# Patient Record
Sex: FEMALE | Race: WHITE | NOT HISPANIC OR LATINO | Employment: OTHER | URBAN - METROPOLITAN AREA
[De-identification: names, ages, dates, MRNs, and addresses within clinical notes are randomized per-mention and may not be internally consistent; named-entity substitution may affect disease eponyms.]

---

## 2017-03-15 ENCOUNTER — HOSPITAL ENCOUNTER (OUTPATIENT)
Dept: FAMILY MEDICINE CLINIC | Facility: CLINIC | Age: 82
Setting detail: SPECIMEN
Discharge: HOME | End: 2017-03-15
Attending: FAMILY MEDICINE | Admitting: FAMILY MEDICINE

## 2017-03-15 LAB
ANION GAP SERPL CALC-SCNC: 15.1 MMOL/L (ref 10–20)
BUN SERPL-MCNC: 22 MG/DL (ref 8–20)
BUN/CREAT SERPL: 18.3 (ref 5.4–26.2)
CALCIUM SERPL-MCNC: 9.6 MG/DL (ref 8.9–10.3)
CHLORIDE SERPL-SCNC: 102 MMOL/L (ref 101–111)
CONV CO2: 22 MMOL/L (ref 22–32)
CREAT UR-MCNC: 1.2 MG/DL (ref 0.4–1)
GLUCOSE SERPL-MCNC: 108 MG/DL (ref 65–99)
POTASSIUM SERPL-SCNC: 4.1 MMOL/L (ref 3.6–5.1)
SODIUM SERPL-SCNC: 135 MMOL/L (ref 136–144)

## 2017-04-26 ENCOUNTER — HOSPITAL ENCOUNTER (OUTPATIENT)
Dept: FAMILY MEDICINE CLINIC | Facility: CLINIC | Age: 82
Setting detail: SPECIMEN
Discharge: HOME | End: 2017-04-26
Attending: FAMILY MEDICINE | Admitting: FAMILY MEDICINE

## 2017-04-26 LAB
ANION GAP SERPL CALC-SCNC: 17.4 MMOL/L (ref 10–20)
BUN SERPL-MCNC: 18 MG/DL (ref 8–20)
BUN/CREAT SERPL: 15 (ref 5.4–26.2)
CALCIUM SERPL-MCNC: 9.9 MG/DL (ref 8.9–10.3)
CHLORIDE SERPL-SCNC: 102 MMOL/L (ref 101–111)
CONV CO2: 22 MMOL/L (ref 22–32)
CREAT UR-MCNC: 1.2 MG/DL (ref 0.4–1)
GLUCOSE SERPL-MCNC: 115 MG/DL (ref 65–99)
POTASSIUM SERPL-SCNC: 4.4 MMOL/L (ref 3.6–5.1)
SODIUM SERPL-SCNC: 137 MMOL/L (ref 136–144)

## 2017-07-06 ENCOUNTER — HOSPITAL ENCOUNTER (OUTPATIENT)
Dept: LAB | Facility: HOSPITAL | Age: 82
Setting detail: SPECIMEN
Discharge: HOME | End: 2017-07-06
Attending: INTERNAL MEDICINE | Admitting: INTERNAL MEDICINE

## 2017-07-06 LAB
ANION GAP SERPL CALC-SCNC: 16.2 MMOL/L (ref 10–20)
BACTERIA SPEC AEROBE CULT: NORMAL
BILIRUB UR QL STRIP: NEGATIVE MG/DL
BUN SERPL-MCNC: 17 MG/DL (ref 8–20)
BUN/CREAT SERPL: 13.1 (ref 5.4–26.2)
CALCIUM SERPL-MCNC: 9.3 MG/DL (ref 8.9–10.3)
CASTS URNS QL MICRO: ABNORMAL /[LPF]
CHLORIDE SERPL-SCNC: 104 MMOL/L (ref 101–111)
COLOR UR: YELLOW
CONV BACTERIA IN URINE MICRO: ABNORMAL
CONV CLARITY OF URINE: ABNORMAL
CONV CO2: 21 MMOL/L (ref 22–32)
CONV HYALINE CASTS IN URINE MICRO: 1 /[LPF] (ref 0–5)
CONV PROTEIN IN URINE BY AUTOMATED TEST STRIP: NEGATIVE MG/DL
CONV SMALL ROUND CELLS: ABNORMAL /[HPF]
CONV UROBILINOGEN IN URINE BY AUTOMATED TEST STRIP: 0.2 MG/DL
CREAT UR-MCNC: 1.3 MG/DL (ref 0.4–1)
CULTURE INDICATED?: ABNORMAL
GLUCOSE SERPL-MCNC: 106 MG/DL (ref 65–99)
GLUCOSE UR QL: NEGATIVE MG/DL
HGB UR QL STRIP: ABNORMAL
KETONES UR QL STRIP: NEGATIVE MG/DL
LEUKOCYTE ESTERASE UR QL STRIP: ABNORMAL
Lab: NORMAL
MICRO REPORT STATUS: NORMAL
NITRITE UR QL STRIP: NEGATIVE
PH UR STRIP.AUTO: 7 [PH] (ref 4.5–8)
POTASSIUM SERPL-SCNC: 4.2 MMOL/L (ref 3.6–5.1)
RBC #/AREA URNS HPF: 1 /[HPF] (ref 0–3)
SODIUM SERPL-SCNC: 137 MMOL/L (ref 136–144)
SP GR UR: 1.01 (ref 1–1.03)
SPECIMEN SOURCE: NORMAL
SPERM URNS QL MICRO: ABNORMAL /[HPF]
SQUAMOUS SPT QL MICRO: 2 /[HPF] (ref 0–5)
UNIDENT CRYS URNS QL MICRO: ABNORMAL /[HPF]
WBC #/AREA URNS HPF: ABNORMAL /[HPF] (ref 0–5)
YEAST SPEC QL WET PREP: ABNORMAL /[HPF]

## 2017-09-15 ENCOUNTER — HOSPITAL ENCOUNTER (OUTPATIENT)
Dept: FAMILY MEDICINE CLINIC | Facility: CLINIC | Age: 82
Setting detail: SPECIMEN
Discharge: HOME | End: 2017-09-15
Attending: FAMILY MEDICINE | Admitting: FAMILY MEDICINE

## 2017-11-16 ENCOUNTER — HOSPITAL ENCOUNTER (OUTPATIENT)
Dept: ORTHOPEDIC SURGERY | Facility: CLINIC | Age: 82
Discharge: HOME | End: 2017-11-16
Attending: PODIATRIST | Admitting: PODIATRIST

## 2018-01-05 ENCOUNTER — HOSPITAL ENCOUNTER (OUTPATIENT)
Dept: GENERAL RADIOLOGY | Facility: HOSPITAL | Age: 83
Discharge: HOME | End: 2018-01-05
Attending: FAMILY MEDICINE | Admitting: FAMILY MEDICINE

## 2018-05-02 ENCOUNTER — HOSPITAL ENCOUNTER (OUTPATIENT)
Dept: GENERAL RADIOLOGY | Facility: HOSPITAL | Age: 83
Discharge: HOME | End: 2018-05-02

## 2018-05-14 ENCOUNTER — HOSPITAL ENCOUNTER (OUTPATIENT)
Dept: FAMILY MEDICINE CLINIC | Facility: CLINIC | Age: 83
Setting detail: SPECIMEN
Discharge: HOME | End: 2018-05-14

## 2018-05-14 LAB
ANION GAP SERPL CALC-SCNC: 11.7 MMOL/L (ref 10–20)
BASOPHILS # BLD AUTO: 0 10*3/UL (ref 0–0.2)
BASOPHILS NFR BLD AUTO: 0 % (ref 0–2)
BUN SERPL-MCNC: 23 MG/DL (ref 8–20)
BUN/CREAT SERPL: 20.9 (ref 5.4–26.2)
CALCIUM SERPL-MCNC: 10.4 MG/DL (ref 8.9–10.3)
CHLORIDE SERPL-SCNC: 105 MMOL/L (ref 101–111)
CONV CO2: 24 MMOL/L (ref 22–32)
CREAT UR-MCNC: 1.1 MG/DL (ref 0.4–1)
DIFFERENTIAL METHOD BLD: (no result)
EOSINOPHIL # BLD AUTO: 0.1 10*3/UL (ref 0–0.3)
EOSINOPHIL # BLD AUTO: 1 % (ref 0–3)
ERYTHROCYTE [DISTWIDTH] IN BLOOD BY AUTOMATED COUNT: 15.1 % (ref 11.5–14.5)
GLUCOSE SERPL-MCNC: 103 MG/DL (ref 65–99)
HCT VFR BLD AUTO: 39.8 % (ref 35–49)
HGB BLD-MCNC: 13.2 G/DL (ref 12–15)
LYMPHOCYTES # BLD AUTO: 2.7 10*3/UL (ref 0.8–4.8)
LYMPHOCYTES NFR BLD AUTO: 26 % (ref 18–42)
MCH RBC QN AUTO: 28.8 PG (ref 26–32)
MCHC RBC AUTO-ENTMCNC: 33.2 G/DL (ref 32–36)
MCV RBC AUTO: 86.7 FL (ref 80–94)
MONOCYTES # BLD AUTO: 1 10*3/UL (ref 0.1–1.3)
MONOCYTES NFR BLD AUTO: 10 % (ref 2–11)
NEUTROPHILS # BLD AUTO: 6.3 10*3/UL (ref 2.3–8.6)
NEUTROPHILS NFR BLD AUTO: 63 % (ref 50–75)
NRBC BLD AUTO-RTO: 0 /100{WBCS}
NRBC/RBC NFR BLD MANUAL: 0 10*3/UL
PLATELET # BLD AUTO: 319 10*3/UL (ref 150–450)
PMV BLD AUTO: 8.9 FL (ref 7.4–10.4)
POTASSIUM SERPL-SCNC: 4.7 MMOL/L (ref 3.6–5.1)
RBC # BLD AUTO: 4.59 10*6/UL (ref 4–5.4)
SODIUM SERPL-SCNC: 136 MMOL/L (ref 136–144)
WBC # BLD AUTO: 10.1 10*3/UL (ref 4.5–11.5)

## 2018-08-02 ENCOUNTER — HOSPITAL ENCOUNTER (OUTPATIENT)
Dept: FAMILY MEDICINE CLINIC | Facility: CLINIC | Age: 83
Setting detail: SPECIMEN
Discharge: HOME | End: 2018-08-02
Attending: PHYSICIAN ASSISTANT | Admitting: PHYSICIAN ASSISTANT

## 2018-08-02 LAB
ANION GAP SERPL CALC-SCNC: 16.3 MMOL/L (ref 10–20)
BUN SERPL-MCNC: 22 MG/DL (ref 8–20)
BUN/CREAT SERPL: 18.3 (ref 5.4–26.2)
CALCIUM SERPL-MCNC: 9.5 MG/DL (ref 8.9–10.3)
CHLORIDE SERPL-SCNC: 102 MMOL/L (ref 101–111)
CONV CO2: 20 MMOL/L (ref 22–32)
CREAT UR-MCNC: 1.2 MG/DL (ref 0.4–1)
GLUCOSE SERPL-MCNC: 102 MG/DL (ref 65–99)
POTASSIUM SERPL-SCNC: 4.3 MMOL/L (ref 3.6–5.1)
SODIUM SERPL-SCNC: 134 MMOL/L (ref 136–144)

## 2018-08-09 ENCOUNTER — HOSPITAL ENCOUNTER (OUTPATIENT)
Dept: FAMILY MEDICINE CLINIC | Facility: CLINIC | Age: 83
Setting detail: SPECIMEN
Discharge: HOME | End: 2018-08-09
Attending: FAMILY MEDICINE | Admitting: FAMILY MEDICINE

## 2018-12-14 ENCOUNTER — HOSPITAL ENCOUNTER (OUTPATIENT)
Dept: FAMILY MEDICINE CLINIC | Facility: CLINIC | Age: 83
Setting detail: SPECIMEN
Discharge: HOME | End: 2018-12-14
Attending: FAMILY MEDICINE | Admitting: FAMILY MEDICINE

## 2018-12-14 LAB
ANION GAP SERPL CALC-SCNC: 13 MMOL/L (ref 10–20)
BUN SERPL-MCNC: 20 MG/DL (ref 8–20)
BUN/CREAT SERPL: 18.2 (ref 5.4–26.2)
CALCIUM SERPL-MCNC: 9.4 MG/DL (ref 8.9–10.3)
CHLORIDE SERPL-SCNC: 105 MMOL/L (ref 101–111)
CONV CO2: 22 MMOL/L (ref 22–32)
CREAT UR-MCNC: 1.1 MG/DL (ref 0.4–1)
GLUCOSE SERPL-MCNC: 87 MG/DL (ref 65–99)
POTASSIUM SERPL-SCNC: 4 MMOL/L (ref 3.6–5.1)
SODIUM SERPL-SCNC: 136 MMOL/L (ref 136–144)

## 2019-02-28 ENCOUNTER — HOSPITAL ENCOUNTER (OUTPATIENT)
Dept: FAMILY MEDICINE CLINIC | Facility: CLINIC | Age: 84
Setting detail: SPECIMEN
Discharge: HOME | End: 2019-02-28
Attending: FAMILY MEDICINE | Admitting: FAMILY MEDICINE

## 2019-02-28 LAB
ALBUMIN SERPL-MCNC: 4 G/DL (ref 3.5–4.8)
ALBUMIN/GLOB SERPL: 1.3 {RATIO} (ref 1–1.7)
ALP SERPL-CCNC: 72 IU/L (ref 32–91)
ALT SERPL-CCNC: 22 IU/L (ref 14–54)
ANION GAP SERPL CALC-SCNC: 16.1 MMOL/L (ref 10–20)
AST SERPL-CCNC: 39 IU/L (ref 15–41)
BILIRUB SERPL-MCNC: 0.6 MG/DL (ref 0.3–1.2)
BUN SERPL-MCNC: 18 MG/DL (ref 8–20)
BUN/CREAT SERPL: 16.4 (ref 5.4–26.2)
CALCIUM SERPL-MCNC: 9.5 MG/DL (ref 8.9–10.3)
CHLORIDE SERPL-SCNC: 104 MMOL/L (ref 101–111)
CHOLEST SERPL-MCNC: 223 MG/DL
CHOLEST/HDLC SERPL: 5 {RATIO}
CONV CO2: 18 MMOL/L (ref 22–32)
CONV LDL CHOLESTEROL DIRECT: 125 MG/DL (ref 0–100)
CONV TOTAL PROTEIN: 7.1 G/DL (ref 6.1–7.9)
CREAT UR-MCNC: 1.1 MG/DL (ref 0.4–1)
GLOBULIN UR ELPH-MCNC: 3.1 G/DL (ref 2.5–3.8)
GLUCOSE SERPL-MCNC: 117 MG/DL (ref 65–99)
HDLC SERPL-MCNC: 45 MG/DL
LDLC/HDLC SERPL: 2.8 {RATIO}
LIPID INTERPRETATION: ABNORMAL
POTASSIUM SERPL-SCNC: 4.1 MMOL/L (ref 3.6–5.1)
SODIUM SERPL-SCNC: 134 MMOL/L (ref 136–144)
TRIGL SERPL-MCNC: 391 MG/DL
VLDLC SERPL CALC-MCNC: 53.1 MG/DL

## 2019-09-16 ENCOUNTER — LAB (OUTPATIENT)
Dept: FAMILY MEDICINE CLINIC | Facility: CLINIC | Age: 84
End: 2019-09-16

## 2019-09-16 ENCOUNTER — OFFICE VISIT (OUTPATIENT)
Dept: FAMILY MEDICINE CLINIC | Facility: CLINIC | Age: 84
End: 2019-09-16

## 2019-09-16 VITALS
DIASTOLIC BLOOD PRESSURE: 92 MMHG | WEIGHT: 231.2 LBS | HEART RATE: 59 BPM | BODY MASS INDEX: 40.96 KG/M2 | SYSTOLIC BLOOD PRESSURE: 133 MMHG | TEMPERATURE: 97 F | HEIGHT: 63 IN | OXYGEN SATURATION: 96 %

## 2019-09-16 DIAGNOSIS — M79.602 LEFT ARM PAIN: ICD-10-CM

## 2019-09-16 DIAGNOSIS — Z63.8 STRESS DUE TO FAMILY TENSION: ICD-10-CM

## 2019-09-16 DIAGNOSIS — E78.2 MIXED HYPERLIPIDEMIA: Primary | ICD-10-CM

## 2019-09-16 DIAGNOSIS — I10 ESSENTIAL HYPERTENSION: ICD-10-CM

## 2019-09-16 DIAGNOSIS — Z23 NEED FOR VACCINATION: ICD-10-CM

## 2019-09-16 DIAGNOSIS — E78.2 MIXED HYPERLIPIDEMIA: ICD-10-CM

## 2019-09-16 DIAGNOSIS — R73.9 HYPERGLYCEMIA: ICD-10-CM

## 2019-09-16 PROBLEM — N28.9 RENAL INSUFFICIENCY: Status: ACTIVE | Noted: 2017-03-15

## 2019-09-16 PROBLEM — C64.9 RENAL CANCER: Status: ACTIVE | Noted: 2017-03-15

## 2019-09-16 PROBLEM — E11.9 TYPE 2 DIABETES MELLITUS WITHOUT COMPLICATIONS (HCC): Status: ACTIVE | Noted: 2017-09-17

## 2019-09-16 PROBLEM — M79.673 PAIN OF FOOT: Status: ACTIVE | Noted: 2017-11-16

## 2019-09-16 PROBLEM — E78.5 HYPERLIPIDEMIA: Status: ACTIVE | Noted: 2019-09-16

## 2019-09-16 PROBLEM — J98.01 BRONCHOSPASM: Status: ACTIVE | Noted: 2019-01-09

## 2019-09-16 PROBLEM — J45.909 ASTHMA: Status: ACTIVE | Noted: 2018-01-10

## 2019-09-16 PROBLEM — M79.10 MUSCLE ACHE: Status: ACTIVE | Noted: 2019-01-09

## 2019-09-16 PROBLEM — H57.12 EYE PAIN, LEFT: Status: ACTIVE | Noted: 2019-02-28

## 2019-09-16 PROBLEM — G47.33 OSA (OBSTRUCTIVE SLEEP APNEA): Status: ACTIVE | Noted: 2019-09-16

## 2019-09-16 PROBLEM — R05.9 COUGH: Status: ACTIVE | Noted: 2018-05-02

## 2019-09-16 PROBLEM — Z63.9 FAMILY TENSION: Status: ACTIVE | Noted: 2018-12-14

## 2019-09-16 PROBLEM — F41.9 ANXIETY: Status: ACTIVE | Noted: 2018-05-14

## 2019-09-16 PROBLEM — M76.829 TIBIALIS POSTERIOR TENDINITIS: Status: ACTIVE | Noted: 2017-11-16

## 2019-09-16 PROBLEM — N18.30 CHRONIC RENAL INSUFFICIENCY, STAGE III (MODERATE) (HCC): Status: ACTIVE | Noted: 2018-08-09

## 2019-09-16 PROBLEM — M19.079 ARTHRITIS OF FOOT: Status: ACTIVE | Noted: 2017-11-16

## 2019-09-16 PROBLEM — R60.0 EDEMA OF LOWER EXTREMITY: Status: ACTIVE | Noted: 2018-07-27

## 2019-09-16 PROBLEM — R25.2 MUSCLE CRAMPS: Status: ACTIVE | Noted: 2017-03-15

## 2019-09-16 LAB
ALBUMIN SERPL-MCNC: 4 G/DL (ref 3.5–4.8)
ALBUMIN/GLOB SERPL: 1.4 G/DL (ref 1–1.7)
ALP SERPL-CCNC: 75 U/L (ref 32–91)
ALT SERPL W P-5'-P-CCNC: 26 U/L (ref 14–54)
ANION GAP SERPL CALCULATED.3IONS-SCNC: 16.4 MMOL/L (ref 5–15)
ARTICHOKE IGE QN: 213 MG/DL (ref 0–100)
AST SERPL-CCNC: 38 U/L (ref 15–41)
BILIRUB SERPL-MCNC: 0.6 MG/DL (ref 0.3–1.2)
BUN BLD-MCNC: 20 MG/DL (ref 8–20)
BUN/CREAT SERPL: 18.2 (ref 5.4–26.2)
CALCIUM SPEC-SCNC: 9.6 MG/DL (ref 8.9–10.3)
CHLORIDE SERPL-SCNC: 104 MMOL/L (ref 101–111)
CHOLEST SERPL-MCNC: 383 MG/DL
CO2 SERPL-SCNC: 22 MMOL/L (ref 22–32)
CREAT BLD-MCNC: 1.1 MG/DL (ref 0.4–1)
GFR SERPL CREATININE-BSD FRML MDRD: 47 ML/MIN/1.73
GLOBULIN UR ELPH-MCNC: 2.8 GM/DL (ref 2.5–3.8)
GLUCOSE BLD-MCNC: 114 MG/DL (ref 65–99)
HBA1C MFR BLD: 5.9 % (ref 3.5–5.6)
HDLC SERPL QL: 9.58
HDLC SERPL-MCNC: 40 MG/DL
LDLC/HDLC SERPL: 4.74 {RATIO}
POTASSIUM BLD-SCNC: 4.4 MMOL/L (ref 3.6–5.1)
PROT SERPL-MCNC: 6.8 G/DL (ref 6.1–7.9)
SODIUM BLD-SCNC: 138 MMOL/L (ref 136–144)
TRIGL SERPL-MCNC: 768 MG/DL
VLDLC SERPL-MCNC: 153.6 MG/DL

## 2019-09-16 PROCEDURE — G0008 ADMIN INFLUENZA VIRUS VAC: HCPCS | Performed by: FAMILY MEDICINE

## 2019-09-16 PROCEDURE — 83036 HEMOGLOBIN GLYCOSYLATED A1C: CPT | Performed by: FAMILY MEDICINE

## 2019-09-16 PROCEDURE — G0009 ADMIN PNEUMOCOCCAL VACCINE: HCPCS | Performed by: FAMILY MEDICINE

## 2019-09-16 PROCEDURE — 99214 OFFICE O/P EST MOD 30 MIN: CPT | Performed by: FAMILY MEDICINE

## 2019-09-16 PROCEDURE — 36415 COLL VENOUS BLD VENIPUNCTURE: CPT | Performed by: FAMILY MEDICINE

## 2019-09-16 PROCEDURE — 90732 PPSV23 VACC 2 YRS+ SUBQ/IM: CPT | Performed by: FAMILY MEDICINE

## 2019-09-16 PROCEDURE — 80053 COMPREHEN METABOLIC PANEL: CPT | Performed by: FAMILY MEDICINE

## 2019-09-16 PROCEDURE — 80061 LIPID PANEL: CPT | Performed by: FAMILY MEDICINE

## 2019-09-16 PROCEDURE — 90674 CCIIV4 VAC NO PRSV 0.5 ML IM: CPT | Performed by: FAMILY MEDICINE

## 2019-09-16 RX ORDER — HYDRALAZINE HYDROCHLORIDE 100 MG/1
100 TABLET, FILM COATED ORAL EVERY 12 HOURS PRN
COMMUNITY
Start: 2018-09-21 | End: 2022-05-10

## 2019-09-16 RX ORDER — ALPRAZOLAM 0.5 MG/1
0.5 TABLET ORAL 2 TIMES DAILY PRN
Qty: 30 TABLET | Refills: 0 | Status: SHIPPED | OUTPATIENT
Start: 2019-09-16 | End: 2020-06-29 | Stop reason: SDUPTHER

## 2019-09-16 RX ORDER — AMLODIPINE BESYLATE 10 MG/1
10 TABLET ORAL EVERY 24 HOURS
Qty: 90 TABLET | Refills: 3 | Status: SHIPPED | OUTPATIENT
Start: 2019-09-16 | End: 2020-08-12

## 2019-09-16 RX ORDER — ALBUTEROL SULFATE 90 UG/1
2 AEROSOL, METERED RESPIRATORY (INHALATION) EVERY 4 HOURS PRN
COMMUNITY
Start: 2018-01-10 | End: 2021-02-05

## 2019-09-16 RX ORDER — ALPRAZOLAM 0.5 MG/1
TABLET ORAL
COMMUNITY
Start: 2018-05-14 | End: 2019-09-16 | Stop reason: SDUPTHER

## 2019-09-16 RX ORDER — AMLODIPINE BESYLATE 10 MG/1
TABLET ORAL EVERY 24 HOURS
COMMUNITY
Start: 2013-02-05 | End: 2019-09-16 | Stop reason: SDUPTHER

## 2019-09-16 SDOH — SOCIAL STABILITY - SOCIAL INSECURITY: OTHER SPECIFIED PROBLEMS RELATED TO PRIMARY SUPPORT GROUP: Z63.8
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
## 2019-09-18 ENCOUNTER — TELEPHONE (OUTPATIENT)
Dept: FAMILY MEDICINE CLINIC | Facility: CLINIC | Age: 84
End: 2019-09-18

## 2019-09-19 RX ORDER — ATORVASTATIN CALCIUM 10 MG/1
10 TABLET, FILM COATED ORAL DAILY
Qty: 90 TABLET | Refills: 3 | Status: SHIPPED | OUTPATIENT
Start: 2019-09-19 | End: 2019-11-25 | Stop reason: SINTOL

## 2019-11-08 ENCOUNTER — OFFICE VISIT (OUTPATIENT)
Dept: CARDIOLOGY | Facility: CLINIC | Age: 84
End: 2019-11-08

## 2019-11-08 VITALS — BODY MASS INDEX: 41.27 KG/M2 | SYSTOLIC BLOOD PRESSURE: 178 MMHG | DIASTOLIC BLOOD PRESSURE: 98 MMHG | WEIGHT: 233 LBS

## 2019-11-08 DIAGNOSIS — R53.82 CHRONIC FATIGUE: ICD-10-CM

## 2019-11-08 DIAGNOSIS — I50.32 CHRONIC DIASTOLIC (CONGESTIVE) HEART FAILURE (HCC): ICD-10-CM

## 2019-11-08 DIAGNOSIS — N28.89 HYPERTENSION SECONDARY TO OTHER RENAL DISORDERS: Primary | ICD-10-CM

## 2019-11-08 DIAGNOSIS — G47.33 SLEEP APNEA, OBSTRUCTIVE: ICD-10-CM

## 2019-11-08 DIAGNOSIS — E78.2 MIXED HYPERLIPIDEMIA: ICD-10-CM

## 2019-11-08 DIAGNOSIS — I15.1 HYPERTENSION SECONDARY TO OTHER RENAL DISORDERS: Primary | ICD-10-CM

## 2019-11-08 DIAGNOSIS — R06.09 DYSPNEA ON EXERTION: ICD-10-CM

## 2019-11-08 PROCEDURE — 93000 ELECTROCARDIOGRAM COMPLETE: CPT | Performed by: INTERNAL MEDICINE

## 2019-11-08 PROCEDURE — 99204 OFFICE O/P NEW MOD 45 MIN: CPT | Performed by: INTERNAL MEDICINE

## 2019-11-08 NOTE — PROGRESS NOTES
CC--exertional dyspnea, hypertension, chronic kidney disease, hyperlipidemia    Sub--  84-year-old female patient came as a self-referral--she complains of exertional shortness of breath with this class III shortness of breath--chronic medical problems include solitary kidney with prior nephrectomy, chronic kidney disease stage III, hypertension, hyperlipidemia and sleep apnea  Patient had issues with treatment of sleep apnea in the past and she is currently using a different mask and she is under the care of an ENT surgeon  She is under tremendous stress from social situation with her   She complains of exertional shortness of breath and significant fatigue and daytime sleepiness  She had a prior cardiac evaluation several years ago according to her without significant coronary artery disease and heart catheterization done more than 10 years ago according to her  She denies any TIA or stroke and no prior history of any peripheral vascular disease  She claims that her blood pressure is well controlled on multiple home recordings      Past Medical History:   Diagnosis Date   • Breast nodule 2013    Left breast nodule (fibrocystic disease , no malignancy)    • Hyperlipidemia    • Hypertension      Past Surgical History:   Procedure Laterality Date   • BREAST BIOPSY Left 05/21/2013    Benign fibrocystic disease ,Abstracted from [facility].   • CARDIAC CATHETERIZATION     • D&C AND LAPAROSCOPY     • FULGURATION ENDOMETRIOSIS      surgery   • NEPHRECTOMY Right      Family History   Problem Relation Age of Onset   • Heart disease Other    • Hyperlipidemia Other    • Diabetes Other    • Hypertension Other      Social History     Tobacco Use   • Smoking status: Never Smoker   • Smokeless tobacco: Never Used   Substance Use Topics   • Alcohol use: No     Frequency: Never   • Drug use: No       (Not in a hospital admission)  Allergies:  Patient has no known allergies.    Review of Systems   General:  positive for  fatigue and tiredness  Eyes: No redness  Cardiovascular: No chest pain, no palpitations  Respiratory:   positive for class 3 shortness of breath  Gastrointestinal: No nausea or vomiting, bleeding  Genitourinary: no hematuria or dysuria  Musculoskeletal: No arthralgia or myalgia  Skin: No rash  Neurologic: No numbness, tingling, syncope  Hematologic/Lymphatic: No abnormal bleeding      Physical Exam  VITALS REVIEWED--blood pressure 178/98, pulse rate is 65 patient is afebrile respiration 12 times a minute    General:      well developed, well nourished, in no acute distress.    Head:      normocephalic and atraumatic.    Eyes:      PERRL/EOM intact, conjunctiva and sclera clear with out nystagmus.    Neck:      no masses, thyromegaly,  trachea central with normal respiratory effort and PMI non displaced   Lungs:      clear bilaterally to auscultation.    Heart:      Sinus rhythm without any murmurs gallops or rubs  Msk:      no deformity or scoliosis noted of thoracic or lumbar spine.    Pulses:      pulses normal in all 4 extremities.    Extremities:       no cyanosis or clubbing--trace left pedal edema and trace right pedal edema.    Neurologic:      no focal deficits.   alert oriented x3  Skin:      intact without lesions or rashes.    Psych:      alert and cooperative; normal mood and affect; normal attention span and concentration.        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

## 2019-11-19 ENCOUNTER — TELEPHONE (OUTPATIENT)
Dept: CARDIOLOGY | Facility: CLINIC | Age: 84
End: 2019-11-19

## 2019-11-19 NOTE — TELEPHONE ENCOUNTER
Called pt regarding stress test, and echo.  Pt states she does not want to have them done right now.  She had testing done at [facility] on 9/25/13 and 9/26/2013.  The results are in care everywhere to be viewed only.  Pt did not want to do the echo she was concerned she would have to stop the echo as the last time she had one she did do to the feeling like her lungs were being punctured. Pt will call if she changes her mind.  I will discuss with Dr Ty to see if he would like to encourage her.

## 2019-11-20 ENCOUNTER — APPOINTMENT (OUTPATIENT)
Dept: CARDIOLOGY | Facility: HOSPITAL | Age: 84
End: 2019-11-20

## 2019-11-25 ENCOUNTER — OFFICE VISIT (OUTPATIENT)
Dept: FAMILY MEDICINE CLINIC | Facility: CLINIC | Age: 84
End: 2019-11-25

## 2019-11-25 VITALS
TEMPERATURE: 98.3 F | HEART RATE: 63 BPM | DIASTOLIC BLOOD PRESSURE: 85 MMHG | OXYGEN SATURATION: 91 % | SYSTOLIC BLOOD PRESSURE: 157 MMHG | BODY MASS INDEX: 41.45 KG/M2 | WEIGHT: 234 LBS

## 2019-11-25 DIAGNOSIS — E78.5 HYPERLIPIDEMIA, UNSPECIFIED HYPERLIPIDEMIA TYPE: Primary | ICD-10-CM

## 2019-11-25 DIAGNOSIS — M79.601 BILATERAL ARM PAIN: ICD-10-CM

## 2019-11-25 DIAGNOSIS — M79.602 BILATERAL ARM PAIN: ICD-10-CM

## 2019-11-25 PROCEDURE — 99213 OFFICE O/P EST LOW 20 MIN: CPT | Performed by: NURSE PRACTITIONER

## 2019-11-25 RX ORDER — EZETIMIBE 10 MG/1
10 TABLET ORAL DAILY
Qty: 30 TABLET | Refills: 0 | Status: SHIPPED | OUTPATIENT
Start: 2019-11-25 | End: 2019-12-14

## 2019-11-25 NOTE — PROGRESS NOTES
Subjective   Leandra Nuñez is a 84 y.o. female.       HPI   Pt. Is here today with c/o intermittent bilateral arm pain.  She feels it is related to her statin medication.  She says the symptoms started when she started this med.  She has previously been on other statins and had the same issues. Once she stopped the med her symptoms resolved.  She would like to discuss an alternative medication.   The pains are from her shoulders to her wrists; come and go but occur daily.  No numbness or tingling.  She hasn't lost  strength.  Denies any redness, bruising or swelling.  No known injury.    Denies any CP; palpations; SOA: dizziness headache; trouble with vision.        The following portions of the patient's history were reviewed and updated as appropriate: allergies, current medications, past family history, past medical history, past social history, past surgical history and problem list.    Review of Systems   Constitutional: Negative for activity change, appetite change, chills, diaphoresis, fatigue, fever, unexpected weight gain and unexpected weight loss.   Respiratory: Negative for cough, shortness of breath and wheezing.    Cardiovascular: Negative for chest pain, palpitations and leg swelling.   Musculoskeletal: Positive for myalgias (bilateral arm pains; intermittent). Negative for arthralgias, neck pain and neck stiffness.   Skin: Negative for dry skin, rash, skin lesions and bruise.   Neurological: Negative for tremors, weakness and numbness.   Hematological: Negative for adenopathy.   Psychiatric/Behavioral: Negative for depressed mood. The patient is not nervous/anxious.        Objective   Physical Exam   Constitutional: She is oriented to person, place, and time. She appears well-developed and well-nourished. No distress.   Neck: Normal range of motion. Neck supple. No thyromegaly present.   Cardiovascular: Normal rate, regular rhythm, normal heart sounds and intact distal pulses.   No murmur  heard.  Pulmonary/Chest: Effort normal and breath sounds normal. No respiratory distress.   Musculoskeletal: Normal range of motion. She exhibits no edema, tenderness or deformity.   Lymphadenopathy:     She has no cervical adenopathy.   Neurological: She is alert and oriented to person, place, and time. She displays normal reflexes. No sensory deficit.   Skin: Skin is warm and dry. Capillary refill takes less than 2 seconds. No rash noted. No erythema.   Psychiatric: She has a normal mood and affect.   Vitals reviewed.        Assessment/Plan   Leandra was seen today for arm pain.    Diagnoses and all orders for this visit:    Hyperlipidemia, unspecified hyperlipidemia type  Comments:  Atorvastatin d/c'd; will start Zetia.  Pt. to work on healthy diet; exercise; weight loss.   Orders:  -     ezetimibe (ZETIA) 10 MG tablet; Take 1 tablet by mouth Daily.    Bilateral arm pain  Comments:  Will d/c atorvastatin.  Will start Zetia.  Pt. to do phone follow up in a couple of weeks to re-eval symptoms.  Call for worsening or new symptoms.

## 2019-12-06 ENCOUNTER — OFFICE VISIT (OUTPATIENT)
Dept: CARDIOLOGY | Facility: CLINIC | Age: 84
End: 2019-12-06

## 2019-12-06 VITALS
WEIGHT: 234 LBS | SYSTOLIC BLOOD PRESSURE: 166 MMHG | BODY MASS INDEX: 41.45 KG/M2 | HEART RATE: 53 BPM | DIASTOLIC BLOOD PRESSURE: 72 MMHG

## 2019-12-06 DIAGNOSIS — I15.1 HYPERTENSION SECONDARY TO OTHER RENAL DISORDERS: ICD-10-CM

## 2019-12-06 DIAGNOSIS — N28.89 HYPERTENSION SECONDARY TO OTHER RENAL DISORDERS: ICD-10-CM

## 2019-12-06 DIAGNOSIS — I50.32 CHRONIC DIASTOLIC (CONGESTIVE) HEART FAILURE (HCC): Primary | ICD-10-CM

## 2019-12-06 DIAGNOSIS — R06.09 DYSPNEA ON EXERTION: ICD-10-CM

## 2019-12-06 DIAGNOSIS — R53.82 CHRONIC FATIGUE: ICD-10-CM

## 2019-12-06 DIAGNOSIS — G47.33 SLEEP APNEA, OBSTRUCTIVE: ICD-10-CM

## 2019-12-06 PROCEDURE — 99213 OFFICE O/P EST LOW 20 MIN: CPT | Performed by: INTERNAL MEDICINE

## 2019-12-06 RX ORDER — OXYBUTYNIN CHLORIDE 10 MG/1
10 TABLET, EXTENDED RELEASE ORAL DAILY
COMMUNITY
End: 2022-04-28

## 2019-12-06 NOTE — PROGRESS NOTES
CC--exertional dyspnea, hypertension, chronic kidney disease, hyperlipidemia    Sub--  84-year-old female patient came as a self-referral--she complains of exertional shortness of breath with this class III shortness of breath--chronic medical problems include solitary kidney with prior nephrectomy, chronic kidney disease stage III, hypertension, hyperlipidemia and sleep apnea  Patient had issues with treatment of sleep apnea in the past and she is currently using a different mask and she is under the care of an ENT surgeon  She is under tremendous stress from social situation with her   She complains of exertional shortness of breath and significant fatigue and daytime sleepiness  She had a prior cardiac evaluation several years ago according to her without significant coronary artery disease and heart catheterization done more than 10 years ago according to her  She denies any TIA or stroke and no prior history of any peripheral vascular disease  She claims that her blood pressure is well controlled on multiple home recordings  Since last visit patient has refused noninvasive work-up and feels better with optimization of her hypertension which is been monitoring at home      Past Medical History:   Diagnosis Date   • Breast nodule 2013    Left breast nodule (fibrocystic disease , no malignancy)    • Hyperlipidemia    • Hypertension      Past Surgical History:   Procedure Laterality Date   • BREAST BIOPSY Left 05/21/2013    Benign fibrocystic disease ,Abstracted from [facility].   • CARDIAC CATHETERIZATION     • D&C AND LAPAROSCOPY     • FULGURATION ENDOMETRIOSIS      surgery   • NEPHRECTOMY Right      Family History   Problem Relation Age of Onset   • Heart disease Other    • Hyperlipidemia Other    • Diabetes Other    • Hypertension Other      Social History     Tobacco Use   • Smoking status: Never Smoker   • Smokeless tobacco: Never Used   Substance Use Topics   • Alcohol use: No     Frequency: Never   •  Drug use: No       (Not in a hospital admission)  Allergies:  Patient has no known allergies.    Review of Systems   General:  positive for fatigue and tiredness  Eyes: No redness  Cardiovascular: No chest pain, no palpitations  Respiratory:   positive for class 3 shortness of breath  Gastrointestinal: No nausea or vomiting, bleeding  Genitourinary: no hematuria or dysuria  Musculoskeletal: No arthralgia or myalgia  Skin: No rash  Neurologic: No numbness, tingling, syncope  Hematologic/Lymphatic: No abnormal bleeding      Physical Exam  VITALS REVIEWED--blood pressure 166/72, pulse rate is 53 patient is afebrile respiration 12 times a minute    General:      well developed, well nourished, in no acute distress.    Head:      normocephalic and atraumatic.    Eyes:      PERRL/EOM intact, conjunctiva and sclera clear with out nystagmus.    Neck:      no masses, thyromegaly,  trachea central with normal respiratory effort and PMI non displaced   Lungs:      clear bilaterally to auscultation.    Heart:      Sinus rhythm without any murmurs gallops or rubs  Msk:      no deformity or scoliosis noted of thoracic or lumbar spine.    Pulses:      pulses normal in all 4 extremities.    Extremities:       no cyanosis or clubbing--trace left pedal edema and trace right pedal edema.    Neurologic:      no focal deficits.   alert oriented x3  Skin:      intact without lesions or rashes.    Psych:      alert and cooperative; normal mood and affect; normal attention span and concentration.            Assessment     hypertensive heart disease with diastolic dysfunction  Chronic kidney disease stage III  Hypertension  Hyperlipidemia  Sleep apnea  Exertional shortness of breath and significant fatigue  Chronic class III diastolic heart failure symptoms  Improved clinically since last clinical visit with optimization of hypertension at home recordings  Continue  current medication and follow-up after few months    Electronically signed  by Damien Ty MD, 12/06/19, 8:04 PM.

## 2019-12-14 ENCOUNTER — TELEPHONE (OUTPATIENT)
Dept: FAMILY MEDICINE CLINIC | Facility: CLINIC | Age: 84
End: 2019-12-14

## 2019-12-14 RX ORDER — COLESEVELAM 180 1/1
1875 TABLET ORAL 2 TIMES DAILY WITH MEALS
Qty: 180 TABLET | Refills: 0 | Status: SHIPPED | OUTPATIENT
Start: 2019-12-14 | End: 2020-03-11

## 2019-12-14 NOTE — TELEPHONE ENCOUNTER
Pt called answering service with c/o leg pain that started last night.  Pt states that he woke up late in the middle of the night with deep muscle leg pain, and some arm aching.  Pt states that it has continued today.  She started taking zetia a couple of weeks ago and is wondering if it could be from that.  She has been doing deep massage.  I advised pt to stop the zetia and that I would call in some welchol for her cholesterol.  I told her to increase her fluid intake to make sure the cramping is not from dehydration.  She is to go to the ER or [facility] for worsening symptoms.  I advised her if her symptoms continue to come in next week bc labs would need to be checked to evaluate electrolytes. Pt agreeable.

## 2019-12-17 ENCOUNTER — OFFICE VISIT (OUTPATIENT)
Dept: FAMILY MEDICINE CLINIC | Facility: CLINIC | Age: 84
End: 2019-12-17

## 2019-12-17 VITALS
HEIGHT: 63 IN | TEMPERATURE: 97.6 F | SYSTOLIC BLOOD PRESSURE: 176 MMHG | OXYGEN SATURATION: 95 % | BODY MASS INDEX: 40.93 KG/M2 | HEART RATE: 56 BPM | WEIGHT: 231 LBS | DIASTOLIC BLOOD PRESSURE: 91 MMHG

## 2019-12-17 DIAGNOSIS — M25.50 ARTHRALGIA, UNSPECIFIED JOINT: Primary | ICD-10-CM

## 2019-12-17 DIAGNOSIS — M79.10 MYALGIA: ICD-10-CM

## 2019-12-17 DIAGNOSIS — J40 BRONCHITIS: ICD-10-CM

## 2019-12-17 PROCEDURE — 99213 OFFICE O/P EST LOW 20 MIN: CPT | Performed by: FAMILY MEDICINE

## 2019-12-17 RX ORDER — BENZONATATE 100 MG/1
100 CAPSULE ORAL 3 TIMES DAILY PRN
Qty: 30 CAPSULE | Refills: 0 | Status: SHIPPED | OUTPATIENT
Start: 2019-12-17 | End: 2020-01-10

## 2019-12-17 RX ORDER — AZITHROMYCIN 250 MG/1
TABLET, FILM COATED ORAL
Qty: 6 TABLET | Refills: 0 | Status: SHIPPED | OUTPATIENT
Start: 2019-12-17 | End: 2019-12-22 | Stop reason: HOSPADM

## 2019-12-17 NOTE — PROGRESS NOTES
Subjective   Leandra Nuñez is a 84 y.o. female.     Comes in with complaints of not feeling well and having leg pain  C/o bilateral arm pain  Saw Elaine Rahman on the 25th   Pt related it to her statin and that was stopped but pain has not changed  Legs and back hurt  Wakes her  In calves and shins  Coughing - taking robitussin with DM and guaifenesin  Chest hurts from coughing  Started Friday  No fever  Hoarse  bp at home varying       The following portions of the patient's history were reviewed and updated as appropriate: allergies, current medications, past family history, past medical history, past social history, past surgical history and problem list.  Past Medical History:   Diagnosis Date   • Breast nodule 2013    Left breast nodule (fibrocystic disease , no malignancy)    • Hyperlipidemia    • Hypertension      Past Surgical History:   Procedure Laterality Date   • BREAST BIOPSY Left 05/21/2013    Benign [facility]rocystic [facility] ,[facility]   • CARDIAC CATHETERIZATION     • D&C AND LAPAROSCOPY     • FULGURATION ENDOMETRIOSIS      surgery   • NEPHRECTOMY Right      Family History   Problem Relation Age of Onset   • Heart disease Other    • Hyperlipidemia Other    • Diabetes Other    • Hypertension Other      Social History     Socioeconomic History   • Marital status:      Spouse name: Not on file   • Number of children: Not on file   • Years of education: Not on file   • Highest education level: Not on file   Tobacco Use   • Smoking status: Never Smoker   • Smokeless tobacco: Never Used   Substance and Sexual Activity   • Alcohol use: No     Frequency: Never   • Drug use: No   • Sexual activity: Yes     Partners: Male         Current Outpatient Medications:   •  albuterol sulfate HFA (VENTOLIN HFA) 108 (90 Base) MCG/ACT inhaler, VENTOLIN  (90 Base) MCG/ACT AERS, Disp: , Rfl:   •  ALPRAZolam (XANAX) 0.5 MG tablet, Take 1 tablet by mouth 2 (Two) Times a Day As Needed for  "Anxiety., Disp: 30 tablet, Rfl: 0  •  amLODIPine (NORVASC) 10 MG tablet, Take 1 tablet by mouth Daily., Disp: 90 tablet, Rfl: 3  •  azithromycin (ZITHROMAX) 250 MG tablet, Take 2 tablets the first day, then 1 tablet daily for 4 days., Disp: 6 tablet, Rfl: 0  •  benzonatate (TESSALON PERLES) 100 MG capsule, Take 1 capsule by mouth 3 (Three) Times a Day As Needed for Cough., Disp: 30 capsule, Rfl: 0  •  colesevelam (WELCHOL) 625 MG tablet, Take 3 tablets by mouth 2 (Two) Times a Day With Meals., Disp: 180 tablet, Rfl: 0  •  hydrALAZINE (APRESOLINE) 100 MG tablet, Every 12 (Twelve) Hours., Disp: , Rfl:   •  oxybutynin XL (DITROPAN-XL) 10 MG 24 hr tablet, Take 10 mg by mouth Daily., Disp: , Rfl:   •  pitavastatin calcium (LIVALO) 2 MG tablet tablet, Take 1 tablet by mouth Daily., Disp: , Rfl:     Review of Systems   Constitutional: Positive for fatigue. Negative for diaphoresis, fever, unexpected weight gain and unexpected weight loss.   Respiratory: Positive for cough. Negative for chest tightness and shortness of breath.    Cardiovascular: Positive for chest pain (secondary to coughing). Negative for palpitations and leg swelling.   Gastrointestinal: Negative for nausea and vomiting.   Musculoskeletal: Positive for arthralgias, back pain and myalgias.   Skin: Negative for rash.   Neurological: Negative for dizziness, syncope, numbness and headache.     /91 (BP Location: Left arm, Patient Position: Sitting, Cuff Size: Adult)   Pulse 56   Temp 97.6 °F (36.4 °C) (Oral)   Ht 160 cm (63\")   Wt 105 kg (231 lb)   SpO2 95%   BMI 40.92 kg/m²       Objective   Physical Exam   Constitutional: She appears well-developed and well-nourished. No distress.   HENT:   Head: Normocephalic and atraumatic.   Right Ear: Tympanic membrane and external ear normal.   Left Ear: Tympanic membrane and external ear normal.   Nose: Nose normal. Right sinus exhibits no maxillary sinus tenderness and no frontal sinus tenderness. Left " sinus exhibits no maxillary sinus tenderness and no frontal sinus tenderness.   Mouth/Throat: Oropharynx is clear and moist and mucous membranes are normal.   Eyes: Conjunctivae are normal. Right eye exhibits no discharge. Left eye exhibits no discharge.   Neck: Neck supple. No JVD present. No thyromegaly present.   Cardiovascular: Normal rate, regular rhythm, normal heart sounds and intact distal pulses. Exam reveals no gallop and no friction rub.   No murmur heard.  Pulmonary/Chest: Effort normal and breath sounds normal. No respiratory distress. She has no wheezes. She has no rales.   Abdominal: Bowel sounds are normal. There is no tenderness. There is no CVA tenderness.   Musculoskeletal: She exhibits no edema.   No vertebral tenderness  Both arms are tender to touch along the full length  Both calves and shins are slightly tender but no swelling, erythema, or warmth   Lymphadenopathy:     She has no cervical adenopathy.   Neurological: She is alert.   Skin: Skin is warm and dry. No rash noted.   Nursing note and vitals reviewed.        Assessment/Plan   Problems Addressed this Visit     None      Visit Diagnoses     Arthralgia, unspecified joint    -  Primary    Relevant Orders    Sedimentation rate, automated    C-reactive protein    Comprehensive Metabolic Panel    Myalgia        Relevant Orders    Sedimentation rate, automated    C-reactive protein    Comprehensive Metabolic Panel    Bronchitis        Relevant Medications    benzonatate (TESSALON PERLES) 100 MG capsule        zpak and benzonate to help with bronchitis  Rest, fluids, hot showers to help with the aches  Will check ESR, CRP and cmp

## 2019-12-20 ENCOUNTER — TELEPHONE (OUTPATIENT)
Dept: FAMILY MEDICINE CLINIC | Facility: CLINIC | Age: 84
End: 2019-12-20

## 2019-12-20 ENCOUNTER — HOSPITAL ENCOUNTER (OUTPATIENT)
Facility: HOSPITAL | Age: 84
Setting detail: OBSERVATION
Discharge: HOME | End: 2019-12-22
Attending: EMERGENCY MEDICINE | Admitting: HOSPITALIST

## 2019-12-20 ENCOUNTER — APPOINTMENT (OUTPATIENT)
Dept: GENERAL RADIOLOGY | Facility: HOSPITAL | Age: 84
End: 2019-12-20

## 2019-12-20 DIAGNOSIS — F41.9 ACUTE ANXIETY: ICD-10-CM

## 2019-12-20 DIAGNOSIS — J20.9 BRONCHITIS WITH BRONCHOSPASM: Primary | ICD-10-CM

## 2019-12-20 DIAGNOSIS — I50.9 CONGESTIVE HEART FAILURE, UNSPECIFIED HF CHRONICITY, UNSPECIFIED HEART FAILURE TYPE (HCC): ICD-10-CM

## 2019-12-20 LAB
ANION GAP SERPL CALCULATED.3IONS-SCNC: 15 MMOL/L (ref 5–15)
B PERT DNA SPEC QL NAA+PROBE: NOT DETECTED
BASOPHILS # BLD AUTO: 0 10*3/MM3 (ref 0–0.2)
BASOPHILS NFR BLD AUTO: 0.2 % (ref 0–1.5)
BUN BLD-MCNC: 15 MG/DL (ref 8–23)
BUN/CREAT SERPL: 13.9 (ref 7–25)
C PNEUM DNA NPH QL NAA+NON-PROBE: NOT DETECTED
CALCIUM SPEC-SCNC: 9.4 MG/DL (ref 8.6–10.5)
CHLORIDE SERPL-SCNC: 101 MMOL/L (ref 98–107)
CO2 SERPL-SCNC: 20 MMOL/L (ref 22–29)
CREAT BLD-MCNC: 1.08 MG/DL (ref 0.57–1)
DEPRECATED RDW RBC AUTO: 46.8 FL (ref 37–54)
EOSINOPHIL # BLD AUTO: 0.1 10*3/MM3 (ref 0–0.4)
EOSINOPHIL NFR BLD AUTO: 1.3 % (ref 0.3–6.2)
ERYTHROCYTE [DISTWIDTH] IN BLOOD BY AUTOMATED COUNT: 14.9 % (ref 12.3–15.4)
FLUAV H1 2009 PAND RNA NPH QL NAA+PROBE: NOT DETECTED
FLUAV H1 HA GENE NPH QL NAA+PROBE: NOT DETECTED
FLUAV H3 RNA NPH QL NAA+PROBE: NOT DETECTED
FLUAV SUBTYP SPEC NAA+PROBE: NOT DETECTED
FLUBV RNA ISLT QL NAA+PROBE: NOT DETECTED
GFR SERPL CREATININE-BSD FRML MDRD: 48 ML/MIN/1.73
GLUCOSE BLD-MCNC: 104 MG/DL (ref 65–99)
HADV DNA SPEC NAA+PROBE: NOT DETECTED
HCOV 229E RNA SPEC QL NAA+PROBE: NOT DETECTED
HCOV HKU1 RNA SPEC QL NAA+PROBE: NOT DETECTED
HCOV NL63 RNA SPEC QL NAA+PROBE: NOT DETECTED
HCOV OC43 RNA SPEC QL NAA+PROBE: NOT DETECTED
HCT VFR BLD AUTO: 39.1 % (ref 34–46.6)
HGB BLD-MCNC: 13 G/DL (ref 12–15.9)
HMPV RNA NPH QL NAA+NON-PROBE: NOT DETECTED
HPIV1 RNA SPEC QL NAA+PROBE: DETECTED
HPIV2 RNA SPEC QL NAA+PROBE: NOT DETECTED
HPIV3 RNA NPH QL NAA+PROBE: NOT DETECTED
HPIV4 P GENE NPH QL NAA+PROBE: NOT DETECTED
LYMPHOCYTES # BLD AUTO: 3.2 10*3/MM3 (ref 0.7–3.1)
LYMPHOCYTES NFR BLD AUTO: 29.1 % (ref 19.6–45.3)
M PNEUMO IGG SER IA-ACNC: NOT DETECTED
MCH RBC QN AUTO: 29.4 PG (ref 26.6–33)
MCHC RBC AUTO-ENTMCNC: 33.3 G/DL (ref 31.5–35.7)
MCV RBC AUTO: 88.3 FL (ref 79–97)
MONOCYTES # BLD AUTO: 0.9 10*3/MM3 (ref 0.1–0.9)
MONOCYTES NFR BLD AUTO: 8.3 % (ref 5–12)
NEUTROPHILS # BLD AUTO: 6.7 10*3/MM3 (ref 1.7–7)
NEUTROPHILS NFR BLD AUTO: 61.1 % (ref 42.7–76)
NRBC BLD AUTO-RTO: 0 /100 WBC (ref 0–0.2)
NT-PROBNP SERPL-MCNC: 293.5 PG/ML (ref 5–1800)
PLATELET # BLD AUTO: 263 10*3/MM3 (ref 140–450)
PMV BLD AUTO: 8.8 FL (ref 6–12)
POTASSIUM BLD-SCNC: 3.8 MMOL/L (ref 3.5–5.2)
RBC # BLD AUTO: 4.43 10*6/MM3 (ref 3.77–5.28)
RHINOVIRUS RNA SPEC NAA+PROBE: NOT DETECTED
RSV RNA NPH QL NAA+NON-PROBE: NOT DETECTED
SODIUM BLD-SCNC: 136 MMOL/L (ref 136–145)
WBC NRBC COR # BLD: 10.9 10*3/MM3 (ref 3.4–10.8)

## 2019-12-20 PROCEDURE — 0099U HC BIOFIRE FILMARRAY RESP PANEL 1: CPT | Performed by: PHYSICIAN ASSISTANT

## 2019-12-20 PROCEDURE — 80048 BASIC METABOLIC PNL TOTAL CA: CPT | Performed by: PHYSICIAN ASSISTANT

## 2019-12-20 PROCEDURE — G0378 HOSPITAL OBSERVATION PER HR: HCPCS

## 2019-12-20 PROCEDURE — 85025 COMPLETE CBC W/AUTO DIFF WBC: CPT | Performed by: PHYSICIAN ASSISTANT

## 2019-12-20 PROCEDURE — 25010000002 FUROSEMIDE PER 20 MG: Performed by: EMERGENCY MEDICINE

## 2019-12-20 PROCEDURE — 25010000002 LORAZEPAM PER 2 MG: Performed by: EMERGENCY MEDICINE

## 2019-12-20 PROCEDURE — 25010000002 CEFTRIAXONE PER 250 MG: Performed by: EMERGENCY MEDICINE

## 2019-12-20 PROCEDURE — 94799 UNLISTED PULMONARY SVC/PX: CPT

## 2019-12-20 PROCEDURE — 96375 TX/PRO/DX INJ NEW DRUG ADDON: CPT

## 2019-12-20 PROCEDURE — 94760 N-INVAS EAR/PLS OXIMETRY 1: CPT

## 2019-12-20 PROCEDURE — 25010000002 HYDRALAZINE PER 20 MG: Performed by: EMERGENCY MEDICINE

## 2019-12-20 PROCEDURE — 99219 PR INITIAL OBSERVATION CARE/DAY 50 MINUTES: CPT | Performed by: NURSE PRACTITIONER

## 2019-12-20 PROCEDURE — 36415 COLL VENOUS BLD VENIPUNCTURE: CPT

## 2019-12-20 PROCEDURE — 93005 ELECTROCARDIOGRAM TRACING: CPT | Performed by: EMERGENCY MEDICINE

## 2019-12-20 PROCEDURE — 25010000002 ONDANSETRON PER 1 MG: Performed by: NURSE PRACTITIONER

## 2019-12-20 PROCEDURE — 94640 AIRWAY INHALATION TREATMENT: CPT

## 2019-12-20 PROCEDURE — 83880 ASSAY OF NATRIURETIC PEPTIDE: CPT | Performed by: EMERGENCY MEDICINE

## 2019-12-20 PROCEDURE — 99284 EMERGENCY DEPT VISIT MOD MDM: CPT

## 2019-12-20 PROCEDURE — 25010000002 METHYLPREDNISOLONE PER 125 MG: Performed by: NURSE PRACTITIONER

## 2019-12-20 PROCEDURE — 71046 X-RAY EXAM CHEST 2 VIEWS: CPT

## 2019-12-20 PROCEDURE — 96361 HYDRATE IV INFUSION ADD-ON: CPT

## 2019-12-20 RX ORDER — SODIUM CHLORIDE 9 MG/ML
100 INJECTION, SOLUTION INTRAVENOUS CONTINUOUS
Status: DISCONTINUED | OUTPATIENT
Start: 2019-12-20 | End: 2019-12-21

## 2019-12-20 RX ORDER — HYDRALAZINE HYDROCHLORIDE 25 MG/1
75 TABLET, FILM COATED ORAL ONCE
Status: DISCONTINUED | OUTPATIENT
Start: 2019-12-20 | End: 2019-12-22 | Stop reason: HOSPADM

## 2019-12-20 RX ORDER — FUROSEMIDE 10 MG/ML
40 INJECTION INTRAMUSCULAR; INTRAVENOUS ONCE
Status: COMPLETED | OUTPATIENT
Start: 2019-12-20 | End: 2019-12-20

## 2019-12-20 RX ORDER — SODIUM CHLORIDE 0.9 % (FLUSH) 0.9 %
10 SYRINGE (ML) INJECTION EVERY 12 HOURS SCHEDULED
Status: DISCONTINUED | OUTPATIENT
Start: 2019-12-20 | End: 2019-12-22 | Stop reason: HOSPADM

## 2019-12-20 RX ORDER — PREDNISONE 20 MG/1
40 TABLET ORAL
Status: DISCONTINUED | OUTPATIENT
Start: 2019-12-21 | End: 2019-12-22 | Stop reason: HOSPADM

## 2019-12-20 RX ORDER — ONDANSETRON 4 MG/1
4 TABLET, FILM COATED ORAL EVERY 6 HOURS PRN
Status: DISCONTINUED | OUTPATIENT
Start: 2019-12-20 | End: 2019-12-22 | Stop reason: HOSPADM

## 2019-12-20 RX ORDER — ACETAMINOPHEN 650 MG/1
650 SUPPOSITORY RECTAL EVERY 4 HOURS PRN
Status: DISCONTINUED | OUTPATIENT
Start: 2019-12-20 | End: 2019-12-22 | Stop reason: HOSPADM

## 2019-12-20 RX ORDER — SODIUM CHLORIDE 0.9 % (FLUSH) 0.9 %
10 SYRINGE (ML) INJECTION AS NEEDED
Status: DISCONTINUED | OUTPATIENT
Start: 2019-12-20 | End: 2019-12-22 | Stop reason: HOSPADM

## 2019-12-20 RX ORDER — ONDANSETRON 2 MG/ML
4 INJECTION INTRAMUSCULAR; INTRAVENOUS EVERY 6 HOURS PRN
Status: DISCONTINUED | OUTPATIENT
Start: 2019-12-20 | End: 2019-12-22 | Stop reason: HOSPADM

## 2019-12-20 RX ORDER — METHYLPREDNISOLONE SODIUM SUCCINATE 125 MG/2ML
125 INJECTION, POWDER, LYOPHILIZED, FOR SOLUTION INTRAMUSCULAR; INTRAVENOUS ONCE
Status: COMPLETED | OUTPATIENT
Start: 2019-12-20 | End: 2019-12-20

## 2019-12-20 RX ORDER — ACETAMINOPHEN 160 MG/5ML
650 SOLUTION ORAL EVERY 4 HOURS PRN
Status: DISCONTINUED | OUTPATIENT
Start: 2019-12-20 | End: 2019-12-22 | Stop reason: HOSPADM

## 2019-12-20 RX ORDER — ECHINACEA PURPUREA EXTRACT 125 MG
2 TABLET ORAL AS NEEDED
Status: ACTIVE | OUTPATIENT
Start: 2019-12-20 | End: 2019-12-21

## 2019-12-20 RX ORDER — LORAZEPAM 2 MG/ML
0.5 INJECTION INTRAMUSCULAR ONCE
Status: COMPLETED | OUTPATIENT
Start: 2019-12-20 | End: 2019-12-20

## 2019-12-20 RX ORDER — HYDRALAZINE HYDROCHLORIDE 20 MG/ML
20 INJECTION INTRAMUSCULAR; INTRAVENOUS ONCE
Status: COMPLETED | OUTPATIENT
Start: 2019-12-20 | End: 2019-12-20

## 2019-12-20 RX ORDER — IPRATROPIUM BROMIDE AND ALBUTEROL SULFATE 2.5; .5 MG/3ML; MG/3ML
3 SOLUTION RESPIRATORY (INHALATION) ONCE
Status: COMPLETED | OUTPATIENT
Start: 2019-12-20 | End: 2019-12-20

## 2019-12-20 RX ORDER — ALBUTEROL SULFATE 2.5 MG/3ML
2.5 SOLUTION RESPIRATORY (INHALATION) ONCE
Status: COMPLETED | OUTPATIENT
Start: 2019-12-20 | End: 2019-12-20

## 2019-12-20 RX ORDER — ACETAMINOPHEN 325 MG/1
650 TABLET ORAL EVERY 4 HOURS PRN
Status: DISCONTINUED | OUTPATIENT
Start: 2019-12-20 | End: 2019-12-22 | Stop reason: HOSPADM

## 2019-12-20 RX ADMIN — METHYLPREDNISOLONE SODIUM SUCCINATE 125 MG: 125 INJECTION, POWDER, FOR SOLUTION INTRAMUSCULAR; INTRAVENOUS at 22:39

## 2019-12-20 RX ADMIN — SODIUM CHLORIDE 100 ML/HR: 900 INJECTION, SOLUTION INTRAVENOUS at 22:40

## 2019-12-20 RX ADMIN — CEFTRIAXONE SODIUM 1 G: 10 INJECTION, POWDER, FOR SOLUTION INTRAVENOUS at 20:04

## 2019-12-20 RX ADMIN — Medication 10 ML: at 23:00

## 2019-12-20 RX ADMIN — FUROSEMIDE 40 MG: 10 INJECTION, SOLUTION INTRAMUSCULAR; INTRAVENOUS at 20:04

## 2019-12-20 RX ADMIN — IPRATROPIUM BROMIDE AND ALBUTEROL SULFATE 3 ML: .5; 3 SOLUTION RESPIRATORY (INHALATION) at 17:11

## 2019-12-20 RX ADMIN — HYDRALAZINE HYDROCHLORIDE 20 MG: 20 INJECTION INTRAMUSCULAR; INTRAVENOUS at 18:09

## 2019-12-20 RX ADMIN — ACETAMINOPHEN 650 MG: 325 TABLET, FILM COATED ORAL at 23:00

## 2019-12-20 RX ADMIN — LORAZEPAM 0.5 MG: 2 INJECTION INTRAMUSCULAR; INTRAVENOUS at 20:05

## 2019-12-20 RX ADMIN — ONDANSETRON 4 MG: 2 INJECTION INTRAMUSCULAR; INTRAVENOUS at 23:00

## 2019-12-20 RX ADMIN — ALBUTEROL SULFATE 2.5 MG: 2.5 SOLUTION RESPIRATORY (INHALATION) at 19:54

## 2019-12-20 NOTE — ED PROVIDER NOTES
Subjective   History of Present Illness  1 week history of cough and congestion with little in the way of sputum production.  Patient denies any fever or chills nausea vomiting.  There is been no chest pain.  Patient denies any swelling in the extremities.  The patient states that she never did smoke.  She has been on antibiotics for the past 3 days without improvement.  She states that she has a nebulizer at home but has not used it.  She does not know the underlying diagnosis for her lung disease but she does have a history of lung disease and has been treated by a pulmonologist in the past.  No history of asthma.  No exposure to any toxic substances.  The patient's old chart was reviewed and she had renal cell cancer in 2013 with a right nephrectomy and no evidence of any recurrence or metastasis.  She also has a past history of ITP obstructive sleep apnea hypertension arthritis and congestive heart failure  Review of Systems   Constitutional: Positive for fatigue and fever.   HENT: Positive for congestion.    Eyes: Negative.    Respiratory: Positive for cough and shortness of breath.    Cardiovascular: Negative.    Gastrointestinal: Negative.    Endocrine: Negative.    Genitourinary: Negative.    Musculoskeletal: Positive for arthralgias.   Skin: Negative.    Allergic/Immunologic: Negative.    Neurological: Positive for weakness.   Hematological: Negative.    Psychiatric/Behavioral: Positive for dysphoric mood.       Past Medical History:   Diagnosis Date   • Breast nodule 2013    Left breast nodule (fibrocystic disease , no malignancy)    • Hyperlipidemia    • Hypertension        No Known Allergies    Past Surgical History:   Procedure Laterality Date   • BREAST BIOPSY Left 05/21/2013    Benign fibrocystic disease ,Abstracted from [facility].   • CARDIAC CATHETERIZATION     • D&C AND LAPAROSCOPY     • FULGURATION ENDOMETRIOSIS      surgery   • NEPHRECTOMY Right        Family History   Problem Relation Age of  Onset   • Heart disease Other    • Hyperlipidemia Other    • Diabetes Other    • Hypertension Other        Social History     Socioeconomic History   • Marital status:      Spouse name: Not on file   • Number of children: Not on file   • Years of education: Not on file   • Highest education level: Not on file   Tobacco Use   • Smoking status: Never Smoker   • Smokeless tobacco: Never Used   Substance and Sexual Activity   • Alcohol use: No     Frequency: Never   • Drug use: No   • Sexual activity: Yes     Partners: Male           Objective   Physical Exam  Patient is awake and alert her pressure was 202/100 her heart rate was 63 and her sats are 98% the HEENT exam shows no exudate neck is supple that JVD her chest reveals some wheezing bilaterally there is some rales in the bases cardiovascular exam reveals a regular rhythm without a gallop or murmurs abdomen was soft nontender she has trace pedal edema she has good distal pulses neurologic exam shows no focal deficit and she has no rash.  Procedures           ED Course         EKG shows a sinus tachycardia at 112 with left atrial enlargement left anterior hemiblock  .thisvisit  Medications   sodium chloride 0.9 % flush 10 mL (has no administration in time range)   hydrALAZINE (APRESOLINE) tablet 75 mg (has no administration in time range)   furosemide (LASIX) injection 40 mg (has no administration in time range)   ipratropium-albuterol (DUO-NEB) nebulizer solution 3 mL (3 mL Nebulization Given 12/20/19 1711)   hydrALAZINE (APRESOLINE) injection 20 mg (20 mg Intravenous Given 12/20/19 1809)     Xr Chest 2 View    Result Date: 12/20/2019  Stable cardiomegaly and benign calcified granulomatous changes. No acute chest findings.  Electronically Signed By-Dr. Saranya Zee MD On:12/20/2019 4:58 PM This report was finalized on [id number] by Dr. Saranya Zee MD.               No data recorded                        MDM  Patient was given hydralazine both  intravenously and p.o. and her blood pressure decreased to 180/90.  Chest x-ray shows chronic changes with old calcifications as well as cardiomegaly but no evidence of acute disease.  White count was 10,900 hemoglobin is 13.  The patient was also given an albuterol treatment for her wheezing.  She was given a dose of Lasix and she does show some patient of her BNP at 235.  Final diagnoses:   Bronchitis with bronchospasm   Congestive heart failure, unspecified HF chronicity, unspecified heart failure type (CMS/[facility])   Acute anxiety              Chris Keyes MD  12/20/19 1934

## 2019-12-20 NOTE — ED NOTES
Seen by her PCP for cough 3 days ago. Was given antibiotics and cough medication. Reports cough is getting worse.     Melina Avelar, RN  12/20/19 3282

## 2019-12-21 PROBLEM — R06.02 SHORTNESS OF BREATH: Status: ACTIVE | Noted: 2019-12-21

## 2019-12-21 PROBLEM — D72.829 LEUKOCYTOSIS: Status: ACTIVE | Noted: 2019-12-21

## 2019-12-21 PROBLEM — N17.9 ACUTE RENAL INJURY: Status: ACTIVE | Noted: 2019-12-21

## 2019-12-21 LAB
ANION GAP SERPL CALCULATED.3IONS-SCNC: 15 MMOL/L (ref 5–15)
BASOPHILS # BLD AUTO: 0 10*3/MM3 (ref 0–0.2)
BASOPHILS NFR BLD AUTO: 0.3 % (ref 0–1.5)
BUN BLD-MCNC: 15 MG/DL (ref 8–23)
BUN/CREAT SERPL: 12.7 (ref 7–25)
CALCIUM SPEC-SCNC: 9.2 MG/DL (ref 8.6–10.5)
CHLORIDE SERPL-SCNC: 102 MMOL/L (ref 98–107)
CHOLEST SERPL-MCNC: 207 MG/DL (ref 0–200)
CO2 SERPL-SCNC: 19 MMOL/L (ref 22–29)
CREAT BLD-MCNC: 1.18 MG/DL (ref 0.57–1)
DEPRECATED RDW RBC AUTO: 45.9 FL (ref 37–54)
EOSINOPHIL # BLD AUTO: 0 10*3/MM3 (ref 0–0.4)
EOSINOPHIL NFR BLD AUTO: 0.1 % (ref 0.3–6.2)
ERYTHROCYTE [DISTWIDTH] IN BLOOD BY AUTOMATED COUNT: 14.9 % (ref 12.3–15.4)
GFR SERPL CREATININE-BSD FRML MDRD: 44 ML/MIN/1.73
GLUCOSE BLD-MCNC: 194 MG/DL (ref 65–99)
HCT VFR BLD AUTO: 38.1 % (ref 34–46.6)
HDLC SERPL-MCNC: 41 MG/DL (ref 40–60)
HGB BLD-MCNC: 13.1 G/DL (ref 12–15.9)
LDLC SERPL CALC-MCNC: 110 MG/DL (ref 0–100)
LDLC/HDLC SERPL: 2.68 {RATIO}
LYMPHOCYTES # BLD AUTO: 1 10*3/MM3 (ref 0.7–3.1)
LYMPHOCYTES NFR BLD AUTO: 9.9 % (ref 19.6–45.3)
MCH RBC QN AUTO: 29.8 PG (ref 26.6–33)
MCHC RBC AUTO-ENTMCNC: 34.3 G/DL (ref 31.5–35.7)
MCV RBC AUTO: 86.8 FL (ref 79–97)
MONOCYTES # BLD AUTO: 0.1 10*3/MM3 (ref 0.1–0.9)
MONOCYTES NFR BLD AUTO: 1.2 % (ref 5–12)
NEUTROPHILS # BLD AUTO: 9.1 10*3/MM3 (ref 1.7–7)
NEUTROPHILS NFR BLD AUTO: 88.5 % (ref 42.7–76)
PLATELET # BLD AUTO: 248 10*3/MM3 (ref 140–450)
PMV BLD AUTO: 8.9 FL (ref 6–12)
POTASSIUM BLD-SCNC: 4.1 MMOL/L (ref 3.5–5.2)
RBC # BLD AUTO: 4.38 10*6/MM3 (ref 3.77–5.28)
SODIUM BLD-SCNC: 136 MMOL/L (ref 136–145)
TRIGL SERPL-MCNC: 281 MG/DL (ref 0–150)
TROPONIN T SERPL-MCNC: <0.01 NG/ML (ref 0–0.03)
TSH SERPL DL<=0.05 MIU/L-ACNC: 0.83 UIU/ML (ref 0.27–4.2)
VLDLC SERPL-MCNC: 56.2 MG/DL
WBC NRBC COR # BLD: 10.3 10*3/MM3 (ref 3.4–10.8)

## 2019-12-21 PROCEDURE — 96372 THER/PROPH/DIAG INJ SC/IM: CPT

## 2019-12-21 PROCEDURE — 25010000002 ENOXAPARIN PER 10 MG: Performed by: NURSE PRACTITIONER

## 2019-12-21 PROCEDURE — 96361 HYDRATE IV INFUSION ADD-ON: CPT

## 2019-12-21 PROCEDURE — 85027 COMPLETE CBC AUTOMATED: CPT | Performed by: NURSE PRACTITIONER

## 2019-12-21 PROCEDURE — 84443 ASSAY THYROID STIM HORMONE: CPT | Performed by: NURSE PRACTITIONER

## 2019-12-21 PROCEDURE — 63710000001 PREDNISONE PER 1 MG: Performed by: NURSE PRACTITIONER

## 2019-12-21 PROCEDURE — 84484 ASSAY OF TROPONIN QUANT: CPT | Performed by: NURSE PRACTITIONER

## 2019-12-21 PROCEDURE — 99225 PR SBSQ OBSERVATION CARE/DAY 25 MINUTES: CPT | Performed by: HOSPITALIST

## 2019-12-21 PROCEDURE — 80048 BASIC METABOLIC PNL TOTAL CA: CPT | Performed by: NURSE PRACTITIONER

## 2019-12-21 PROCEDURE — 80061 LIPID PANEL: CPT | Performed by: NURSE PRACTITIONER

## 2019-12-21 PROCEDURE — G0378 HOSPITAL OBSERVATION PER HR: HCPCS

## 2019-12-21 RX ORDER — COLESEVELAM 180 1/1
1875 TABLET ORAL 2 TIMES DAILY WITH MEALS
Status: DISCONTINUED | OUTPATIENT
Start: 2019-12-21 | End: 2019-12-22 | Stop reason: HOSPADM

## 2019-12-21 RX ORDER — DOXYCYCLINE 100 MG/1
100 TABLET ORAL EVERY 12 HOURS SCHEDULED
Status: DISCONTINUED | OUTPATIENT
Start: 2019-12-22 | End: 2019-12-22 | Stop reason: HOSPADM

## 2019-12-21 RX ORDER — AMLODIPINE BESYLATE 5 MG/1
10 TABLET ORAL DAILY
Status: DISCONTINUED | OUTPATIENT
Start: 2019-12-21 | End: 2019-12-22 | Stop reason: HOSPADM

## 2019-12-21 RX ORDER — HYDRALAZINE HYDROCHLORIDE 25 MG/1
100 TABLET, FILM COATED ORAL EVERY 12 HOURS PRN
Status: DISCONTINUED | OUTPATIENT
Start: 2019-12-21 | End: 2019-12-22 | Stop reason: HOSPADM

## 2019-12-21 RX ORDER — OXYBUTYNIN CHLORIDE 10 MG/1
10 TABLET, EXTENDED RELEASE ORAL DAILY
Status: DISCONTINUED | OUTPATIENT
Start: 2019-12-21 | End: 2019-12-22

## 2019-12-21 RX ORDER — BENZONATATE 100 MG/1
100 CAPSULE ORAL 3 TIMES DAILY PRN
Status: DISCONTINUED | OUTPATIENT
Start: 2019-12-21 | End: 2019-12-22 | Stop reason: HOSPADM

## 2019-12-21 RX ORDER — ALPRAZOLAM 0.5 MG/1
0.5 TABLET ORAL 2 TIMES DAILY PRN
Status: DISCONTINUED | OUTPATIENT
Start: 2019-12-21 | End: 2019-12-22 | Stop reason: HOSPADM

## 2019-12-21 RX ORDER — ATORVASTATIN CALCIUM 20 MG/1
20 TABLET, FILM COATED ORAL DAILY
Status: DISCONTINUED | OUTPATIENT
Start: 2019-12-21 | End: 2019-12-22 | Stop reason: HOSPADM

## 2019-12-21 RX ADMIN — DOXYCYCLINE 100 MG: 100 INJECTION, POWDER, LYOPHILIZED, FOR SOLUTION INTRAVENOUS at 17:29

## 2019-12-21 RX ADMIN — AMLODIPINE BESYLATE 10 MG: 5 TABLET ORAL at 09:41

## 2019-12-21 RX ADMIN — OXYBUTYNIN CHLORIDE 10 MG: 10 TABLET, EXTENDED RELEASE ORAL at 09:42

## 2019-12-21 RX ADMIN — SODIUM CHLORIDE 100 ML/HR: 900 INJECTION, SOLUTION INTRAVENOUS at 09:20

## 2019-12-21 RX ADMIN — ACETAMINOPHEN 650 MG: 325 TABLET, FILM COATED ORAL at 17:36

## 2019-12-21 RX ADMIN — COLESEVELAM HYDROCHLORIDE 1875 MG: 625 TABLET, FILM COATED ORAL at 17:29

## 2019-12-21 RX ADMIN — ENOXAPARIN SODIUM 40 MG: 40 INJECTION SUBCUTANEOUS at 21:18

## 2019-12-21 RX ADMIN — PREDNISONE 40 MG: 20 TABLET ORAL at 09:41

## 2019-12-21 RX ADMIN — COLESEVELAM HYDROCHLORIDE 1875 MG: 625 TABLET, FILM COATED ORAL at 09:41

## 2019-12-21 RX ADMIN — Medication 10 ML: at 09:48

## 2019-12-21 NOTE — ED NOTES
"Called to bedside by pt. Pt sts \"I can't breath and my toes are numb. I'm going to die if I can't catch my breath.\" Pt hyperventilating, spo2 98% on room air, good vocal quality, no stridor noted, wheeze noted at end if expiratory phase, good chest expansion bilaterally, spouse reports pt has hx of anxiety. Pt sts , \" My nose is clogged and I can't breath through my nose.\" Dr Keyes notified, new orders received.      Simin Turner, RN  12/20/19 1937    "

## 2019-12-21 NOTE — H&P
[facility] Medicine Services      Patient Name: Leandra Nuñez  : 1935  MRN: [id number]  Primary Care Physician: Anabelle Sellers MD  Date of admission: 2019    Patient Care Team:  Anabelle Sellers MD as PCP - General  Anabelle Sellers MD as PCP - Claims Attributed          Subjective   History Present Illness     Chief Complaint:   Chief Complaint   Patient presents with   • Cough       Ms. Nuñez is a 84 y.o.  presents to [facility] complaining of shortness of breath          84-year-old female presents to the ER with a chief complaint of shortness of breath which is been worsening over the last 4 days.  The patient reports increased cough without sputum production which was so severe 2 days ago that she vomited.  She has had subjective chills without known fever, denies associated nausea or diarrhea.  She has had no known ill contacts.  The patient went to see her PCP 3 days ago and was prescribed an antibiotic.  There was a delay of 1 day getting the prescription filled because the prescription was originally faxed to the online instead of a local pharmacy.  The patient has been on antibiotics for 2 days.    Review of records: The patient self-referred to Dr. 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  At that time he recommended that the patient have a stress test and echocardiogram to evaluate for possible hypertension induced diastolic heart failure.  The patient has not had that testing done today.  The patient had cardiac evaluation with stress test 7 years ago prior to having nephrectomy.      Review of Systems   Constitution: Positive for chills and malaise/fatigue.   HENT: Positive for congestion.    Cardiovascular: Negative for chest pain.   Respiratory: Positive for cough and shortness of breath. Negative for sputum production.    Gastrointestinal: Positive  for vomiting. Negative for abdominal pain, diarrhea and nausea.        Upper abdominal wall tenderness to palpation; patient has been coughing and vomited x1   Genitourinary: Negative for dysuria.   All other systems reviewed and are negative.          Personal History     Past Medical History:   Past Medical History:   Diagnosis Date   • Breast nodule 2013    Left breast nodule (fibrocystic disease , no malignancy)    • Hyperlipidemia    • Hypertension        Surgical History:      Past Surgical History:   Procedure Laterality Date   • BREAST BIOPSY Left 05/21/2013    Benign fibrocystic disease ,Abstracted from [facility].   • CARDIAC CATHETERIZATION     • D&C AND LAPAROSCOPY     • FULGURATION ENDOMETRIOSIS      surgery   • NEPHRECTOMY Right            Family History: family history includes Diabetes in an other family member; Heart disease in an other family member; Hyperlipidemia in an other family member; Hypertension in an other family member. Otherwise pertinent FHx was reviewed and unremarkable.     Social History:  reports that she has never smoked. She has never used smokeless tobacco. She reports that she does not drink alcohol or use drugs.      Medications:  Prior to Admission medications    Medication Sig Start Date End Date Taking? Authorizing Provider   albuterol sulfate HFA (VENTOLIN HFA) 108 (90 Base) MCG/ACT inhaler VENTOLIN  (90 Base) MCG/ACT AERS 1/10/18   Provider, MD Jory   ALPRAZolam (XANAX) 0.5 MG tablet Take 1 tablet by mouth 2 (Two) Times a Day As Needed for Anxiety. 9/16/19   Anabelle Sellers MD   amLODIPine (NORVASC) 10 MG tablet Take 1 tablet by mouth Daily. 9/16/19   Anabelle Sellers MD   azithromycin (ZITHROMAX) 250 MG tablet Take 2 tablets the first day, then 1 tablet daily for 4 days. 12/17/19   Anabelle Sellers MD   benzonatate (TESSALON PERLES) 100 MG capsule Take 1 capsule by mouth 3 (Three) Times a Day As Needed for Cough. 12/17/19    Anabelle Sellers MD   colesevelam (WELCHOL) 625 MG tablet Take 3 tablets by mouth 2 (Two) Times a Day With Meals. 12/14/19   Yancy Craft APRN   hydrALAZINE (APRESOLINE) 100 MG tablet Every 12 (Twelve) Hours. 9/21/18   ProviderJory MD   oxybutynin XL (DITROPAN-XL) 10 MG 24 hr tablet Take 10 mg by mouth Daily.    ProviderJory MD   pitavastatin calcium (LIVALO) 2 MG tablet tablet Take 1 tablet by mouth Daily.    Provider, MD Jory       Allergies:  No Known Allergies    Objective   Objective     Vital Signs  Temp:  [97.5 °F (36.4 °C)] 97.5 °F (36.4 °C)  Heart Rate:  [55-63] 63  Resp:  [16-30] 16  BP: (161-226)/() 181/95  SpO2:  [96 %-98 %] 98 %  on   ;   Device (Oxygen Therapy): room air  Body mass index is 41.12 kg/m².    Physical Exam   Constitutional: She is oriented to person, place, and time. She appears well-developed and well-nourished. No distress.   HENT:   Head: Normocephalic and atraumatic.   Nasal congestion noted with clear drainage   Eyes: Pupils are equal, round, and reactive to light. EOM are normal. No scleral icterus.   Neck: Normal range of motion. No JVD present. No tracheal deviation present. No thyromegaly present.   Cardiovascular: Normal rate, regular rhythm, normal heart sounds and intact distal pulses.   No murmur heard.  Pulmonary/Chest: Effort normal. She has wheezes.   Abdominal: Soft. Bowel sounds are normal. She exhibits no distension. There is tenderness.   Abdominal wall tenderness to the upper abdomen just below the rib area consistent with muscle strain from coughing   Musculoskeletal: Normal range of motion. She exhibits no edema.   Lymphadenopathy:     She has no cervical adenopathy.   Neurological: She is alert and oriented to person, place, and time.   Skin: Skin is warm and dry. Capillary refill takes less than 2 seconds. She is not diaphoretic. No erythema.   Psychiatric: Her behavior is normal. Judgment and thought content normal.    Patient is anxious   Vitals reviewed.      Results Review:  I have personally reviewed most recent cardiac tracings, lab results and radiology images and interpretations and agree with findings, most notably: Parainfluenza positive, mild leukocytosis.    Results from last 7 days   Lab Units 12/20/19  1721   WBC 10*3/mm3 10.90*   HEMOGLOBIN g/dL 13.0   HEMATOCRIT % 39.1   PLATELETS 10*3/mm3 263     Results from last 7 days   Lab Units 12/20/19  1804   SODIUM mmol/L 136   POTASSIUM mmol/L 3.8   CHLORIDE mmol/L 101   CO2 mmol/L 20.0*   BUN mg/dL 15   CREATININE mg/dL 1.08*   GLUCOSE mg/dL 104*   CALCIUM mg/dL 9.4   PROBNP pg/mL 293.5     Estimated Creatinine Clearance: 45.7 mL/min (A) (by C-G formula based on SCr of 1.08 mg/dL (H)).  Brief Urine Lab Results     None          Microbiology Results (last 10 days)     Procedure Component Value - Date/Time    Respiratory Panel, PCR - Swab, Nasopharynx [[id number]]  (Abnormal) Collected:  12/20/19 1808    Lab Status:  Final result Specimen:  Swab from Nasopharynx Updated:  12/20/19 1923     ADENOVIRUS, PCR Not Detected     Coronavirus 229E Not Detected     Coronavirus HKU1 Not Detected     Coronavirus NL63 Not Detected     Coronavirus OC43 Not Detected     Human Metapneumovirus Not Detected     Human Rhinovirus/Enterovirus Not Detected     Influenza B PCR Not Detected     Parainfluenza Virus 1 Detected     Parainfluenza Virus 2 Not Detected     Parainfluenza Virus 3 Not Detected     Parainfluenza Virus 4 Not Detected     Bordetella pertussis pcr Not Detected     Influenza A H1 2009 PCR Not Detected     Chlamydophila pneumoniae PCR Not Detected     Mycoplasma pneumo by PCR Not Detected     Influenza A PCR Not Detected     Influenza A H3 Not Detected     Influenza A H1 Not Detected     RSV, PCR Not Detected          ECG/EMG Results (most recent)     Procedure Component Value Units Date/Time    ECG 12 Lead [[id number]] Collected:  12/20/19 1922     Updated:  12/20/19 1924     Narrative:       HEART RATE= 79  bpm  RR Interval= 756  ms  MI Interval= 174  ms  P Horizontal Axis= 16  deg  P Front Axis= 50  deg  QRSD Interval= 112  ms  QT Interval= 404  ms  QRS Axis= -14  deg  T Wave Axis= 13  deg  - BORDERLINE ECG -  Sinus rhythm  Low voltage, precordial leads  Probable left ventricular hypertrophy  When compared with ECG of 23-Oct-2017 11:41:15,  Significant rate increase  Significant axis, voltage or hypertrophy change  Electronically Signed By:   Date and Time of Study: 2019-12-20 19:22:40                    Xr Chest 2 View    Result Date: 12/20/2019  Stable cardiomegaly and benign calcified granulomatous changes. No acute chest findings.  Electronically Signed By-Dr. Saranya Zee MD On:12/20/2019 4:58 PM This report was finalized on [id number] by Dr. Saranya Zee MD.        Estimated Creatinine Clearance: 45.7 mL/min (A) (by C-G formula based on SCr of 1.08 mg/dL (H)).    Assessment/Plan   Assessment/Plan       Active Hospital Problems    Diagnosis  POA   • Bronchitis with bronchospasm [J20.9]  Yes      Resolved Hospital Problems   No resolved problems to display.       Active Hospital Problems:  * Shortness of breath  --Likely secondary to bronchospasms; consideration for CHF, proBNP 293; cardiac ischemia considered  --Lasix x1 given in ER    Plan: Oxygen support as needed; duo nebs as needed; repeat troponin     Bronchitis with bronchospasm- (present on admission)  --Likely secondary to parainfluenza virus  --History of unspecified pulmonary disease with rescue inhaler    Plan: IV Medrol x1; Prednisone 40 mg daily x4 days; droplet precautions; continue Tessalon Perles; hold azithromycin    Hypertension- (present on admission)  Chronic, uncontrolled with elevated blood pressure    Continue amlodipine; continue PRN hydralazine    Leukocytosis  Mild, WBC 10.9--likely reactive    Plan: Repeat CBC     Acute renal injury (CMS/HCC)  --Mild, creatinine 1.08  --History of CKD stage III,  solitary kidney secondary to nephrectomy with renal carcinoma    Plan: Gentle IV fluids; repeat BMP    Anxiety- (present on admission)  Chronic, uncontrolled with situational anxiety about health    Plan: Continue Xanax     Hyperlipidemia- (present on admission)  Chronic    Plan: Continue WelChol, pitavastatin     Overactive bladder, chronic: continue oxybutynin    VTE Prophylaxis - Lovenox 30 mg SC daily.    CODE STATUS:    Code Status and Medical Interventions:   Ordered at: 12/20/19 1944     Code Status:    CPR     Medical Interventions (Level of Support Prior to Arrest):    Full       Admission Status:  I believe this patient meets observation criteria.      I discussed the patients findings and my recommendations with patient and family.        Electronically signed by DENNIS Romo, 12/20/19, 7:45 PM.  [facility] Hospitalist Team

## 2019-12-21 NOTE — ASSESSMENT & PLAN NOTE
--Likely secondary to bronchospasms; consideration for CHF, proBNP 293; cardiac ischemia considered  --Lasix x1 given in ER    Plan: Oxygen support as needed; duo nebs as needed; repeat troponin

## 2019-12-21 NOTE — PLAN OF CARE
Problem: Patient Care Overview  Goal: Plan of Care Review  Outcome: Ongoing (interventions implemented as appropriate)  Flowsheets (Taken 12/21/2019 0317)  Progress: no change  Plan of Care Reviewed With: patient  Outcome Summary: Pt continues to have harsh non productive cought that induces nausea.  Goal: Individualization and Mutuality  Outcome: Ongoing (interventions implemented as appropriate)  Goal: Discharge Needs Assessment  Outcome: Ongoing (interventions implemented as appropriate)  Goal: Interprofessional Rounds/Family Conf  Outcome: Ongoing (interventions implemented as appropriate)     Problem: Fall Risk (Adult)  Goal: Identify Related Risk Factors and Signs and Symptoms  Outcome: Ongoing (interventions implemented as appropriate)  Goal: Absence of Fall  Outcome: Ongoing (interventions implemented as appropriate)     Problem: Skin Injury Risk (Adult)  Goal: Identify Related Risk Factors and Signs and Symptoms  Outcome: Ongoing (interventions implemented as appropriate)  Goal: Skin Health and Integrity  Outcome: Ongoing (interventions implemented as appropriate)

## 2019-12-21 NOTE — ASSESSMENT & PLAN NOTE
--Mild, creatinine 1.08  --History of CKD stage III, solitary kidney secondary to nephrectomy with renal carcinoma    Plan: Gentle IV fluids; repeat BMP

## 2019-12-21 NOTE — PROGRESS NOTES
"      [facility] Medicine Services Daily Progress Note      Hospitalist Team  LOS 0 days      Patient Care Team:  Anabelle Sellers MD as PCP - General  Anabelle Sellers MD as PCP - Claims Attributed    Patient Location: [facility]/1      Subjective   Subjective     Chief Complaint / Subjective  Chief Complaint   Patient presents with   • Cough     Examined the patient in the morning 12/21/2019.  Complains of productive yellow cough and generalized weakness.  Never smoked.          Present on Admission:  • Bronchitis with bronchospasm  • Anxiety  • Hyperlipidemia  • Hypertension      Brief Synopsis of Hospital Course/HPI      The patient is an 84-year-old female with history of solitary kidney, hypertension, and hyperlipidemia that presented to the ER on 12/20/2019 complaining of 4 days of shortness of air and productive yellow cough.  She endorsed subjective chills without known fever and denies associated nausea or diarrhea.  She has had no known ill contacts.  The patient went to see her PCP 3 days ago and was prescribed an antibiotic.  There was a delay of 1 day getting the prescription filled because the prescription was originally faxed to the online instead of a local pharmacy.  The patient has been on antibiotics for 2 days.     Review of Systems   All other systems reviewed and are negative.        Objective   Objective      Vital Signs  Temp:  [97.5 °F (36.4 °C)-98.2 °F (36.8 °C)] 97.6 °F (36.4 °C)  Heart Rate:  [55-87] 65  Resp:  [16-30] 18  BP: (140-226)/() 183/78  Oxygen Therapy  SpO2: 94 %  Pulse Oximetry Type: Continuous  Device (Oxygen Therapy): room air  Flowsheet Rows      First Filed Value   Admission Height  160 cm (63\") Documented at 12/20/2019 1559   Admission Weight  105 kg (232 lb 2.3 oz) Documented at 12/20/2019 1559        Intake & Output (last 3 days)       12/18 0701 - 12/19 0700 12/19 0701 - 12/20 0700 12/20 0701 - 12/21 0700 12/21 0701 - 12/22 0700 "    P.O.   675 720    I.V. (mL/kg)    1066.7 (10.1)    Total Intake(mL/kg)   675 (6.4) 1786.7 (16.9)    Urine (mL/kg/hr)   650     Total Output   650     Net   +25 +1786.7            Urine Unmeasured Occurrence    1 x        Lines, Drains & Airways    Active LDAs     Name:   Placement date:   Placement time:   Site:   Days:    Peripheral IV 12/20/19 2003 Left Hand   12/20/19 2003    Hand   less than 1                  Physical Exam:    Physical Exam   Constitutional: She is oriented to person, place, and time. She appears well-developed and well-nourished.   HENT:   Head: Normocephalic and atraumatic.   Eyes: Pupils are equal, round, and reactive to light. EOM are normal.   Neck: Normal range of motion. Neck supple.   Cardiovascular: Normal rate and intact distal pulses.   Pulmonary/Chest: Effort normal. She has decreased breath sounds in the right lower field and the left lower field.   Abdominal: Soft. Bowel sounds are normal. There is no rebound and no guarding.   Musculoskeletal:   Moves all extremities.   Lymphadenopathy:     She has no cervical adenopathy.   Neurological: She is alert and oriented to person, place, and time.   Skin: Skin is warm and dry.   Psychiatric: She has a normal mood and affect. Her speech is normal and behavior is normal.       Procedures: None    Results Review:     I reviewed the patient's new clinical results.      Lab Results (last 24 hours)     Procedure Component Value Units Date/Time    Basic Metabolic Panel [[id number]]  (Abnormal) Collected:  12/21/19 0343    Specimen:  Blood Updated:  12/21/19 0507     Glucose 194 mg/dL      BUN 15 mg/dL      Creatinine 1.18 mg/dL      Sodium 136 mmol/L      Potassium 4.1 mmol/L      Chloride 102 mmol/L      CO2 19.0 mmol/L      Calcium 9.2 mg/dL      eGFR Non African Amer 44 mL/min/1.73      BUN/Creatinine Ratio 12.7     Anion Gap 15.0 mmol/L     Narrative:       GFR Normal >60  Chronic Kidney Disease <60  Kidney Failure <15      TSH  [[id number]]  (Normal) Collected:  12/21/19 0343    Specimen:  Blood Updated:  12/21/19 0501     TSH 0.832 uIU/mL     Troponin [[id number]]  (Normal) Collected:  12/21/19 0343    Specimen:  Blood Updated:  12/21/19 0501     Troponin T <0.010 ng/mL     Narrative:       Troponin T Reference Range:  <= 0.03 ng/mL-   Negative for AMI  >0.03 ng/mL-     Abnormal for myocardial necrosis.  Clinicians would have to utilize clinical acumen, EKG, Troponin and serial changes to determine if it is an Acute Myocardial Infarction or myocardial injury due to an underlying chronic condition.     Lipid Panel [[id number]]  (Abnormal) Collected:  12/21/19 0343    Specimen:  Blood Updated:  12/21/19 0457     Total Cholesterol 207 mg/dL      Triglycerides 281 mg/dL      HDL Cholesterol 41 mg/dL      LDL Cholesterol  110 mg/dL      VLDL Cholesterol 56.2 mg/dL      LDL/HDL Ratio 2.68    Narrative:       Cholesterol Reference Ranges  (U.S. Department of Health and Human Services ATP III Classifications)    Desirable          <200 mg/dL  Borderline High    200-239 mg/dL  High Risk          >240 mg/dL      Triglyceride Reference Ranges  (U.S. Department of Health and Human Services ATP III Classifications)    Normal           <150 mg/dL  Borderline High  150-199 mg/dL  High             200-499 mg/dL  Very High        >500 mg/dL    HDL Reference Ranges  (U.S. Department of Health and Human Services ATP III Classifcations)    Low     <40 mg/dl (major risk factor for CHD)  High    >60 mg/dl ('negative' risk factor for CHD)        LDL Reference Ranges  (U.S. Department of Health and Human Services ATP III Classifcations)    Optimal          <100 mg/dL  Near Optimal     100-129 mg/dL  Borderline High  130-159 mg/dL  High             160-189 mg/dL  Very High        >189 mg/dL    CBC & Differential [[id number]] Collected:  12/21/19 0343    Specimen:  Blood Updated:  12/21/19 0444    Narrative:       The following orders were created for panel order CBC  & Differential.  Procedure                               Abnormality         Status                     ---------                               -----------         ------                     Manual Differential[[id number]]                                                         CBC Auto Differential[[id number]]        Abnormal            Final result                 Please view results for these tests on the individual orders.    CBC Auto Differential [[id number]]  (Abnormal) Collected:  12/21/19 0343    Specimen:  Blood Updated:  12/21/19 0444     WBC 10.30 10*3/mm3      RBC 4.38 10*6/mm3      Hemoglobin 13.1 g/dL      Hematocrit 38.1 %      MCV 86.8 fL      MCH 29.8 pg      MCHC 34.3 g/dL      RDW 14.9 %      RDW-SD 45.9 fl      MPV 8.9 fL      Platelets 248 10*3/mm3      Neutrophil % 88.5 %      Lymphocyte % 9.9 %      Monocyte % 1.2 %      Eosinophil % 0.1 %      Basophil % 0.3 %      Neutrophils, Absolute 9.10 10*3/mm3      Lymphocytes, Absolute 1.00 10*3/mm3      Monocytes, Absolute 0.10 10*3/mm3      Eosinophils, Absolute 0.00 10*3/mm3      Basophils, Absolute 0.00 10*3/mm3     Respiratory Panel, PCR - Swab, Nasopharynx [[id number]]  (Abnormal) Collected:  12/20/19 1808    Specimen:  Swab from Nasopharynx Updated:  12/20/19 1923     ADENOVIRUS, PCR Not Detected     Coronavirus 229E Not Detected     Coronavirus HKU1 Not Detected     Coronavirus NL63 Not Detected     Coronavirus OC43 Not Detected     Human Metapneumovirus Not Detected     Human Rhinovirus/Enterovirus Not Detected     Influenza B PCR Not Detected     Parainfluenza Virus 1 Detected     Parainfluenza Virus 2 Not Detected     Parainfluenza Virus 3 Not Detected     Parainfluenza Virus 4 Not Detected     Bordetella pertussis pcr Not Detected     Influenza A H1 2009 PCR Not Detected     Chlamydophila pneumoniae PCR Not Detected     Mycoplasma pneumo by PCR Not Detected     Influenza A PCR Not Detected     Influenza A H3 Not Detected     Influenza A H1 Not  Detected     RSV, PCR Not Detected    BNP [[id number]]  (Normal) Collected:  12/20/19 1804    Specimen:  Blood Updated:  12/20/19 1843     proBNP 293.5 pg/mL     Narrative:       Among patients with dyspnea, NT-proBNP is highly sensitive for the detection of acute congestive heart failure. In addition NT-proBNP of <300 pg/ml effectively rules out acute congestive heart failure with 99% negative predictive value.      Basic Metabolic Panel [[id number]]  (Abnormal) Collected:  12/20/19 1804    Specimen:  Blood Updated:  12/20/19 1839     Glucose 104 mg/dL      BUN 15 mg/dL      Creatinine 1.08 mg/dL      Sodium 136 mmol/L      Potassium 3.8 mmol/L      Chloride 101 mmol/L      CO2 20.0 mmol/L      Calcium 9.4 mg/dL      eGFR Non African Amer 48 mL/min/1.73      BUN/Creatinine Ratio 13.9     Anion Gap 15.0 mmol/L     Narrative:       GFR Normal >60  Chronic Kidney Disease <60  Kidney Failure <15      CBC & Differential [[id number]] Collected:  12/20/19 1721    Specimen:  Blood Updated:  12/20/19 1727    Narrative:       The following orders were created for panel order CBC & Differential.  Procedure                               Abnormality         Status                     ---------                               -----------         ------                     CBC Auto Differential[[id number]]        Abnormal            Final result                 Please view results for these tests on the individual orders.    CBC Auto Differential [[id number]]  (Abnormal) Collected:  12/20/19 1721    Specimen:  Blood Updated:  12/20/19 1727     WBC 10.90 10*3/mm3      RBC 4.43 10*6/mm3      Hemoglobin 13.0 g/dL      Hematocrit 39.1 %      MCV 88.3 fL      MCH 29.4 pg      MCHC 33.3 g/dL      RDW 14.9 %      RDW-SD 46.8 fl      MPV 8.8 fL      Platelets 263 10*3/mm3      Neutrophil % 61.1 %      Lymphocyte % 29.1 %      Monocyte % 8.3 %      Eosinophil % 1.3 %      Basophil % 0.2 %      Neutrophils, Absolute 6.70 10*3/mm3       Lymphocytes, Absolute 3.20 10*3/mm3      Monocytes, Absolute 0.90 10*3/mm3      Eosinophils, Absolute 0.10 10*3/mm3      Basophils, Absolute 0.00 10*3/mm3      nRBC 0.0 /100 WBC         No results found for: HGBA1C                Microbiology Results (last 10 days)     Procedure Component Value - Date/Time    Respiratory Panel, PCR - Swab, Nasopharynx [[id number]]  (Abnormal) Collected:  12/20/19 1808    Lab Status:  Final result Specimen:  Swab from Nasopharynx Updated:  12/20/19 1923     ADENOVIRUS, PCR Not Detected     Coronavirus 229E Not Detected     Coronavirus HKU1 Not Detected     Coronavirus NL63 Not Detected     Coronavirus OC43 Not Detected     Human Metapneumovirus Not Detected     Human Rhinovirus/Enterovirus Not Detected     Influenza B PCR Not Detected     Parainfluenza Virus 1 Detected     Parainfluenza Virus 2 Not Detected     Parainfluenza Virus 3 Not Detected     Parainfluenza Virus 4 Not Detected     Bordetella pertussis pcr Not Detected     Influenza A H1 2009 PCR Not Detected     Chlamydophila pneumoniae PCR Not Detected     Mycoplasma pneumo by PCR Not Detected     Influenza A PCR Not Detected     Influenza A H3 Not Detected     Influenza A H1 Not Detected     RSV, PCR Not Detected          ECG/EMG Results (most recent)     Procedure Component Value Units Date/Time    ECG 12 Lead [[id number]] Collected:  12/20/19 1922     Updated:  12/21/19 0703    Narrative:       HEART RATE= 79  bpm  RR Interval= 756  ms  MA Interval= 174  ms  P Horizontal Axis= 16  deg  P Front Axis= 50  deg  QRSD Interval= 112  ms  QT Interval= 404  ms  QRS Axis= -14  deg  T Wave Axis= 13  deg  - BORDERLINE ECG -  Sinus rhythm  Low voltage, precordial leads  Probable left ventricular hypertrophy  When compared with ECG of 23-Oct-2017 11:41:15,  Significant rate increase  Significant axis, voltage or hypertrophy change  Electronically Signed By: Chris Keyes ([email or web address]) 21-Dec-2019 07:03:23  Date and Time of Study: 2019-12-20  19:22:40                    Xr Chest 2 View    Result Date: 12/20/2019  Stable cardiomegaly and benign calcified granulomatous changes. No acute chest findings.  Electronically Signed By-Dr. Saranya Zee MD On:12/20/2019 4:58 PM This report was finalized on [id number] by Dr. Saranya Zee MD.      Xrays, labs reviewed personally by physician.    Medication Review:   I have reviewed the patient's current medication list      Scheduled Meds    amLODIPine 10 mg Oral Daily   atorvastatin 20 mg Oral Daily   colesevelam 1,875 mg Oral BID With Meals   enoxaparin 40 mg Subcutaneous BID   hydrALAZINE 75 mg Oral Once   oxybutynin XL 10 mg Oral Daily   predniSONE 40 mg Oral Daily With Breakfast   sodium chloride 10 mL Intravenous Q12H       Meds Infusions       Meds PRN  •  acetaminophen **OR** acetaminophen **OR** acetaminophen  •  ALPRAZolam  •  benzonatate  •  hydrALAZINE  •  ondansetron **OR** ondansetron  •  [COMPLETED] Insert peripheral IV **AND** sodium chloride  •  sodium chloride  •  sodium chloride      Assessment/Plan   Assessment/Plan     Active Hospital Problems    Diagnosis  POA   • **Shortness of breath [R06.02]  Unknown   • Acute renal injury (CMS/HCC) [N17.9]  Unknown   • Leukocytosis [D72.829]  Unknown   • Bronchitis with bronchospasm [J20.9]  Yes   • Hyperlipidemia [E78.5]  Yes   • Hypertension [I10]  Yes   • Anxiety [F41.9]  Yes      Resolved Hospital Problems   No resolved problems to display.         Active Hospital Problems:    1.  Mucopurulent bronchitis secondary to parainfluenza virus  --Continue bronchodilators, corticosteroids and antibiotics    2. Hypertension:  --Continue amlodipine    3.  Hyperlipidemia:  --Continue statin    4.  Anxiety:  --Continue Xanax    5. VTE Prophylaxis - Lovenox 40 mg SC daily.      Code Status -   Code Status and Medical Interventions:   Ordered at: 12/20/19 1944     Code Status:    CPR     Medical Interventions (Level of Support Prior to Arrest):    Full        Discharge Planning    Destination      Coordination has not been started for this encounter.      Durable Medical Equipment      Coordination has not been started for this encounter.      Dialysis/Infusion      Coordination has not been started for this encounter.      Home Medical Care      Coordination has not been started for this encounter.      Therapy      Coordination has not been started for this encounter.      Community Resources      Coordination has not been started for this encounter.            Electronically signed by Dave Hernandez DO, 12/21/19, 1:35 PM.  [facility] Hospitalist Team

## 2019-12-21 NOTE — ASSESSMENT & PLAN NOTE
--Likely secondary to parainfluenza virus  --History of unspecified pulmonary disease with rescue inhaler    Plan: IV Medrol x1; Prednisone 40 mg daily x4 days; droplet precautions; continue Tessalon Perles; hold azithromycin

## 2019-12-21 NOTE — ASSESSMENT & PLAN NOTE
Chronic, uncontrolled with elevated blood pressure    Continue amlodipine; continue PRN hydralazine

## 2019-12-22 VITALS
TEMPERATURE: 97.8 F | HEIGHT: 63 IN | HEART RATE: 55 BPM | SYSTOLIC BLOOD PRESSURE: 142 MMHG | WEIGHT: 234.13 LBS | DIASTOLIC BLOOD PRESSURE: 80 MMHG | BODY MASS INDEX: 41.48 KG/M2 | OXYGEN SATURATION: 93 % | RESPIRATION RATE: 18 BRPM

## 2019-12-22 PROBLEM — J20.4 ACUTE BRONCHITIS DUE TO PARAINFLUENZA VIRUS: Status: ACTIVE | Noted: 2019-12-22

## 2019-12-22 PROBLEM — N17.9 ACUTE KIDNEY INJURY: Status: ACTIVE | Noted: 2019-12-22

## 2019-12-22 PROBLEM — J41.1 MUCOPURULENT CHRONIC BRONCHITIS (HCC): Status: ACTIVE | Noted: 2019-12-22

## 2019-12-22 LAB
ANION GAP SERPL CALCULATED.3IONS-SCNC: 12 MMOL/L (ref 5–15)
BASOPHILS # BLD AUTO: 0 10*3/MM3 (ref 0–0.2)
BASOPHILS NFR BLD AUTO: 0.2 % (ref 0–1.5)
BUN BLD-MCNC: 21 MG/DL (ref 8–23)
BUN/CREAT SERPL: 16.9 (ref 7–25)
CALCIUM SPEC-SCNC: 9.1 MG/DL (ref 8.6–10.5)
CHLORIDE SERPL-SCNC: 103 MMOL/L (ref 98–107)
CO2 SERPL-SCNC: 19 MMOL/L (ref 22–29)
CREAT BLD-MCNC: 1.24 MG/DL (ref 0.57–1)
CRP SERPL-MCNC: 0.33 MG/DL (ref 0–0.5)
DEPRECATED RDW RBC AUTO: 47.3 FL (ref 37–54)
EOSINOPHIL # BLD AUTO: 0 10*3/MM3 (ref 0–0.4)
EOSINOPHIL NFR BLD AUTO: 0 % (ref 0.3–6.2)
ERYTHROCYTE [DISTWIDTH] IN BLOOD BY AUTOMATED COUNT: 15.2 % (ref 12.3–15.4)
ERYTHROCYTE [SEDIMENTATION RATE] IN BLOOD: 24 MM/HR (ref 0–30)
GFR SERPL CREATININE-BSD FRML MDRD: 41 ML/MIN/1.73
GLUCOSE BLD-MCNC: 151 MG/DL (ref 65–99)
HCT VFR BLD AUTO: 33.6 % (ref 34–46.6)
HGB BLD-MCNC: 11.5 G/DL (ref 12–15.9)
LYMPHOCYTES # BLD AUTO: 2.1 10*3/MM3 (ref 0.7–3.1)
LYMPHOCYTES NFR BLD AUTO: 13 % (ref 19.6–45.3)
MCH RBC QN AUTO: 30.1 PG (ref 26.6–33)
MCHC RBC AUTO-ENTMCNC: 34.1 G/DL (ref 31.5–35.7)
MCV RBC AUTO: 88.2 FL (ref 79–97)
MONOCYTES # BLD AUTO: 1.2 10*3/MM3 (ref 0.1–0.9)
MONOCYTES NFR BLD AUTO: 7.1 % (ref 5–12)
NEUTROPHILS # BLD AUTO: 13 10*3/MM3 (ref 1.7–7)
NEUTROPHILS NFR BLD AUTO: 79.7 % (ref 42.7–76)
NRBC BLD AUTO-RTO: 0.1 /100 WBC (ref 0–0.2)
PLATELET # BLD AUTO: 251 10*3/MM3 (ref 140–450)
PMV BLD AUTO: 8.7 FL (ref 6–12)
POTASSIUM BLD-SCNC: 3.9 MMOL/L (ref 3.5–5.2)
PROCALCITONIN SERPL-MCNC: <0.02 NG/ML (ref 0.1–0.25)
RBC # BLD AUTO: 3.81 10*6/MM3 (ref 3.77–5.28)
SODIUM BLD-SCNC: 134 MMOL/L (ref 136–145)
WBC NRBC COR # BLD: 16.3 10*3/MM3 (ref 3.4–10.8)

## 2019-12-22 PROCEDURE — 99217 PR OBSERVATION CARE DISCHARGE MANAGEMENT: CPT | Performed by: HOSPITALIST

## 2019-12-22 PROCEDURE — 85652 RBC SED RATE AUTOMATED: CPT | Performed by: HOSPITALIST

## 2019-12-22 PROCEDURE — 86140 C-REACTIVE PROTEIN: CPT | Performed by: HOSPITALIST

## 2019-12-22 PROCEDURE — 84145 PROCALCITONIN (PCT): CPT | Performed by: HOSPITALIST

## 2019-12-22 PROCEDURE — 80048 BASIC METABOLIC PNL TOTAL CA: CPT | Performed by: HOSPITALIST

## 2019-12-22 PROCEDURE — 85025 COMPLETE CBC W/AUTO DIFF WBC: CPT | Performed by: HOSPITALIST

## 2019-12-22 PROCEDURE — 63710000001 PREDNISONE PER 1 MG: Performed by: NURSE PRACTITIONER

## 2019-12-22 PROCEDURE — 96365 THER/PROPH/DIAG IV INF INIT: CPT

## 2019-12-22 PROCEDURE — 97162 PT EVAL MOD COMPLEX 30 MIN: CPT

## 2019-12-22 PROCEDURE — G0378 HOSPITAL OBSERVATION PER HR: HCPCS

## 2019-12-22 RX ORDER — SODIUM CHLORIDE 9 MG/ML
75 INJECTION, SOLUTION INTRAVENOUS CONTINUOUS
Status: DISCONTINUED | OUTPATIENT
Start: 2019-12-22 | End: 2019-12-22 | Stop reason: HOSPADM

## 2019-12-22 RX ORDER — CLONIDINE HYDROCHLORIDE 0.1 MG/1
0.1 TABLET ORAL EVERY 8 HOURS PRN
Status: DISCONTINUED | OUTPATIENT
Start: 2019-12-22 | End: 2019-12-22 | Stop reason: HOSPADM

## 2019-12-22 RX ORDER — DOXYCYCLINE 100 MG/1
100 TABLET ORAL EVERY 12 HOURS SCHEDULED
Qty: 8 TABLET | Refills: 0 | Status: SHIPPED | OUTPATIENT
Start: 2019-12-22 | End: 2019-12-26

## 2019-12-22 RX ORDER — DOXYCYCLINE 100 MG/1
100 TABLET ORAL EVERY 12 HOURS SCHEDULED
Qty: 8 TABLET | Refills: 0 | Status: SHIPPED | OUTPATIENT
Start: 2019-12-22 | End: 2019-12-22

## 2019-12-22 RX ADMIN — Medication 10 ML: at 08:34

## 2019-12-22 RX ADMIN — DOXYCYCLINE 100 MG: 100 INJECTION, POWDER, LYOPHILIZED, FOR SOLUTION INTRAVENOUS at 03:56

## 2019-12-22 RX ADMIN — COLESEVELAM HYDROCHLORIDE 1875 MG: 625 TABLET, FILM COATED ORAL at 08:34

## 2019-12-22 RX ADMIN — PREDNISONE 40 MG: 20 TABLET ORAL at 08:34

## 2019-12-22 RX ADMIN — ATORVASTATIN CALCIUM 20 MG: 20 TABLET, FILM COATED ORAL at 08:34

## 2019-12-22 RX ADMIN — AMLODIPINE BESYLATE 10 MG: 5 TABLET ORAL at 08:34

## 2019-12-22 NOTE — NURSING NOTE
Pt complains of pain at IV site and swelling. IV is not able to be used and patient is refusing to have another IV placed.

## 2019-12-22 NOTE — PLAN OF CARE
Problem: Patient Care Overview  Goal: Plan of Care Review  Outcome: Ongoing (interventions implemented as appropriate)  Flowsheets  Taken 12/22/2019 1006  Progress: improving  Taken 12/22/2019 1004  Plan of Care Reviewed With: patient  Outcome Summary: Pt admitted 12/20 with SOA and failed OP treatment.  d/o bronchitis/flu.  Pt feels much better today, has been getting self up to bathroom.  Pt is safe to be up ad kwadwo, no concerns from PT standpoint.  Plans home at d/c.

## 2019-12-22 NOTE — PLAN OF CARE
Problem: Patient Care Overview  Goal: Plan of Care Review  Outcome: Ongoing (interventions implemented as appropriate)   Patient states she feels much better today and slept well most of the night. Possible discharge today.

## 2019-12-22 NOTE — DISCHARGE SUMMARY
[facility] Medicine Services  DISCHARGE SUMMARY        Prepared For PCP:  Anabelle Sellers MD    Patient Name: Leandra Nuñez  : 1935  MRN: [id number]      Date of Admission:   2019    Date of Discharge:  2019    Length of stay:  LOS: 0 days     Hospital Course     Presenting Problem:   Bronchitis with bronchospasm [J20.9]  Acute anxiety [F41.9]  Congestive heart failure, unspecified HF chronicity, unspecified heart failure type (CMS/HCC) [I50.9]      Active Hospital Problems    Diagnosis  POA   • **Mucopurulent chronic bronchitis (CMS/HCC) [J41.1]  Yes     Priority: High   • Acute renal injury (CMS/HCC) [N17.9]  Unknown     Priority: High   • Hypertension [I10]  Yes     Priority: High   • Acute kidney injury (CMS/HCC) [N17.9]  Yes   • Acute bronchitis due to parainfluenza virus [J20.4]  Yes   • Shortness of breath [R06.02]  Unknown   • Leukocytosis [D72.829]  Unknown   • Bronchitis with bronchospasm [J20.9]  Yes   • Hyperlipidemia [E78.5]  Yes   • Anxiety [F41.9]  Yes      Resolved Hospital Problems   No resolved problems to display.       Hospital Course:    The patient was placed on observation for treatment of mucopurulent bronchitis from parainfluenza infection.  IV fluids were provided for acute kidney injury from dehydration.  The patient's blood pressure was elevated on admission which was controlled after amlodipine and hydralazine were continued.  The patient will be discharged home on 2019 and needs to follow-up with her PCP in 2 weeks.  She also needs to follow-up with EP cardiologist regarding work-up of chronic dyspnea on exertion.    Recommendation for Outpatient Providers:     The patient should have monitoring of her renal function as an outpatient.  BMP within 2 weeks after discharge home        Reasons For Change In Medications and Indications for New Medications:        Day of Discharge     HPI:        The patient is an 84-year-old  female with history of solitary kidney, hypertension, and hyperlipidemia that presented to the ER on 12/20/2019 complaining of 4 days of shortness of air and productive yellow cough.  She endorsed subjective chills without known fever and denies associated nausea or diarrhea.  She has had no known ill contacts.  The patient went to see her PCP 3 days ago and was prescribed an antibiotic.  There was a delay of 1 day getting the prescription filled because the prescription was originally faxed to the online instead of a local pharmacy.  The patient has been on antibiotics for 2 days.    Vital Signs:   Temp:  [97.8 °F (36.6 °C)-98.5 °F (36.9 °C)] 97.8 °F (36.6 °C)  Heart Rate:  [55-70] 55  Resp:  [18] 18  BP: (137-142)/(79-80) 142/80     Physical Exam:  Physical Exam   Constitutional: She is oriented to person, place, and time. She appears well-developed and well-nourished.   HENT:   Head: Normocephalic and atraumatic.   Eyes: Pupils are equal, round, and reactive to light. EOM are normal.   Neck: Normal range of motion. Neck supple.   Cardiovascular: Normal rate and normal heart sounds.   Pulmonary/Chest: Effort normal and breath sounds normal.   Abdominal: Soft. Bowel sounds are normal.   Musculoskeletal: Normal range of motion.   Neurological: She is alert and oriented to person, place, and time.   Skin: Skin is warm and dry.   Psychiatric: She has a normal mood and affect.       Pertinent  and/or Most Recent Results     Results from last 7 days   Lab Units 12/22/19  0355 12/21/19  0343 12/20/19  1804 12/20/19  1721   WBC 10*3/mm3 16.30* 10.30  --  10.90*   HEMOGLOBIN g/dL 11.5* 13.1  --  13.0   HEMATOCRIT % 33.6* 38.1  --  39.1   PLATELETS 10*3/mm3 251 248  --  263   SODIUM mmol/L 134* 136 136  --    POTASSIUM mmol/L 3.9 4.1 3.8  --    CHLORIDE mmol/L 103 102 101  --    CO2 mmol/L 19.0* 19.0* 20.0*  --    BUN mg/dL 21 15 15  --    CREATININE mg/dL 1.24* 1.18* 1.08*  --    GLUCOSE mg/dL 151* 194* 104*  --    CALCIUM  mg/dL 9.1 9.2 9.4  --            Invalid input(s): PROT, LABALBU  Results from last 7 days   Lab Units 12/21/19  0343   CHOLESTEROL mg/dL 207*   TRIGLYCERIDES mg/dL 281*   HDL CHOL mg/dL 41     Results from last 7 days   Lab Units 12/22/19  0355 12/21/19  0343 12/20/19  1804   TSH uIU/mL  --  0.832  --    PROBNP pg/mL  --   --  293.5   TROPONIN T ng/mL  --  <0.010  --    PROCALCITONIN ng/mL <0.02*  --   --        Brief Urine Lab Results     None          Microbiology Results Abnormal     Procedure Component Value - Date/Time    Respiratory Panel, PCR - Swab, Nasopharynx [[id number]]  (Abnormal) Collected:  12/20/19 1808    Lab Status:  Final result Specimen:  Swab from Nasopharynx Updated:  12/20/19 1923     ADENOVIRUS, PCR Not Detected     Coronavirus 229E Not Detected     Coronavirus HKU1 Not Detected     Coronavirus NL63 Not Detected     Coronavirus OC43 Not Detected     Human Metapneumovirus Not Detected     Human Rhinovirus/Enterovirus Not Detected     Influenza B PCR Not Detected     Parainfluenza Virus 1 Detected     Parainfluenza Virus 2 Not Detected     Parainfluenza Virus 3 Not Detected     Parainfluenza Virus 4 Not Detected     Bordetella pertussis pcr Not Detected     Influenza A H1 2009 PCR Not Detected     Chlamydophila pneumoniae PCR Not Detected     Mycoplasma pneumo by PCR Not Detected     Influenza A PCR Not Detected     Influenza A H3 Not Detected     Influenza A H1 Not Detected     RSV, PCR Not Detected          Xr Chest 2 View    Result Date: 12/20/2019  Impression: Stable cardiomegaly and benign calcified granulomatous changes. No acute chest findings.  Electronically Signed By-Dr. Saranya Zee MD On:12/20/2019 4:58 PM This report was finalized on [id number] by Dr. Saranya Zee MD.                           Test Results Pending at Discharge: None        Procedures Performed: none           Consults:   Consults     Date and Time Order Name Status Description    12/20/2019 1928 Hospitalist  (on-call MD unless specified) Completed             Discharge Details        Discharge Medications      New Medications      Instructions Start Date   doxycycline 100 MG tablet  Commonly known as:  ADOXA   100 mg, Oral, Every 12 Hours Scheduled         Continue These Medications      Instructions Start Date   ALPRAZolam 0.5 MG tablet  Commonly known as:  XANAX   0.5 mg, Oral, 2 Times Daily PRN      amLODIPine 10 MG tablet  Commonly known as:  NORVASC   10 mg, Oral, Every 24 Hours      benzonatate 100 MG capsule  Commonly known as:  TESSALON PERLES   100 mg, Oral, 3 Times Daily PRN      colesevelam 625 MG tablet  Commonly known as:  WELCHOL   1,875 mg, Oral, 2 Times Daily With Meals      hydrALAZINE 100 MG tablet  Commonly known as:  APRESOLINE   100 mg, Oral, Every 12 Hours PRN      oxybutynin XL 10 MG 24 hr tablet  Commonly known as:  DITROPAN-XL   10 mg, Oral, Daily      pitavastatin calcium 2 MG tablet tablet  Commonly known as:  LIVALO   1 tablet, Oral, Daily      VENTOLIN  (90 Base) MCG/ACT inhaler  Generic drug:  albuterol sulfate HFA   2 puffs, Inhalation, Every 4 Hours PRN         Stop These Medications    azithromycin 250 MG tablet  Commonly known as:  ZITHROMAX            No Known Allergies      Discharge Disposition:  Home or Self Care    Diet:  Hospital:  Diet Order   Procedures   • Diet Cardiac, Diabetic/Consistent Carbs; Healthy Heart; Diabetic - Consistent Carb       Discharge Activity: As tolerated    CODE STATUS:    Code Status and Medical Interventions:   Ordered at: 12/20/19 1944     Code Status:    CPR     Medical Interventions (Level of Support Prior to Arrest):    Full         Follow-up Appointments  Future Appointments   Date Time Provider Department Center   2/3/2020  9:30 AM Damien Ty MD [facility] None   3/9/2020 11:15 AM Damien Ty MD [facility] None   3/18/2020 11:00 AM Anabelle Sellers MD [facility] None       Additional Instructions for the  Follow-ups that You Need to Schedule     Discharge Follow-up with PCP   As directed       Currently Documented PCP:    Anabelle Sellers MD    PCP Phone Number:    [phone]     Follow Up Details:  2 weeks                 Condition on Discharge:    Stable      Electronically signed by Dave Hernandez DO, 12/22/19, 1:15 PM.    Time: I spent  15 minutes on this discharge activity which included face-to-face encounter with the patient/reviewing the data in the system/coordination of the care with the nursing staff as well as consultants/documentation/entering orders.

## 2019-12-22 NOTE — THERAPY EVALUATION
Patient Name: Leandra Nuñez  : 1935    MRN: [id number]                              Today's Date: 2019       Admit Date: 2019    Visit Dx:     ICD-10-CM ICD-9-CM   1. Bronchitis with bronchospasm J20.9 490   2. Congestive heart failure, unspecified HF chronicity, unspecified heart failure type (CMS/HCC) I50.9 428.0   3. Acute anxiety F41.9 300.00     Patient Active Problem List   Diagnosis   • Abnormal mammogram   • Anxiety   • Arthritis of foot   • Asthma   • Bronchospasm   • Chronic renal insufficiency, stage III (moderate) (CMS/HCC)   • Cough   • Edema of lower extremity   • Encounter for general adult medical examination without abnormal findings   • Eye pain, left   • Family tension   • Gastroesophageal reflux disease   • Hyperlipidemia   • Hypertension   • Muscle ache   • Muscle cramps   • Obesity   • Pain of foot   • Renal cancer (CMS/HCC)   • Renal insufficiency   • Tibialis posterior tendinitis   • Type 2 diabetes mellitus without complications (CMS/[facility])   • Visit for screening mammogram   • Adjustment disorder with anxiety   • Idiopathic thrombocytopenic purpura (CMS/HCC)   • NSVT (nonsustained ventricular tachycardia) (CMS/HCC)   • HOMA (obstructive sleep apnea)   • Osteoarthritis   • Renal cell cancer (CMS/HCC)   • Thrombocytopenia (CMS/HCC)   • GERD (gastroesophageal reflux disease)   • Hyperlipidemia   • Hypertension   • Hyperglycemia   • Bronchitis with bronchospasm   • Shortness of breath   • Acute renal injury (CMS/HCC)   • Leukocytosis   • Mucopurulent chronic bronchitis (CMS/HCC)   • Acute kidney injury (CMS/[facility])   • Acute bronchitis due to parainfluenza virus     Past Medical History:   Diagnosis Date   • Breast nodule     Left breast nodule (fibrocystic disease , no malignancy)    • Hyperlipidemia    • Hypertension      Past Surgical History:   Procedure Laterality Date   • BREAST BIOPSY Left 2013    Benign fibrocystic disease ,Abstracted from [facility].   •  CARDIAC CATHETERIZATION     • D&C AND LAPAROSCOPY     • FULGURATION ENDOMETRIOSIS      surgery   • NEPHRECTOMY Right      General Information     Row Name 12/22/19 1003          PT Evaluation Time/Intention    Document Type  evaluation  -     Mode of Treatment  physical therapy  -[facility] Name 12/22/19 1003          General Information    Patient Profile Reviewed?  yes  -     Prior Level of Function  independent:  -     Barriers to Rehab  none identified  -[facility] Name 12/22/19 1003          Relationship/Environment    Lives With  alone spouse is in assisted living facility  -[facility] Name 12/22/19 1003          Resource/Environmental Concerns    Current Living Arrangements  home/apartment/condo  -[facility] Name 12/22/19 1003          Home Main Entrance    Number of Stairs, Main Entrance  none  -     Row Name 12/22/19 1003          Cognitive Assessment/Intervention- PT/OT    Orientation Status (Cognition)  oriented x 4  -       User Key  (r) = Recorded By, (t) = Taken By, (c) = Cosigned By    Initials Name Provider Type     Mitra Alatorre, PT Physical Therapist        Mobility     Row Name 12/22/19 1003          Bed Mobility Assessment/Treatment    Bed Mobility Assessment/Treatment  supine-sit-supine  -     Supine-Sit-Supine [place] (Bed Mobility)  independent  -     Row Name 12/22/19 1003          Bed-Chair Transfer    Bed-Chair [place] (Transfers)  independent  -[facility] Name 12/22/19 1003          Sit-Stand Transfer    Sit-Stand [place] (Transfers)  independent  -[facility] Name 12/22/19 1003          Gait/Stairs Assessment/Training    Gait/Stairs Assessment/Training  gait/ambulation independence  -     [place] Level (Gait)  independent  -     Distance in Feet (Gait)  pt is up ad kwadwo in room, able to make laps in room during eval.  did not walk in [place] due to droplet isolation  -     Comment (Gait/Stairs)  normal gait pattern  -       User Key  (r) = Recorded  By, (t) = Taken By, (c) = Cosigned By    Initials Name Provider Type     Mitra Alatorre, IAIN Physical Therapist        Obj/Interventions     [facility] Name 12/22/19 1004          General ROM    GENERAL ROM COMMENTS  AROM WNLs BUE/LE  -[facility] Name 12/22/19 1004          MMT (Manual Muscle Testing)    General MMT Comments  gross strength 5/5  -[facility] 12/22/19 1004          Static Sitting Balance    Level of [place] (Unsupported Sitting, Static Balance)  independent  -[facility] 12/22/19 1004          Dynamic Sitting Balance    Level of [place], Reaches Outside Midline (Sitting, Dynamic Balance)  independent  -[facility] 12/22/19 1004          Static Standing Balance    Level of [place] (Supported Standing, Static Balance)  independent  -[facility] 12/22/19 1004          Dynamic Standing Balance    Level of [place], Reaches Outside Midline (Standing, Dynamic Balance)  independent  -[facility] 12/22/19 1004          Sensory Assessment/Intervention    Sensory General Assessment  no sensation deficits identified  -       User Key  (r) = Recorded By, (t) = Taken By, (c) = Cosigned By    Initials Name Provider Type     Mitra Alatorre, IAIN Physical Therapist        Goals/Plan    No documentation.       Clinical Impression     Row Name 12/22/19 1004          Pain Assessment    Additional Documentation  Pain Scale: Numbers Pre/Post-Treatment (Group)  -[facility]     Row Name 12/22/19 1004          Pain Scale: Numbers Pre/Post-Treatment    Pain Scale: Numbers, Pretreatment  0/10 - no pain  -     Pain Scale: Numbers, Post-Treatment  0/10 - no pain  -[facility]     Row Name 12/22/19 1004          Plan of Care Review    Plan of Care Reviewed With  patient  -     Progress  improving  -     Outcome Summary  Pt admitted 12/20 with SOA and failed OP treatment.  d/o bronchitis/flu.  Pt feels much better today, has been getting self up to bathroom.  Pt is safe to be up ad kwadwo, no concerns from PT  standpoint.  Plans home at d/c.  -     Row Name 12/22/19 1004          Physical Therapy Clinical Impression    Criteria for Skilled Interventions Met (PT Clinical Impression)  no;no problems identified which require skilled intervention  -     Row Name 12/22/19 1004          Positioning and Restraints    Pre-Treatment Position  in bed  -     Post Treatment Position  chair  -     In Chair  notified nsg;call light within reach  -       User Key  (r) = Recorded By, (t) = Taken By, (c) = Cosigned By    Initials Name Provider Type     Mitra Alatorre, IAIN Physical Therapist        Outcome Measures    No documentation.       Physical Therapy Education                 Title: PT OT SLP Therapies (In Progress)     Topic: Physical Therapy (In Progress)     Point: Mobility training (Done)     Description:   Instruct learner(s) on safety and technique for assisting patient out of bed, chair or wheelchair.  Instruct in the proper use of assistive devices, such as walker, crutches, cane or brace.              Patient Friendly Description:   It's important to get you on your feet again, but we need to do so in a way that is safe for you. Falling has serious consequences, and your personal safety is the most important thing of all.        When it's time to get out of bed, one of us or a family member will sit next to you on the bed to give you support.     If your doctor or nurse tells you to use a walker, crutches, a cane, or a brace, be sure you use it every time you get out of bed, even if you think you don't need it.    Learning Progress Summary           Patient Acceptance, E,TB, VU by  at 12/22/2019 1005                               User Key     Initials Effective Dates Name Provider Type Discipline     03/01/19 -  Mitra Alatorre PT Physical Therapist PT              PT Recommendation and Plan     Outcome Summary/Treatment Plan (PT)  Anticipated Discharge Disposition (PT): home  Plan of Care Reviewed With:  patient  Progress: improving  Outcome Summary: Pt admitted 12/20 with SOA and failed OP treatment.  d/o bronchitis/flu.  Pt feels much better today, has been getting self up to bathroom.  Pt is safe to be up ad kwadwo, no concerns from PT standpoint.  Plans home at d/c.     Time Calculation:   PT Charges     Row Name 12/22/19 1006             Time Calculation    Start Time  0715  -      Stop Time  0735  -      Time Calculation (min)  20 min  -      PT Received On  12/22/19  -         Time Calculation- PT    Total Timed Code Minutes- PT  0 minute(s)  -        User Key  (r) = Recorded By, (t) = Taken By, (c) = Cosigned By    Initials Name Provider Type     Mitra Alatorre, PT Physical Therapist        Therapy Charges for Today     Code Description Service Date Service Provider Modifiers Qty    [id number]  PT EVAL MOD COMPLEXITY 2 12/22/2019 Mitra Alatorre, PT GP 1               Mitra Alatorre PT  12/22/2019

## 2019-12-23 ENCOUNTER — READMISSION MANAGEMENT (OUTPATIENT)
Dept: CALL CENTER | Facility: HOSPITAL | Age: 84
End: 2019-12-23

## 2019-12-23 NOTE — OUTREACH NOTE
Prep Survey      Responses   Facility patient discharged from?  [facility]   Is patient eligible?  Yes   Discharge diagnosis  bronchitis w/ bronchospasm r/t parainfluenza virusacute anxiety, CHF   Does the patient have one of the following disease processes/diagnoses(primary or secondary)?  Other   Does the patient have Home health ordered?  No   Is there a DME ordered?  No   Prep survey completed?  Yes          Melva Rojas RN

## 2019-12-26 ENCOUNTER — READMISSION MANAGEMENT (OUTPATIENT)
Dept: CALL CENTER | Facility: HOSPITAL | Age: 84
End: 2019-12-26

## 2019-12-27 NOTE — OUTREACH NOTE
Medical Week 1 Survey      Responses   Facility patient discharged from?  [facility]   Does the patient have one of the following disease processes/diagnoses(primary or secondary)?  Other   Is there a successful TCM telephone encounter documented?  No   Week 1 attempt successful?  No   Revoke  Decline to participate [No answer/No voicemail]          Sofya Artis LPN

## 2020-01-10 ENCOUNTER — LAB (OUTPATIENT)
Dept: FAMILY MEDICINE CLINIC | Facility: CLINIC | Age: 85
End: 2020-01-10

## 2020-01-10 ENCOUNTER — OFFICE VISIT (OUTPATIENT)
Dept: FAMILY MEDICINE CLINIC | Facility: CLINIC | Age: 85
End: 2020-01-10

## 2020-01-10 VITALS
WEIGHT: 248 LBS | DIASTOLIC BLOOD PRESSURE: 82 MMHG | HEIGHT: 63 IN | OXYGEN SATURATION: 96 % | HEART RATE: 55 BPM | BODY MASS INDEX: 43.94 KG/M2 | SYSTOLIC BLOOD PRESSURE: 142 MMHG

## 2020-01-10 DIAGNOSIS — N28.9 RENAL INSUFFICIENCY: ICD-10-CM

## 2020-01-10 DIAGNOSIS — R53.83 FATIGUE, UNSPECIFIED TYPE: ICD-10-CM

## 2020-01-10 DIAGNOSIS — J20.9 BRONCHITIS WITH BRONCHOSPASM: ICD-10-CM

## 2020-01-10 DIAGNOSIS — N28.9 RENAL INSUFFICIENCY: Primary | ICD-10-CM

## 2020-01-10 LAB
ANION GAP SERPL CALCULATED.3IONS-SCNC: 17 MMOL/L (ref 5–15)
BASOPHILS # BLD AUTO: 0.06 10*3/MM3 (ref 0–0.2)
BASOPHILS NFR BLD AUTO: 0.6 % (ref 0–1.5)
BUN BLD-MCNC: 23 MG/DL (ref 8–23)
BUN/CREAT SERPL: 18.3 (ref 7–25)
CALCIUM SPEC-SCNC: 9.7 MG/DL (ref 8.6–10.5)
CHLORIDE SERPL-SCNC: 100 MMOL/L (ref 98–107)
CO2 SERPL-SCNC: 21 MMOL/L (ref 22–29)
CREAT BLD-MCNC: 1.26 MG/DL (ref 0.57–1)
DEPRECATED RDW RBC AUTO: 44.2 FL (ref 37–54)
EOSINOPHIL # BLD AUTO: 0.13 10*3/MM3 (ref 0–0.4)
EOSINOPHIL NFR BLD AUTO: 1.2 % (ref 0.3–6.2)
ERYTHROCYTE [DISTWIDTH] IN BLOOD BY AUTOMATED COUNT: 14 % (ref 12.3–15.4)
GFR SERPL CREATININE-BSD FRML MDRD: 41 ML/MIN/1.73
GLUCOSE BLD-MCNC: 113 MG/DL (ref 65–99)
HCT VFR BLD AUTO: 38 % (ref 34–46.6)
HGB BLD-MCNC: 12.8 G/DL (ref 12–15.9)
IMM GRANULOCYTES # BLD AUTO: 0.05 10*3/MM3 (ref 0–0.05)
IMM GRANULOCYTES NFR BLD AUTO: 0.5 % (ref 0–0.5)
LYMPHOCYTES # BLD AUTO: 2.48 10*3/MM3 (ref 0.7–3.1)
LYMPHOCYTES NFR BLD AUTO: 23.6 % (ref 19.6–45.3)
MCH RBC QN AUTO: 29.3 PG (ref 26.6–33)
MCHC RBC AUTO-ENTMCNC: 33.7 G/DL (ref 31.5–35.7)
MCV RBC AUTO: 87 FL (ref 79–97)
MONOCYTES # BLD AUTO: 0.85 10*3/MM3 (ref 0.1–0.9)
MONOCYTES NFR BLD AUTO: 8.1 % (ref 5–12)
NEUTROPHILS # BLD AUTO: 6.92 10*3/MM3 (ref 1.7–7)
NEUTROPHILS NFR BLD AUTO: 66 % (ref 42.7–76)
NRBC BLD AUTO-RTO: 0 /100 WBC (ref 0–0.2)
PLATELET # BLD AUTO: 327 10*3/MM3 (ref 140–450)
PMV BLD AUTO: 11.2 FL (ref 6–12)
POTASSIUM BLD-SCNC: 4.3 MMOL/L (ref 3.5–5.2)
RBC # BLD AUTO: 4.37 10*6/MM3 (ref 3.77–5.28)
SODIUM BLD-SCNC: 138 MMOL/L (ref 136–145)
WBC NRBC COR # BLD: 10.49 10*3/MM3 (ref 3.4–10.8)

## 2020-01-10 PROCEDURE — 36415 COLL VENOUS BLD VENIPUNCTURE: CPT

## 2020-01-10 PROCEDURE — 85025 COMPLETE CBC W/AUTO DIFF WBC: CPT | Performed by: FAMILY MEDICINE

## 2020-01-10 PROCEDURE — 99213 OFFICE O/P EST LOW 20 MIN: CPT | Performed by: FAMILY MEDICINE

## 2020-01-10 PROCEDURE — 80048 BASIC METABOLIC PNL TOTAL CA: CPT | Performed by: FAMILY MEDICINE

## 2020-01-10 RX ORDER — EZETIMIBE 10 MG/1
10 TABLET ORAL DAILY
Qty: 90 TABLET | Refills: 3 | Status: SHIPPED | OUTPATIENT
Start: 2020-01-10 | End: 2021-01-23

## 2020-01-10 NOTE — PROGRESS NOTES
Subjective   Leandra Nuñez is a 84 y.o. female.     She comes in today for follow-up after she was admitted to the hospital just prior to [place] with bronchitis with acute bronchospasm  She was also noted to be in congestive heart failure and have some renal insufficiency  She needs a follow-up BMP  Extremely tired       The following portions of the patient's history were reviewed and updated as appropriate: allergies, current medications, past family history, past medical history, past social history, past surgical history and problem list.  Past Medical History:   Diagnosis Date   • Breast nodule 2013    Left breast nodule (fibrocystic disease , no malignancy)    • Hyperlipidemia    • Hypertension      Past Surgical History:   Procedure Laterality Date   • BREAST BIOPSY Left 05/21/2013    Benign fibrocystic disease ,[facility].   • CARDIAC CATHETERIZATION     • D&C AND LAPAROSCOPY     • FULGURATION ENDOMETRIOSIS      surgery   • NEPHRECTOMY Right      Family History   Problem Relation Age of Onset   • Heart disease Other    • Hyperlipidemia Other    • Diabetes Other    • Hypertension Other      Social History     Socioeconomic History   • Marital status:      Spouse name: Not on file   • Number of children: Not on file   • Years of education: Not on file   • Highest education level: Not on file   Tobacco Use   • Smoking status: Never Smoker   • Smokeless tobacco: Never Used   Substance and Sexual Activity   • Alcohol use: No     Frequency: Never   • Drug use: No   • Sexual activity: Yes     Partners: Male         Current Outpatient Medications:   •  albuterol sulfate HFA (VENTOLIN HFA) 108 (90 Base) MCG/ACT inhaler, Inhale 2 puffs Every 4 (Four) Hours As Needed for Wheezing or Shortness of Air., Disp: , Rfl:   •  ALPRAZolam (XANAX) 0.5 MG tablet, Take 1 tablet by mouth 2 (Two) Times a Day As Needed for Anxiety., Disp: 30 tablet, Rfl: 0  •  amLODIPine (NORVASC) 10 MG tablet, Take 1  "tablet by mouth Daily., Disp: 90 tablet, Rfl: 3  •  colesevelam (WELCHOL) 625 MG tablet, Take 3 tablets by mouth 2 (Two) Times a Day With Meals., Disp: 180 tablet, Rfl: 0  •  hydrALAZINE (APRESOLINE) 100 MG tablet, Take 100 mg by mouth Every 12 (Twelve) Hours As Needed., Disp: , Rfl:   •  oxybutynin XL (DITROPAN-XL) 10 MG 24 hr tablet, Take 10 mg by mouth Daily., Disp: , Rfl:   •  pitavastatin calcium (LIVALO) 2 MG tablet tablet, Take 1 tablet by mouth Daily., Disp: 90 tablet, Rfl: 3  •  ezetimibe (ZETIA) 10 MG tablet, Take 1 tablet by mouth Daily., Disp: 90 tablet, Rfl: 3    Review of Systems   Constitutional: Negative for diaphoresis, fatigue, fever, unexpected weight gain and unexpected weight loss.   Respiratory: Positive for cough (occ). Negative for chest tightness and shortness of breath.    Cardiovascular: Negative for chest pain, palpitations and leg swelling.   Gastrointestinal: Negative for nausea and vomiting.   Neurological: Negative for dizziness, syncope and headache.     /82 (BP Location: Left arm, Patient Position: Sitting, Cuff Size: Large Adult)   Pulse 55   Ht 160 cm (63\")   Wt 112 kg (248 lb)   SpO2 96%   BMI 43.93 kg/m²       Objective   Physical Exam   Constitutional: She appears well-developed and well-nourished. No distress.   HENT:   Head: Normocephalic and atraumatic.   Neck: Neck supple. No JVD present.   Cardiovascular: Normal rate, regular rhythm, normal heart sounds and intact distal pulses. Exam reveals no gallop and no friction rub.   No murmur heard.  Pulmonary/Chest: Effort normal and breath sounds normal. No respiratory distress. She has no wheezes. She has no rales.   Musculoskeletal: She exhibits no edema.   Lymphadenopathy:     She has no cervical adenopathy.   Neurological: She is alert.   Skin: Skin is warm and dry.         Assessment/Plan   Problems Addressed this Visit        Respiratory    Bronchitis with bronchospasm       Genitourinary    Renal insufficiency - " Primary    Relevant Orders    CBC & Differential (Completed)    Basic Metabolic Panel (Completed)      Other Visit Diagnoses     Fatigue, unspecified type        Relevant Orders    CBC & Differential (Completed)    Basic Metabolic Panel (Completed)        Dramatic improvement in overall condition  She was encouraged to increase fluid intake  Bronchitis w/bronchospasm has resolved  Will check labs to eval her fatigue and renal insuf

## 2020-02-03 ENCOUNTER — OFFICE VISIT (OUTPATIENT)
Dept: CARDIOLOGY | Facility: CLINIC | Age: 85
End: 2020-02-03

## 2020-02-03 VITALS
DIASTOLIC BLOOD PRESSURE: 64 MMHG | WEIGHT: 228.4 LBS | SYSTOLIC BLOOD PRESSURE: 154 MMHG | HEART RATE: 53 BPM | BODY MASS INDEX: 40.46 KG/M2

## 2020-02-03 DIAGNOSIS — R06.09 DYSPNEA ON EXERTION: ICD-10-CM

## 2020-02-03 DIAGNOSIS — E78.2 MIXED HYPERLIPIDEMIA: ICD-10-CM

## 2020-02-03 DIAGNOSIS — G47.33 SLEEP APNEA, OBSTRUCTIVE: ICD-10-CM

## 2020-02-03 DIAGNOSIS — N28.89 HYPERTENSION SECONDARY TO OTHER RENAL DISORDERS: ICD-10-CM

## 2020-02-03 DIAGNOSIS — I15.1 HYPERTENSION SECONDARY TO OTHER RENAL DISORDERS: ICD-10-CM

## 2020-02-03 DIAGNOSIS — I50.32 CHRONIC DIASTOLIC (CONGESTIVE) HEART FAILURE (HCC): Primary | ICD-10-CM

## 2020-02-03 PROCEDURE — 99214 OFFICE O/P EST MOD 30 MIN: CPT | Performed by: INTERNAL MEDICINE

## 2020-02-03 NOTE — PROGRESS NOTES
CC--exertional dyspnea, hypertension, chronic kidney disease, hyperlipidemia    Sub--  84-year-old female patient came as a self-referral--she complains of exertional shortness of breath with this class III shortness of breath--chronic medical problems include solitary kidney with prior nephrectomy, chronic kidney disease stage III, hypertension, hyperlipidemia and sleep apnea  Patient had issues with treatment of sleep apnea in the past and she is currently using a different mask and she is under the care of an ENT surgeon  She is under tremendous stress from social situation with her   She complains of exertional shortness of breath and significant fatigue and daytime sleepiness  She had a prior cardiac evaluation several years ago according to her without significant coronary artery disease and heart catheterization done more than 10 years ago according to her  She denies any TIA or stroke and no prior history of any peripheral vascular disease  She claims that her blood pressure is well controlled on multiple home recordings  Since last visit patient has refused noninvasive work-up and feels better with optimization of her hypertension which is been monitoring at home      Past Medical History:   Diagnosis Date   • Breast nodule 2013    Left breast nodule (fibrocystic disease , no malignancy)    • Hyperlipidemia    • Hypertension      Past Surgical History:   Procedure Laterality Date   • BREAST BIOPSY Left 05/21/2013    Benign fibrocystic disease ,Abstracted from [facility].   • CARDIAC CATHETERIZATION     • D&C AND LAPAROSCOPY     • FULGURATION ENDOMETRIOSIS      surgery   • NEPHRECTOMY Right      Family History   Problem Relation Age of Onset   • Heart disease Other    • Hyperlipidemia Other    • Diabetes Other    • Hypertension Other      Social History     Tobacco Use   • Smoking status: Never Smoker   • Smokeless tobacco: Never Used   Substance Use Topics   • Alcohol use: No     Frequency: Never   •  Drug use: No       (Not in a hospital admission)  Allergies:  Patient has no known allergies.    Review of Systems   General:  positive for fatigue and tiredness  Eyes: No redness  Cardiovascular: No chest pain, no palpitations  Respiratory:   positive for class 3 shortness of breath  Gastrointestinal: No nausea or vomiting, bleeding  Genitourinary: no hematuria or dysuria  Musculoskeletal: No arthralgia or myalgia  Skin: No rash  Neurologic: No numbness, tingling, syncope  Hematologic/Lymphatic: No abnormal bleeding      Physical Exam  VITALS REVIEWED--blood pressure 154/64, pulse rate is 53 patient is afebrile respiration 12 times a minute    General:      well developed, well nourished, in no acute distress.    Head:      normocephalic and atraumatic.    Eyes:      PERRL/EOM intact, conjunctiva and sclera clear with out nystagmus.    Neck:      no masses, thyromegaly,  trachea central with normal respiratory effort and PMI non displaced   Lungs:      clear bilaterally to auscultation.    Heart:      Sinus rhythm without any murmurs gallops or rubs  Msk:      no deformity or scoliosis noted of thoracic or lumbar spine.    Pulses:      pulses normal in all 4 extremities.    Extremities:       no cyanosis or clubbing--trace left pedal edema and trace right pedal edema.    Neurologic:      no focal deficits.   alert oriented x3  Skin:      intact without lesions or rashes.    Psych:      alert and cooperative; normal mood and affect; normal attention span and concentration.            Assessment     hypertensive heart disease with diastolic dysfunction  Chronic kidney disease stage III  Hypertension  Hyperlipidemia  Sleep apnea  Exertional shortness of breath and significant fatigue  Chronic class III diastolic heart failure symptoms  Improved clinically since last clinical visit with optimization of hypertension at home recordings  Continue  current medication and follow-up after few months    Electronically signed  by Damien Ty MD, 02/03/20, 8:08 PM..

## 2020-02-20 ENCOUNTER — OFFICE VISIT (OUTPATIENT)
Dept: FAMILY MEDICINE CLINIC | Facility: CLINIC | Age: 85
End: 2020-02-20

## 2020-02-20 VITALS
HEART RATE: 59 BPM | SYSTOLIC BLOOD PRESSURE: 198 MMHG | BODY MASS INDEX: 40.39 KG/M2 | OXYGEN SATURATION: 96 % | DIASTOLIC BLOOD PRESSURE: 93 MMHG | TEMPERATURE: 97.7 F | WEIGHT: 228 LBS

## 2020-02-20 DIAGNOSIS — I15.1 HYPERTENSION SECONDARY TO OTHER RENAL DISORDERS: ICD-10-CM

## 2020-02-20 DIAGNOSIS — N28.89 HYPERTENSION SECONDARY TO OTHER RENAL DISORDERS: ICD-10-CM

## 2020-02-20 DIAGNOSIS — M79.12 MYALGIA OF AUXILIARY MUSCLES, HEAD AND NECK: Primary | ICD-10-CM

## 2020-02-20 PROBLEM — M79.673 PAIN OF FOOT: Status: RESOLVED | Noted: 2017-11-16 | Resolved: 2020-02-20

## 2020-02-20 PROBLEM — J98.01 BRONCHOSPASM: Status: RESOLVED | Noted: 2019-01-09 | Resolved: 2020-02-20

## 2020-02-20 PROBLEM — H57.12 EYE PAIN, LEFT: Status: RESOLVED | Noted: 2019-02-28 | Resolved: 2020-02-20

## 2020-02-20 PROBLEM — R05.9 COUGH: Status: RESOLVED | Noted: 2018-05-02 | Resolved: 2020-02-20

## 2020-02-20 PROCEDURE — 99214 OFFICE O/P EST MOD 30 MIN: CPT | Performed by: PHYSICIAN ASSISTANT

## 2020-02-20 RX ORDER — METHOCARBAMOL 500 MG/1
TABLET, FILM COATED ORAL
Qty: 30 TABLET | Refills: 0 | Status: SHIPPED | OUTPATIENT
Start: 2020-02-20 | End: 2020-04-07

## 2020-02-20 NOTE — PROGRESS NOTES
Subjective  Leandra Nuñez is a 84 y.o. female     History of Present Illness  Patient is an 84-year-old white female here today complaining of bilateral upper arm pain in the musculature, wrapping around the shoulders, and the upper back.  She states it is better with rest and worse with lifting her 28 pound cat, her muscles seem tender to the touch.  She denies trauma to the area.  She is tried Tylenol over-the-counter which helps a little bit.    The following portions of the patient's history were reviewed and updated as appropriate: allergies, current medications, past family history, past medical history, past social history, past surgical history and problem list.    Review of Systems   Constitutional: Negative for fever.   HENT: Negative for congestion and sore throat.    Eyes: Negative for visual disturbance.   Respiratory: Negative for shortness of breath.    Cardiovascular: Negative for chest pain.   Musculoskeletal: Positive for myalgias (upper ext). Negative for joint swelling.   Neurological: Negative for dizziness, tremors, seizures, syncope, facial asymmetry, speech difficulty, weakness, light-headedness, numbness, headache, memory problem and confusion.   Psychiatric/Behavioral: Negative for suicidal ideas and depressed mood.       Objective  Physical Exam   Constitutional: She is oriented to person, place, and time. She appears well-developed and well-nourished. She is morbidly obese.  Eyes: Pupils are equal, round, and reactive to light.   Cardiovascular: Normal rate, regular rhythm and normal heart sounds.   Pulmonary/Chest: Effort normal and breath sounds normal.   Musculoskeletal:        Right shoulder: She exhibits tenderness. She exhibits normal range of motion, no bony tenderness, no swelling, no effusion, no crepitus, no deformity, no laceration, no pain, no spasm and normal strength.        Left shoulder: She exhibits tenderness. She exhibits normal range of motion, no bony tenderness,  no swelling, no effusion, no crepitus, no deformity, no laceration, no pain, no spasm, normal pulse and normal strength.        Right upper arm: She exhibits tenderness. She exhibits no bony tenderness, no swelling, no edema, no deformity and no laceration.        Left upper arm: She exhibits tenderness. She exhibits no bony tenderness, no swelling, no edema, no deformity and no laceration.   Musculature of the neck and shoulder area very tender to palpation, blurry out of proportion to what is expected.trapezius musculature in particular is tense and tender.    Neurological: She is alert and oriented to person, place, and time. She has normal strength.   Reflex Scores:       Tricep reflexes are 2+ on the right side and 2+ on the left side.       Bicep reflexes are 2+ on the right side and 2+ on the left side.       Patellar reflexes are 2+ on the right side and 2+ on the left side.  Upper extremity strength out of 5 bilaterally  Lower extremity strength out of 5 bilaterally   Psychiatric: She has a normal mood and affect. Her behavior is normal.       Vitals:    02/20/20 0935   BP: (!) 206/85   BP Location: Right arm   Patient Position: Sitting   Cuff Size: Adult   Pulse: 59   Temp: 97.7 °F (36.5 °C)   TempSrc: Oral   SpO2: 96%   Weight: 103 kg (228 lb)     Body mass index is 40.39 kg/m².    PHQ-9 Total Score:        Assessment/Plan  Diagnoses and all orders for this visit:    1. Myalgia of auxiliary muscles, head and neck (Primary)  Comments:  Recommended physical therapy and Robaxin at this point, will consider additional testing for polymyalgia rheumatica if symptoms are not improving.  Discussed with patient that Robaxin can be sedating, she wants to try it despite the risk.  She is to take this primarily at home in the evenings or at bedtime, not when driving or operating machinery.    Orders:  -     Ambulatory Referral to Physical Therapy Evaluate and treat; Heat, Electrotherapy; E-stim, Tens (Home),  Iontophoresis; Soft Tissue Mobilizaton; Stretching, ROM    2. Hypertension secondary to other renal disorders  Comments:  Advised patient to go home and take blood pressure medicines ASAP.  Strict ER precautions discussed, advised patient to call 911 if any chest pain, shortness of breath, confusion, headaches, visual changes, numbness or tingling.  Patient agreeable and verbalized understanding.    Other orders  -     methocarbamol (ROBAXIN) 500 MG tablet; 1/2 tab PO every 6 hrs PRN muscle spasms  Dispense: 30 tablet; Refill: 0

## 2020-02-26 ENCOUNTER — TREATMENT (OUTPATIENT)
Dept: PHYSICAL THERAPY | Facility: CLINIC | Age: 85
End: 2020-02-26

## 2020-02-26 DIAGNOSIS — M79.12 MYALGIA OF AUXILIARY MUSCLES, HEAD AND NECK: Primary | ICD-10-CM

## 2020-02-26 DIAGNOSIS — M54.2 PAIN, NECK: ICD-10-CM

## 2020-02-26 PROCEDURE — 97140 MANUAL THERAPY 1/> REGIONS: CPT | Performed by: PHYSICAL THERAPIST

## 2020-02-26 PROCEDURE — 97162 PT EVAL MOD COMPLEX 30 MIN: CPT | Performed by: PHYSICAL THERAPIST

## 2020-02-26 PROCEDURE — 97110 THERAPEUTIC EXERCISES: CPT | Performed by: PHYSICAL THERAPIST

## 2020-02-26 NOTE — PROGRESS NOTES
Physical Therapy Initial Evaluation and Plan of Care    Patient: Leandra Nuñez   : 1935  Diagnosis/ICD-10 Code:  Myalgia of auxiliary muscles, head and neck [M79.12]  Referring practitioner: ELIGIO Prieto  Date of Initial Visit: 2020  Today's Date: 2020  Patient seen for 1 session           Subjective Questionnaire: NDI:10/50 points      Subjective Evaluation    History of Present Illness  Date of onset: 2/10/2020  Mechanism of injury: Patient presents to physical therapy with orders to address muscle pain.   She states that she was having pain from her elbows to her shoulders.  Then it started traveling up into her shoulders and across her neck.  She went to her doctor because the pain across her shoulders was getting worse. She told her doctor that lifting her cat (28#) causes increased pain and it gets better with rest.  She was given a muscle relaxer but the medication just came in mail today.      She states that she fatigues easily at home when she is trying to get her housework finished but the pain usually doesn't affect her ability to drive to do any specific task.      She denies any headaches.   She states that she only had one day of dizziness when laying down but she states that has also been a pattern since she was a kid.        Patient Occupation: Retired Pain  Current pain ratin  At best pain ratin  At worst pain ratin  Location: Across shoulders, lower neck and into both arms to her elbows  Quality: sharp and dull ache  Relieving factors: rest  Aggravating factors: lifting and repetitive movement (Leaning forward)    Social Support  Lives with: alone ( lives in assisted living)    Hand dominance: right    Treatments  No previous or current treatments  Patient Goals  Patient goals for therapy: decreased pain             Objective       Palpation   Left   Muscle spasm in the levator scapulae and upper trapezius.   Tenderness of the levator scapulae and  upper trapezius.     Right   Muscle spasm in the levator scapulae and upper trapezius. Tenderness of the levator scapulae and upper trapezius.     Neurological Testing     Sensation   Cervical/Thoracic   Left   Intact: light touch    Right   Intact: light touch    Active Range of Motion   Cervical/Thoracic Spine   Cervical    Flexion: 60 degrees   Extension: 30 degrees   Left lateral flexion: 22 degrees   Right lateral flexion: 18 degrees   Left rotation: 56 degrees   Right rotation: 52 degrees     Strength/Myotome Testing     Left Shoulder     Planes of Motion   Flexion: 4   Abduction: 4     Right Shoulder     Planes of Motion   Flexion: 4   Abduction: 4     Left Elbow   Flexion: 4+  Extension: 4+    Right Elbow   Flexion: 4+  Extension: 4+         Assessment & Plan     Assessment  Impairments: abnormal muscle tone, abnormal or restricted ROM, activity intolerance, impaired physical strength, lacks appropriate home exercise program and pain with function  Assessment details: The patient is a 84 y.o. female who presents to physical therapy today for neck and arm pain. Upon initial evaluation, the patient demonstrates the impairments listed above. Due to these impairments, the patient is unable to tolerate activity without pain. The patient would benefit from skilled PT services to address functional limitations and impairments and to improve patient quality of life.    Prognosis: good  Functional Limitations: carrying objects, lifting, uncomfortable because of pain, reaching overhead and unable to perform repetitive tasks  Goals  Plan Goals: STG's: 3 weeks   Patient will report a reduction in pain by >25%  Patient will be able to perform HEP with minimal verbal cues     LTG's: By discharge   Patient will report a reduction in pain by >60%  Patient will be able to lift her cat without increased pain  Patient will have >10% improvement on the Neck Disability Index to demonstrate overall improvement in daily functional  level  Patient will be able to reach into overhead cabinet to get a dish without pain or difficulty  Patient will be able to tolerate sitting > 60 minutes without increased pain  Patient will demonstrate sufficient cervical AROM to allow patient to turn her head to view blindspots when driving  Patient will be independent with HEP      Plan  Therapy options: will be seen for skilled physical therapy services  Planned modality interventions: cryotherapy, electrical stimulation/Russian stimulation, TENS, thermotherapy (hydrocollator packs) and ultrasound  Planned therapy interventions: body mechanics training, flexibility, functional ROM exercises, home exercise program, manual therapy, neuromuscular re-education, postural training, soft tissue mobilization, spinal/joint mobilization, strengthening and stretching  Duration in visits: 12  Treatment plan discussed with: patient        History # of Personal Factors and/or Comorbidities: MODERATE (1-2)  Examination of Body System(s): # of elements: MODERATE (3)  Clinical Presentation: EVOLVING  Clinical Decision Making: MODERATE      Timed:         Manual Therapy:    15     mins  57832;     Therapeutic Exercise:    10     mins  20644;     Neuromuscular Derick:        mins  19781;    Therapeutic Activity:          mins  20757;     Gait Training:           mins  09466;     Ultrasound:          mins  07820;    Ionto                                   mins   58019  Self Care                            mins   84055  Canalith Repos         mins 81261      Un-Timed:  Electrical Stimulation:         mins  49947 ( );  Dry Needling          mins self-pay  Traction          mins 30042  Low Eval          Mins  02469  Mod Eval     25     Mins  15180  High Eval                            Mins  96968  Re-Eval                               mins  14679        Timed Treatment:   25   mins   Total Treatment:     50   mins    PT SIGNATURE: Nury Pemberton PT   DATE TREATMENT INITIATED:  2/26/2020    Medicare Initial Certification  Certification Period: 5/26/2020  I certify that the therapy services are furnished while this patient is under my care.  The services outlined above are required by this patient, and will be reviewed every 90 days.     PHYSICIAN: Jojo Edwards PA      DATE:     Please sign and return via fax to [phone].. Thank you, [facility].

## 2020-03-05 ENCOUNTER — TREATMENT (OUTPATIENT)
Dept: PHYSICAL THERAPY | Facility: CLINIC | Age: 85
End: 2020-03-05

## 2020-03-05 DIAGNOSIS — M54.2 PAIN, NECK: ICD-10-CM

## 2020-03-05 DIAGNOSIS — M79.12 MYALGIA OF AUXILIARY MUSCLES, HEAD AND NECK: Primary | ICD-10-CM

## 2020-03-05 PROCEDURE — 97110 THERAPEUTIC EXERCISES: CPT | Performed by: PHYSICAL THERAPIST

## 2020-03-05 PROCEDURE — 97140 MANUAL THERAPY 1/> REGIONS: CPT | Performed by: PHYSICAL THERAPIST

## 2020-03-05 NOTE — PROGRESS NOTES
Physical Therapy Daily Progress Note    VISIT#: 2    Subjective   Leandra Nuñez reports that she feels a little better but still feels tight throughout.  Pain Rating (0-10): 6    Objective     See Exercise, Manual, and Modality Logs for complete treatment.     Patient Education: Continue current HEP    Assessment & Plan     Assessment  Assessment details: Patient had significant increase in tightness L>R levator scapulae and upper traps.  She responded well to manual therapy and introduction of stretching program.          Progress per Plan of Care and Progress strengthening /stabilization /functional activity            Timed:         Manual Therapy:    15     mins  09435;     Therapeutic Exercise:    35     mins  91699;     Neuromuscular Derick:        mins  40571;    Therapeutic Activity:          mins  79649;     Gait Training:           mins  29878;     Ultrasound:          mins  10244;    Ionto                                   mins   26477  Self Care                            mins   17675  Canalith Repos                   mins  74540    Un-Timed:  Electrical Stimulation:         mins  90125 ( );  Dry Needling          mins self-pay  Traction          mins 28318  Low Eval          Mins  35613  Mod Eval          Mins  92072  High Eval                            Mins  91560  Re-Eval                               mins  64607    Timed Treatment:   50   mins   Total Treatment:     50   mins    Nury Pemberton PT  [place] License # [id number]  Physical Therapist

## 2020-03-09 ENCOUNTER — TREATMENT (OUTPATIENT)
Dept: PHYSICAL THERAPY | Facility: CLINIC | Age: 85
End: 2020-03-09

## 2020-03-09 DIAGNOSIS — M54.2 PAIN, NECK: ICD-10-CM

## 2020-03-09 DIAGNOSIS — M79.12 MYALGIA OF AUXILIARY MUSCLES, HEAD AND NECK: Primary | ICD-10-CM

## 2020-03-09 PROCEDURE — 97110 THERAPEUTIC EXERCISES: CPT | Performed by: PHYSICAL THERAPIST

## 2020-03-09 PROCEDURE — 97140 MANUAL THERAPY 1/> REGIONS: CPT | Performed by: PHYSICAL THERAPIST

## 2020-03-09 NOTE — PROGRESS NOTES
Physical Therapy Daily Progress Note    VISIT#: 3    Subjective   Leandra Nuñez reports pain and stiffness in R>L. Pt stated an decrease in pain after manual therapy.    Pain Rating (0-10): 6    Objective     See Exercise, Manual, and Modality Logs for complete treatment.     Patient Education: exercise cueing, technique, and rationale     Assessment & Plan     Assessment  Assessment details: Pt is very sensitive in upper traps and has pain in shoulders and neck. Pt tolerated tx well but is limited due to pain.   Functional Limitations: sleeping, uncomfortable because of pain and reaching overhead        Progress per Plan of Care and Progress strengthening /stabilization /functional activity            Timed:         Manual Therapy:    15     mins  28348;     Therapeutic Exercise:    20   mins  82238;     Neuromuscular Derick:        mins  20748;    Therapeutic Activity:          mins  32182;     Gait Training:           mins  29702;     Ultrasound:          mins  01626;    Ionto                                   mins   15242  Self Care                            mins   52554  Canalith Repos                   mins  20149    Un-Timed:  Electrical Stimulation:         mins  20921 (MC );  Dry Needling          mins self-pay  Traction          mins 31874  Re-Eval                               mins  58610    Timed Treatment:   35   mins   Total Treatment:     40   mins    Elda Ayala Student PTA    Nruy Pemberton, PT  [place] License # [id number]  Physical Therapist    I was present in the room guiding the student, directing the service, and making the skilled judgement for all services rendered.

## 2020-03-11 RX ORDER — COLESEVELAM 180 1/1
TABLET ORAL
Qty: 180 TABLET | Refills: 0 | Status: SHIPPED | OUTPATIENT
Start: 2020-03-11 | End: 2020-11-03

## 2020-04-07 RX ORDER — METHOCARBAMOL 500 MG/1
TABLET, FILM COATED ORAL
Qty: 30 TABLET | Refills: 23 | Status: SHIPPED | OUTPATIENT
Start: 2020-04-07 | End: 2021-11-23 | Stop reason: SDUPTHER

## 2020-04-28 ENCOUNTER — DOCUMENTATION (OUTPATIENT)
Dept: PHYSICAL THERAPY | Facility: CLINIC | Age: 85
End: 2020-04-28

## 2020-04-28 ENCOUNTER — TELEPHONE (OUTPATIENT)
Dept: PHYSICAL THERAPY | Facility: CLINIC | Age: 85
End: 2020-04-28

## 2020-04-28 NOTE — PROGRESS NOTES
Discharge Summary  Discharge Summary from Physical Therapy Report      Dates  PT visit: 2/26/20-3/9/20  Number of Visits: 3     Discharge Status of Patient: Patient was placed on hold due to pandemic.  When called to reschedule, she stated that she wanted to be discharged until she could see her physician.    Goals: Partially Met    Discharge Plan: Continue with current home exercise program as instructed  Patient to return to referring/providing physician    Date of Discharge 4/28/20        Nury Pemberton, PT  Physical Therapist

## 2020-05-08 ENCOUNTER — LAB (OUTPATIENT)
Dept: FAMILY MEDICINE CLINIC | Facility: CLINIC | Age: 85
End: 2020-05-08

## 2020-05-08 ENCOUNTER — OFFICE VISIT (OUTPATIENT)
Dept: FAMILY MEDICINE CLINIC | Facility: CLINIC | Age: 85
End: 2020-05-08

## 2020-05-08 VITALS
OXYGEN SATURATION: 97 % | HEART RATE: 62 BPM | HEIGHT: 63 IN | SYSTOLIC BLOOD PRESSURE: 185 MMHG | WEIGHT: 231 LBS | TEMPERATURE: 97.3 F | BODY MASS INDEX: 40.93 KG/M2 | DIASTOLIC BLOOD PRESSURE: 82 MMHG

## 2020-05-08 DIAGNOSIS — M25.512 CHRONIC PAIN OF BOTH SHOULDERS: ICD-10-CM

## 2020-05-08 DIAGNOSIS — M79.10 MUSCLE SORENESS: ICD-10-CM

## 2020-05-08 DIAGNOSIS — M25.511 CHRONIC PAIN OF BOTH SHOULDERS: ICD-10-CM

## 2020-05-08 DIAGNOSIS — M79.10 MUSCLE ACHE: ICD-10-CM

## 2020-05-08 DIAGNOSIS — G89.29 CHRONIC PAIN OF BOTH SHOULDERS: ICD-10-CM

## 2020-05-08 DIAGNOSIS — M79.10 MUSCLE SORENESS: Primary | ICD-10-CM

## 2020-05-08 PROCEDURE — 85652 RBC SED RATE AUTOMATED: CPT | Performed by: FAMILY MEDICINE

## 2020-05-08 PROCEDURE — 36415 COLL VENOUS BLD VENIPUNCTURE: CPT | Performed by: FAMILY MEDICINE

## 2020-05-08 PROCEDURE — 99213 OFFICE O/P EST LOW 20 MIN: CPT | Performed by: FAMILY MEDICINE

## 2020-05-08 PROCEDURE — 80053 COMPREHEN METABOLIC PANEL: CPT | Performed by: FAMILY MEDICINE

## 2020-05-08 PROCEDURE — 85025 COMPLETE CBC W/AUTO DIFF WBC: CPT | Performed by: FAMILY MEDICINE

## 2020-05-08 NOTE — PROGRESS NOTES
Subjective   Leandra Nuñez is a 84 y.o. female.     Here with complaints of right shoulder and arm pain since March   also complaining of some left arm tenderness  bp at home runs 110-120 sys at home  Had 4 PT sessions prior to the pandemic starting   Hurts to hold her arms up  Just the muscles  Has a 28 pound cat       The following portions of the patient's history were reviewed and updated as appropriate: allergies, current medications, past family history, past medical history, past social history, past surgical history and problem list.  Past Medical History:   Diagnosis Date   • Anxiety    • Arthritis    • Breast nodule 2013    Left breast nodule (fibrocystic disease , no malignancy)    • Cancer (CMS/HCC)    • Cataract    • Chronic kidney disease    • Hyperlipidemia    • Hypertension    • Shortness of breath      Past Surgical History:   Procedure Laterality Date   • BREAST BIOPSY Left 05/21/2013    Benign fibrocystic disease ,Abstracted from [facility].   • CARDIAC CATHETERIZATION     • D&C AND LAPAROSCOPY     • FULGURATION ENDOMETRIOSIS      surgery   • NEPHRECTOMY Right      Family History   Problem Relation Age of Onset   • Heart disease Other    • Hyperlipidemia Other    • Diabetes Other    • Hypertension Other      Social History     Socioeconomic History   • Marital status:      Spouse name: Not on file   • Number of children: Not on file   • Years of education: Not on file   • Highest education level: Not on file   Tobacco Use   • Smoking status: Never Smoker   • Smokeless tobacco: Never Used   Substance and Sexual Activity   • Alcohol use: No     Frequency: Never   • Drug use: No   • Sexual activity: Yes     Partners: Male         Current Outpatient Medications:   •  albuterol sulfate HFA (VENTOLIN HFA) 108 (90 Base) MCG/ACT inhaler, Inhale 2 puffs Every 4 (Four) Hours As Needed for Wheezing or Shortness of Air., Disp: , Rfl:   •  ALPRAZolam (XANAX) 0.5 MG tablet, Take 1 tablet by mouth 2  "(Two) Times a Day As Needed for Anxiety., Disp: 30 tablet, Rfl: 0  •  amLODIPine (NORVASC) 10 MG tablet, Take 1 tablet by mouth Daily., Disp: 90 tablet, Rfl: 3  •  colesevelam (WELCHOL) 625 MG tablet, TAKE THREE TABLETS BY MOUTH TWICE A DAY WITH MEALS, Disp: 180 tablet, Rfl: 0  •  ezetimibe (ZETIA) 10 MG tablet, Take 1 tablet by mouth Daily., Disp: 90 tablet, Rfl: 3  •  hydrALAZINE (APRESOLINE) 100 MG tablet, Take 100 mg by mouth Every 12 (Twelve) Hours As Needed., Disp: , Rfl:   •  methocarbamol (ROBAXIN) 500 MG tablet, TAKE ONE-HALF (1/2) TABLET EVERY 6 HOURS AS NEEDED FOR MUSCLE SPASMS, Disp: 30 tablet, Rfl: 23  •  oxybutynin XL (DITROPAN-XL) 10 MG 24 hr tablet, Take 10 mg by mouth Daily., Disp: , Rfl:   •  pitavastatin calcium (LIVALO) 2 MG tablet tablet, Take 1 tablet by mouth Daily., Disp: 90 tablet, Rfl: 3    Review of Systems   Constitutional: Negative for diaphoresis, fatigue, fever, unexpected weight gain and unexpected weight loss.   Respiratory: Negative for cough, chest tightness and shortness of breath.    Cardiovascular: Negative for chest pain, palpitations and leg swelling.   Gastrointestinal: Negative for nausea and vomiting.   Musculoskeletal: Positive for myalgias.   Skin: Negative for rash.   Neurological: Negative for dizziness, syncope, numbness and headache.     BP (!) 185/82 (BP Location: Right arm, Patient Position: Sitting, Cuff Size: Adult)   Pulse 62   Temp 97.3 °F (36.3 °C) (Oral)   Ht 160 cm (63\")   Wt 105 kg (231 lb)   SpO2 97%   Breastfeeding No   BMI 40.92 kg/m²       Objective   Physical Exam   Constitutional: She appears well-developed and well-nourished. No distress.   HENT:   Head: Normocephalic and atraumatic.   Neck: Neck supple.   Cardiovascular: Normal rate, regular rhythm, normal heart sounds and intact distal pulses. Exam reveals no gallop and no friction rub.   No murmur heard.  No temporal artery bruit on either side   Pulmonary/Chest: Effort normal and breath " sounds normal. No respiratory distress. She has no wheezes. She has no rales.   Musculoskeletal: She exhibits no edema.        Right shoulder: She exhibits decreased range of motion.        Left shoulder: She exhibits decreased range of motion.   Both upper arms are very tender to the touch  No swelling, erythema, or warmth  No rash/ecchymosis   Neurological: She is alert. She has normal strength.   Skin: Skin is warm and dry.         Assessment/Plan   Problems Addressed this Visit        Nervous and Auditory    Muscle ache    Relevant Orders    Comprehensive Metabolic Panel    CBC & Differential    Sedimentation rate, automated      Other Visit Diagnoses     Muscle soreness    -  Primary    Relevant Orders    Comprehensive Metabolic Panel    CBC & Differential    Sedimentation rate, automated    Ambulatory Referral to Orthopedic Surgery    Chronic pain of both shoulders        Relevant Orders    Ambulatory Referral to Orthopedic Surgery          At this point do not feel it is polymyalgia rheumatica but will order a sed rate and check LFTs  Will refer to ortho as I am concerned it it more likely related to her shoulders or possible muscle fatigue from lifting her 28 pound cat

## 2020-05-09 LAB
ALBUMIN SERPL-MCNC: 4.3 G/DL (ref 3.5–5.2)
ALBUMIN/GLOB SERPL: 1.3 G/DL
ALP SERPL-CCNC: 79 U/L (ref 39–117)
ALT SERPL W P-5'-P-CCNC: 27 U/L (ref 1–33)
ANION GAP SERPL CALCULATED.3IONS-SCNC: 15.2 MMOL/L (ref 5–15)
AST SERPL-CCNC: 52 U/L (ref 1–32)
BASOPHILS # BLD AUTO: 0.09 10*3/MM3 (ref 0–0.2)
BASOPHILS NFR BLD AUTO: 0.9 % (ref 0–1.5)
BILIRUB SERPL-MCNC: 0.3 MG/DL (ref 0.2–1.2)
BUN BLD-MCNC: 28 MG/DL (ref 8–23)
BUN/CREAT SERPL: 21.2 (ref 7–25)
CALCIUM SPEC-SCNC: 9.8 MG/DL (ref 8.6–10.5)
CHLORIDE SERPL-SCNC: 102 MMOL/L (ref 98–107)
CO2 SERPL-SCNC: 19.8 MMOL/L (ref 22–29)
CREAT BLD-MCNC: 1.32 MG/DL (ref 0.57–1)
DEPRECATED RDW RBC AUTO: 45.2 FL (ref 37–54)
EOSINOPHIL # BLD AUTO: 0.18 10*3/MM3 (ref 0–0.4)
EOSINOPHIL NFR BLD AUTO: 1.7 % (ref 0.3–6.2)
ERYTHROCYTE [DISTWIDTH] IN BLOOD BY AUTOMATED COUNT: 13.9 % (ref 12.3–15.4)
ERYTHROCYTE [SEDIMENTATION RATE] IN BLOOD: 23 MM/HR (ref 0–30)
GFR SERPL CREATININE-BSD FRML MDRD: 38 ML/MIN/1.73
GLOBULIN UR ELPH-MCNC: 3.2 GM/DL
GLUCOSE BLD-MCNC: 104 MG/DL (ref 65–99)
HCT VFR BLD AUTO: 40.2 % (ref 34–46.6)
HGB BLD-MCNC: 13.5 G/DL (ref 12–15.9)
IMM GRANULOCYTES # BLD AUTO: 0.04 10*3/MM3 (ref 0–0.05)
IMM GRANULOCYTES NFR BLD AUTO: 0.4 % (ref 0–0.5)
LYMPHOCYTES # BLD AUTO: 2.84 10*3/MM3 (ref 0.7–3.1)
LYMPHOCYTES NFR BLD AUTO: 27.2 % (ref 19.6–45.3)
MCH RBC QN AUTO: 30.2 PG (ref 26.6–33)
MCHC RBC AUTO-ENTMCNC: 33.6 G/DL (ref 31.5–35.7)
MCV RBC AUTO: 89.9 FL (ref 79–97)
MONOCYTES # BLD AUTO: 0.77 10*3/MM3 (ref 0.1–0.9)
MONOCYTES NFR BLD AUTO: 7.4 % (ref 5–12)
NEUTROPHILS # BLD AUTO: 6.54 10*3/MM3 (ref 1.7–7)
NEUTROPHILS NFR BLD AUTO: 62.4 % (ref 42.7–76)
NRBC BLD AUTO-RTO: 0 /100 WBC (ref 0–0.2)
PLATELET # BLD AUTO: 307 10*3/MM3 (ref 140–450)
PMV BLD AUTO: 11 FL (ref 6–12)
POTASSIUM BLD-SCNC: 4.4 MMOL/L (ref 3.5–5.2)
PROT SERPL-MCNC: 7.5 G/DL (ref 6–8.5)
RBC # BLD AUTO: 4.47 10*6/MM3 (ref 3.77–5.28)
SODIUM BLD-SCNC: 137 MMOL/L (ref 136–145)
WBC NRBC COR # BLD: 10.46 10*3/MM3 (ref 3.4–10.8)

## 2020-06-29 DIAGNOSIS — F41.9 ANXIETY: Primary | ICD-10-CM

## 2020-06-29 RX ORDER — ALPRAZOLAM 0.5 MG/1
0.5 TABLET ORAL 2 TIMES DAILY PRN
Qty: 30 TABLET | Refills: 0 | Status: SHIPPED | OUTPATIENT
Start: 2020-06-29 | End: 2020-08-07 | Stop reason: SDUPTHER

## 2020-07-01 ENCOUNTER — LAB (OUTPATIENT)
Dept: FAMILY MEDICINE CLINIC | Facility: CLINIC | Age: 85
End: 2020-07-01

## 2020-07-01 ENCOUNTER — OFFICE VISIT (OUTPATIENT)
Dept: FAMILY MEDICINE CLINIC | Facility: CLINIC | Age: 85
End: 2020-07-01

## 2020-07-01 VITALS
HEART RATE: 56 BPM | DIASTOLIC BLOOD PRESSURE: 79 MMHG | BODY MASS INDEX: 41.75 KG/M2 | WEIGHT: 235.6 LBS | SYSTOLIC BLOOD PRESSURE: 169 MMHG | TEMPERATURE: 97.3 F | HEIGHT: 63 IN | OXYGEN SATURATION: 97 %

## 2020-07-01 DIAGNOSIS — E78.2 MIXED HYPERLIPIDEMIA: ICD-10-CM

## 2020-07-01 DIAGNOSIS — Z00.00 ENCOUNTER FOR GENERAL ADULT MEDICAL EXAMINATION WITHOUT ABNORMAL FINDINGS: Primary | ICD-10-CM

## 2020-07-01 DIAGNOSIS — E11.9 TYPE 2 DIABETES MELLITUS WITHOUT COMPLICATION, WITHOUT LONG-TERM CURRENT USE OF INSULIN (HCC): ICD-10-CM

## 2020-07-01 LAB
ARTICHOKE IGE QN: 91 MG/DL (ref 0–100)
CHOLEST SERPL-MCNC: 194 MG/DL (ref 0–200)
HBA1C MFR BLD: 6.2 % (ref 3.5–5.6)
HDLC SERPL-MCNC: 44 MG/DL (ref 40–60)
LDLC SERPL CALC-MCNC: ABNORMAL MG/DL
LDLC/HDLC SERPL: ABNORMAL {RATIO}
TRIGL SERPL-MCNC: 580 MG/DL (ref 0–150)
VLDLC SERPL-MCNC: ABNORMAL MG/DL

## 2020-07-01 PROCEDURE — 83036 HEMOGLOBIN GLYCOSYLATED A1C: CPT | Performed by: FAMILY MEDICINE

## 2020-07-01 PROCEDURE — 36415 COLL VENOUS BLD VENIPUNCTURE: CPT | Performed by: FAMILY MEDICINE

## 2020-07-01 PROCEDURE — 83721 ASSAY OF BLOOD LIPOPROTEIN: CPT

## 2020-07-01 PROCEDURE — 80061 LIPID PANEL: CPT | Performed by: FAMILY MEDICINE

## 2020-07-01 PROCEDURE — G0439 PPPS, SUBSEQ VISIT: HCPCS | Performed by: FAMILY MEDICINE

## 2020-07-01 NOTE — PATIENT INSTRUCTIONS
Keep working to lose weight through healthy eating and exercise.  No fried foods and limit pasta, bread, and sweets.  See your eye doctor  Get a flu shot this Fall

## 2020-07-01 NOTE — PROGRESS NOTES
The ABCs of the Annual Wellness Visit  Subsequent Medicare Wellness Visit    Chief Complaint   Patient presents with   • Medicare Wellness-subsequent       Subjective   History of Present Illness:  Leandra Nuñez is a 85 y.o. female who presents for a Subsequent Medicare Wellness Visit.  Also needs follow up on chol and dm  Does not check bs  Sees kidney specialist in Nov  Last eye exam was 2 years    HEALTH RISK ASSESSMENT    Recent Hospitalizations:  Recently treated at the following:  [facility]     Current Medical Providers:  Patient Care Team:  Anabelle Sellers MD as PCP - General  Anabelle Sellers MD as PCP - Claims Attributed  Jojo Edwards PA as Physician Assistant (Physician Assistant)    Smoking Status:  Social History     Tobacco Use   Smoking Status Never Smoker   Smokeless Tobacco Never Used       Alcohol Consumption:  Social History     Substance and Sexual Activity   Alcohol Use No   • Frequency: Never       Depression Screen:   PHQ-2/PHQ-9 Depression Screening 7/1/2020   Little interest or pleasure in doing things 0   Feeling down, depressed, or hopeless 0   Total Score 0       Fall Risk Screen:  LILY Fall Risk Assessment was completed, and patient is at LOW risk for falls.Assessment completed on:7/1/2020    Health Habits and Functional and Cognitive Screening:  Functional & Cognitive Status 7/1/2020   Do you have difficulty preparing food and eating? No   Do you have difficulty bathing yourself, getting dressed or grooming yourself? No   Do you have difficulty using the toilet? No   Do you have difficulty moving around from place to place? No   Do you have trouble with steps or getting out of a bed or a chair? No   Current Diet Well Balanced Diet   Dental Exam Up to date   Eye Exam Not up to date   Exercise (times per week) 1 times per week   Current Exercise Activities Include Walking   Do you need help using the phone?  No   Are you deaf or do you have serious  difficulty hearing?  No   Do you need help with transportation? No   Do you need help shopping? No   Do you need help preparing meals?  No   Do you need help with housework?  No   Do you need help with laundry? No   Do you need help taking your medications? No   Do you need help managing money? No   Do you ever drive or ride in a car without wearing a seat belt? No   Have you felt unusual stress, anger or loneliness in the last month? No   Who do you live with? Alone   If you need help, do you have trouble finding someone available to you? No   Have you been bothered in the last four weeks by sexual problems? No   Do you have difficulty concentrating, remembering or making decisions? No         Does the patient have evidence of cognitive impairment? No    Asprin use counseling:Does not need ASA (and currently is not on it)    Age-appropriate Screening Schedule:  Refer to the list below for future screening recommendations based on patient's age, sex and/or medical conditions. Orders for these recommended tests are listed in the plan section. The patient has been provided with a written plan.    Health Maintenance   Topic Date Due   • TDAP/TD VACCINES (1 - Tdap) 06/23/1946   • ZOSTER VACCINE (1 of 2) 06/23/1986   • DIABETIC EYE EXAM  02/26/2020   • HEMOGLOBIN A1C  03/16/2020   • INFLUENZA VACCINE  08/01/2020   • LIPID PANEL  12/21/2020   • URINE MICROALBUMIN  Discontinued          The following portions of the patient's history were reviewed and updated as appropriate: allergies, current medications, past family history, past medical history, past social history, past surgical history and problem list.    Outpatient Medications Prior to Visit   Medication Sig Dispense Refill   • albuterol sulfate HFA (VENTOLIN HFA) 108 (90 Base) MCG/ACT inhaler Inhale 2 puffs Every 4 (Four) Hours As Needed for Wheezing or Shortness of Air.     • ALPRAZolam (XANAX) 0.5 MG tablet Take 1 tablet by mouth 2 (Two) Times a Day As Needed for  Anxiety. 30 tablet 0   • amLODIPine (NORVASC) 10 MG tablet Take 1 tablet by mouth Daily. 90 tablet 3   • colesevelam (WELCHOL) 625 MG tablet TAKE THREE TABLETS BY MOUTH TWICE A DAY WITH MEALS 180 tablet 0   • ezetimibe (ZETIA) 10 MG tablet Take 1 tablet by mouth Daily. 90 tablet 3   • hydrALAZINE (APRESOLINE) 100 MG tablet Take 100 mg by mouth Every 12 (Twelve) Hours As Needed.     • methocarbamol (ROBAXIN) 500 MG tablet TAKE ONE-HALF (1/2) TABLET EVERY 6 HOURS AS NEEDED FOR MUSCLE SPASMS 30 tablet 23   • oxybutynin XL (DITROPAN-XL) 10 MG 24 hr tablet Take 10 mg by mouth Daily.     • pitavastatin calcium (LIVALO) 2 MG tablet tablet Take 1 tablet by mouth Daily. 90 tablet 3     No facility-administered medications prior to visit.        Patient Active Problem List   Diagnosis   • Abnormal mammogram   • Anxiety   • Arthritis of foot   • Asthma   • Chronic renal insufficiency, stage III (moderate) (CMS/HCC)   • Edema of lower extremity   • Encounter for general adult medical examination without abnormal findings   • Family tension   • Gastroesophageal reflux disease   • Hyperlipidemia   • Hypertension   • Muscle ache   • Muscle cramps   • Obesity   • Renal cancer (CMS/HCC)   • Renal insufficiency   • Tibialis posterior tendinitis   • Type 2 diabetes mellitus without complications (CMS/HCC)   • Visit for screening mammogram   • Adjustment disorder with anxiety   • Idiopathic thrombocytopenic purpura (CMS/HCC)   • NSVT (nonsustained ventricular tachycardia) (CMS/HCC)   • HOMA (obstructive sleep apnea)   • Osteoarthritis   • Renal cell cancer (CMS/HCC)   • Thrombocytopenia (CMS/HCC)   • GERD (gastroesophageal reflux disease)   • Hyperglycemia   • Bronchitis with bronchospasm   • Shortness of breath   • Acute renal injury (CMS/HCC)   • Leukocytosis   • Mucopurulent chronic bronchitis (CMS/HCC)   • Acute kidney injury (CMS/HCC)   • Acute bronchitis due to parainfluenza virus       Advanced Care Planning:  ACP discussion was  "held with the patient during this visit. Patient has an advance directive in EMR which is still valid.     Review of Systems   Constitutional: Positive for appetite change.   Respiratory: Negative.    Cardiovascular: Negative.    Gastrointestinal: Negative for nausea and vomiting.   Endocrine: Negative.    Neurological: Negative for dizziness, syncope, light-headedness and headaches.   Psychiatric/Behavioral: Negative.        Compared to one year ago, the patient feels her physical health is the same.  Compared to one year ago, the patient feels her mental health is the same.    Reviewed chart for potential of high risk medication in the elderly: yes  Reviewed chart for potential of harmful drug interactions in the elderly:yes    Objective         Vitals:    07/01/20 1100   BP: 169/79   BP Location: Left arm   Patient Position: Sitting   Cuff Size: Large Adult   Pulse: 56   Temp: 97.3 °F (36.3 °C)   TempSrc: Temporal   SpO2: 97%   Weight: 107 kg (235 lb 9.6 oz)   Height: 160 cm (63\")       Body mass index is 41.73 kg/m².  Discussed the patient's BMI with her. The BMI is above average; BMI management plan is completed.    Physical Exam   Constitutional: She is oriented to person, place, and time. She appears well-developed and well-nourished.   HENT:   Head: Normocephalic and atraumatic.   Neck: No JVD present. No thyromegaly present.   Cardiovascular: Normal rate, regular rhythm, normal heart sounds and intact distal pulses.   No murmur heard.  Pulmonary/Chest: Effort normal and breath sounds normal. No respiratory distress.   Musculoskeletal: She exhibits no edema.   Lymphadenopathy:     She has no cervical adenopathy.   Neurological: She is alert and oriented to person, place, and time.   Skin: Skin is warm and dry.   Psychiatric: She has a normal mood and affect.   Nursing note and vitals reviewed.            Assessment/Plan   Medicare Risks and Personalized Health Plan  CMS Preventative Services Quick " Reference  Obesity/Overweight     The above risks/problems have been discussed with the patient.  Pertinent information has been shared with the patient in the After Visit Summary.  Follow up plans and orders are seen below in the Assessment/Plan Section.    Diagnoses and all orders for this visit:    1. Encounter for general adult medical examination without abnormal findings (Primary)    2. Mixed hyperlipidemia  -     Lipid Panel; Future    3. Type 2 diabetes mellitus without complication, without long-term current use of insulin (CMS/[facility])  -     Hemoglobin A1c  -     Lipid Panel; Future      Follow Up:  Return in about 6 months (around 1/1/2021).     An After Visit Summary and PPPS were given to the patient.         She was counseled on the need for weight loss and encouraged to get a flu shot this fall  She is doing well  She was counseled on the need for an eye exam this year  She will keep her follow-up with her nephrologist  Labs were ordered  I will see her back in 6 months

## 2020-07-31 ENCOUNTER — LAB (OUTPATIENT)
Dept: FAMILY MEDICINE CLINIC | Facility: CLINIC | Age: 85
End: 2020-07-31

## 2020-07-31 ENCOUNTER — OFFICE VISIT (OUTPATIENT)
Dept: FAMILY MEDICINE CLINIC | Facility: CLINIC | Age: 85
End: 2020-07-31

## 2020-07-31 VITALS
WEIGHT: 234 LBS | TEMPERATURE: 97.3 F | HEART RATE: 58 BPM | SYSTOLIC BLOOD PRESSURE: 185 MMHG | OXYGEN SATURATION: 95 % | BODY MASS INDEX: 41.45 KG/M2 | DIASTOLIC BLOOD PRESSURE: 75 MMHG

## 2020-07-31 DIAGNOSIS — N18.30 CHRONIC KIDNEY DISEASE, STAGE 3 (MODERATE): ICD-10-CM

## 2020-07-31 DIAGNOSIS — R73.9 HYPERGLYCEMIA: ICD-10-CM

## 2020-07-31 DIAGNOSIS — C64.1 MALIGNANT NEOPLASM OF RIGHT KIDNEY (HCC): ICD-10-CM

## 2020-07-31 DIAGNOSIS — R53.82 CHRONIC FATIGUE: ICD-10-CM

## 2020-07-31 DIAGNOSIS — E78.1 PURE HYPERTRIGLYCERIDEMIA: ICD-10-CM

## 2020-07-31 DIAGNOSIS — R25.2 LEG CRAMPS: ICD-10-CM

## 2020-07-31 DIAGNOSIS — D69.6 THROMBOCYTOPENIA (HCC): ICD-10-CM

## 2020-07-31 DIAGNOSIS — J45.20 MILD INTERMITTENT ASTHMA WITHOUT COMPLICATION: Primary | ICD-10-CM

## 2020-07-31 DIAGNOSIS — N28.89 HYPERTENSION SECONDARY TO OTHER RENAL DISORDERS: ICD-10-CM

## 2020-07-31 DIAGNOSIS — I15.1 HYPERTENSION SECONDARY TO OTHER RENAL DISORDERS: ICD-10-CM

## 2020-07-31 DIAGNOSIS — E11.9 TYPE 2 DIABETES MELLITUS WITHOUT COMPLICATION, WITHOUT LONG-TERM CURRENT USE OF INSULIN (HCC): ICD-10-CM

## 2020-07-31 DIAGNOSIS — F41.9 ANXIETY: ICD-10-CM

## 2020-07-31 DIAGNOSIS — E66.01 CLASS 3 SEVERE OBESITY DUE TO EXCESS CALORIES WITH SERIOUS COMORBIDITY AND BODY MASS INDEX (BMI) OF 40.0 TO 44.9 IN ADULT (HCC): ICD-10-CM

## 2020-07-31 DIAGNOSIS — I10 WHITE COAT SYNDROME WITH DIAGNOSIS OF HYPERTENSION: ICD-10-CM

## 2020-07-31 DIAGNOSIS — G47.33 OSA (OBSTRUCTIVE SLEEP APNEA): ICD-10-CM

## 2020-07-31 DIAGNOSIS — K21.9 GASTROESOPHAGEAL REFLUX DISEASE WITHOUT ESOPHAGITIS: ICD-10-CM

## 2020-07-31 DIAGNOSIS — N18.30 CHRONIC RENAL INSUFFICIENCY, STAGE III (MODERATE) (HCC): ICD-10-CM

## 2020-07-31 PROBLEM — C64.9 RENAL CANCER: Status: RESOLVED | Noted: 2017-03-15 | Resolved: 2020-07-31

## 2020-07-31 PROBLEM — R06.02 SHORTNESS OF BREATH: Status: RESOLVED | Noted: 2019-12-21 | Resolved: 2020-07-31

## 2020-07-31 PROBLEM — D72.829 LEUKOCYTOSIS: Status: RESOLVED | Noted: 2019-12-21 | Resolved: 2020-07-31

## 2020-07-31 PROBLEM — M19.079 ARTHRITIS OF FOOT: Status: RESOLVED | Noted: 2017-11-16 | Resolved: 2020-07-31

## 2020-07-31 PROBLEM — M79.10 MUSCLE ACHE: Status: RESOLVED | Noted: 2019-01-09 | Resolved: 2020-07-31

## 2020-07-31 PROBLEM — N17.9 ACUTE KIDNEY INJURY (HCC): Status: RESOLVED | Noted: 2019-12-22 | Resolved: 2020-07-31

## 2020-07-31 PROBLEM — Z63.9 FAMILY TENSION: Status: RESOLVED | Noted: 2018-12-14 | Resolved: 2020-07-31

## 2020-07-31 PROBLEM — M76.829 TIBIALIS POSTERIOR TENDINITIS: Status: RESOLVED | Noted: 2017-11-16 | Resolved: 2020-07-31

## 2020-07-31 PROBLEM — J41.1 MUCOPURULENT CHRONIC BRONCHITIS: Status: RESOLVED | Noted: 2019-12-22 | Resolved: 2020-07-31

## 2020-07-31 PROBLEM — J20.9 BRONCHITIS WITH BRONCHOSPASM: Status: RESOLVED | Noted: 2019-12-20 | Resolved: 2020-07-31

## 2020-07-31 PROBLEM — N17.9 ACUTE RENAL INJURY: Status: RESOLVED | Noted: 2019-12-21 | Resolved: 2020-07-31

## 2020-07-31 PROBLEM — R60.0 EDEMA OF LOWER EXTREMITY: Status: RESOLVED | Noted: 2018-07-27 | Resolved: 2020-07-31

## 2020-07-31 PROBLEM — J20.4 ACUTE BRONCHITIS DUE TO PARAINFLUENZA VIRUS: Status: RESOLVED | Noted: 2019-12-22 | Resolved: 2020-07-31

## 2020-07-31 LAB
25(OH)D3 SERPL-MCNC: 30.4 NG/ML (ref 30–100)
ALBUMIN SERPL-MCNC: 4.4 G/DL (ref 3.5–5.2)
ALBUMIN/GLOB SERPL: 1.5 G/DL
ALP SERPL-CCNC: 70 U/L (ref 39–117)
ALT SERPL W P-5'-P-CCNC: 34 U/L (ref 1–33)
ANION GAP SERPL CALCULATED.3IONS-SCNC: 15 MMOL/L (ref 5–15)
AST SERPL-CCNC: 49 U/L (ref 1–32)
BASOPHILS # BLD AUTO: 0.09 10*3/MM3 (ref 0–0.2)
BASOPHILS NFR BLD AUTO: 1 % (ref 0–1.5)
BILIRUB SERPL-MCNC: 0.4 MG/DL (ref 0–1.2)
BUN SERPL-MCNC: 24 MG/DL (ref 8–23)
BUN/CREAT SERPL: 17.4 (ref 7–25)
CALCIUM SPEC-SCNC: 9.8 MG/DL (ref 8.6–10.5)
CHLORIDE SERPL-SCNC: 102 MMOL/L (ref 98–107)
CO2 SERPL-SCNC: 21 MMOL/L (ref 22–29)
CREAT SERPL-MCNC: 1.38 MG/DL (ref 0.57–1)
DEPRECATED RDW RBC AUTO: 45.8 FL (ref 37–54)
EOSINOPHIL # BLD AUTO: 0.14 10*3/MM3 (ref 0–0.4)
EOSINOPHIL NFR BLD AUTO: 1.5 % (ref 0.3–6.2)
ERYTHROCYTE [DISTWIDTH] IN BLOOD BY AUTOMATED COUNT: 13.9 % (ref 12.3–15.4)
GFR SERPL CREATININE-BSD FRML MDRD: 36 ML/MIN/1.73
GLOBULIN UR ELPH-MCNC: 2.9 GM/DL
GLUCOSE SERPL-MCNC: 100 MG/DL (ref 65–99)
HCT VFR BLD AUTO: 41.6 % (ref 34–46.6)
HGB BLD-MCNC: 13.8 G/DL (ref 12–15.9)
IMM GRANULOCYTES # BLD AUTO: 0.03 10*3/MM3 (ref 0–0.05)
IMM GRANULOCYTES NFR BLD AUTO: 0.3 % (ref 0–0.5)
LYMPHOCYTES # BLD AUTO: 2.74 10*3/MM3 (ref 0.7–3.1)
LYMPHOCYTES NFR BLD AUTO: 29.1 % (ref 19.6–45.3)
MAGNESIUM SERPL-MCNC: 2.2 MG/DL (ref 1.6–2.4)
MCH RBC QN AUTO: 29.9 PG (ref 26.6–33)
MCHC RBC AUTO-ENTMCNC: 33.2 G/DL (ref 31.5–35.7)
MCV RBC AUTO: 90 FL (ref 79–97)
MONOCYTES # BLD AUTO: 0.79 10*3/MM3 (ref 0.1–0.9)
MONOCYTES NFR BLD AUTO: 8.4 % (ref 5–12)
NEUTROPHILS NFR BLD AUTO: 5.62 10*3/MM3 (ref 1.7–7)
NEUTROPHILS NFR BLD AUTO: 59.7 % (ref 42.7–76)
NRBC BLD AUTO-RTO: 0 /100 WBC (ref 0–0.2)
PLATELET # BLD AUTO: 295 10*3/MM3 (ref 140–450)
PMV BLD AUTO: 11.5 FL (ref 6–12)
POTASSIUM SERPL-SCNC: 4 MMOL/L (ref 3.5–5.2)
PROT SERPL-MCNC: 7.3 G/DL (ref 6–8.5)
RBC # BLD AUTO: 4.62 10*6/MM3 (ref 3.77–5.28)
SODIUM SERPL-SCNC: 138 MMOL/L (ref 136–145)
TSH SERPL DL<=0.05 MIU/L-ACNC: 1.17 UIU/ML (ref 0.27–4.2)
VIT B12 BLD-MCNC: 449 PG/ML (ref 211–946)
WBC # BLD AUTO: 9.41 10*3/MM3 (ref 3.4–10.8)

## 2020-07-31 PROCEDURE — 36415 COLL VENOUS BLD VENIPUNCTURE: CPT | Performed by: PHYSICIAN ASSISTANT

## 2020-07-31 PROCEDURE — 99214 OFFICE O/P EST MOD 30 MIN: CPT | Performed by: PHYSICIAN ASSISTANT

## 2020-07-31 PROCEDURE — 84443 ASSAY THYROID STIM HORMONE: CPT | Performed by: PHYSICIAN ASSISTANT

## 2020-07-31 PROCEDURE — 83735 ASSAY OF MAGNESIUM: CPT | Performed by: PHYSICIAN ASSISTANT

## 2020-07-31 PROCEDURE — 82306 VITAMIN D 25 HYDROXY: CPT | Performed by: PHYSICIAN ASSISTANT

## 2020-07-31 PROCEDURE — 80053 COMPREHEN METABOLIC PANEL: CPT | Performed by: PHYSICIAN ASSISTANT

## 2020-07-31 PROCEDURE — 82607 VITAMIN B-12: CPT | Performed by: PHYSICIAN ASSISTANT

## 2020-07-31 PROCEDURE — 85025 COMPLETE CBC W/AUTO DIFF WBC: CPT | Performed by: PHYSICIAN ASSISTANT

## 2020-07-31 RX ORDER — ICOSAPENT ETHYL 1000 MG/1
2 CAPSULE ORAL 2 TIMES DAILY WITH MEALS
Qty: 120 CAPSULE | Refills: 5 | Status: SHIPPED | OUTPATIENT
Start: 2020-07-31 | End: 2020-11-03

## 2020-07-31 RX ORDER — ESCITALOPRAM OXALATE 10 MG/1
10 TABLET ORAL EVERY MORNING
Qty: 30 TABLET | Refills: 5 | Status: SHIPPED | OUTPATIENT
Start: 2020-07-31 | End: 2021-10-05

## 2020-07-31 NOTE — PROGRESS NOTES
"Subjective  Leandra Nuñez is a 85 y.o. female     History of Present Illness  Patient is an 85-year-old white female here for follow-up and maintenance of her current medical conditions and for a new concern which include:    1.  New problem: Fatigue: Patient complains of being super fatigued for the last 6 months off and on, however significantly worse within the last 2 months.  She states that she has trouble sleeping, she gets up multiple times at night to use the restroom, she has leg cramps at night, she has sleep apnea and cannot tolerate a CPAP machine.  She lives at home by herself, her  lives in a nursing home, she is often lonely and feels sad.    2.  Anxiety: Patient is currently taking Xanax 0.5 mg twice daily as needed, she states that she uses this medication infrequently.    3.  Hypertension: Patient is currently taking amlodipine 10 mg once daily and hydralazine 100 mg twice daily.  Her blood pressure today is very elevated in the office, however she states that at home her blood pressures run very normal, she checks it routinely twice daily.    4.  Hyperlipidemia/hypertriglyceridemia: Patient is currently taking WelChol 625 mg 3 times daily, Zetia 10 mg daily, and she is prescribed to have a statin, however she states she stopped taking this due to muscle cramps, she has tried \"every other statin\" and states she cannot tolerate any of them.    5.  Overactive bladder: Patient is currently taking oxybutynin extended release 10 mg once daily.    6.  Chronic muscle spasms: Patient is currently taking Robaxin 500 mg 3 times daily as needed.    The following portions of the patient's history were reviewed and updated as appropriate: allergies, current medications, past family history, past medical history, past social history, past surgical history and problem list.    Review of Systems   Constitutional: Positive for fatigue. Negative for fever and unexpected weight loss.   HENT: Negative for " ear pain and sore throat.    Eyes: Negative for blurred vision.   Respiratory: Negative for cough and shortness of breath.    Cardiovascular: Negative for chest pain.   Gastrointestinal: Negative for abdominal pain, blood in stool, diarrhea, nausea and vomiting.   Genitourinary: Negative for flank pain and urgency.   Musculoskeletal: Negative for joint swelling.   Neurological: Negative for syncope, headache and confusion.   Psychiatric/Behavioral: Positive for sleep disturbance and depressed mood. Negative for suicidal ideas. The patient is nervous/anxious.        Objective  Physical Exam   Constitutional: She is oriented to person, place, and time. She appears well-developed and well-nourished. She is obese.  HENT:   Head: Normocephalic and atraumatic.   Right Ear: External ear normal.   Left Ear: External ear normal.   Nose: Nose normal.   Mouth/Throat: Oropharynx is clear and moist.   Eyes: Pupils are equal, round, and reactive to light. Conjunctivae and EOM are normal.   Neck: Normal range of motion. Neck supple.   Cardiovascular: Normal rate, regular rhythm and normal heart sounds.   Pulmonary/Chest: Effort normal and breath sounds normal.   Abdominal: Soft. Bowel sounds are normal.   Musculoskeletal: Normal range of motion.   Neurological: She is alert and oriented to person, place, and time.   Psychiatric: Her behavior is normal. Her mood appears anxious. She exhibits a depressed mood.       Vitals:    07/31/20 1115   BP: (!) 185/75   BP Location: Right arm   Patient Position: Sitting   Cuff Size: Adult   Pulse: 58   Temp: 97.3 °F (36.3 °C)   TempSrc: Oral   SpO2: 95%   Weight: 106 kg (234 lb)     Body mass index is 41.45 kg/m².    PHQ-9 Total Score:        Assessment/Plan  Diagnoses and all orders for this visit:    1. Mild intermittent asthma without complication (Primary)  Comments:  Stable, patient to continue current medications.    2. Hypertension secondary to other renal disorders  Comments:  Stable,  patient to continue current medications.  Orders:  -     Comprehensive Metabolic Panel    3. Pure hypertriglyceridemia  Comments:  Deteriorated, patient's triglycerides continue to be over 500, for this reason I am adding Vascepa to her medication regimen.  Samples given.  Orders:  -     Cancel: Lipid Panel    4. Gastroesophageal reflux disease without esophagitis  Comments:  Stable, patient to continue current medications.    5. Type 2 diabetes mellitus without complication, without long-term current use of insulin (CMS/[facility])  Comments:  Stable, patient is not taking medication for this, she is to continue with lifestyle changes.  Orders:  -     Cancel: Hemoglobin A1c    6. Chronic renal insufficiency, stage III (moderate) (CMS/[facility])  Comments:  Stable, patient to continue current medications.    7. Hyperglycemia  Comments:  Stable at this time.    8. Anxiety  Comments:  Deteriorated, starting patient on Lexapro daily for anxiety and depression, follow-up 3 months.    9. HOMA (obstructive sleep apnea)  Comments:  Patient states she cannot tolerate CPAP machine, she may need to follow back up with neurology regarding this.    10. Class 3 severe obesity due to excess calories with serious comorbidity and body mass index (BMI) of 40.0 to 44.9 in adult (CMS/[facility])  Comments:  I recommended she follow a more healthful diet and exercise regularly for weight loss and overall improved mental health.    11. Chronic fatigue  Comments:  Worsened, labs today to investigate worsening fatigue, likely secondary to depression, starting patient on Lexapro today, follow-up 3 months.  Orders:  -     CBC & Differential  -     TSH  -     Vitamin B12  -     Vitamin D 25 Hydroxy    12. Leg cramps  Comments:  Patient continues to have leg cramps, labs today for electrolyte levels including magnesium.  Orders:  -     Magnesium    13. Chronic kidney disease, stage 3 (moderate) (CMS/[facility])   Comments:  Stable, patient to continue current  medications.  Orders:  -     Vitamin D 25 Hydroxy    14. Thrombocytopenia (CMS/HCC)  Comments:  CBC today.    15. Malignant neoplasm of right kidney (CMS/HCC)  Comments:  Stable, patient has had the right kidney removed.    16. White coat syndrome with diagnosis of hypertension  Comments:  New, patient states her blood pressures run normal at home, she checks them regularly.    Other orders  -     icosapent ethyl (Vascepa) 1 g capsule capsule; Take 2 g by mouth 2 (Two) Times a Day With Meals.  Dispense: 120 capsule; Refill: 5  -     escitalopram (Lexapro) 10 MG tablet; Take 1 tablet by mouth Every Morning.  Dispense: 30 tablet; Refill: 5

## 2020-08-03 ENCOUNTER — OFFICE VISIT (OUTPATIENT)
Dept: CARDIOLOGY | Facility: CLINIC | Age: 85
End: 2020-08-03

## 2020-08-03 VITALS
WEIGHT: 232 LBS | OXYGEN SATURATION: 97 % | HEART RATE: 56 BPM | DIASTOLIC BLOOD PRESSURE: 85 MMHG | SYSTOLIC BLOOD PRESSURE: 161 MMHG | BODY MASS INDEX: 41.1 KG/M2

## 2020-08-03 DIAGNOSIS — E78.2 MIXED HYPERLIPIDEMIA: ICD-10-CM

## 2020-08-03 DIAGNOSIS — G47.33 SLEEP APNEA, OBSTRUCTIVE: Primary | ICD-10-CM

## 2020-08-03 DIAGNOSIS — I50.32 CHRONIC DIASTOLIC (CONGESTIVE) HEART FAILURE (HCC): ICD-10-CM

## 2020-08-03 DIAGNOSIS — N28.89 HYPERTENSION SECONDARY TO OTHER RENAL DISORDERS: ICD-10-CM

## 2020-08-03 DIAGNOSIS — R06.09 DYSPNEA ON EXERTION: ICD-10-CM

## 2020-08-03 DIAGNOSIS — I15.1 HYPERTENSION SECONDARY TO OTHER RENAL DISORDERS: ICD-10-CM

## 2020-08-03 PROCEDURE — 99214 OFFICE O/P EST MOD 30 MIN: CPT | Performed by: INTERNAL MEDICINE

## 2020-08-03 PROCEDURE — 93000 ELECTROCARDIOGRAM COMPLETE: CPT | Performed by: INTERNAL MEDICINE

## 2020-08-03 NOTE — PROGRESS NOTES
CC--exertional dyspnea, hypertension, chronic kidney disease, hyperlipidemia    Sub--85-year-old female patient came as a self-referral--she complained exertional shortness of breath with this class III shortness of breath--chronic medical problems include solitary kidney with prior nephrectomy, chronic kidney disease stage III, hypertension, hyperlipidemia and sleep apnea  Patient had issues with treatment of sleep apnea in the past and she is currently using a different mask and she is under the care of an ENT surgeon  She is under tremendous stress from social situation with her   She complains of exertional shortness of breath and significant fatigue and daytime sleepiness  She had a prior cardiac evaluation several years ago according to her without significant coronary artery disease and heart catheterization done more than 10 years ago according to her  She denies any TIA or stroke and no prior history of any peripheral vascular disease  She claims that her blood pressure is well controlled on multiple home recordings  Since last visit patient has refused noninvasive work-up and feels better with optimization of her hypertension which is been monitoring at home  She complains of atypical mid lumbar pain in the rib area which is reproducible consistent with musculoskeletal pain      Past Medical History:   Diagnosis Date   • Anxiety    • Arthritis    • Breast nodule 2013    Left breast nodule (fibrocystic disease , no malignancy)    • Cancer (CMS/HCC)    • Cataract    • Chronic kidney disease    • HL (hearing loss)    • Hyperlipidemia    • Hypertension    • Obesity    • Shortness of breath      Past Surgical History:   Procedure Laterality Date   • BREAST BIOPSY Left 05/21/2013    Ben[facility]rocystic [facility] ,[facility]   • CARDIAC CATHETERIZATION     • D&C AND LAPAROSCOPY     • FULGURATION ENDOMETRIOSIS      surgery   • NEPHRECTOMY Right      History reviewed. No pertinent family  history.  Social History     Tobacco Use   • Smoking status: Never Smoker   • Smokeless tobacco: Never Used   Substance Use Topics   • Alcohol use: No   • Drug use: No     Review of Systems   General:  positive for fatigue and tiredness  Eyes: No redness  Cardiovascular: No chest pain, no palpitations  Respiratory:   positive for class 2 shortness of breath  Gastrointestinal: No nausea or vomiting, bleeding  Genitourinary: no hematuria or dysuria  Musculoskeletal: No arthralgia or myalgia  Skin: No rash  Neurologic: No numbness, tingling, syncope  Hematologic/Lymphatic: No abnormal bleeding      Physical Exam  VITALS REVIEWED--blood pressure 161/85, pulse rate is 56 patient is afebrile respiration 12 times a minute  EKG shows sinus rhythm with left atrial enlargement heart rate 56 NV interval 175 QRS of 114 QTC of 441 and nonspecific ST changes and low voltage complexes in the precordial leads and no significant changes compared to prior EKG EKG indication includes hypertension and diastolic heart failure    General:      well developed, well nourished, in no acute distress.    Head:      normocephalic and atraumatic.    Eyes:      PERRL/EOM intact, conjunctiva and sclera clear with out nystagmus.    Neck:      no masses, thyromegaly,  trachea central with normal respiratory effort and PMI non displaced   Lungs:      clear bilaterally to auscultation.    Heart:      Sinus rhythm without any murmurs gallops or rubs  Msk:      no deformity or scoliosis noted of thoracic or lumbar spine.    Pulses:      pulses normal in all 4 extremities.    Extremities:       no cyanosis or clubbing--trace left pedal edema and trace right pedal edema.    Neurologic:      no focal deficits.   alert oriented x3  Skin:      intact without lesions or rashes.    Psych:      alert and cooperative; normal mood and affect; normal attention span and concentration.            Assessment     hypertensive heart disease with diastolic  dysfunction  Chronic kidney disease stage III  Hypertension  Hyperlipidemia  Sleep apnea  Improved clinically with optimization of hypertension at home recordings  Continue  current medication and follow-up after few months  Lateral mid lumbar pain reproducible with palpation consistent with musculoskeletal symptoms and patient was told to follow-up with primary care physician    Electronically signed by Damien Ty MD, 08/03/20, 12:45 PM.

## 2020-08-06 ENCOUNTER — TELEPHONE (OUTPATIENT)
Dept: FAMILY MEDICINE CLINIC | Facility: CLINIC | Age: 85
End: 2020-08-06

## 2020-08-06 NOTE — TELEPHONE ENCOUNTER
Patient said her insurance will not cover the vascepa. She is requesting an alternative med for her cholesterol.

## 2020-08-07 DIAGNOSIS — F41.9 ANXIETY: ICD-10-CM

## 2020-08-07 RX ORDER — ALPRAZOLAM 0.5 MG/1
0.5 TABLET ORAL 2 TIMES DAILY PRN
Qty: 30 TABLET | Refills: 0 | Status: SHIPPED | OUTPATIENT
Start: 2020-08-07 | End: 2021-03-23

## 2020-08-12 RX ORDER — AMLODIPINE BESYLATE 10 MG/1
TABLET ORAL
Qty: 90 TABLET | Refills: 3 | Status: SHIPPED | OUTPATIENT
Start: 2020-08-12 | End: 2021-11-23 | Stop reason: SDUPTHER

## 2020-09-09 ENCOUNTER — TELEPHONE (OUTPATIENT)
Dept: FAMILY MEDICINE CLINIC | Facility: CLINIC | Age: 85
End: 2020-09-09

## 2020-09-09 NOTE — TELEPHONE ENCOUNTER
Pt left a vm yesterday on the main scheduling line. She was to see dr. Anne yesterday at 4pm. She reports having severe sciatic nerve pain and says she can barely walk. She was told to call PCP.

## 2020-11-03 ENCOUNTER — OFFICE VISIT (OUTPATIENT)
Dept: FAMILY MEDICINE CLINIC | Facility: CLINIC | Age: 85
End: 2020-11-03

## 2020-11-03 VITALS
HEART RATE: 63 BPM | BODY MASS INDEX: 40.22 KG/M2 | OXYGEN SATURATION: 96 % | HEIGHT: 63 IN | DIASTOLIC BLOOD PRESSURE: 84 MMHG | WEIGHT: 227 LBS | SYSTOLIC BLOOD PRESSURE: 156 MMHG | TEMPERATURE: 97.5 F

## 2020-11-03 DIAGNOSIS — N28.89 HYPERTENSION SECONDARY TO OTHER RENAL DISORDERS: Primary | ICD-10-CM

## 2020-11-03 DIAGNOSIS — I15.1 HYPERTENSION SECONDARY TO OTHER RENAL DISORDERS: Primary | ICD-10-CM

## 2020-11-03 DIAGNOSIS — R53.83 FATIGUE, UNSPECIFIED TYPE: ICD-10-CM

## 2020-11-03 DIAGNOSIS — E11.9 TYPE 2 DIABETES MELLITUS WITHOUT COMPLICATION, WITHOUT LONG-TERM CURRENT USE OF INSULIN (HCC): ICD-10-CM

## 2020-11-03 DIAGNOSIS — R06.02 SHORTNESS OF BREATH: ICD-10-CM

## 2020-11-03 PROCEDURE — 99213 OFFICE O/P EST LOW 20 MIN: CPT | Performed by: FAMILY MEDICINE

## 2020-11-03 RX ORDER — ACETAMINOPHEN AND CODEINE PHOSPHATE 300; 30 MG/1; MG/1
TABLET ORAL
COMMUNITY
Start: 2020-09-08 | End: 2021-03-23

## 2020-11-03 NOTE — PATIENT INSTRUCTIONS
Keep working to lose weight through healthy eating and exercise.  No fried foods and limit pasta, bread, and sweets.  Stress reduction

## 2020-11-03 NOTE — PROGRESS NOTES
Subjective   Leandra Nuñez is a 85 y.o. female.     Here for follow up on bp  Also c/o fatigue  Dyspnea with walking short distances  For months  bp runs 110 sys at home  Only takes hydralazine if she sees sys bp 140 -only occurs about once a week  Denies melena  Some nausea  Anxiety has been stable  Has not been checking BS  Just saw her nephrologist - creatinine has improved and she is not anemic per patient  Very stressed and worried about her  and his health  He is in an assisted living facility and she can only see him once a week for 30 min sec to current COVID-19 pandemic       The following portions of the patient's history were reviewed and updated as appropriate: allergies, current medications, past family history, past medical history, past social history, past surgical history and problem list.  Past Medical History:   Diagnosis Date   • Anxiety    • Arthritis    • Breast nodule 2013    Left breast nodule (fibrocystic disease , no malignancy)    • Cancer (CMS/HCC)    • Cataract    • Chronic kidney disease    • HL (hearing loss)    • Hyperlipidemia    • Hypertension    • Obesity    • Shortness of breath      Past Surgical History:   Procedure Laterality Date   • BREAST BIOPSY Left 05/21/2013    Benign fibrocystic disease ,[facility]   • CARDIAC CATHETERIZATION     • D&C AND LAPAROSCOPY     • FULGURATION ENDOMETRIOSIS      surgery   • NEPHRECTOMY Right      History reviewed. No pertinent family history.  Social History     Socioeconomic History   • Marital status:      Spouse name: Not on file   • Number of children: Not on file   • Years of education: Not on file   • Highest education level: Not on file   Tobacco Use   • Smoking status: Never Smoker   • Smokeless tobacco: Never Used   Substance and Sexual Activity   • Alcohol use: No   • Drug use: No   • Sexual activity: Yes     Partners: Male         Current Outpatient Medications:   •  acetaminophen-codeine (TYLENOL #3)  "300-30 MG per tablet, , Disp: , Rfl:   •  amLODIPine (NORVASC) 10 MG tablet, TAKE 1 TABLET DAILY, Disp: 90 tablet, Rfl: 3  •  hydrALAZINE (APRESOLINE) 100 MG tablet, Take 100 mg by mouth Every 12 (Twelve) Hours As Needed., Disp: , Rfl:   •  methocarbamol (ROBAXIN) 500 MG tablet, TAKE ONE-HALF (1/2) TABLET EVERY 6 HOURS AS NEEDED FOR MUSCLE SPASMS, Disp: 30 tablet, Rfl: 23  •  oxybutynin XL (DITROPAN-XL) 10 MG 24 hr tablet, Take 10 mg by mouth Daily., Disp: , Rfl:   •  albuterol sulfate HFA (VENTOLIN HFA) 108 (90 Base) MCG/ACT inhaler, Inhale 2 puffs Every 4 (Four) Hours As Needed for Wheezing or Shortness of Air., Disp: , Rfl:   •  ALPRAZolam (XANAX) 0.5 MG tablet, Take 1 tablet by mouth 2 (Two) Times a Day As Needed for Anxiety., Disp: 30 tablet, Rfl: 0  •  escitalopram (Lexapro) 10 MG tablet, Take 1 tablet by mouth Every Morning., Disp: 30 tablet, Rfl: 5  •  ezetimibe (ZETIA) 10 MG tablet, Take 1 tablet by mouth Daily., Disp: 90 tablet, Rfl: 3    Review of Systems   Constitutional: Positive for fatigue. Negative for appetite change, chills, diaphoresis, fever and unexpected weight gain.   Eyes: Negative for photophobia.   Respiratory: Positive for shortness of breath. Negative for apnea and wheezing.    Cardiovascular: Negative.    Gastrointestinal: Positive for nausea. Negative for vomiting.   Endocrine: Negative.    Neurological: Negative for dizziness, syncope and headache.   Psychiatric/Behavioral: Positive for stress. The patient is nervous/anxious.      /84 (BP Location: Left arm, Patient Position: Sitting, Cuff Size: Large Adult)   Pulse 63   Temp 97.5 °F (36.4 °C) (Temporal)   Ht 160 cm (63\")   Wt 103 kg (227 lb)   SpO2 96%   Breastfeeding No   BMI 40.21 kg/m²       Objective   Physical Exam  Vitals signs and nursing note reviewed.   Constitutional:       Appearance: Normal appearance. She is morbidly obese.   HENT:      Right Ear: Tympanic membrane normal.      Left Ear: Tympanic membrane " normal.   Neck:      Musculoskeletal: Neck supple.   Cardiovascular:      Rate and Rhythm: Normal rate and regular rhythm.      Heart sounds: Normal heart sounds.   Pulmonary:      Effort: Pulmonary effort is normal.      Breath sounds: Normal breath sounds.   Musculoskeletal:      Right lower leg: Edema (tr) present.      Left lower leg: Edema (tr) present.   Lymphadenopathy:      Cervical: No cervical adenopathy.   Skin:     General: Skin is warm and dry.   Neurological:      General: No focal deficit present.      Mental Status: She is alert.           Assessment/Plan   Problems Addressed this Visit        Cardiovascular and Mediastinum    Hypertension - Primary       Endocrine    Type 2 diabetes mellitus without complications (CMS/HCC)      Other Visit Diagnoses     Fatigue, unspecified type        Shortness of breath          Diagnoses       Codes Comments    Hypertension secondary to other renal disorders    -  Primary ICD-10-CM: I15.1, N28.89  ICD-9-CM: 405.91, 593.89     Fatigue, unspecified type     ICD-10-CM: R53.83  ICD-9-CM: 780.79     Shortness of breath     ICD-10-CM: R06.02  ICD-9-CM: 786.05     Type 2 diabetes mellitus without complication, without long-term current use of insulin (CMS/HCC)     ICD-10-CM: E11.9  ICD-9-CM: 250.00         bp is doing better  She was counseled on the need for weight loss  She was encouraged to be compliant with diet  Encouraged stress reduction

## 2021-01-23 RX ORDER — EZETIMIBE 10 MG/1
TABLET ORAL
Qty: 90 TABLET | Refills: 2 | Status: SHIPPED | OUTPATIENT
Start: 2021-01-23 | End: 2021-10-20

## 2021-02-05 ENCOUNTER — OFFICE VISIT (OUTPATIENT)
Dept: CARDIOLOGY | Facility: CLINIC | Age: 86
End: 2021-02-05

## 2021-02-05 VITALS
DIASTOLIC BLOOD PRESSURE: 96 MMHG | OXYGEN SATURATION: 97 % | SYSTOLIC BLOOD PRESSURE: 152 MMHG | HEART RATE: 52 BPM | WEIGHT: 222 LBS | BODY MASS INDEX: 39.33 KG/M2

## 2021-02-05 DIAGNOSIS — I50.32 CHRONIC DIASTOLIC (CONGESTIVE) HEART FAILURE (HCC): ICD-10-CM

## 2021-02-05 DIAGNOSIS — G47.33 SLEEP APNEA, OBSTRUCTIVE: Primary | ICD-10-CM

## 2021-02-05 DIAGNOSIS — N28.89 HYPERTENSION SECONDARY TO OTHER RENAL DISORDERS: ICD-10-CM

## 2021-02-05 DIAGNOSIS — I15.1 HYPERTENSION SECONDARY TO OTHER RENAL DISORDERS: ICD-10-CM

## 2021-02-05 DIAGNOSIS — E78.2 MIXED HYPERLIPIDEMIA: ICD-10-CM

## 2021-02-05 PROCEDURE — 93000 ELECTROCARDIOGRAM COMPLETE: CPT | Performed by: INTERNAL MEDICINE

## 2021-02-05 PROCEDURE — 99213 OFFICE O/P EST LOW 20 MIN: CPT | Performed by: INTERNAL MEDICINE

## 2021-02-05 NOTE — PROGRESS NOTES
CC--exertional dyspnea, hypertension, chronic kidney disease, hyperlipidemia    Sub--85-year-old female patient came as a self-referral--she complained exertional shortness of breath with this class III shortness of breath--chronic medical problems include solitary kidney with prior nephrectomy, chronic kidney disease stage III, hypertension, hyperlipidemia and sleep apnea  Patient had issues with treatment of sleep apnea in the past and she is currently using a different mask and she is under the care of an ENT surgeon  She is under tremendous stress from social situation with her   She complains of exertional shortness of breath and significant fatigue and daytime sleepiness  She had a prior cardiac evaluation several years ago according to her without significant coronary artery disease and heart catheterization done more than 10 years ago according to her  She denies any TIA or stroke and no prior history of any peripheral vascular disease  She claims that her blood pressure is well controlled on multiple home recordings  Since last visit patient has refused noninvasive work-up and feels better with optimization of her hypertension which is been monitoring at home  Patient has noticed chronic vertigo and hearing problems and she is seeing an audiologist and wants to see a ENT specialist        Past Medical History:   Diagnosis Date   • Anxiety    • Arthritis    • Breast nodule 2013    Left breast nodule (fibrocystic disease , no malignancy)    • Cancer (CMS/HCC)    • Cataract    • Chronic kidney disease    • HL (hearing loss)    • Hyperlipidemia    • Hypertension    • Obesity    • Shortness of breath      Past Surgical History:   Procedure Laterality Date   • BREAST BIOPSY Left 05/21/2013    Benign fibrocystic disease ,Abstracted from [facility].   • CARDIAC CATHETERIZATION     • D&C AND LAPAROSCOPY     • FULGURATION ENDOMETRIOSIS      surgery   • NEPHRECTOMY Right      Physical Exam  VITALS  REVIEWED    General:      well developed, well nourished, in no acute distress.    Head:      normocephalic and atraumatic.    Eyes:      PERRL/EOM intact, conjunctiva and sclera clear with out nystagmus.    Neck:      no masses, thyromegaly,  trachea central with normal respiratory effort and PMI non displaced   Lungs:      clear bilaterally to auscultation.    Heart:      Sinus rhythm without any murmurs gallops or rubs          Assessment     hypertensive heart disease with diastolic dysfunction  Chronic kidney disease stage III  Hypertension  Hyperlipidemia  Sleep apnea  Improved clinically with optimization of hypertension at home recordings  Prescription medications reviewed  Follow-up in 6 months  Hearing problems and vertigo to be evaluated by ENT and audiologist      ECG 12 Lead    Date/Time: 2/5/2021 1:24 PM  Performed by: Damien Ty MD  Authorized by: Damien Ty MD   Comparison: compared with previous ECG   Similar to previous ECG  Rhythm: sinus rhythm  Rate: normal  Conduction: conduction normal  QRS axis: normal              Electronically signed by Damien Ty MD, 02/05/21, 1:24 PM EST.

## 2021-03-23 ENCOUNTER — HOSPITAL ENCOUNTER (OUTPATIENT)
Facility: HOSPITAL | Age: 86
Discharge: NURSING, REHAB OR LONG-TERM CARE | End: 2021-03-25
Attending: INTERNAL MEDICINE | Admitting: ORTHOPAEDIC SURGERY

## 2021-03-23 ENCOUNTER — APPOINTMENT (OUTPATIENT)
Dept: GENERAL RADIOLOGY | Facility: HOSPITAL | Age: 86
End: 2021-03-23

## 2021-03-23 DIAGNOSIS — S42.212A CLOSED DISPLACED FRACTURE OF SURGICAL NECK OF LEFT HUMERUS, UNSPECIFIED FRACTURE MORPHOLOGY, INITIAL ENCOUNTER: Primary | ICD-10-CM
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
## 2021-03-23 PROBLEM — R73.03 BORDERLINE DIABETES: Chronic | Status: ACTIVE | Noted: 2021-03-23

## 2021-03-23 LAB
ANION GAP SERPL CALCULATED.3IONS-SCNC: 18 MMOL/L (ref 5–15)
BASOPHILS # BLD AUTO: 0.1 10*3/MM3 (ref 0–0.2)
BASOPHILS NFR BLD AUTO: 0.8 % (ref 0–1.5)
BUN SERPL-MCNC: 23 MG/DL (ref 8–23)
BUN/CREAT SERPL: 19.2 (ref 7–25)
CALCIUM SPEC-SCNC: 10.1 MG/DL (ref 8.6–10.5)
CHLORIDE SERPL-SCNC: 100 MMOL/L (ref 98–107)
CK SERPL-CCNC: 120 U/L (ref 20–180)
CO2 SERPL-SCNC: 19 MMOL/L (ref 22–29)
CREAT SERPL-MCNC: 1.2 MG/DL (ref 0.57–1)
DEPRECATED RDW RBC AUTO: 45.1 FL (ref 37–54)
EOSINOPHIL # BLD AUTO: 0 10*3/MM3 (ref 0–0.4)
EOSINOPHIL NFR BLD AUTO: 0.4 % (ref 0.3–6.2)
ERYTHROCYTE [DISTWIDTH] IN BLOOD BY AUTOMATED COUNT: 14.5 % (ref 12.3–15.4)
GFR SERPL CREATININE-BSD FRML MDRD: 43 ML/MIN/1.73
GLUCOSE BLDC GLUCOMTR-MCNC: 136 MG/DL (ref 70–105)
GLUCOSE SERPL-MCNC: 155 MG/DL (ref 65–99)
HCT VFR BLD AUTO: 42.4 % (ref 34–46.6)
HGB BLD-MCNC: 14.3 G/DL (ref 12–15.9)
HOLD SPECIMEN: NORMAL
LYMPHOCYTES # BLD AUTO: 2.2 10*3/MM3 (ref 0.7–3.1)
LYMPHOCYTES NFR BLD AUTO: 19.6 % (ref 19.6–45.3)
MCH RBC QN AUTO: 30.2 PG (ref 26.6–33)
MCHC RBC AUTO-ENTMCNC: 33.8 G/DL (ref 31.5–35.7)
MCV RBC AUTO: 89.5 FL (ref 79–97)
MONOCYTES # BLD AUTO: 0.7 10*3/MM3 (ref 0.1–0.9)
MONOCYTES NFR BLD AUTO: 6.4 % (ref 5–12)
NEUTROPHILS NFR BLD AUTO: 72.8 % (ref 42.7–76)
NEUTROPHILS NFR BLD AUTO: 8.3 10*3/MM3 (ref 1.7–7)
NRBC BLD AUTO-RTO: 0.1 /100 WBC (ref 0–0.2)
PLATELET # BLD AUTO: 277 10*3/MM3 (ref 140–450)
PMV BLD AUTO: 8.9 FL (ref 6–12)
POTASSIUM SERPL-SCNC: 3.9 MMOL/L (ref 3.5–5.2)
RBC # BLD AUTO: 4.74 10*6/MM3 (ref 3.77–5.28)
SARS-COV-2 ORF1AB RESP QL NAA+PROBE: NOT DETECTED
SODIUM SERPL-SCNC: 137 MMOL/L (ref 136–145)
WBC # BLD AUTO: 11.4 10*3/MM3 (ref 3.4–10.8)

## 2021-03-23 PROCEDURE — 82550 ASSAY OF CK (CPK): CPT | Performed by: PHYSICIAN ASSISTANT

## 2021-03-23 PROCEDURE — 80048 BASIC METABOLIC PNL TOTAL CA: CPT | Performed by: PHYSICIAN ASSISTANT

## 2021-03-23 PROCEDURE — 96374 THER/PROPH/DIAG INJ IV PUSH: CPT

## 2021-03-23 PROCEDURE — 73030 X-RAY EXAM OF SHOULDER: CPT

## 2021-03-23 PROCEDURE — 25010000002 ONDANSETRON PER 1 MG: Performed by: PHYSICIAN ASSISTANT

## 2021-03-23 PROCEDURE — 99284 EMERGENCY DEPT VISIT MOD MDM: CPT

## 2021-03-23 PROCEDURE — 25010000002 HYDRALAZINE PER 20 MG: Performed by: PHYSICIAN ASSISTANT

## 2021-03-23 PROCEDURE — G0378 HOSPITAL OBSERVATION PER HR: HCPCS

## 2021-03-23 PROCEDURE — 96375 TX/PRO/DX INJ NEW DRUG ADDON: CPT

## 2021-03-23 PROCEDURE — C9803 HOPD COVID-19 SPEC COLLECT: HCPCS

## 2021-03-23 PROCEDURE — 85025 COMPLETE CBC W/AUTO DIFF WBC: CPT | Performed by: PHYSICIAN ASSISTANT

## 2021-03-23 PROCEDURE — 82962 GLUCOSE BLOOD TEST: CPT

## 2021-03-23 PROCEDURE — 83036 HEMOGLOBIN GLYCOSYLATED A1C: CPT | Performed by: NURSE PRACTITIONER

## 2021-03-23 PROCEDURE — 96376 TX/PRO/DX INJ SAME DRUG ADON: CPT

## 2021-03-23 PROCEDURE — 25010000002 HYDROMORPHONE PER 4 MG: Performed by: PHYSICIAN ASSISTANT

## 2021-03-23 PROCEDURE — U0004 COV-19 TEST NON-CDC HGH THRU: HCPCS | Performed by: INTERNAL MEDICINE

## 2021-03-23 PROCEDURE — 99219 PR INITIAL OBSERVATION CARE/DAY 50 MINUTES: CPT | Performed by: INTERNAL MEDICINE

## 2021-03-23 PROCEDURE — U0005 INFEC AGEN DETEC AMPLI PROBE: HCPCS | Performed by: INTERNAL MEDICINE

## 2021-03-23 RX ORDER — OXYBUTYNIN CHLORIDE 10 MG/1
10 TABLET, EXTENDED RELEASE ORAL DAILY
Status: DISCONTINUED | OUTPATIENT
Start: 2021-03-23 | End: 2021-03-25 | Stop reason: HOSPADM

## 2021-03-23 RX ORDER — ACETAMINOPHEN 160 MG/5ML
650 SOLUTION ORAL EVERY 4 HOURS PRN
Status: DISCONTINUED | OUTPATIENT
Start: 2021-03-23 | End: 2021-03-24

## 2021-03-23 RX ORDER — ACETAMINOPHEN 650 MG/1
650 SUPPOSITORY RECTAL EVERY 4 HOURS PRN
Status: DISCONTINUED | OUTPATIENT
Start: 2021-03-23 | End: 2021-03-24

## 2021-03-23 RX ORDER — HYDROCODONE BITARTRATE AND ACETAMINOPHEN 10; 325 MG/1; MG/1
1 TABLET ORAL EVERY 6 HOURS PRN
Status: DISCONTINUED | OUTPATIENT
Start: 2021-03-23 | End: 2021-03-24

## 2021-03-23 RX ORDER — METHOCARBAMOL 500 MG/1
500 TABLET, FILM COATED ORAL EVERY 6 HOURS PRN
Status: DISCONTINUED | OUTPATIENT
Start: 2021-03-23 | End: 2021-03-25 | Stop reason: HOSPADM

## 2021-03-23 RX ORDER — ONDANSETRON 2 MG/ML
4 INJECTION INTRAMUSCULAR; INTRAVENOUS EVERY 6 HOURS PRN
Status: DISCONTINUED | OUTPATIENT
Start: 2021-03-23 | End: 2021-03-24

## 2021-03-23 RX ORDER — SODIUM CHLORIDE 0.9 % (FLUSH) 0.9 %
10 SYRINGE (ML) INJECTION AS NEEDED
Status: DISCONTINUED | OUTPATIENT
Start: 2021-03-23 | End: 2021-03-25 | Stop reason: HOSPADM

## 2021-03-23 RX ORDER — SODIUM CHLORIDE 0.9 % (FLUSH) 0.9 %
10 SYRINGE (ML) INJECTION EVERY 12 HOURS SCHEDULED
Status: DISCONTINUED | OUTPATIENT
Start: 2021-03-23 | End: 2021-03-25 | Stop reason: HOSPADM

## 2021-03-23 RX ORDER — HYDRALAZINE HYDROCHLORIDE 10 MG/1
10 TABLET, FILM COATED ORAL EVERY 6 HOURS PRN
Status: DISCONTINUED | OUTPATIENT
Start: 2021-03-23 | End: 2021-03-25 | Stop reason: HOSPADM

## 2021-03-23 RX ORDER — HYDROMORPHONE HCL 110MG/55ML
0.5 PATIENT CONTROLLED ANALGESIA SYRINGE INTRAVENOUS ONCE
Status: COMPLETED | OUTPATIENT
Start: 2021-03-23 | End: 2021-03-23

## 2021-03-23 RX ORDER — HYDRALAZINE HYDROCHLORIDE 20 MG/ML
20 INJECTION INTRAMUSCULAR; INTRAVENOUS ONCE
Status: COMPLETED | OUTPATIENT
Start: 2021-03-23 | End: 2021-03-23

## 2021-03-23 RX ORDER — ALPRAZOLAM 0.5 MG/1
0.5 TABLET ORAL 3 TIMES DAILY PRN
Status: DISCONTINUED | OUTPATIENT
Start: 2021-03-23 | End: 2021-03-25 | Stop reason: HOSPADM

## 2021-03-23 RX ORDER — ACETAMINOPHEN 325 MG/1
650 TABLET ORAL EVERY 4 HOURS PRN
Status: DISCONTINUED | OUTPATIENT
Start: 2021-03-23 | End: 2021-03-24

## 2021-03-23 RX ORDER — DEXTROSE MONOHYDRATE 25 G/50ML
25 INJECTION, SOLUTION INTRAVENOUS
Status: DISCONTINUED | OUTPATIENT
Start: 2021-03-23 | End: 2021-03-25 | Stop reason: HOSPADM

## 2021-03-23 RX ORDER — LIDOCAINE 50 MG/G
1 PATCH TOPICAL
Status: DISCONTINUED | OUTPATIENT
Start: 2021-03-23 | End: 2021-03-25 | Stop reason: HOSPADM

## 2021-03-23 RX ORDER — INSULIN LISPRO 100 [IU]/ML
0-7 INJECTION, SOLUTION INTRAVENOUS; SUBCUTANEOUS AS NEEDED
Status: DISCONTINUED | OUTPATIENT
Start: 2021-03-23 | End: 2021-03-25 | Stop reason: HOSPADM

## 2021-03-23 RX ORDER — ONDANSETRON 4 MG/1
4 TABLET, FILM COATED ORAL EVERY 6 HOURS PRN
Status: DISCONTINUED | OUTPATIENT
Start: 2021-03-23 | End: 2021-03-24

## 2021-03-23 RX ORDER — HYDROMORPHONE HCL 110MG/55ML
0.5 PATIENT CONTROLLED ANALGESIA SYRINGE INTRAVENOUS EVERY 4 HOURS PRN
Status: DISCONTINUED | OUTPATIENT
Start: 2021-03-23 | End: 2021-03-24

## 2021-03-23 RX ORDER — ZOLPIDEM TARTRATE 5 MG/1
5 TABLET ORAL NIGHTLY PRN
Status: DISCONTINUED | OUTPATIENT
Start: 2021-03-23 | End: 2021-03-25 | Stop reason: HOSPADM

## 2021-03-23 RX ORDER — AMLODIPINE BESYLATE 5 MG/1
10 TABLET ORAL DAILY
Status: DISCONTINUED | OUTPATIENT
Start: 2021-03-23 | End: 2021-03-25 | Stop reason: HOSPADM

## 2021-03-23 RX ORDER — INSULIN LISPRO 100 [IU]/ML
0-7 INJECTION, SOLUTION INTRAVENOUS; SUBCUTANEOUS
Status: DISCONTINUED | OUTPATIENT
Start: 2021-03-23 | End: 2021-03-25 | Stop reason: HOSPADM

## 2021-03-23 RX ORDER — NICOTINE POLACRILEX 4 MG
15 LOZENGE BUCCAL
Status: DISCONTINUED | OUTPATIENT
Start: 2021-03-23 | End: 2021-03-25 | Stop reason: HOSPADM

## 2021-03-23 RX ORDER — ALUMINA, MAGNESIA, AND SIMETHICONE 2400; 2400; 240 MG/30ML; MG/30ML; MG/30ML
15 SUSPENSION ORAL EVERY 6 HOURS PRN
Status: DISCONTINUED | OUTPATIENT
Start: 2021-03-23 | End: 2021-03-25 | Stop reason: HOSPADM

## 2021-03-23 RX ORDER — CHOLECALCIFEROL (VITAMIN D3) 125 MCG
5 CAPSULE ORAL NIGHTLY PRN
Status: DISCONTINUED | OUTPATIENT
Start: 2021-03-23 | End: 2021-03-24

## 2021-03-23 RX ORDER — ONDANSETRON 2 MG/ML
4 INJECTION INTRAMUSCULAR; INTRAVENOUS ONCE
Status: COMPLETED | OUTPATIENT
Start: 2021-03-23 | End: 2021-03-23

## 2021-03-23 RX ADMIN — ONDANSETRON 4 MG: 2 INJECTION INTRAMUSCULAR; INTRAVENOUS at 14:17

## 2021-03-23 RX ADMIN — SODIUM CHLORIDE 500 ML: 9 INJECTION, SOLUTION INTRAVENOUS at 14:32

## 2021-03-23 RX ADMIN — HYDROCODONE BITARTRATE AND ACETAMINOPHEN 1 TABLET: 10; 325 TABLET ORAL at 17:52

## 2021-03-23 RX ADMIN — LIDOCAINE 1 PATCH: 50 PATCH TOPICAL at 17:43

## 2021-03-23 RX ADMIN — OXYBUTYNIN CHLORIDE 10 MG: 10 TABLET, EXTENDED RELEASE ORAL at 17:43

## 2021-03-23 RX ADMIN — HYDROMORPHONE HYDROCHLORIDE 0.5 MG: 2 INJECTION, SOLUTION INTRAMUSCULAR; INTRAVENOUS; SUBCUTANEOUS at 15:12

## 2021-03-23 RX ADMIN — ALPRAZOLAM 0.5 MG: 0.5 TABLET ORAL at 22:12

## 2021-03-23 RX ADMIN — HYDRALAZINE HYDROCHLORIDE 20 MG: 20 INJECTION INTRAMUSCULAR; INTRAVENOUS at 15:12

## 2021-03-23 RX ADMIN — Medication 10 ML: at 21:59

## 2021-03-23 RX ADMIN — HYDROMORPHONE HYDROCHLORIDE 0.5 MG: 2 INJECTION, SOLUTION INTRAMUSCULAR; INTRAVENOUS; SUBCUTANEOUS at 14:18

## 2021-03-23 RX ADMIN — AMLODIPINE BESYLATE 10 MG: 5 TABLET ORAL at 17:43

## 2021-03-24 ENCOUNTER — ANESTHESIA EVENT (OUTPATIENT)
Dept: PERIOP | Facility: HOSPITAL | Age: 86
End: 2021-03-24

## 2021-03-24 ENCOUNTER — APPOINTMENT (OUTPATIENT)
Dept: GENERAL RADIOLOGY | Facility: HOSPITAL | Age: 86
End: 2021-03-24

## 2021-03-24 ENCOUNTER — ANESTHESIA (OUTPATIENT)
Dept: PERIOP | Facility: HOSPITAL | Age: 86
End: 2021-03-24

## 2021-03-24 LAB
ABO GROUP BLD: NORMAL
ANION GAP SERPL CALCULATED.3IONS-SCNC: 10 MMOL/L (ref 5–15)
APTT PPP: 25.9 SECONDS (ref 24–31)
BASOPHILS # BLD AUTO: 0.1 10*3/MM3 (ref 0–0.2)
BASOPHILS NFR BLD AUTO: 0.6 % (ref 0–1.5)
BLD GP AB SCN SERPL QL: NEGATIVE
BUN SERPL-MCNC: 20 MG/DL (ref 8–23)
BUN/CREAT SERPL: 18.3 (ref 7–25)
CALCIUM SPEC-SCNC: 8.8 MG/DL (ref 8.6–10.5)
CHLORIDE SERPL-SCNC: 103 MMOL/L (ref 98–107)
CO2 SERPL-SCNC: 21 MMOL/L (ref 22–29)
CREAT SERPL-MCNC: 1.09 MG/DL (ref 0.57–1)
DEPRECATED RDW RBC AUTO: 45.1 FL (ref 37–54)
EOSINOPHIL # BLD AUTO: 0.1 10*3/MM3 (ref 0–0.4)
EOSINOPHIL NFR BLD AUTO: 0.5 % (ref 0.3–6.2)
ERYTHROCYTE [DISTWIDTH] IN BLOOD BY AUTOMATED COUNT: 14.4 % (ref 12.3–15.4)
GFR SERPL CREATININE-BSD FRML MDRD: 48 ML/MIN/1.73
GLUCOSE BLDC GLUCOMTR-MCNC: 115 MG/DL (ref 70–105)
GLUCOSE BLDC GLUCOMTR-MCNC: 134 MG/DL (ref 70–105)
GLUCOSE BLDC GLUCOMTR-MCNC: 144 MG/DL (ref 70–105)
GLUCOSE BLDC GLUCOMTR-MCNC: 176 MG/DL (ref 70–105)
GLUCOSE BLDC GLUCOMTR-MCNC: 180 MG/DL (ref 70–105)
GLUCOSE SERPL-MCNC: 116 MG/DL (ref 65–99)
HBA1C MFR BLD: 6.2 % (ref 3.5–5.6)
HCT VFR BLD AUTO: 35 % (ref 34–46.6)
HCT VFR BLD AUTO: 35.4 % (ref 34–46.6)
HGB BLD-MCNC: 11.7 G/DL (ref 12–15.9)
HGB BLD-MCNC: 11.8 G/DL (ref 12–15.9)
INR PPP: 1.01 (ref 0.93–1.1)
LYMPHOCYTES # BLD AUTO: 2.3 10*3/MM3 (ref 0.7–3.1)
LYMPHOCYTES NFR BLD AUTO: 18.4 % (ref 19.6–45.3)
MCH RBC QN AUTO: 29.4 PG (ref 26.6–33)
MCHC RBC AUTO-ENTMCNC: 32.9 G/DL (ref 31.5–35.7)
MCV RBC AUTO: 89.2 FL (ref 79–97)
MONOCYTES # BLD AUTO: 1 10*3/MM3 (ref 0.1–0.9)
MONOCYTES NFR BLD AUTO: 7.7 % (ref 5–12)
MRSA DNA SPEC QL NAA+PROBE: NORMAL
NEUTROPHILS NFR BLD AUTO: 72.8 % (ref 42.7–76)
NEUTROPHILS NFR BLD AUTO: 9.1 10*3/MM3 (ref 1.7–7)
NRBC BLD AUTO-RTO: 0 /100 WBC (ref 0–0.2)
PLATELET # BLD AUTO: 267 10*3/MM3 (ref 140–450)
PMV BLD AUTO: 8.7 FL (ref 6–12)
POTASSIUM SERPL-SCNC: 3.9 MMOL/L (ref 3.5–5.2)
PROTHROMBIN TIME: 11.1 SECONDS (ref 9.6–11.7)
RBC # BLD AUTO: 3.97 10*6/MM3 (ref 3.77–5.28)
RH BLD: NEGATIVE
SODIUM SERPL-SCNC: 134 MMOL/L (ref 136–145)
T&S EXPIRATION DATE: NORMAL
WBC # BLD AUTO: 12.6 10*3/MM3 (ref 3.4–10.8)

## 2021-03-24 PROCEDURE — 25010000002 DEXAMETHASONE PER 1 MG: Performed by: STUDENT IN AN ORGANIZED HEALTH CARE EDUCATION/TRAINING PROGRAM

## 2021-03-24 PROCEDURE — C1713 ANCHOR/SCREW BN/BN,TIS/BN: HCPCS | Performed by: ORTHOPAEDIC SURGERY

## 2021-03-24 PROCEDURE — 82962 GLUCOSE BLOOD TEST: CPT

## 2021-03-24 PROCEDURE — 85014 HEMATOCRIT: CPT | Performed by: ORTHOPAEDIC SURGERY

## 2021-03-24 PROCEDURE — G0378 HOSPITAL OBSERVATION PER HR: HCPCS

## 2021-03-24 PROCEDURE — 73030 X-RAY EXAM OF SHOULDER: CPT

## 2021-03-24 PROCEDURE — 85730 THROMBOPLASTIN TIME PARTIAL: CPT | Performed by: ORTHOPAEDIC SURGERY

## 2021-03-24 PROCEDURE — 25010000002 MIDAZOLAM PER 1 MG: Performed by: STUDENT IN AN ORGANIZED HEALTH CARE EDUCATION/TRAINING PROGRAM

## 2021-03-24 PROCEDURE — 71045 X-RAY EXAM CHEST 1 VIEW: CPT

## 2021-03-24 PROCEDURE — 63710000001 INSULIN LISPRO (HUMAN) PER 5 UNITS: Performed by: ORTHOPAEDIC SURGERY

## 2021-03-24 PROCEDURE — 25010000002 ONDANSETRON PER 1 MG: Performed by: NURSE ANESTHETIST, CERTIFIED REGISTERED

## 2021-03-24 PROCEDURE — 25010000002 FENTANYL CITRATE (PF) 100 MCG/2ML SOLUTION: Performed by: NURSE ANESTHETIST, CERTIFIED REGISTERED

## 2021-03-24 PROCEDURE — 25010000002 ROPIVACAINE PER 1 MG: Performed by: STUDENT IN AN ORGANIZED HEALTH CARE EDUCATION/TRAINING PROGRAM

## 2021-03-24 PROCEDURE — A9270 NON-COVERED ITEM OR SERVICE: HCPCS | Performed by: INTERNAL MEDICINE

## 2021-03-24 PROCEDURE — A9270 NON-COVERED ITEM OR SERVICE: HCPCS | Performed by: ORTHOPAEDIC SURGERY

## 2021-03-24 PROCEDURE — 63710000001 BENZOCAINE-MENTHOL 15-3.6 MG LOZENGE 18 EACH BOX: Performed by: INTERNAL MEDICINE

## 2021-03-24 PROCEDURE — 85610 PROTHROMBIN TIME: CPT | Performed by: ORTHOPAEDIC SURGERY

## 2021-03-24 PROCEDURE — 86900 BLOOD TYPING SEROLOGIC ABO: CPT | Performed by: ORTHOPAEDIC SURGERY

## 2021-03-24 PROCEDURE — 76000 FLUOROSCOPY <1 HR PHYS/QHP: CPT

## 2021-03-24 PROCEDURE — 99225 PR SBSQ OBSERVATION CARE/DAY 25 MINUTES: CPT | Performed by: INTERNAL MEDICINE

## 2021-03-24 PROCEDURE — 25010000002 HYDROMORPHONE PER 4 MG: Performed by: NURSE ANESTHETIST, CERTIFIED REGISTERED

## 2021-03-24 PROCEDURE — 76942 ECHO GUIDE FOR BIOPSY: CPT | Performed by: ORTHOPAEDIC SURGERY

## 2021-03-24 PROCEDURE — 23615 OPTX PROX HUMRL FX W/INT FIX: CPT | Performed by: NURSE PRACTITIONER

## 2021-03-24 PROCEDURE — 87641 MR-STAPH DNA AMP PROBE: CPT | Performed by: ORTHOPAEDIC SURGERY

## 2021-03-24 PROCEDURE — 36591 DRAW BLOOD OFF VENOUS DEVICE: CPT

## 2021-03-24 PROCEDURE — 85018 HEMOGLOBIN: CPT | Performed by: ORTHOPAEDIC SURGERY

## 2021-03-24 PROCEDURE — 86900 BLOOD TYPING SEROLOGIC ABO: CPT

## 2021-03-24 PROCEDURE — 80048 BASIC METABOLIC PNL TOTAL CA: CPT | Performed by: NURSE PRACTITIONER

## 2021-03-24 PROCEDURE — 25010000002 CEFAZOLIN PER 500 MG: Performed by: ORTHOPAEDIC SURGERY

## 2021-03-24 PROCEDURE — 86901 BLOOD TYPING SEROLOGIC RH(D): CPT

## 2021-03-24 PROCEDURE — 25010000002 PROPOFOL 10 MG/ML EMULSION: Performed by: NURSE ANESTHETIST, CERTIFIED REGISTERED

## 2021-03-24 PROCEDURE — 86901 BLOOD TYPING SEROLOGIC RH(D): CPT | Performed by: ORTHOPAEDIC SURGERY

## 2021-03-24 PROCEDURE — 86850 RBC ANTIBODY SCREEN: CPT | Performed by: ORTHOPAEDIC SURGERY

## 2021-03-24 PROCEDURE — 85025 COMPLETE CBC W/AUTO DIFF WBC: CPT | Performed by: ORTHOPAEDIC SURGERY

## 2021-03-24 DEVICE — LOCKING SCREW
Type: IMPLANTABLE DEVICE | Site: HUMERUS | Status: FUNCTIONAL
Brand: AXSOS

## 2021-03-24 DEVICE — CORTEX SCREW
Type: IMPLANTABLE DEVICE | Site: HUMERUS | Status: FUNCTIONAL
Brand: AXSOS

## 2021-03-24 DEVICE — PROXIMAL LATERAL HUMERUS PLATE, LEFT
Type: IMPLANTABLE DEVICE | Site: HUMERUS | Status: FUNCTIONAL
Brand: AXSOS

## 2021-03-24 RX ORDER — ONDANSETRON 2 MG/ML
4 INJECTION INTRAMUSCULAR; INTRAVENOUS ONCE AS NEEDED
Status: COMPLETED | OUTPATIENT
Start: 2021-03-24 | End: 2021-03-24

## 2021-03-24 RX ORDER — GLYCOPYRROLATE 1 MG/5 ML
SYRINGE (ML) INTRAVENOUS AS NEEDED
Status: DISCONTINUED | OUTPATIENT
Start: 2021-03-24 | End: 2021-03-24 | Stop reason: SURG

## 2021-03-24 RX ORDER — SODIUM CHLORIDE, SODIUM LACTATE, POTASSIUM CHLORIDE, CALCIUM CHLORIDE 600; 310; 30; 20 MG/100ML; MG/100ML; MG/100ML; MG/100ML
1000 INJECTION, SOLUTION INTRAVENOUS CONTINUOUS
Status: DISCONTINUED | OUTPATIENT
Start: 2021-03-24 | End: 2021-03-24

## 2021-03-24 RX ORDER — HYDROCODONE BITARTRATE AND ACETAMINOPHEN 5; 325 MG/1; MG/1
1 TABLET ORAL EVERY 4 HOURS PRN
Status: DISCONTINUED | OUTPATIENT
Start: 2021-03-24 | End: 2021-03-25 | Stop reason: HOSPADM

## 2021-03-24 RX ORDER — DIPHENHYDRAMINE HCL 25 MG
25 CAPSULE ORAL EVERY 6 HOURS PRN
Status: DISCONTINUED | OUTPATIENT
Start: 2021-03-24 | End: 2021-03-25 | Stop reason: HOSPADM

## 2021-03-24 RX ORDER — ROPIVACAINE HYDROCHLORIDE 5 MG/ML
INJECTION, SOLUTION EPIDURAL; INFILTRATION; PERINEURAL
Status: COMPLETED | OUTPATIENT
Start: 2021-03-24 | End: 2021-03-24

## 2021-03-24 RX ORDER — ACETAMINOPHEN 650 MG/1
650 SUPPOSITORY RECTAL ONCE AS NEEDED
Status: DISCONTINUED | OUTPATIENT
Start: 2021-03-24 | End: 2021-03-24 | Stop reason: HOSPADM

## 2021-03-24 RX ORDER — ACETAMINOPHEN 650 MG/1
650 SUPPOSITORY RECTAL EVERY 4 HOURS PRN
Status: DISCONTINUED | OUTPATIENT
Start: 2021-03-24 | End: 2021-03-25 | Stop reason: HOSPADM

## 2021-03-24 RX ORDER — ACETAMINOPHEN 325 MG/1
650 TABLET ORAL EVERY 4 HOURS PRN
Status: DISCONTINUED | OUTPATIENT
Start: 2021-03-24 | End: 2021-03-25 | Stop reason: HOSPADM

## 2021-03-24 RX ORDER — HYDROCODONE BITARTRATE AND ACETAMINOPHEN 5; 325 MG/1; MG/1
1 TABLET ORAL ONCE AS NEEDED
Status: DISCONTINUED | OUTPATIENT
Start: 2021-03-24 | End: 2021-03-24 | Stop reason: HOSPADM

## 2021-03-24 RX ORDER — ACETAMINOPHEN 325 MG/1
650 TABLET ORAL ONCE AS NEEDED
Status: DISCONTINUED | OUTPATIENT
Start: 2021-03-24 | End: 2021-03-24 | Stop reason: HOSPADM

## 2021-03-24 RX ORDER — ROCURONIUM BROMIDE 10 MG/ML
INJECTION, SOLUTION INTRAVENOUS AS NEEDED
Status: DISCONTINUED | OUTPATIENT
Start: 2021-03-24 | End: 2021-03-24 | Stop reason: SURG

## 2021-03-24 RX ORDER — NALOXONE HCL 0.4 MG/ML
0.4 VIAL (ML) INJECTION
Status: DISCONTINUED | OUTPATIENT
Start: 2021-03-24 | End: 2021-03-25 | Stop reason: HOSPADM

## 2021-03-24 RX ORDER — MIDAZOLAM HYDROCHLORIDE 1 MG/ML
INJECTION INTRAMUSCULAR; INTRAVENOUS
Status: COMPLETED | OUTPATIENT
Start: 2021-03-24 | End: 2021-03-24

## 2021-03-24 RX ORDER — PROPOFOL 10 MG/ML
VIAL (ML) INTRAVENOUS AS NEEDED
Status: DISCONTINUED | OUTPATIENT
Start: 2021-03-24 | End: 2021-03-24 | Stop reason: SURG

## 2021-03-24 RX ORDER — HYDROMORPHONE HCL 110MG/55ML
0.25 PATIENT CONTROLLED ANALGESIA SYRINGE INTRAVENOUS
Status: DISCONTINUED | OUTPATIENT
Start: 2021-03-24 | End: 2021-03-24 | Stop reason: HOSPADM

## 2021-03-24 RX ORDER — PHENYLEPHRINE HCL IN 0.9% NACL 1 MG/10 ML
SYRINGE (ML) INTRAVENOUS AS NEEDED
Status: DISCONTINUED | OUTPATIENT
Start: 2021-03-24 | End: 2021-03-24 | Stop reason: SURG

## 2021-03-24 RX ORDER — DEXAMETHASONE SODIUM PHOSPHATE 4 MG/ML
INJECTION, SOLUTION INTRA-ARTICULAR; INTRALESIONAL; INTRAMUSCULAR; INTRAVENOUS; SOFT TISSUE
Status: COMPLETED | OUTPATIENT
Start: 2021-03-24 | End: 2021-03-24

## 2021-03-24 RX ORDER — HYDROMORPHONE HCL 110MG/55ML
1 PATIENT CONTROLLED ANALGESIA SYRINGE INTRAVENOUS
Status: DISCONTINUED | OUTPATIENT
Start: 2021-03-24 | End: 2021-03-25 | Stop reason: HOSPADM

## 2021-03-24 RX ORDER — NEOSTIGMINE METHYLSULFATE 5 MG/5 ML
SYRINGE (ML) INTRAVENOUS AS NEEDED
Status: DISCONTINUED | OUTPATIENT
Start: 2021-03-24 | End: 2021-03-24 | Stop reason: SURG

## 2021-03-24 RX ORDER — FENTANYL CITRATE 50 UG/ML
INJECTION, SOLUTION INTRAMUSCULAR; INTRAVENOUS AS NEEDED
Status: DISCONTINUED | OUTPATIENT
Start: 2021-03-24 | End: 2021-03-24 | Stop reason: SURG

## 2021-03-24 RX ORDER — DIAZEPAM 5 MG/1
5 TABLET ORAL EVERY 6 HOURS PRN
Status: DISCONTINUED | OUTPATIENT
Start: 2021-03-24 | End: 2021-03-25 | Stop reason: HOSPADM

## 2021-03-24 RX ORDER — ONDANSETRON 4 MG/1
4 TABLET, FILM COATED ORAL EVERY 6 HOURS PRN
Status: DISCONTINUED | OUTPATIENT
Start: 2021-03-24 | End: 2021-03-25 | Stop reason: HOSPADM

## 2021-03-24 RX ORDER — LIDOCAINE HYDROCHLORIDE 10 MG/ML
INJECTION, SOLUTION EPIDURAL; INFILTRATION; INTRACAUDAL; PERINEURAL AS NEEDED
Status: DISCONTINUED | OUTPATIENT
Start: 2021-03-24 | End: 2021-03-24 | Stop reason: SURG

## 2021-03-24 RX ORDER — HYDROCODONE BITARTRATE AND ACETAMINOPHEN 10; 325 MG/1; MG/1
1 TABLET ORAL EVERY 4 HOURS PRN
Status: DISCONTINUED | OUTPATIENT
Start: 2021-03-24 | End: 2021-03-25 | Stop reason: HOSPADM

## 2021-03-24 RX ORDER — ONDANSETRON 2 MG/ML
4 INJECTION INTRAMUSCULAR; INTRAVENOUS EVERY 6 HOURS PRN
Status: DISCONTINUED | OUTPATIENT
Start: 2021-03-24 | End: 2021-03-25 | Stop reason: HOSPADM

## 2021-03-24 RX ORDER — EPHEDRINE SULFATE 50 MG/ML
INJECTION INTRAVENOUS AS NEEDED
Status: DISCONTINUED | OUTPATIENT
Start: 2021-03-24 | End: 2021-03-24 | Stop reason: SURG

## 2021-03-24 RX ORDER — ONDANSETRON 2 MG/ML
INJECTION INTRAMUSCULAR; INTRAVENOUS AS NEEDED
Status: DISCONTINUED | OUTPATIENT
Start: 2021-03-24 | End: 2021-03-24 | Stop reason: SURG

## 2021-03-24 RX ADMIN — BENZOCAINE AND MENTHOL 1 LOZENGE: 15; 3.6 LOZENGE ORAL at 22:25

## 2021-03-24 RX ADMIN — MIDAZOLAM 2 MG: 1 INJECTION INTRAMUSCULAR; INTRAVENOUS at 11:33

## 2021-03-24 RX ADMIN — EPHEDRINE SULFATE 10 MG: 50 INJECTION INTRAVENOUS at 12:12

## 2021-03-24 RX ADMIN — CEFAZOLIN SODIUM 2 G: 1 INJECTION, POWDER, FOR SOLUTION INTRAMUSCULAR; INTRAVENOUS at 12:06

## 2021-03-24 RX ADMIN — INSULIN LISPRO 2 UNITS: 100 INJECTION, SOLUTION INTRAVENOUS; SUBCUTANEOUS at 17:10

## 2021-03-24 RX ADMIN — FENTANYL CITRATE 50 MCG: 50 INJECTION, SOLUTION INTRAMUSCULAR; INTRAVENOUS at 11:55

## 2021-03-24 RX ADMIN — SODIUM CHLORIDE, POTASSIUM CHLORIDE, SODIUM LACTATE AND CALCIUM CHLORIDE 1000 ML: 600; 310; 30; 20 INJECTION, SOLUTION INTRAVENOUS at 11:20

## 2021-03-24 RX ADMIN — FENTANYL CITRATE 50 MCG: 50 INJECTION, SOLUTION INTRAMUSCULAR; INTRAVENOUS at 12:37

## 2021-03-24 RX ADMIN — HYDROCODONE BITARTRATE AND ACETAMINOPHEN 1 TABLET: 10; 325 TABLET ORAL at 00:38

## 2021-03-24 RX ADMIN — SODIUM CHLORIDE, POTASSIUM CHLORIDE, SODIUM LACTATE AND CALCIUM CHLORIDE: 600; 310; 30; 20 INJECTION, SOLUTION INTRAVENOUS at 11:50

## 2021-03-24 RX ADMIN — Medication 0.2 MG: at 12:03

## 2021-03-24 RX ADMIN — ROPIVACAINE HYDROCHLORIDE 20 ML: 5 INJECTION, SOLUTION EPIDURAL; INFILTRATION; PERINEURAL at 11:33

## 2021-03-24 RX ADMIN — CEFAZOLIN SODIUM 2 G: 10 INJECTION, POWDER, FOR SOLUTION INTRAVENOUS at 21:30

## 2021-03-24 RX ADMIN — EPHEDRINE SULFATE 10 MG: 50 INJECTION INTRAVENOUS at 12:17

## 2021-03-24 RX ADMIN — Medication 10 ML: at 09:31

## 2021-03-24 RX ADMIN — ONDANSETRON 4 MG: 2 INJECTION INTRAMUSCULAR; INTRAVENOUS at 13:17

## 2021-03-24 RX ADMIN — DEXAMETHASONE SODIUM PHOSPHATE 4 MG: 4 INJECTION, SOLUTION INTRAMUSCULAR; INTRAVENOUS at 11:33

## 2021-03-24 RX ADMIN — HYDROCODONE BITARTRATE AND ACETAMINOPHEN 1 TABLET: 10; 325 TABLET ORAL at 07:02

## 2021-03-24 RX ADMIN — PROPOFOL 150 MG: 10 INJECTION, EMULSION INTRAVENOUS at 12:06

## 2021-03-24 RX ADMIN — Medication 4 MG: at 13:17

## 2021-03-24 RX ADMIN — ONDANSETRON 4 MG: 2 INJECTION INTRAMUSCULAR; INTRAVENOUS at 14:43

## 2021-03-24 RX ADMIN — LIDOCAINE HYDROCHLORIDE 100 MG: 10 INJECTION, SOLUTION EPIDURAL; INFILTRATION; INTRACAUDAL; PERINEURAL at 12:06

## 2021-03-24 RX ADMIN — ROCURONIUM BROMIDE 50 MG: 10 INJECTION INTRAVENOUS at 12:06

## 2021-03-24 RX ADMIN — Medication 100 MCG: at 12:30

## 2021-03-24 RX ADMIN — HYDROMORPHONE HYDROCHLORIDE 0.5 MG: 2 INJECTION, SOLUTION INTRAMUSCULAR; INTRAVENOUS; SUBCUTANEOUS at 14:06

## 2021-03-24 RX ADMIN — Medication 0.6 MG: at 13:17

## 2021-03-24 NOTE — ANESTHESIA PREPROCEDURE EVALUATION
Anesthesia Evaluation     Patient summary reviewed and Nursing notes reviewed   no history of anesthetic complications:  NPO Solid Status: > 8 hours  NPO Liquid Status: > 2 hours           Airway   Mallampati: II  TM distance: >3 FB  Neck ROM: full  No difficulty expected  Dental - normal exam     Pulmonary - normal exam   (+) asthma,shortness of breath, sleep apnea on CPAP,   Cardiovascular - normal exam    ECG reviewed    (+) hypertension, CHF Diastolic >=55%, hyperlipidemia,       Neuro/Psych  (+) psychiatric history Anxiety and Depression,     GI/Hepatic/Renal/Endo    (+) obesity, morbid obesity, GERD,  renal disease CRI, diabetes mellitus well controlled,     Musculoskeletal     (+) arthralgias,   Abdominal   (+) obese,    Substance History - negative use     OB/GYN negative ob/gyn ROS         Other   arthritis, blood dyscrasia,   history of cancer remission                    Anesthesia Plan    ASA 3     general with block   total IV anesthesia  intravenous induction     Anesthetic plan, all risks, benefits, and alternatives have been provided, discussed and informed consent has been obtained with: patient.  Use of blood products discussed with patient  Consented to blood products.   Plan discussed with CRNA.

## 2021-03-24 NOTE — ANESTHESIA PROCEDURE NOTES
Airway  Date/Time: 3/24/2021 12:03 PM  Airway not difficult    General Information and Staff    Patient location during procedure: OR  Anesthesiologist: Goyo Adkins MD  CRNA: Melina Rodriguez CRNA    Indications and Patient Condition  Indications for airway management: airway protection    Preoxygenated: yes  Mask difficulty assessment: 2 - vent by mask + OA or adjuvant +/- NMBA    Final Airway Details  Final airway type: endotracheal airway      Successful airway: ETT  Cuffed: yes   Successful intubation technique: direct laryngoscopy  Facilitating devices/methods: intubating stylet  Endotracheal tube insertion site: oral  Blade: Jackie  Blade size: 3  ETT size (mm): 7.5  Cormack-Lehane Classification: grade I - full view of glottis  Placement verified by: chest auscultation and capnometry   Measured from: lips  ETT/EBT  to lips (cm): 21  Number of attempts at approach: 1  Assessment: lips, teeth, and gum same as pre-op and atraumatic intubation

## 2021-03-24 NOTE — ANESTHESIA POSTPROCEDURE EVALUATION
Patient: Leandra Nuñez    Procedure Summary     Date: 03/24/21 Room / Location: [facility] OR 12 / [facility] MAIN OR    Anesthesia Start: 1150 Anesthesia Stop: 1339    Procedure: HUMERUS PROXIMAL OPEN REDUCTION INTERNAL FIXATION (Left Arm Upper) Diagnosis:       Closed displaced fracture of surgical neck of left humerus, unspecified fracture morphology, initial encounter      (Closed displaced fracture of surgical neck of left humerus, unspecified fracture morphology, initial encounter [S42.212A])    Surgeons: Yair Anne MD Provider: Goyo Adkins MD    Anesthesia Type: general with block ASA Status: 3          Anesthesia Type: general with block    Vitals  Vitals Value Taken Time   /59 03/24/21 1429   Temp 97.8 °F (36.6 °C) 03/24/21 1337   Pulse 56 03/24/21 1433   Resp 13 03/24/21 1407   SpO2 92 % 03/24/21 1433   Vitals shown include unvalidated device data.        Post Anesthesia Care and Evaluation    Patient location during evaluation: PACU  Patient participation: complete - patient participated  Level of consciousness: awake  Pain score: 0  Pain management: adequate  Airway patency: patent  Anesthetic complications: No anesthetic complications  PONV Status: none  Cardiovascular status: acceptable  Respiratory status: acceptable  Hydration status: acceptable    Comments: Patient seen and examined postoperatively; vital signs stable; SpO2 greater than or equal to 90%; cardiopulmonary status stable; nausea/vomiting adequately controlled; pain adequately controlled; no apparent anesthesia complications; patient discharged from anesthesia care when discharge criteria were met

## 2021-03-24 NOTE — ANESTHESIA PROCEDURE NOTES
Peripheral Block    Pre-sedation assessment completed: 3/24/2021 11:00 AM    Patient reassessed immediately prior to procedure    Patient location during procedure: pre-op  Start time: 3/24/2021 11:25 AM  Stop time: 3/24/2021 11:33 AM  Reason for block: procedure for pain, at surgeon's request and post-op pain management  Performed by  Anesthesiologist: Goyo Adkins MD  Assisted by: Ramírez Marquez RN  Preanesthetic Checklist  Completed: patient identified, IV checked, site marked, risks and benefits discussed, surgical consent, monitors and equipment checked, pre-op evaluation and timeout performed  Prep:  Pt Position: supine  Sterile barriers:alcohol skin prep, cap, gloves, gown, mask and washed/disinfected hands  Prep: ChloraPrep  Patient monitoring: blood pressure monitoring, continuous pulse oximetry and EKG  Procedure  Sedation:yes  Performed under: local infiltration  Guidance:ultrasound guided  ULTRASOUND INTERPRETATION.  Using ultrasound guidance a 21 G and 22 G gauge needle was placed in close proximity to the brachial plexus nerve, at which point, under ultrasound guidance anesthetic was injected in the area of the nerve and spread of the anesthesia was seen on ultrasound in close proximity thereto.  There were no abnormalities seen on ultrasound; a digital image was taken; and the patient tolerated the procedure with no complications. Images:still images obtained, printed/placed on chart    Laterality:left  Block Type:interscalene  Injection Technique:single-shot  Needle Type:echogenic  Needle Gauge:21 G  Resistance on Injection: none  Sedation medications used: midazolam (VERSED) injection, 2 mg  Medications Used: dexamethasone (DECADRON) injection, 4 mg  ropivacaine (NAROPIN) 0.5 % injection, 20 mL  Med admintered at 3/24/2021 11:33 AM      Post Assessment  Injection Assessment: negative aspiration for heme, no paresthesia on injection and incremental injection  Patient Tolerance:comfortable  throughout block  Complications:no

## 2021-03-25 VITALS
SYSTOLIC BLOOD PRESSURE: 177 MMHG | HEART RATE: 55 BPM | WEIGHT: 234.4 LBS | DIASTOLIC BLOOD PRESSURE: 73 MMHG | RESPIRATION RATE: 18 BRPM | TEMPERATURE: 97.3 F | OXYGEN SATURATION: 94 % | HEIGHT: 64 IN | BODY MASS INDEX: 40.02 KG/M2

## 2021-03-25 LAB
ANION GAP SERPL CALCULATED.3IONS-SCNC: 12 MMOL/L (ref 5–15)
BASOPHILS # BLD AUTO: 0.1 10*3/MM3 (ref 0–0.2)
BASOPHILS NFR BLD AUTO: 0.5 % (ref 0–1.5)
BUN SERPL-MCNC: 17 MG/DL (ref 8–23)
BUN/CREAT SERPL: 15.6 (ref 7–25)
CALCIUM SPEC-SCNC: 9.2 MG/DL (ref 8.6–10.5)
CHLORIDE SERPL-SCNC: 101 MMOL/L (ref 98–107)
CO2 SERPL-SCNC: 21 MMOL/L (ref 22–29)
CREAT SERPL-MCNC: 1.09 MG/DL (ref 0.57–1)
DEPRECATED RDW RBC AUTO: 47.7 FL (ref 37–54)
EOSINOPHIL # BLD AUTO: 0 10*3/MM3 (ref 0–0.4)
EOSINOPHIL NFR BLD AUTO: 0 % (ref 0.3–6.2)
ERYTHROCYTE [DISTWIDTH] IN BLOOD BY AUTOMATED COUNT: 15.1 % (ref 12.3–15.4)
GFR SERPL CREATININE-BSD FRML MDRD: 48 ML/MIN/1.73
GLUCOSE BLDC GLUCOMTR-MCNC: 105 MG/DL (ref 70–105)
GLUCOSE BLDC GLUCOMTR-MCNC: 108 MG/DL (ref 70–105)
GLUCOSE BLDC GLUCOMTR-MCNC: 123 MG/DL (ref 70–105)
GLUCOSE SERPL-MCNC: 117 MG/DL (ref 65–99)
HCT VFR BLD AUTO: 34.1 % (ref 34–46.6)
HGB BLD-MCNC: 11.2 G/DL (ref 12–15.9)
LYMPHOCYTES # BLD AUTO: 1.2 10*3/MM3 (ref 0.7–3.1)
LYMPHOCYTES NFR BLD AUTO: 8.8 % (ref 19.6–45.3)
MCH RBC QN AUTO: 29.7 PG (ref 26.6–33)
MCHC RBC AUTO-ENTMCNC: 33 G/DL (ref 31.5–35.7)
MCV RBC AUTO: 90.1 FL (ref 79–97)
MONOCYTES # BLD AUTO: 0.8 10*3/MM3 (ref 0.1–0.9)
MONOCYTES NFR BLD AUTO: 5.6 % (ref 5–12)
NEUTROPHILS NFR BLD AUTO: 11.9 10*3/MM3 (ref 1.7–7)
NEUTROPHILS NFR BLD AUTO: 85.1 % (ref 42.7–76)
NRBC BLD AUTO-RTO: 0 /100 WBC (ref 0–0.2)
PLATELET # BLD AUTO: 254 10*3/MM3 (ref 140–450)
PMV BLD AUTO: 9 FL (ref 6–12)
POTASSIUM SERPL-SCNC: 4.5 MMOL/L (ref 3.5–5.2)
RBC # BLD AUTO: 3.78 10*6/MM3 (ref 3.77–5.28)
SODIUM SERPL-SCNC: 134 MMOL/L (ref 136–145)
WBC # BLD AUTO: 14 10*3/MM3 (ref 3.4–10.8)

## 2021-03-25 PROCEDURE — A9270 NON-COVERED ITEM OR SERVICE: HCPCS | Performed by: ORTHOPAEDIC SURGERY

## 2021-03-25 PROCEDURE — G0378 HOSPITAL OBSERVATION PER HR: HCPCS

## 2021-03-25 PROCEDURE — 63710000001 HYDROCODONE-ACETAMINOPHEN 10-325 MG TABLET: Performed by: ORTHOPAEDIC SURGERY

## 2021-03-25 PROCEDURE — 63710000001 HYDRALAZINE 10 MG TABLET: Performed by: ORTHOPAEDIC SURGERY

## 2021-03-25 PROCEDURE — 63710000001 LIDOCAINE 5 % PATCH: Performed by: ORTHOPAEDIC SURGERY

## 2021-03-25 PROCEDURE — 25010000002 CEFAZOLIN PER 500 MG: Performed by: ORTHOPAEDIC SURGERY

## 2021-03-25 PROCEDURE — 85025 COMPLETE CBC W/AUTO DIFF WBC: CPT | Performed by: ORTHOPAEDIC SURGERY

## 2021-03-25 PROCEDURE — 97530 THERAPEUTIC ACTIVITIES: CPT

## 2021-03-25 PROCEDURE — 80048 BASIC METABOLIC PNL TOTAL CA: CPT | Performed by: ORTHOPAEDIC SURGERY

## 2021-03-25 PROCEDURE — 63710000001 AMLODIPINE 5 MG TABLET: Performed by: ORTHOPAEDIC SURGERY

## 2021-03-25 PROCEDURE — 99217 PR OBSERVATION CARE DISCHARGE MANAGEMENT: CPT | Performed by: INTERNAL MEDICINE

## 2021-03-25 PROCEDURE — 63710000001 OXYBUTYNIN XL 10 MG TABLET SUSTAINED-RELEASE 24 HOUR: Performed by: ORTHOPAEDIC SURGERY

## 2021-03-25 PROCEDURE — 97162 PT EVAL MOD COMPLEX 30 MIN: CPT

## 2021-03-25 PROCEDURE — 82962 GLUCOSE BLOOD TEST: CPT

## 2021-03-25 RX ORDER — HYDROCODONE BITARTRATE AND ACETAMINOPHEN 5; 325 MG/1; MG/1
1 TABLET ORAL EVERY 4 HOURS PRN
Start: 2021-03-25 | End: 2021-03-31

## 2021-03-25 RX ORDER — HYDROCODONE BITARTRATE AND ACETAMINOPHEN 10; 325 MG/1; MG/1
1 TABLET ORAL EVERY 4 HOURS PRN
Start: 2021-03-25 | End: 2021-03-31

## 2021-03-25 RX ORDER — LIDOCAINE 50 MG/G
1 PATCH TOPICAL
Start: 2021-03-26 | End: 2021-07-08

## 2021-03-25 RX ORDER — ONDANSETRON 4 MG/1
4 TABLET, FILM COATED ORAL EVERY 6 HOURS PRN
Start: 2021-03-25 | End: 2021-07-08

## 2021-03-25 RX ADMIN — LIDOCAINE 1 PATCH: 50 PATCH TOPICAL at 08:18

## 2021-03-25 RX ADMIN — Medication 10 ML: at 08:19

## 2021-03-25 RX ADMIN — CEFAZOLIN SODIUM 2 G: 10 INJECTION, POWDER, FOR SOLUTION INTRAVENOUS at 11:38

## 2021-03-25 RX ADMIN — OXYBUTYNIN CHLORIDE 10 MG: 10 TABLET, EXTENDED RELEASE ORAL at 08:18

## 2021-03-25 RX ADMIN — HYDRALAZINE HYDROCHLORIDE 10 MG: 10 TABLET, FILM COATED ORAL at 11:38

## 2021-03-25 RX ADMIN — HYDROCODONE BITARTRATE AND ACETAMINOPHEN 1 TABLET: 10; 325 TABLET ORAL at 13:09

## 2021-03-25 RX ADMIN — AMLODIPINE BESYLATE 10 MG: 5 TABLET ORAL at 08:19

## 2021-03-25 RX ADMIN — HYDROCODONE BITARTRATE AND ACETAMINOPHEN 1 TABLET: 10; 325 TABLET ORAL at 17:21

## 2021-03-25 RX ADMIN — CEFAZOLIN SODIUM 2 G: 10 INJECTION, POWDER, FOR SOLUTION INTRAVENOUS at 04:41

## 2021-04-14 ENCOUNTER — TELEPHONE (OUTPATIENT)
Dept: FAMILY MEDICINE CLINIC | Facility: CLINIC | Age: 86
End: 2021-04-14

## 2021-04-14 NOTE — TELEPHONE ENCOUNTER
Sana a care transitioning nurse called and this is a Dr. Rios's patient. Patient is being d/c'd tomorrow from [facility] by the rehab physician and they want patient to have HH nursing and therapy at home, status post stay at [facility]. She had a fall and fx'd humerus and had to have surgery. Sana is needing a verbal that's its okay patient has HH and we will sign papers.

## 2021-05-04 ENCOUNTER — OUTSIDE FACILITY SERVICE (OUTPATIENT)
Dept: FAMILY MEDICINE CLINIC | Facility: CLINIC | Age: 86
End: 2021-05-04

## 2021-05-04 PROCEDURE — G0180 MD CERTIFICATION HHA PATIENT: HCPCS | Performed by: FAMILY MEDICINE

## 2021-05-11 ENCOUNTER — TELEPHONE (OUTPATIENT)
Dept: FAMILY MEDICINE CLINIC | Facility: CLINIC | Age: 86
End: 2021-05-11

## 2021-05-11 NOTE — TELEPHONE ENCOUNTER
Caller:  OSCAR    Relationship:     Best call back number: [phone]    What orders are you requesting  ONE ADDITIONAL THERAPY SESSION FOR THIS WEEK FOR FAMILY EDUCATION.

## 2021-07-08 ENCOUNTER — OFFICE VISIT (OUTPATIENT)
Dept: FAMILY MEDICINE CLINIC | Facility: CLINIC | Age: 86
End: 2021-07-08

## 2021-07-08 ENCOUNTER — LAB (OUTPATIENT)
Dept: FAMILY MEDICINE CLINIC | Facility: CLINIC | Age: 86
End: 2021-07-08

## 2021-07-08 VITALS
DIASTOLIC BLOOD PRESSURE: 93 MMHG | WEIGHT: 223 LBS | OXYGEN SATURATION: 96 % | HEART RATE: 52 BPM | SYSTOLIC BLOOD PRESSURE: 166 MMHG | HEIGHT: 64 IN | TEMPERATURE: 98.2 F | BODY MASS INDEX: 38.07 KG/M2

## 2021-07-08 DIAGNOSIS — E11.9 TYPE 2 DIABETES MELLITUS WITHOUT COMPLICATION, WITHOUT LONG-TERM CURRENT USE OF INSULIN (HCC): ICD-10-CM

## 2021-07-08 DIAGNOSIS — I15.1 HYPERTENSION SECONDARY TO OTHER RENAL DISORDERS: ICD-10-CM

## 2021-07-08 DIAGNOSIS — R53.83 FATIGUE, UNSPECIFIED TYPE: ICD-10-CM

## 2021-07-08 DIAGNOSIS — N28.89 HYPERTENSION SECONDARY TO OTHER RENAL DISORDERS: ICD-10-CM

## 2021-07-08 DIAGNOSIS — E78.1 PURE HYPERTRIGLYCERIDEMIA: Primary | ICD-10-CM

## 2021-07-08 DIAGNOSIS — E78.1 PURE HYPERTRIGLYCERIDEMIA: ICD-10-CM

## 2021-07-08 LAB
ALBUMIN SERPL-MCNC: 4.3 G/DL (ref 3.5–5.2)
ALBUMIN/GLOB SERPL: 1.4 G/DL
ALP SERPL-CCNC: 81 U/L (ref 39–117)
ALT SERPL W P-5'-P-CCNC: 19 U/L (ref 1–33)
ANION GAP SERPL CALCULATED.3IONS-SCNC: 12.5 MMOL/L (ref 5–15)
AST SERPL-CCNC: 31 U/L (ref 1–32)
BASOPHILS # BLD AUTO: 0.11 10*3/MM3 (ref 0–0.2)
BASOPHILS NFR BLD AUTO: 0.9 % (ref 0–1.5)
BILIRUB SERPL-MCNC: 0.2 MG/DL (ref 0–1.2)
BUN SERPL-MCNC: 28 MG/DL (ref 8–23)
BUN/CREAT SERPL: 26.4 (ref 7–25)
CALCIUM SPEC-SCNC: 9.8 MG/DL (ref 8.6–10.5)
CHLORIDE SERPL-SCNC: 100 MMOL/L (ref 98–107)
CHOLEST SERPL-MCNC: 241 MG/DL (ref 0–200)
CO2 SERPL-SCNC: 20.5 MMOL/L (ref 22–29)
CREAT SERPL-MCNC: 1.06 MG/DL (ref 0.57–1)
DEPRECATED RDW RBC AUTO: 43.8 FL (ref 37–54)
EOSINOPHIL # BLD AUTO: 0.12 10*3/MM3 (ref 0–0.4)
EOSINOPHIL NFR BLD AUTO: 1 % (ref 0.3–6.2)
ERYTHROCYTE [DISTWIDTH] IN BLOOD BY AUTOMATED COUNT: 13.3 % (ref 12.3–15.4)
GFR SERPL CREATININE-BSD FRML MDRD: 49 ML/MIN/1.73
GLOBULIN UR ELPH-MCNC: 3 GM/DL
GLUCOSE SERPL-MCNC: 99 MG/DL (ref 65–99)
HCT VFR BLD AUTO: 41.5 % (ref 34–46.6)
HDLC SERPL-MCNC: 42 MG/DL (ref 40–60)
HGB BLD-MCNC: 13.9 G/DL (ref 12–15.9)
IMM GRANULOCYTES # BLD AUTO: 0.04 10*3/MM3 (ref 0–0.05)
IMM GRANULOCYTES NFR BLD AUTO: 0.3 % (ref 0–0.5)
LDLC SERPL CALC-MCNC: 133 MG/DL (ref 0–100)
LDLC/HDLC SERPL: 3 {RATIO}
LYMPHOCYTES # BLD AUTO: 2.72 10*3/MM3 (ref 0.7–3.1)
LYMPHOCYTES NFR BLD AUTO: 23.3 % (ref 19.6–45.3)
MCH RBC QN AUTO: 30.1 PG (ref 26.6–33)
MCHC RBC AUTO-ENTMCNC: 33.5 G/DL (ref 31.5–35.7)
MCV RBC AUTO: 89.8 FL (ref 79–97)
MONOCYTES # BLD AUTO: 0.89 10*3/MM3 (ref 0.1–0.9)
MONOCYTES NFR BLD AUTO: 7.6 % (ref 5–12)
NEUTROPHILS NFR BLD AUTO: 66.9 % (ref 42.7–76)
NEUTROPHILS NFR BLD AUTO: 7.78 10*3/MM3 (ref 1.7–7)
NRBC BLD AUTO-RTO: 0 /100 WBC (ref 0–0.2)
PLATELET # BLD AUTO: 355 10*3/MM3 (ref 140–450)
PMV BLD AUTO: 10.6 FL (ref 6–12)
POTASSIUM SERPL-SCNC: 4.6 MMOL/L (ref 3.5–5.2)
PROT SERPL-MCNC: 7.3 G/DL (ref 6–8.5)
RBC # BLD AUTO: 4.62 10*6/MM3 (ref 3.77–5.28)
SODIUM SERPL-SCNC: 133 MMOL/L (ref 136–145)
TRIGL SERPL-MCNC: 365 MG/DL (ref 0–150)
TSH SERPL DL<=0.05 MIU/L-ACNC: 1.44 UIU/ML (ref 0.27–4.2)
VLDLC SERPL-MCNC: 66 MG/DL (ref 5–40)
WBC # BLD AUTO: 11.66 10*3/MM3 (ref 3.4–10.8)

## 2021-07-08 PROCEDURE — 36415 COLL VENOUS BLD VENIPUNCTURE: CPT | Performed by: FAMILY MEDICINE

## 2021-07-08 PROCEDURE — 99213 OFFICE O/P EST LOW 20 MIN: CPT | Performed by: FAMILY MEDICINE

## 2021-07-08 PROCEDURE — 80053 COMPREHEN METABOLIC PANEL: CPT | Performed by: FAMILY MEDICINE

## 2021-07-08 PROCEDURE — 80061 LIPID PANEL: CPT | Performed by: FAMILY MEDICINE

## 2021-07-08 PROCEDURE — 85025 COMPLETE CBC W/AUTO DIFF WBC: CPT | Performed by: FAMILY MEDICINE

## 2021-07-08 PROCEDURE — 84443 ASSAY THYROID STIM HORMONE: CPT | Performed by: FAMILY MEDICINE

## 2021-07-08 NOTE — PROGRESS NOTES
Subjective   Leandra Nuñez is a 86 y.o. female.     Here for follow-up on her blood pressure, cholesterol, and diabetes  She saw ENT for her tinnitus  She is also complaining of some left shoulder pain  She has not had her covid vaccine  She is no longer driving  Fractured her left arm after a syncopal episode in her garage  She is followed by ortho - Dr. Anne  No energy - tired  She has family support       The following portions of the patient's history were reviewed and updated as appropriate: allergies, current medications, past family history, past medical history, past social history, past surgical history, and problem list.  Past Medical History:   Diagnosis Date   • Anxiety    • Arthritis    • Breast nodule 2013    Left breast nodule (fibrocystic disease , no malignancy)    • Cancer (CMS/HCC)    • Cataract    • Chronic kidney disease    • HL (hearing loss)    • Hyperlipidemia    • Hypertension    • Obesity    • Shortness of breath      Past Surgical History:   Procedure Laterality Date   • BREAST BIOPSY Left 05/21/2013    Benign fibrocystic disease ,Abstracted from [facility] [place].   • CARDIAC CATHETERIZATION     • D & C AND LAPAROSCOPY     • FULGURATION ENDOMETRIOSIS      surgery   • NEPHRECTOMY Right    • ORIF HUMERUS FRACTURE Left 3/24/2021    Procedure: HUMERUS PROXIMAL OPEN REDUCTION INTERNAL FIXATION;  Surgeon: Yair Anne MD;  Location: [facility];  Service: Orthopedics;  Laterality: Left;     History reviewed. No pertinent family history.  Social History     Socioeconomic History   • Marital status:      Spouse name: Not on file   • Number of children: Not on file   • Years of education: Not on file   • Highest education level: Not on file   Tobacco Use   • Smoking status: Never Smoker   • Smokeless tobacco: Never Used   Vaping Use   • Vaping Use: Never used   Substance and Sexual Activity   • Alcohol use: No   • Drug use: No   • Sexual activity: Yes     Partners: Male         Current  "Outpatient Medications:   •  amLODIPine (NORVASC) 10 MG tablet, TAKE 1 TABLET DAILY, Disp: 90 tablet, Rfl: 3  •  escitalopram (Lexapro) 10 MG tablet, Take 1 tablet by mouth Every Morning., Disp: 30 tablet, Rfl: 5  •  ezetimibe (ZETIA) 10 MG tablet, TAKE 1 TABLET DAILY, Disp: 90 tablet, Rfl: 2  •  hydrALAZINE (APRESOLINE) 100 MG tablet, Take 100 mg by mouth Every 12 (Twelve) Hours As Needed., Disp: , Rfl:   •  methocarbamol (ROBAXIN) 500 MG tablet, TAKE ONE-HALF (1/2) TABLET EVERY 6 HOURS AS NEEDED FOR MUSCLE SPASMS, Disp: 30 tablet, Rfl: 23  •  oxybutynin XL (DITROPAN-XL) 10 MG 24 hr tablet, Take 10 mg by mouth Daily., Disp: , Rfl:     Review of Systems   Constitutional: Positive for fatigue. Negative for chills, diaphoresis and fever.   Respiratory: Negative.    Cardiovascular: Negative.    Gastrointestinal: Negative for nausea and vomiting.   Endocrine: Negative.    Musculoskeletal: Positive for arthralgias.   Neurological: Negative for dizziness, syncope, light-headedness and headache.   Hematological: Negative for adenopathy.     /93 (BP Location: Right arm, Patient Position: Sitting, Cuff Size: Large Adult)   Pulse 52   Temp 98.2 °F (36.8 °C) (Temporal)   Ht 162.6 cm (64\")   Wt 101 kg (223 lb)   SpO2 96%   Breastfeeding No   BMI 38.28 kg/m²       Objective   Physical Exam  Vitals and nursing note reviewed.   Constitutional:       Appearance: Normal appearance. She is obese.   HENT:      Head: Normocephalic and atraumatic.   Cardiovascular:      Rate and Rhythm: Normal rate and regular rhythm.      Heart sounds: Normal heart sounds.   Pulmonary:      Effort: Pulmonary effort is normal.      Breath sounds: Normal breath sounds.   Musculoskeletal:      Cervical back: Neck supple.      Right lower leg: Edema (trace) present.      Left lower leg: Edema (trace) present.   Skin:     General: Skin is warm and dry.   Neurological:      Mental Status: She is alert.   Psychiatric:         Mood and Affect: " Affect is flat.           Assessment/Plan   Problems Addressed this Visit        Cardiac and Vasculature    Hyperlipidemia - Primary    Relevant Orders    Comprehensive Metabolic Panel    Lipid Panel    Hypertension    Relevant Orders    Comprehensive Metabolic Panel    Lipid Panel    CBC & Differential       Endocrine and Metabolic    Type 2 diabetes mellitus without complications (CMS/HCC)    Relevant Orders    Comprehensive Metabolic Panel    Lipid Panel    CBC & Differential      Other Visit Diagnoses     Fatigue, unspecified type        Relevant Orders    Comprehensive Metabolic Panel    TSH    CBC & Differential      Diagnoses       Codes Comments    Pure hypertriglyceridemia    -  Primary ICD-10-CM: E78.1  ICD-9-CM: 272.1     Hypertension secondary to other renal disorders     ICD-10-CM: I15.1, N28.89  ICD-9-CM: 405.91, 593.89     Type 2 diabetes mellitus without complication, without long-term current use of insulin (CMS/HCC)     ICD-10-CM: E11.9  ICD-9-CM: 250.00     Fatigue, unspecified type     ICD-10-CM: R53.83  ICD-9-CM: 780.79           She was counseled on the need for weight loss and dietary compliance  She did not need refills  Labs were ordered  I will see her back in 6 months for follow-up Medicare wellness

## 2021-10-04 NOTE — PROGRESS NOTES
Subjective   Leandra Nuñez is a 86 y.o. female.     Pt presents with swelling in feet and lower legs for past couple of weeks.  No change in medications recently. She denies any shortness of breath or chest pain.  She has had soreness in bilateral calf muscles for past year but nothing new. She denies any fevers or numbness/tingling.       The following portions of the patient's history were reviewed and updated as appropriate: allergies, current medications, past family history, past medical history, past social history, past surgical history and problem list.  Past Medical History:   Diagnosis Date   • Anxiety    • Arthritis    • Breast nodule 2013    Left breast nodule (fibrocystic disease , no malignancy)    • Cancer (HCC)    • Cataract    • Chronic kidney disease    • HL (hearing loss)    • Hyperlipidemia    • Hypertension    • Obesity    • Shortness of breath      Past Surgical History:   Procedure Laterality Date   • BREAST BIOPSY Left 05/21/2013    Benign fibrocystic disease ,Abstracted from [facility].   • CARDIAC CATHETERIZATION     • D & C AND LAPAROSCOPY     • FULGURATION ENDOMETRIOSIS      surgery   • NEPHRECTOMY Right    • ORIF HUMERUS FRACTURE Left 3/24/2021    Procedure: HUMERUS PROXIMAL OPEN REDUCTION INTERNAL FIXATION;  Surgeon: Yair Anne MD;  Location: [facility] OR;  Service: Orthopedics;  Laterality: Left;     No family history on file.  Social History     Socioeconomic History   • Marital status:      Spouse name: Not on file   • Number of children: Not on file   • Years of education: Not on file   • Highest education level: Not on file   Tobacco Use   • Smoking status: Never Smoker   • Smokeless tobacco: Never Used   Vaping Use   • Vaping Use: Never used   Substance and Sexual Activity   • Alcohol use: No   • Drug use: No   • Sexual activity: Yes     Partners: Male         Current Outpatient Medications:   •  ALPRAZolam (XANAX) 0.5 MG tablet, Take 0.5 mg by mouth 2 (Two) Times  "a Day As Needed for Anxiety., Disp: , Rfl:   •  amLODIPine (NORVASC) 10 MG tablet, TAKE 1 TABLET DAILY, Disp: 90 tablet, Rfl: 3  •  ezetimibe (ZETIA) 10 MG tablet, TAKE 1 TABLET DAILY, Disp: 90 tablet, Rfl: 2  •  furosemide (Lasix) 20 MG tablet, Take 1 tablet by mouth 2 (Two) Times a Day., Disp: 60 tablet, Rfl: 0  •  hydrALAZINE (APRESOLINE) 100 MG tablet, Take 100 mg by mouth Every 12 (Twelve) Hours As Needed., Disp: , Rfl:   •  methocarbamol (ROBAXIN) 500 MG tablet, TAKE ONE-HALF (1/2) TABLET EVERY 6 HOURS AS NEEDED FOR MUSCLE SPASMS, Disp: 30 tablet, Rfl: 23  •  oxybutynin XL (DITROPAN-XL) 10 MG 24 hr tablet, Take 10 mg by mouth Daily., Disp: , Rfl:   •  potassium chloride 10 MEQ CR tablet, Take 1 tablet by mouth Daily., Disp: 30 tablet, Rfl: 0    Review of Systems   Constitutional: Negative for activity change, appetite change, chills, diaphoresis, fatigue, fever, unexpected weight gain and unexpected weight loss.   Respiratory: Negative for cough, chest tightness, shortness of breath and wheezing.    Cardiovascular: Positive for leg swelling. Negative for chest pain and palpitations.   Gastrointestinal: Negative for abdominal pain, nausea and vomiting.   Musculoskeletal: Negative for gait problem and myalgias.   Skin: Negative for color change and rash.   Neurological: Negative for dizziness, weakness, light-headedness, numbness and headache.   Psychiatric/Behavioral: Negative for stress.     /86 (BP Location: Right arm, Patient Position: Sitting, Cuff Size: Adult)   Pulse 61   Temp 98.2 °F (36.8 °C) (Temporal)   Resp 16   Ht 162.6 cm (64\")   Wt 106 kg (233 lb)   SpO2 96%   BMI 39.99 kg/m²       Objective   Physical Exam  Vitals and nursing note reviewed.   Constitutional:       Appearance: Normal appearance.   Cardiovascular:      Rate and Rhythm: Normal rate and regular rhythm.      Pulses: Normal pulses.      Heart sounds: Normal heart sounds.   Pulmonary:      Effort: Pulmonary effort is " normal.      Breath sounds: Normal breath sounds.   Musculoskeletal:      Right lower le+ Pitting Edema present.      Left lower le+ Pitting Edema present.      Comments: Slight tenderness with squeezing of calf muscles bilaterally.   Skin:     General: Skin is warm and dry.   Neurological:      Mental Status: She is alert and oriented to person, place, and time.   Psychiatric:         Mood and Affect: Mood normal.         Behavior: Behavior normal.         Thought Content: Thought content normal.         Procedures     Assessment/Plan   Diagnoses and all orders for this visit:    1. Bilateral lower extremity edema (Primary)  -     furosemide (Lasix) 20 MG tablet; Take 1 tablet by mouth 2 (Two) Times a Day.  Dispense: 60 tablet; Refill: 0  -     potassium chloride 10 MEQ CR tablet; Take 1 tablet by mouth Daily.  Dispense: 30 tablet; Refill: 0  -     Basic metabolic panel; Future  -     POCT urinalysis dipstick, automated    Discussed possible causes including venous insufficiency, congestion heart failure, dvt, medication side effect, nephrotic syndrome.  Will check urine and pt to start lasix and potassium.  Pt to monitor and let me know if edema is improving.  She does have some slight tenderness in lower legs but states this is something she has for over past year.

## 2021-10-05 ENCOUNTER — OFFICE VISIT (OUTPATIENT)
Dept: FAMILY MEDICINE CLINIC | Facility: CLINIC | Age: 86
End: 2021-10-05

## 2021-10-05 VITALS
BODY MASS INDEX: 39.78 KG/M2 | WEIGHT: 233 LBS | SYSTOLIC BLOOD PRESSURE: 144 MMHG | DIASTOLIC BLOOD PRESSURE: 86 MMHG | RESPIRATION RATE: 16 BRPM | TEMPERATURE: 98.2 F | OXYGEN SATURATION: 96 % | HEIGHT: 64 IN | HEART RATE: 61 BPM

## 2021-10-05 DIAGNOSIS — R60.0 BILATERAL LOWER EXTREMITY EDEMA: Primary | ICD-10-CM

## 2021-10-05 LAB
BILIRUB BLD-MCNC: NEGATIVE MG/DL
CLARITY, POC: CLEAR
COLOR UR: YELLOW
GLUCOSE UR STRIP-MCNC: NEGATIVE MG/DL
KETONES UR QL: NEGATIVE
LEUKOCYTE EST, POC: NEGATIVE
NITRITE UR-MCNC: NEGATIVE MG/ML
PH UR: 5.5 [PH] (ref 5–8)
PROT UR STRIP-MCNC: NEGATIVE MG/DL
RBC # UR STRIP: NEGATIVE /UL
SP GR UR: 1.01 (ref 1–1.03)
UROBILINOGEN UR QL: NORMAL

## 2021-10-05 PROCEDURE — 81003 URINALYSIS AUTO W/O SCOPE: CPT | Performed by: PHYSICIAN ASSISTANT

## 2021-10-05 PROCEDURE — 99213 OFFICE O/P EST LOW 20 MIN: CPT | Performed by: PHYSICIAN ASSISTANT

## 2021-10-05 RX ORDER — FUROSEMIDE 20 MG/1
20 TABLET ORAL 2 TIMES DAILY
Qty: 60 TABLET | Refills: 0 | Status: SHIPPED | OUTPATIENT
Start: 2021-10-05 | End: 2022-01-26 | Stop reason: SDUPTHER

## 2021-10-05 RX ORDER — POTASSIUM CHLORIDE 750 MG/1
10 TABLET, FILM COATED, EXTENDED RELEASE ORAL DAILY
Qty: 30 TABLET | Refills: 0 | Status: SHIPPED | OUTPATIENT
Start: 2021-10-05

## 2021-10-05 RX ORDER — ALPRAZOLAM 0.5 MG/1
0.5 TABLET ORAL 2 TIMES DAILY PRN
COMMUNITY
End: 2022-04-28

## 2021-10-07 ENCOUNTER — OFFICE VISIT (OUTPATIENT)
Dept: CARDIOLOGY | Facility: CLINIC | Age: 86
End: 2021-10-07

## 2021-10-07 VITALS
WEIGHT: 233 LBS | SYSTOLIC BLOOD PRESSURE: 175 MMHG | BODY MASS INDEX: 39.99 KG/M2 | DIASTOLIC BLOOD PRESSURE: 94 MMHG | HEART RATE: 59 BPM

## 2021-10-07 DIAGNOSIS — I15.1 HYPERTENSION SECONDARY TO OTHER RENAL DISORDERS: ICD-10-CM

## 2021-10-07 DIAGNOSIS — N28.89 HYPERTENSION SECONDARY TO OTHER RENAL DISORDERS: ICD-10-CM

## 2021-10-07 DIAGNOSIS — E78.2 MIXED HYPERLIPIDEMIA: ICD-10-CM

## 2021-10-07 DIAGNOSIS — G47.33 SLEEP APNEA, OBSTRUCTIVE: ICD-10-CM

## 2021-10-07 DIAGNOSIS — R06.09 DYSPNEA ON EXERTION: ICD-10-CM

## 2021-10-07 DIAGNOSIS — I50.32 CHRONIC DIASTOLIC (CONGESTIVE) HEART FAILURE (HCC): Primary | ICD-10-CM

## 2021-10-07 PROCEDURE — 99213 OFFICE O/P EST LOW 20 MIN: CPT | Performed by: INTERNAL MEDICINE

## 2021-10-07 PROCEDURE — 93000 ELECTROCARDIOGRAM COMPLETE: CPT | Performed by: INTERNAL MEDICINE

## 2021-10-07 NOTE — PROGRESS NOTES
CC--exertional dyspnea, hypertension, chronic kidney disease, hyperlipidemia    Sub--86-year-old female patient came as a self-referral--she complained exertional shortness of breath with this class III shortness of breath--chronic medical problems include solitary kidney with prior nephrectomy, chronic kidney disease stage III, hypertension, hyperlipidemia and sleep apnea  Patient had issues with treatment of sleep apnea in the past and she is currently using a different mask and she is under the care of an ENT surgeon  She is under tremendous stress from social situation with her   She complains of exertional shortness of breath and significant fatigue and daytime sleepiness  She had a prior cardiac evaluation several years ago according to her without significant coronary artery disease and heart catheterization done more than 10 years ago according to her  She denies any TIA or stroke and no prior history of any peripheral vascular disease  She claims that her blood pressure is well controlled on multiple home recordings  Since last visit patient has refused noninvasive work-up and feels better with optimization of her hypertension which is been monitoring at home  Patient has noticed chronic vertigo and hearing problems and she is seeing an audiologist         Past Medical History:   Diagnosis Date   • Anxiety    • Arthritis    • Breast nodule 2013    Left breast nodule (fibrocystic disease , no malignancy)    • Cancer (CMS/HCC)    • Cataract    • Chronic kidney disease    • HL (hearing loss)    • Hyperlipidemia    • Hypertension    • Obesity    • Shortness of breath      Past Surgical History:   Procedure Laterality Date   • BREAST BIOPSY Left 05/21/2013    Ben[facility]rocy[facility] ,[facility]   • CARDIAC CATHETERIZATION     • D&C AND LAPAROSCOPY     • FULGURATION ENDOMETRIOSIS      surgery   • NEPHRECTOMY Right      Physical Exam  VITALS REVIEWED    General:      well developed, well  nourished, in no acute distress.    Head:      normocephalic and atraumatic.    Eyes:      PERRL/EOM intact, conjunctiva and sclera clear with out nystagmus.    Neck:      no masses, thyromegaly,  trachea central with normal respiratory effort and PMI non displaced   Lungs:      clear bilaterally to auscultation.    Heart:      Sinus rhythm without any murmurs gallops or rubs          Assessment  LDL--133, CR--1.06, K is 4.6--labs reviewed--TSH normal  Leg edema--on diuretics   hypertensive heart disease with diastolic dysfunction  Chronic kidney disease stage III  Hypertension  Hyperlipidemia  Sleep apnea---cannot tolerate CPAP  Improved clinically with optimization of hypertension at home recordings  Prescription medications reviewed  Follow-up in 6 months        ECG 12 Lead    Date/Time: 10/7/2021 1:46 PM  Performed by: Damien Ty MD  Authorized by: Damien Ty MD   Comparison: compared with previous ECG   Similar to previous ECG  Rhythm: sinus rhythm  Ectopy: atrial premature contractions  Rate: normal  Conduction: conduction normal  QRS axis: normal  Other findings: non-specific ST-T wave changes          Electronically signed by Damien Ty MD, 10/07/21, 1:46 PM EDT.

## 2021-10-20 RX ORDER — EZETIMIBE 10 MG/1
TABLET ORAL
Qty: 90 TABLET | Refills: 3 | Status: SHIPPED | OUTPATIENT
Start: 2021-10-20 | End: 2023-02-24

## 2021-11-23 RX ORDER — AMLODIPINE BESYLATE 10 MG/1
10 TABLET ORAL DAILY
Qty: 90 TABLET | Refills: 3 | Status: SHIPPED | OUTPATIENT
Start: 2021-11-23 | End: 2022-09-02 | Stop reason: SDUPTHER

## 2021-11-23 RX ORDER — METHOCARBAMOL 500 MG/1
TABLET, FILM COATED ORAL
Qty: 90 TABLET | Refills: 0 | Status: SHIPPED | OUTPATIENT
Start: 2021-11-23 | End: 2022-03-18 | Stop reason: SDUPTHER

## 2022-01-14 ENCOUNTER — LAB (OUTPATIENT)
Dept: FAMILY MEDICINE CLINIC | Facility: CLINIC | Age: 87
End: 2022-01-14

## 2022-01-14 ENCOUNTER — OFFICE VISIT (OUTPATIENT)
Dept: FAMILY MEDICINE CLINIC | Facility: CLINIC | Age: 87
End: 2022-01-14

## 2022-01-14 VITALS
WEIGHT: 235 LBS | OXYGEN SATURATION: 97 % | DIASTOLIC BLOOD PRESSURE: 82 MMHG | SYSTOLIC BLOOD PRESSURE: 171 MMHG | BODY MASS INDEX: 40.12 KG/M2 | HEIGHT: 64 IN | TEMPERATURE: 97.1 F | HEART RATE: 59 BPM

## 2022-01-14 DIAGNOSIS — E11.9 TYPE 2 DIABETES MELLITUS WITHOUT COMPLICATION, WITHOUT LONG-TERM CURRENT USE OF INSULIN: ICD-10-CM

## 2022-01-14 DIAGNOSIS — I15.1 HYPERTENSION SECONDARY TO OTHER RENAL DISORDERS: ICD-10-CM

## 2022-01-14 DIAGNOSIS — N18.30 STAGE 3 CHRONIC KIDNEY DISEASE, UNSPECIFIED WHETHER STAGE 3A OR 3B CKD: ICD-10-CM

## 2022-01-14 DIAGNOSIS — R60.0 LOCALIZED EDEMA: ICD-10-CM

## 2022-01-14 DIAGNOSIS — N28.89 HYPERTENSION SECONDARY TO OTHER RENAL DISORDERS: ICD-10-CM

## 2022-01-14 DIAGNOSIS — R35.1 NOCTURIA: ICD-10-CM

## 2022-01-14 DIAGNOSIS — S42.215S CLOSED NONDISPLACED FRACTURE OF SURGICAL NECK OF LEFT HUMERUS, UNSPECIFIED FRACTURE MORPHOLOGY, SEQUELA: ICD-10-CM

## 2022-01-14 DIAGNOSIS — E78.1 PURE HYPERTRIGLYCERIDEMIA: ICD-10-CM

## 2022-01-14 DIAGNOSIS — E78.1 PURE HYPERTRIGLYCERIDEMIA: Primary | ICD-10-CM

## 2022-01-14 PROBLEM — Z90.5 ABSENT KIDNEY: Status: ACTIVE | Noted: 2021-11-01

## 2022-01-14 PROBLEM — S42.213A CLOSED FRACTURE OF SURGICAL NECK OF HUMERUS: Status: ACTIVE | Noted: 2021-03-23

## 2022-01-14 PROBLEM — E56.9 VITAMIN DEFICIENCY: Status: ACTIVE | Noted: 2021-11-01

## 2022-01-14 PROBLEM — R60.9 EDEMA: Status: ACTIVE | Noted: 2021-11-01

## 2022-01-14 LAB
ALBUMIN SERPL-MCNC: 4.7 G/DL (ref 3.5–5.2)
ALBUMIN/GLOB SERPL: 1.6 G/DL
ALP SERPL-CCNC: 87 U/L (ref 39–117)
ALT SERPL W P-5'-P-CCNC: 30 U/L (ref 1–33)
ANION GAP SERPL CALCULATED.3IONS-SCNC: 15 MMOL/L (ref 5–15)
AST SERPL-CCNC: 42 U/L (ref 1–32)
BASOPHILS # BLD AUTO: 0.08 10*3/MM3 (ref 0–0.2)
BASOPHILS NFR BLD AUTO: 0.7 % (ref 0–1.5)
BILIRUB BLD-MCNC: NEGATIVE MG/DL
BILIRUB SERPL-MCNC: 0.3 MG/DL (ref 0–1.2)
BUN SERPL-MCNC: 22 MG/DL (ref 8–23)
BUN/CREAT SERPL: 21.4 (ref 7–25)
CALCIUM SPEC-SCNC: 10 MG/DL (ref 8.6–10.5)
CHLORIDE SERPL-SCNC: 102 MMOL/L (ref 98–107)
CHOLEST SERPL-MCNC: 253 MG/DL (ref 0–200)
CLARITY, POC: CLEAR
CO2 SERPL-SCNC: 20 MMOL/L (ref 22–29)
COLOR UR: YELLOW
CREAT SERPL-MCNC: 1.03 MG/DL (ref 0.57–1)
DEPRECATED RDW RBC AUTO: 42.2 FL (ref 37–54)
EOSINOPHIL # BLD AUTO: 0.16 10*3/MM3 (ref 0–0.4)
EOSINOPHIL NFR BLD AUTO: 1.4 % (ref 0.3–6.2)
ERYTHROCYTE [DISTWIDTH] IN BLOOD BY AUTOMATED COUNT: 13.1 % (ref 12.3–15.4)
EXPIRATION DATE: NORMAL
GFR SERPL CREATININE-BSD FRML MDRD: 51 ML/MIN/1.73
GLOBULIN UR ELPH-MCNC: 2.9 GM/DL
GLUCOSE SERPL-MCNC: 88 MG/DL (ref 65–99)
GLUCOSE UR STRIP-MCNC: NEGATIVE MG/DL
HBA1C MFR BLD: 6.3 % (ref 3.5–5.6)
HCT VFR BLD AUTO: 42.6 % (ref 34–46.6)
HDLC SERPL-MCNC: 48 MG/DL (ref 40–60)
HGB BLD-MCNC: 14.4 G/DL (ref 12–15.9)
IMM GRANULOCYTES # BLD AUTO: 0.03 10*3/MM3 (ref 0–0.05)
IMM GRANULOCYTES NFR BLD AUTO: 0.3 % (ref 0–0.5)
KETONES UR QL: NEGATIVE
LDLC SERPL CALC-MCNC: 116 MG/DL (ref 0–100)
LDLC/HDLC SERPL: 2.14 {RATIO}
LEUKOCYTE EST, POC: NEGATIVE
LYMPHOCYTES # BLD AUTO: 2.72 10*3/MM3 (ref 0.7–3.1)
LYMPHOCYTES NFR BLD AUTO: 23.6 % (ref 19.6–45.3)
Lab: NORMAL
MCH RBC QN AUTO: 29.6 PG (ref 26.6–33)
MCHC RBC AUTO-ENTMCNC: 33.8 G/DL (ref 31.5–35.7)
MCV RBC AUTO: 87.7 FL (ref 79–97)
MONOCYTES # BLD AUTO: 0.94 10*3/MM3 (ref 0.1–0.9)
MONOCYTES NFR BLD AUTO: 8.2 % (ref 5–12)
NEUTROPHILS NFR BLD AUTO: 65.8 % (ref 42.7–76)
NEUTROPHILS NFR BLD AUTO: 7.58 10*3/MM3 (ref 1.7–7)
NITRITE UR-MCNC: NEGATIVE MG/ML
NRBC BLD AUTO-RTO: 0 /100 WBC (ref 0–0.2)
PH UR: 6.5 [PH] (ref 5–8)
PLATELET # BLD AUTO: 272 10*3/MM3 (ref 140–450)
PMV BLD AUTO: 11.3 FL (ref 6–12)
POTASSIUM SERPL-SCNC: 4.6 MMOL/L (ref 3.5–5.2)
PROT SERPL-MCNC: 7.6 G/DL (ref 6–8.5)
PROT UR STRIP-MCNC: NEGATIVE MG/DL
RBC # BLD AUTO: 4.86 10*6/MM3 (ref 3.77–5.28)
RBC # UR STRIP: NEGATIVE /UL
SODIUM SERPL-SCNC: 137 MMOL/L (ref 136–145)
SP GR UR: 1.01 (ref 1–1.03)
TRIGL SERPL-MCNC: 511 MG/DL (ref 0–150)
UROBILINOGEN UR QL: NORMAL
VLDLC SERPL-MCNC: 89 MG/DL (ref 5–40)
WBC NRBC COR # BLD: 11.51 10*3/MM3 (ref 3.4–10.8)

## 2022-01-14 PROCEDURE — 80053 COMPREHEN METABOLIC PANEL: CPT | Performed by: FAMILY MEDICINE

## 2022-01-14 PROCEDURE — 83036 HEMOGLOBIN GLYCOSYLATED A1C: CPT | Performed by: FAMILY MEDICINE

## 2022-01-14 PROCEDURE — 80061 LIPID PANEL: CPT | Performed by: FAMILY MEDICINE

## 2022-01-14 PROCEDURE — 81003 URINALYSIS AUTO W/O SCOPE: CPT | Performed by: FAMILY MEDICINE

## 2022-01-14 PROCEDURE — 36415 COLL VENOUS BLD VENIPUNCTURE: CPT | Performed by: FAMILY MEDICINE

## 2022-01-14 PROCEDURE — 99214 OFFICE O/P EST MOD 30 MIN: CPT | Performed by: FAMILY MEDICINE

## 2022-01-14 PROCEDURE — 85025 COMPLETE CBC W/AUTO DIFF WBC: CPT | Performed by: FAMILY MEDICINE

## 2022-01-14 NOTE — PATIENT INSTRUCTIONS
Keep working to lose weight through healthy eating and exercise.  No fried foods and limit pasta, bread, and sweets.  Warm compresses to back  See DR. Anne  Limit salt  Drink water  Compression stockings  Elevate feet

## 2022-01-14 NOTE — PROGRESS NOTES
Subjective   Leandra Nuñez is a 86 y.o. female.     Here for follow-up on her blood pressure, cholesterol, diabetes  She is also complaining of some back pain for the last few weeks especially with bending or walking  left axilla and left upper arm pain following the fx  Muscle pain in both arms  Feels similar to when she was on a statin  Urinary incontinence is worse  Nocturia and is not sleeping well as a result  Lasix started in Oct and is not helping her edema    Limited rom       The following portions of the patient's history were reviewed and updated as appropriate: allergies, current medications, past family history, past medical history, past social history, past surgical history, and problem list.  Past Medical History:   Diagnosis Date   • Anxiety    • Arthritis    • Breast nodule 2013    Left breast nodule (fibrocystic disease , no malignancy)    • Cancer (HCC)    • Cataract    • Chronic kidney disease    • HL (hearing loss)    • Hyperlipidemia    • Hypertension    • Obesity    • Shortness of breath      Past Surgical History:   Procedure Laterality Date   • BREAST BIOPSY Left 05/21/2013    Benign fibrocystic disease ,Abstracted from [facility].   • CARDIAC CATHETERIZATION     • D & C AND LAPAROSCOPY     • FULGURATION ENDOMETRIOSIS      surgery   • NEPHRECTOMY Right    • ORIF HUMERUS FRACTURE Left 3/24/2021    Procedure: HUMERUS PROXIMAL OPEN REDUCTION INTERNAL FIXATION;  Surgeon: Yair Anne MD;  Location: [facility];  Service: Orthopedics;  Laterality: Left;     History reviewed. No pertinent family history.  Social History     Socioeconomic History   • Marital status:    Tobacco Use   • Smoking status: Never Smoker   • Smokeless tobacco: Never Used   Vaping Use   • Vaping Use: Never used   Substance and Sexual Activity   • Alcohol use: No   • Drug use: No   • Sexual activity: Yes     Partners: Male         Current Outpatient Medications:   •  ALPRAZolam (XANAX) 0.5 MG tablet, Take  "0.5 mg by mouth 2 (Two) Times a Day As Needed for Anxiety., Disp: , Rfl:   •  amLODIPine (NORVASC) 10 MG tablet, Take 1 tablet by mouth Daily., Disp: 90 tablet, Rfl: 3  •  ezetimibe (ZETIA) 10 MG tablet, TAKE 1 TABLET DAILY, Disp: 90 tablet, Rfl: 3  •  furosemide (Lasix) 20 MG tablet, Take 1 tablet by mouth 2 (Two) Times a Day., Disp: 60 tablet, Rfl: 0  •  hydrALAZINE (APRESOLINE) 100 MG tablet, Take 100 mg by mouth Every 12 (Twelve) Hours As Needed., Disp: , Rfl:   •  methocarbamol (ROBAXIN) 500 MG tablet, TAKE ONE-HALF (1/2) TABLET EVERY 6 HOURS AS NEEDED FOR MUSCLE SPASMS, Disp: 90 tablet, Rfl: 0  •  oxybutynin XL (DITROPAN-XL) 10 MG 24 hr tablet, Take 10 mg by mouth Daily., Disp: , Rfl:   •  potassium chloride 10 MEQ CR tablet, Take 1 tablet by mouth Daily., Disp: 30 tablet, Rfl: 0    Review of Systems   Constitutional: Negative.    Respiratory: Negative.    Cardiovascular: Positive for leg swelling. Negative for chest pain and palpitations.   Gastrointestinal: Negative for nausea and vomiting.   Endocrine: Negative.    Genitourinary: Positive for urinary incontinence.   Musculoskeletal: Positive for arthralgias and back pain.   Neurological: Negative for dizziness, syncope, light-headedness and headache.     /82 (BP Location: Right arm, Patient Position: Sitting, Cuff Size: Large Adult)   Pulse 59   Temp 97.1 °F (36.2 °C) (Temporal)   Ht 162.6 cm (64\")   Wt 107 kg (235 lb)   SpO2 97%   Breastfeeding No   BMI 40.34 kg/m²       Objective   Physical Exam  Vitals and nursing note reviewed.   Constitutional:       General: She is not in acute distress.     Appearance: She is well-developed and well-groomed. She is morbidly obese.   HENT:      Head: Normocephalic and atraumatic.   Neck:      Thyroid: No thyromegaly.   Cardiovascular:      Rate and Rhythm: Normal rate and regular rhythm.      Heart sounds: Normal heart sounds. No murmur heard.  No friction rub. No gallop.    Pulmonary:      Effort: " Pulmonary effort is normal. No respiratory distress.      Breath sounds: Normal breath sounds. No wheezing or rales.   Musculoskeletal:      Left shoulder: Tenderness present. No swelling or effusion. Decreased range of motion.      Cervical back: Neck supple.      Right lower le+ Pitting Edema present.      Left lower le+ Pitting Edema present.      Comments: No vertebral tenderness.     Lymphadenopathy:      Cervical: No cervical adenopathy.   Skin:     General: Skin is warm and dry.   Neurological:      Mental Status: She is alert.   Psychiatric:         Behavior: Behavior is cooperative.         Brief Urine Lab Results  (Last result in the past 365 days)      Color   Clarity   Blood   Leuk Est   Nitrite   Protein   CREAT   Urine HCG        22 1305 Yellow   Clear   Negative   Negative   Negative   Negative                   Assessment/Plan   Problems Addressed this Visit        Cardiac and Vasculature    Hyperlipidemia - Primary    Relevant Orders    Comprehensive Metabolic Panel (Completed)    Lipid Panel (Completed)    Hypertension    Relevant Orders    Comprehensive Metabolic Panel (Completed)    CBC & Differential (Completed)    Lipid Panel (Completed)       Endocrine and Metabolic    Type 2 diabetes mellitus without complication (HCC)    Relevant Orders    Comprehensive Metabolic Panel (Completed)    CBC & Differential (Completed)    Lipid Panel (Completed)    Hemoglobin A1c (Completed)       Genitourinary and Reproductive     Stage 3 chronic kidney disease (HCC)       Musculoskeletal and Injuries    Closed fracture of surgical neck of humerus       Symptoms and Signs    Edema      Other Visit Diagnoses     Nocturia        Relevant Orders    POCT urinalysis dipstick, automated (Completed)      Diagnoses       Codes Comments    Pure hypertriglyceridemia    -  Primary ICD-10-CM: E78.1  ICD-9-CM: 272.1     Hypertension secondary to other renal disorders     ICD-10-CM: I15.1, N28.89  ICD-9-CM:  405.91, 593.89     Type 2 diabetes mellitus without complication, without long-term current use of insulin ([facility])     ICD-10-CM: E11.9  ICD-9-CM: 250.00     Nocturia     ICD-10-CM: R35.1  ICD-9-CM: 788.43     Stage 3 chronic kidney disease, unspecified whether stage 3a or 3b CKD ([facility])     ICD-10-CM: N18.30  ICD-9-CM: 585.3     Closed nondisplaced fracture of surgical neck of left humerus, unspecified fracture morphology, sequela     ICD-10-CM: S42.215S  ICD-9-CM: 905.2     Localized edema     ICD-10-CM: R60.0  ICD-9-CM: 782.3           She will continue her current medications  She will elevate her feet and limit her salt  rec use of compression stockings  Hesitant to adjust meds with her h/o CKD  She was reminded to keep follow up with her nephrologist  Suspect her amlodipine is contributing but she did not want it changed  She was encouraged to be compliant with diet  Encouraged her to follow up with Dr. Anne who treated her humerus fx  UA is normal with no evidence of infection  She is on meds to help with incontinence

## 2022-01-20 ENCOUNTER — TELEPHONE (OUTPATIENT)
Dept: FAMILY MEDICINE CLINIC | Facility: CLINIC | Age: 87
End: 2022-01-20

## 2022-01-20 NOTE — TELEPHONE ENCOUNTER
Caller: Leandra Nuñez    Relationship: Self    Best call back number: [phone]    What test was performed: BLOOD WORK AND URINE TEST    When was the test performed: 01/14/2022    Where was the test performed:  AT THE OFFICE    Additional notes: PATIENT WOULD LIKE A CALL BACK ASAP WHEN THE LAB RESULTS ARE AVAILABLE TO DISCUSS THEM.     PATIENT ALSO STATED THAT SHE DISCUSSED WITH DR CALDERON THAT AMLODIPINE MAY BE CAUSING HER FEET AND ANKLES TO SWELL. PATIENT WAS WONDERING IF THERE WOULD BE ANY OTHER MEDICATIONS CALLED IN, IN PLACE OF THE AMLODIPINE. PLEASE CALL PATIENT TO ADVISE.

## 2022-01-21 NOTE — TELEPHONE ENCOUNTER
She had an active mychart so I sent a message to her that way so the information is attached to the lab and you can see it there and relate it to her

## 2022-01-26 ENCOUNTER — TELEPHONE (OUTPATIENT)
Dept: FAMILY MEDICINE CLINIC | Facility: CLINIC | Age: 87
End: 2022-01-26

## 2022-01-26 DIAGNOSIS — R60.0 BILATERAL LOWER EXTREMITY EDEMA: ICD-10-CM

## 2022-01-26 RX ORDER — FUROSEMIDE 20 MG/1
20 TABLET ORAL 2 TIMES DAILY
Qty: 60 TABLET | Refills: 0 | Status: SHIPPED | OUTPATIENT
Start: 2022-01-26 | End: 2022-04-13 | Stop reason: SDUPTHER

## 2022-01-26 NOTE — TELEPHONE ENCOUNTER
Caller: Leandra Nuñez    Relationship: Self    Best call back number: [phone]    Requested Prescriptions:   Requested Prescriptions     Pending Prescriptions Disp Refills   • furosemide (Lasix) 20 MG tablet 60 tablet 0     Sig: Take 1 tablet by mouth 2 (Two) Times a Day.        Pharmacy where request should be sent: [facility], [place] - [address] AT [place] [address] - [phone] [facility] [phone] FX     Additional details provided by patient: PATIENT IS OUT    Does the patient have less than a 3 day supply:  [x] Yes  [] No    Waqar Barillas Rep   01/26/22 15:03 EST

## 2022-02-16 ENCOUNTER — OFFICE VISIT (OUTPATIENT)
Dept: PODIATRY | Facility: CLINIC | Age: 87
End: 2022-02-16

## 2022-02-16 VITALS
DIASTOLIC BLOOD PRESSURE: 83 MMHG | BODY MASS INDEX: 40.12 KG/M2 | HEART RATE: 65 BPM | SYSTOLIC BLOOD PRESSURE: 163 MMHG | HEIGHT: 64 IN | WEIGHT: 235 LBS

## 2022-02-16 DIAGNOSIS — I87.303 CHRONIC VENOUS HYPERTENSION WITHOUT COMPLICATIONS, BILATERAL: ICD-10-CM

## 2022-02-16 DIAGNOSIS — M19.071 ARTHRITIS OF BOTH FEET: ICD-10-CM

## 2022-02-16 DIAGNOSIS — M21.41 PES PLANUS OF BOTH FEET: ICD-10-CM

## 2022-02-16 DIAGNOSIS — M79.671 BILATERAL FOOT PAIN: Primary | ICD-10-CM

## 2022-02-16 DIAGNOSIS — M19.072 ARTHRITIS OF BOTH FEET: ICD-10-CM

## 2022-02-16 DIAGNOSIS — M79.672 BILATERAL FOOT PAIN: Primary | ICD-10-CM

## 2022-02-16 DIAGNOSIS — M21.42 PES PLANUS OF BOTH FEET: ICD-10-CM

## 2022-02-16 PROCEDURE — 99203 OFFICE O/P NEW LOW 30 MIN: CPT | Performed by: PODIATRIST

## 2022-02-17 NOTE — PROGRESS NOTES
02/16/2022  Foot and Ankle Surgery - New Patient   Provider: Dr. Patel Gregg DPM  Location: [facility]    Subjective:  Leandra Nuñez is a 86 y.o. female.     Chief Complaint   Patient presents with   • Left Foot - Pain, Edema   • Right Foot - Pain, Edema   • Initial Evaluation     last pcp appt with susana zamarripa md  1/14/2022       HPI: Patient is an 86-year-old female that presents for issues involving her feet.  She complains of intermittent pains with increased activity.  A majority of her symptoms are noted to the midfoot regions.  She also complains of progressive swelling involving both lower extremities.  Patient is unaware of any recent injuries.  I have seen her in the past for related issues involving her feet.  Patient states that her activity is relatively limited.  She denies any prominent pain at this time.    Allergies   Allergen Reactions   • Statins Myalgia and Other (See Comments)       Past Medical History:   Diagnosis Date   • Anxiety    • Arthritis    • Breast nodule 2013    Left breast nodule (fibrocystic disease , no malignancy)    • Cancer (HCC)    • Cataract    • Chronic kidney disease    • HL (hearing loss)    • Hyperlipidemia    • Hypertension    • Obesity    • Shortness of breath        Past Surgical History:   Procedure Laterality Date   • BREAST BIOPSY Left 05/21/2013    Benign fibrocystic disease ,Abstracted from [facility].   • CARDIAC CATHETERIZATION     • D & C AND LAPAROSCOPY     • FULGURATION ENDOMETRIOSIS      surgery   • NEPHRECTOMY Right    • ORIF HUMERUS FRACTURE Left 3/24/2021    Procedure: HUMERUS PROXIMAL OPEN REDUCTION INTERNAL FIXATION;  Surgeon: Yair Anne MD;  Location: [facility];  Service: Orthopedics;  Laterality: Left;       History reviewed. No pertinent family history.    Social History     Socioeconomic History   • Marital status:    Tobacco Use   • Smoking status: Never Smoker   • Smokeless tobacco: Never Used   Vaping  "Use   • Vaping Use: Never used   Substance and Sexual Activity   • Alcohol use: No   • Drug use: No   • Sexual activity: Yes     Partners: Male        Current Outpatient Medications on File Prior to Visit   Medication Sig Dispense Refill   • ALPRAZolam (XANAX) 0.5 MG tablet Take 0.5 mg by mouth 2 (Two) Times a Day As Needed for Anxiety.     • amLODIPine (NORVASC) 10 MG tablet Take 1 tablet by mouth Daily. 90 tablet 3   • ezetimibe (ZETIA) 10 MG tablet TAKE 1 TABLET DAILY 90 tablet 3   • furosemide (Lasix) 20 MG tablet Take 1 tablet by mouth 2 (Two) Times a Day. 60 tablet 0   • hydrALAZINE (APRESOLINE) 100 MG tablet Take 100 mg by mouth Every 12 (Twelve) Hours As Needed.     • methocarbamol (ROBAXIN) 500 MG tablet TAKE ONE-HALF (1/2) TABLET EVERY 6 HOURS AS NEEDED FOR MUSCLE SPASMS 90 tablet 0   • oxybutynin XL (DITROPAN-XL) 10 MG 24 hr tablet Take 10 mg by mouth Daily.     • potassium chloride 10 MEQ CR tablet Take 1 tablet by mouth Daily. 30 tablet 0     No current facility-administered medications on file prior to visit.       Review of Systems:  General: Denies fever, chills, fatigue, and weakness.  Eyes: Denies vision loss, blurry vision, and excessive redness.  ENT: Denies hearing issues and difficulty swallowing.  Cardiovascular: Denies palpitations, chest pain, or syncopal episodes.  Respiratory: Denies shortness of breath, wheezing, and coughing.  GI: Denies abdominal pain, nausea, and vomiting.   : Denies frequency, hematuria, and urgency.  Musculoskeletal: + Intermittent bilateral foot pain  Derm: Denies rash, open wounds, or suspicious lesions.  Neuro: Denies headaches, numbness, loss of coordination, and tremors.  Psych: Denies anxiety and depression.  Endocrine: Denies temperature intolerance and changes in appetite.  Heme: Denies bleeding disorders or abnormal bruising.     Objective   /83   Pulse 65   Ht 162.6 cm (64\")   Wt 107 kg (235 lb)   BMI 40.34 kg/m²     Podiatry Exam       General " Appearance:  well developed, well nourished, in no acute distress.    Mental Status Exam        Judgement and Insight:  Intact       Orientation:  Oriented to time, place, and person  Cardiovascular (Bilateral)       Dorsalis Pedis Pulse (BL):  2/4       Posterior Tibialis Pulse (BL):  2/4       Capillary Filling Time (BL):  1-3 Seconds       Edema (BL):   moderate pitting edema to bilateral lower extremities  Dermatological Exam       Temperature:  warm to warm       Skin Elasticity:  normal skin elasticity  Neurological Exam (Bilateral)       Paresthesia (Bl):  negative       Patella DTRs (Bl):  symmetric       Achilles DTRs (Bl):  symmetric       Tinel over Tarsal Tunnel (Bl):  negative       Intermedial Dorsal Cutaneous Nerve (Bl):  negative       Medial Dorsal Cutaneous Nerve (Bl):  negative  Monofilament Test (Bl):   intact        MusculoSkeletal Exam (Bilateral)       Gait and Stance (Bl):   abducted and pronated; antalgic       ROM (Bl):   ankle and pedal joint range of motion is limited and mildly tender with passive ROM.  No crepitus.       Muscle Strength (Bl):   mild guarding with strength testing       Subluxed Digits (Bl):  no subluxation or laxity of joints       Dislocated Joints (Bl):  no dislocation of joints       Gastroc soleus equinus (Bl):  Inability to dorsiflex past neutral position both straight legged and bent knee       Post tibial tendon (Bl):  soreness noted on palpation       Peroneal Tendon (Bl):  soreness on palpation  Additional Musculoskelatal Findings  Discomfort with palpation to the midfoot region of both feet.  No obvious instability.  No jordana progressive deformity as compared to previous evaluation.  Decreased medial arch with stance.    Assessment/Plan   Diagnoses and all orders for this visit:    1. Bilateral foot pain (Primary)  -     XR Foot 3+ View Bilateral    2. Arthritis of both feet    3. Chronic venous hypertension without complications, bilateral    4. Pes planus of  both feet      Patient returns for issues involving her feet.  I have seen her in 2018 for similar issues.  She continues to have mild to moderate discomfort intermittently with increased activities.  Imaging was obtained independently reviewed showing moderate degenerative changes involving the midfoot joints.  No fractures or dislocations are identified.  I explained that her feet are quite flat which is also contributing to her symptoms.  We did review the diagnoses and treatment options at length.  I have recommended that she acquire a pair of over-the-counter arch supports and wear on a daily basis.  We did review appropriate shoes and to avoid barefoot and unsupported weightbearing.  I would like her to start stretching manual therapy exercises.  We did discuss oral and topical anti-inflammatories for symptom management.  I have also asked that she consider compression stockings to address the lower extremity swelling.  Patient states that she will consider.  I have recommended that she return in 6 weeks for reevaluation.  Greater than 30 minutes was spent before, during, and after evaluation for patient care.    Orders Placed This Encounter   Procedures   • XR Foot 3+ View Bilateral     May leave after xray     Scheduling Instructions:      Cast room     Order Specific Question:   Reason for Exam:     Answer:   darío foot pain     Order Specific Question:   Does this patient have a diabetic monitoring/medication delivering device on?     Answer:   No     Order Specific Question:   Release to patient     Answer:   Immediate        Note is dictated utilizing voice recognition software. Unfortunately this leads to occasional typographical errors. I apologize in advance if the situation occurs. If questions occur please do not hesitate to call our office.

## 2022-02-18 ENCOUNTER — PATIENT ROUNDING (BHMG ONLY) (OUTPATIENT)
Dept: PODIATRY | Facility: CLINIC | Age: 87
End: 2022-02-18

## 2022-02-18 NOTE — PROGRESS NOTES
A my chart message has been sent to the patient for PATIENT ROUNDING with [facility] [place], [place]

## 2022-03-18 RX ORDER — METHOCARBAMOL 500 MG/1
TABLET, FILM COATED ORAL
Qty: 90 TABLET | Refills: 0 | Status: SHIPPED | OUTPATIENT
Start: 2022-03-18 | End: 2022-04-12

## 2022-03-18 NOTE — TELEPHONE ENCOUNTER
Caller: Leandra Nuñez    Relationship: Self    Best call back number: [phone]    Requested Prescriptions:   Requested Prescriptions     Pending Prescriptions Disp Refills   • methocarbamol (ROBAXIN) 500 MG tablet 90 tablet 0     Sig: TAKE ONE-HALF (1/2) TABLET EVERY 6 HOURS AS NEEDED FOR MUSCLE SPASMS        Pharmacy where request should be sent: [facility] - [address] [phone] [facility] [phone]      Additional details provided by patient: THE PATIENT WILL NEED A 90-DAY SUPPLY OF THIS MEDICATION. SHE WOULD LIKE REFILLS PLACED ON THIS IF POSSIBLE.     Does the patient have less than a 3 day supply:  [x] Yes  [] No    Waqar Hilario Rep   03/18/22 13:11 EDT

## 2022-04-07 ENCOUNTER — OFFICE VISIT (OUTPATIENT)
Dept: CARDIOLOGY | Facility: CLINIC | Age: 87
End: 2022-04-07

## 2022-04-07 VITALS
BODY MASS INDEX: 40.12 KG/M2 | WEIGHT: 235 LBS | SYSTOLIC BLOOD PRESSURE: 146 MMHG | HEIGHT: 64 IN | OXYGEN SATURATION: 95 % | DIASTOLIC BLOOD PRESSURE: 89 MMHG | HEART RATE: 58 BPM

## 2022-04-07 DIAGNOSIS — R06.09 DYSPNEA ON EXERTION: ICD-10-CM

## 2022-04-07 DIAGNOSIS — G47.33 SLEEP APNEA, OBSTRUCTIVE: ICD-10-CM

## 2022-04-07 DIAGNOSIS — I15.1 HYPERTENSION SECONDARY TO OTHER RENAL DISORDERS: ICD-10-CM

## 2022-04-07 DIAGNOSIS — I50.32 CHRONIC DIASTOLIC (CONGESTIVE) HEART FAILURE: Primary | ICD-10-CM

## 2022-04-07 DIAGNOSIS — N28.89 HYPERTENSION SECONDARY TO OTHER RENAL DISORDERS: ICD-10-CM

## 2022-04-07 PROCEDURE — 99214 OFFICE O/P EST MOD 30 MIN: CPT | Performed by: INTERNAL MEDICINE

## 2022-04-07 PROCEDURE — 93000 ELECTROCARDIOGRAM COMPLETE: CPT | Performed by: INTERNAL MEDICINE

## 2022-04-07 NOTE — PROGRESS NOTES
CC--exertional dyspnea, hypertension, chronic kidney disease, hyperlipidemia    Sub--86-year-old female patient came as a self-referral--she complained exertional shortness of breath with this class III shortness of breath--chronic medical problems include solitary kidney with prior nephrectomy, chronic kidney disease stage III, hypertension, hyperlipidemia and sleep apnea  Patient had issues with treatment of sleep apnea in the past and she is currently using a different mask and she is under the care of an ENT surgeon  She is under tremendous stress from social situation with her   She complains of exertional shortness of breath and significant fatigue and daytime sleepiness  She had a prior cardiac evaluation several years ago according to her without significant coronary artery disease and heart catheterization done more than 10 years ago according to her  She denies any TIA or stroke and no prior history of any peripheral vascular disease  She claims that her blood pressure is well controlled on multiple home recordings  Since last visit patient has refused noninvasive work-up and feels better with optimization of her hypertension which is been monitoring at home  Patient has noticed chronic vertigo and hearing problems and she is seeing an audiologist     Has bladder issues and shoulder issues    No new cardiac sx        Past Medical History:   Diagnosis Date   • Anxiety    • Arthritis    • Breast nodule 2013    Left breast nodule (fibrocystic disease , no malignancy)    • Cancer (CMS/HCC)    • Cataract    • Chronic kidney disease    • HL (hearing loss)    • Hyperlipidemia    • Hypertension    • Obesity    • Shortness of breath      Past Surgical History:   Procedure Laterality Date   • BREAST BIOPSY Left 05/21/2013    Benign fibrocystic disease ,Abstracted from [facility].   • CARDIAC CATHETERIZATION     • D&C AND LAPAROSCOPY     • FULGURATION ENDOMETRIOSIS      surgery   • NEPHRECTOMY Right       Physical Exam  VITALS REVIEWED    General:      well developed, well nourished, in no acute distress.    Head:      normocephalic and atraumatic.    Eyes:      PERRL/EOM intact, conjunctiva and sclera clear with out nystagmus.    Neck:      no masses, thyromegaly,  trachea central with normal respiratory effort and PMI non displaced   Lungs:      clear bilaterally to auscultation.    Heart:      Sinus rhythm without any murmurs gallops or rubs          Assessment  LDL--116, CR--1.03, K is 4.6, HB and PLTS normal  Leg edema--on diuretics   hypertensive heart disease with diastolic dysfunction  Chronic kidney disease stage III  Hypertension--better controlled  Hyperlipidemia--pcp follows  Sleep apnea---cannot tolerate CPAP  Improved clinically with optimization of hypertension at home recordings  Prescription medications reviewed  Follow-up in 6 months        ECG 12 Lead    Date/Time: 4/7/2022 11:38 AM  Performed by: Damien Ty MD  Authorized by: Damien Ty MD   Comparison: compared with previous ECG   Similar to previous ECG  Rhythm: sinus rhythm  Ectopy: atrial premature contractions  Rate: normal  Conduction: conduction normal  QRS axis: normal  Other findings: non-specific ST-T wave changes            Electronically signed by Damien Ty MD, 04/07/22, 11:38 AM EDT.

## 2022-04-12 RX ORDER — METHOCARBAMOL 500 MG/1
TABLET, FILM COATED ORAL
Qty: 90 TABLET | Refills: 0 | Status: SHIPPED | OUTPATIENT
Start: 2022-04-12 | End: 2022-05-25

## 2022-04-13 DIAGNOSIS — R60.0 BILATERAL LOWER EXTREMITY EDEMA: ICD-10-CM

## 2022-04-13 RX ORDER — FUROSEMIDE 20 MG/1
20 TABLET ORAL 2 TIMES DAILY
Qty: 60 TABLET | Refills: 0 | Status: SHIPPED | OUTPATIENT
Start: 2022-04-13 | End: 2022-04-26

## 2022-04-25 ENCOUNTER — APPOINTMENT (OUTPATIENT)
Dept: GENERAL RADIOLOGY | Facility: HOSPITAL | Age: 87
End: 2022-04-25

## 2022-04-25 ENCOUNTER — HOSPITAL ENCOUNTER (EMERGENCY)
Facility: HOSPITAL | Age: 87
Discharge: HOME | End: 2022-04-26
Attending: EMERGENCY MEDICINE | Admitting: EMERGENCY MEDICINE

## 2022-04-25 ENCOUNTER — APPOINTMENT (OUTPATIENT)
Dept: CT IMAGING | Facility: HOSPITAL | Age: 87
End: 2022-04-25

## 2022-04-25 DIAGNOSIS — I10 HYPERTENSION, UNSPECIFIED TYPE: ICD-10-CM

## 2022-04-25 DIAGNOSIS — R30.0 DYSURIA: ICD-10-CM

## 2022-04-25 DIAGNOSIS — R10.9 FLANK PAIN: Primary | ICD-10-CM

## 2022-04-25 LAB
ALBUMIN SERPL-MCNC: 4.3 G/DL (ref 3.5–5.2)
ALBUMIN/GLOB SERPL: 1.3 G/DL
ALP SERPL-CCNC: 96 U/L (ref 39–117)
ALT SERPL W P-5'-P-CCNC: 16 U/L (ref 1–33)
ANION GAP SERPL CALCULATED.3IONS-SCNC: 16 MMOL/L (ref 5–15)
AST SERPL-CCNC: 31 U/L (ref 1–32)
BASOPHILS # BLD AUTO: 0.1 10*3/MM3 (ref 0–0.2)
BASOPHILS NFR BLD AUTO: 0.8 % (ref 0–1.5)
BILIRUB SERPL-MCNC: 0.3 MG/DL (ref 0–1.2)
BILIRUB UR QL STRIP: NEGATIVE
BUN SERPL-MCNC: 19 MG/DL (ref 8–23)
BUN/CREAT SERPL: 16.2 (ref 7–25)
CALCIUM SPEC-SCNC: 9.3 MG/DL (ref 8.6–10.5)
CHLORIDE SERPL-SCNC: 102 MMOL/L (ref 98–107)
CLARITY UR: CLEAR
CO2 SERPL-SCNC: 20 MMOL/L (ref 22–29)
COLOR UR: YELLOW
CREAT SERPL-MCNC: 1.17 MG/DL (ref 0.57–1)
DEPRECATED RDW RBC AUTO: 46.8 FL (ref 37–54)
EGFRCR SERPLBLD CKD-EPI 2021: 45.8 ML/MIN/1.73
EOSINOPHIL # BLD AUTO: 0.2 10*3/MM3 (ref 0–0.4)
EOSINOPHIL NFR BLD AUTO: 1.2 % (ref 0.3–6.2)
ERYTHROCYTE [DISTWIDTH] IN BLOOD BY AUTOMATED COUNT: 15.2 % (ref 12.3–15.4)
GLOBULIN UR ELPH-MCNC: 3.2 GM/DL
GLUCOSE SERPL-MCNC: 159 MG/DL (ref 65–99)
GLUCOSE UR STRIP-MCNC: NEGATIVE MG/DL
HCT VFR BLD AUTO: 43.7 % (ref 34–46.6)
HGB BLD-MCNC: 14.2 G/DL (ref 12–15.9)
HGB UR QL STRIP.AUTO: NEGATIVE
HOLD SPECIMEN: NORMAL
KETONES UR QL STRIP: NEGATIVE
LEUKOCYTE ESTERASE UR QL STRIP.AUTO: NEGATIVE
LIPASE SERPL-CCNC: 41 U/L (ref 13–60)
LIPASE SERPL-CCNC: 43 U/L (ref 13–60)
LYMPHOCYTES # BLD AUTO: 3.3 10*3/MM3 (ref 0.7–3.1)
LYMPHOCYTES NFR BLD AUTO: 22.3 % (ref 19.6–45.3)
MCH RBC QN AUTO: 28.8 PG (ref 26.6–33)
MCHC RBC AUTO-ENTMCNC: 32.6 G/DL (ref 31.5–35.7)
MCV RBC AUTO: 88.4 FL (ref 79–97)
MONOCYTES # BLD AUTO: 1 10*3/MM3 (ref 0.1–0.9)
MONOCYTES NFR BLD AUTO: 6.6 % (ref 5–12)
NEUTROPHILS NFR BLD AUTO: 10.3 10*3/MM3 (ref 1.7–7)
NEUTROPHILS NFR BLD AUTO: 69.1 % (ref 42.7–76)
NITRITE UR QL STRIP: NEGATIVE
NRBC BLD AUTO-RTO: 0.1 /100 WBC (ref 0–0.2)
PH UR STRIP.AUTO: 6 [PH] (ref 5–8)
PLAT MORPH BLD: NORMAL
PLATELET # BLD AUTO: 310 10*3/MM3 (ref 140–450)
PMV BLD AUTO: 9.3 FL (ref 6–12)
POTASSIUM SERPL-SCNC: 4.1 MMOL/L (ref 3.5–5.2)
PROT SERPL-MCNC: 7.5 G/DL (ref 6–8.5)
PROT UR QL STRIP: NEGATIVE
RBC # BLD AUTO: 4.95 10*6/MM3 (ref 3.77–5.28)
RBC MORPH BLD: NORMAL
SODIUM SERPL-SCNC: 138 MMOL/L (ref 136–145)
SP GR UR STRIP: <=1.005 (ref 1–1.03)
UROBILINOGEN UR QL STRIP: NORMAL
WBC MORPH BLD: NORMAL
WBC NRBC COR # BLD: 14.9 10*3/MM3 (ref 3.4–10.8)
WHOLE BLOOD HOLD SPECIMEN: NORMAL

## 2022-04-25 PROCEDURE — 71045 X-RAY EXAM CHEST 1 VIEW: CPT

## 2022-04-25 PROCEDURE — 36415 COLL VENOUS BLD VENIPUNCTURE: CPT

## 2022-04-25 PROCEDURE — 99283 EMERGENCY DEPT VISIT LOW MDM: CPT

## 2022-04-25 PROCEDURE — 85007 BL SMEAR W/DIFF WBC COUNT: CPT

## 2022-04-25 PROCEDURE — 85025 COMPLETE CBC W/AUTO DIFF WBC: CPT

## 2022-04-25 PROCEDURE — 81003 URINALYSIS AUTO W/O SCOPE: CPT

## 2022-04-25 PROCEDURE — 80053 COMPREHEN METABOLIC PANEL: CPT

## 2022-04-25 PROCEDURE — 83690 ASSAY OF LIPASE: CPT

## 2022-04-25 PROCEDURE — 83690 ASSAY OF LIPASE: CPT | Performed by: EMERGENCY MEDICINE

## 2022-04-25 PROCEDURE — 74176 CT ABD & PELVIS W/O CONTRAST: CPT

## 2022-04-25 RX ORDER — LABETALOL HYDROCHLORIDE 5 MG/ML
20 INJECTION, SOLUTION INTRAVENOUS ONCE
Status: DISCONTINUED | OUTPATIENT
Start: 2022-04-25 | End: 2022-04-25

## 2022-04-25 RX ORDER — SODIUM CHLORIDE 0.9 % (FLUSH) 0.9 %
10 SYRINGE (ML) INJECTION AS NEEDED
Status: DISCONTINUED | OUTPATIENT
Start: 2022-04-25 | End: 2022-04-26 | Stop reason: HOSPADM

## 2022-04-25 RX ORDER — HYDROCODONE BITARTRATE AND ACETAMINOPHEN 5; 325 MG/1; MG/1
1 TABLET ORAL EVERY 6 HOURS PRN
Qty: 12 TABLET | Refills: 0 | Status: SHIPPED | OUTPATIENT
Start: 2022-04-25 | End: 2022-04-26 | Stop reason: SDUPTHER

## 2022-04-25 RX ORDER — CLONIDINE HYDROCHLORIDE 0.1 MG/1
0.1 TABLET ORAL ONCE
Status: COMPLETED | OUTPATIENT
Start: 2022-04-25 | End: 2022-04-25

## 2022-04-25 RX ORDER — CLONIDINE HYDROCHLORIDE 0.1 MG/1
0.1 TABLET ORAL 2 TIMES DAILY PRN
Qty: 6 TABLET | Refills: 0 | Status: SHIPPED | OUTPATIENT
Start: 2022-04-25 | End: 2022-04-26 | Stop reason: SDUPTHER

## 2022-04-25 RX ADMIN — CLONIDINE HYDROCHLORIDE 0.1 MG: 0.1 TABLET ORAL at 22:04

## 2022-04-26 VITALS
HEART RATE: 57 BPM | BODY MASS INDEX: 38.41 KG/M2 | OXYGEN SATURATION: 94 % | DIASTOLIC BLOOD PRESSURE: 78 MMHG | SYSTOLIC BLOOD PRESSURE: 168 MMHG | TEMPERATURE: 97.8 F | RESPIRATION RATE: 18 BRPM | WEIGHT: 225 LBS | HEIGHT: 64 IN

## 2022-04-26 DIAGNOSIS — R60.0 BILATERAL LOWER EXTREMITY EDEMA: ICD-10-CM

## 2022-04-26 RX ORDER — FUROSEMIDE 20 MG/1
TABLET ORAL
Qty: 60 TABLET | Refills: 11 | Status: SHIPPED | OUTPATIENT
Start: 2022-04-26 | End: 2022-04-27

## 2022-04-26 RX ORDER — CLONIDINE HYDROCHLORIDE 0.1 MG/1
0.1 TABLET ORAL 2 TIMES DAILY PRN
Qty: 6 TABLET | Refills: 0 | Status: SHIPPED | OUTPATIENT
Start: 2022-04-26 | End: 2022-04-28 | Stop reason: SDUPTHER

## 2022-04-26 RX ORDER — HYDROCODONE BITARTRATE AND ACETAMINOPHEN 5; 325 MG/1; MG/1
1 TABLET ORAL EVERY 6 HOURS PRN
Qty: 12 TABLET | Refills: 0 | Status: SHIPPED | OUTPATIENT
Start: 2022-04-26 | End: 2022-04-29

## 2022-04-26 NOTE — ED PROVIDER NOTES
Subjective   Patient is an 86-year-old obese  female who presents today with complaints of left flank pain.  She states the pain started this morning when she got up.  She rates it a 10 out of 10 when she is up moving and reports radiation down to her left hip.  She reports no pain while laying still.  She denies injury and states she slept well last night but reports she can barely walk today due to pain.  She says she took Robaxin and Norco this morning but they did not help her pain.  She also reports having 1 kidney which happens to be on her left side.  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  She also reports a history of hypertension.She denies dysuria, nausea, vomiting, chest pain, fever, shortness of breath and weakness.  She reports her only allergies to statins.  She also states she did not have her COVID vaccines.          Review of Systems   Constitutional: Positive for activity change. Negative for diaphoresis and fever.   HENT: Negative for congestion, rhinorrhea and sore throat.    Eyes: Negative.    Respiratory: Negative for cough, chest tightness and shortness of breath.    Cardiovascular: Negative for chest pain and palpitations.   Gastrointestinal: Negative for abdominal pain, blood in stool, constipation, diarrhea, nausea and vomiting.   Endocrine: Negative.    Genitourinary: Positive for flank pain. Negative for difficulty urinating, dysuria, frequency and urgency.   Musculoskeletal: Positive for back pain. Negative for arthralgias and myalgias.   Skin: Negative.    Allergic/Immunologic: Negative.    Neurological: Negative for dizziness, syncope, weakness and headaches.   Hematological: Negative for adenopathy.   Psychiatric/Behavioral: Negative for confusion. The patient is not nervous/anxious.    All other systems reviewed and are negative.      Past Medical History:   Diagnosis Date   • Anxiety    • Arthritis    • Breast nodule 2013    Left breast nodule  (fibrocystic disease , no malignancy)    • Cancer (HCC)    • Cataract    • Chronic kidney disease    • HL (hearing loss)    • Hyperlipidemia    • Hypertension    • Obesity    • Shortness of breath        Allergies   Allergen Reactions   • Statins Myalgia and Other (See Comments)       Past Surgical History:   Procedure Laterality Date   • BREAST BIOPSY Left 05/21/2013    Benign fibrocystic disease ,Abstract[facility].   • CARDIAC CATHETERIZATION     • D & C AND LAPAROSCOPY     • FULGURATION ENDOMETRIOSIS      surgery   • NEPHRECTOMY Right    • ORIF HUMERUS FRACTURE Left 3/24/2021    Procedure: HUMERUS PROXIMAL OPEN REDUCTION INTERNAL FIXATION;  Surgeon: Yair Anne MD;  Location: [facility];  Service: Orthopedics;  Laterality: Left;       No family history on file.    Social History     Socioeconomic History   • Marital status:    Tobacco Use   • Smoking status: Never Smoker   • Smokeless tobacco: Never Used   Vaping Use   • Vaping Use: Never used   Substance and Sexual Activity   • Alcohol use: No   • Drug use: No   • Sexual activity: Yes     Partners: Male           Objective   Physical Exam  Vitals and nursing note reviewed.   Constitutional:       General: She is not in acute distress.     Appearance: Normal appearance. She is obese. She is not ill-appearing.   HENT:      Head: Normocephalic and atraumatic.   Cardiovascular:      Rate and Rhythm: Normal rate and regular rhythm.      Pulses: Normal pulses.      Heart sounds: Normal heart sounds.   Pulmonary:      Effort: Pulmonary effort is normal. No respiratory distress.      Breath sounds: Normal breath sounds.   Abdominal:      General: Abdomen is flat. Bowel sounds are decreased.      Palpations: Abdomen is soft.      Tenderness: There is no abdominal tenderness. There is no right CVA tenderness or left CVA tenderness. Negative signs include Brock's sign.      Hernia: No hernia is present.   Musculoskeletal:      Cervical back: Normal  "range of motion and neck supple. No tenderness.   Skin:     General: Skin is warm and dry.      Capillary Refill: Capillary refill takes less than 2 seconds.   Neurological:      General: No focal deficit present.      Mental Status: She is alert and oriented to person, place, and time. Mental status is at baseline.   Psychiatric:         Mood and Affect: Mood normal.         Behavior: Behavior normal.         Procedures           ED Course  ED Course as of 04/25/22 2348   Mon Apr 25, 2022   2141 Glucose: Negative [SJ]      ED Course User Index  [SJ] Mickie Hernandez, APRN    /78   Pulse 72   Temp 98.2 °F (36.8 °C)   Resp 11   Ht 162.6 cm (64\")   Wt 102 kg (225 lb)   SpO2 97%   BMI 38.62 kg/m²   Labs Reviewed   COMPREHENSIVE METABOLIC PANEL - Abnormal; Notable for the following components:       Result Value    Glucose 159 (*)     Creatinine 1.17 (*)     CO2 20.0 (*)     Anion Gap 16.0 (*)     eGFR 45.8 (*)     All other components within normal limits    Narrative:     GFR Normal >60  Chronic Kidney Disease <60  Kidney Failure <15     CBC WITH AUTO DIFFERENTIAL - Abnormal; Notable for the following components:    WBC 14.90 (*)     Neutrophils, Absolute 10.30 (*)     Lymphocytes, Absolute 3.30 (*)     Monocytes, Absolute 1.00 (*)     All other components within normal limits   URINALYSIS W/ CULTURE IF INDICATED - Normal    Narrative:     Urine microscopic not indicated.   LIPASE - Normal   SCAN SLIDE - Normal   LIPASE - Normal   CBC AND DIFFERENTIAL    Narrative:     The following orders were created for panel order CBC & Differential.  Procedure                               Abnormality         Status                     ---------                               -----------         ------                     CBC Auto Differential[[id number]]        Abnormal            Final result               Scan Slide[[id number]]                   Normal              Final result                 Please view results " for these tests on the individual orders.   EXTRA TUBES    Narrative:     The following orders were created for panel order Extra Tubes.  Procedure                               Abnormality         Status                     ---------                               -----------         ------                     Lavender Top[[id number]]                                     Final result               Green Top (Gel)[[id number]]                                  Final result                 Please view results for these tests on the individual orders.   LAVENDER TOP   GREEN TOP     Medications   sodium chloride 0.9 % flush 10 mL (has no administration in time range)   cloNIDine (CATAPRES) tablet 0.1 mg (0.1 mg Oral Given 4/25/22 2204)     CT Abdomen Pelvis Without Contrast    Result Date: 4/25/2022  1. Negative for urinary tract stone. Right kidney is absent. 2. The appendix is not delineated. There are no secondary signs of appendicitis. 3. There is a bilobed cystic structure or 2 cystic structures close together in the posterior inferior left pelvis, measuring 7 cm, which is slightly larger than on 2017 exam. GYN neoplasm is not excluded. Recommend consideration of nonemergent ultrasound for further evaluation. 4. Additional findings as reported above.  Electronically Signed By-Vikki Otoole MD On:4/25/2022 9:08 PM This report was finalized on [id number] by  Vikki Otoole MD.    XR Chest 1 View    Result Date: 4/25/2022  1. Stable exam. There is cardiomegaly with no evidence of active lung disease.  Electronically Signed By-Vikki Otoole MD On:4/25/2022 11:05 PM This report was finalized on [id number] by  Vikki Otoole MD.                                                 MDM  Number of Diagnoses or Management Options  Dysuria  Flank pain  Hypertension, unspecified type  Diagnosis management comments: IV was established and blood work is obtained.  Patient denies pain and nausea at this time.  She is hypertensive on  "exam.  She stated she had taken her medication appropriately this morning.  Her blood pressure remained high and she was medicated with 0.1 mg Catapres.  Her urinalysis is negative for infection.  Blood work reveals creatinine of 1.17, which is stable for the patient.  White count was 14.9.  CT shows:  1. Negative for urinary tract stone. Right kidney is absent.  2. The appendix is not delineated. There are no secondary signs of  appendicitis.  3. There is a bilobed cystic structure or 2 cystic structures close  together in the posterior inferior left pelvis, measuring 7 cm, which is  slightly larger than on 2017 exam. GYN neoplasm is not excluded.  Recommend consideration of nonemergent ultrasound for further  Evaluation.    Upon reassessment, her blood pressure was 168/78. She remained stable and alert during her stay in the ER.  I discussed the results with her and she verbalizes understanding.  Family at bedside said patient has already seen Dr. Simpson regarding pelvic cysts and stated \"that is not the problem here.\"  Patient will be discharged home with Catapres to use if systolic pressure exceeds 180 and Norco 5 to treat patient's pain.  She has been instructed to follow-up with her primary care provider and have her blood pressure monitored closely.  About 10 minutes discussing with the patient and her family the importance of controlling blood pressure due to patient only having 1 kidney.  Patient and family verbalized understanding.    INSPECT report collected and reviewed on patient prior to prescibing controlled substance/medication. Patient condition considered appropriate for narcotic/controlled prescription.      I discussed with the patient the risks and benefits associated with opiate/narcotic pain medication today.  Based on patient's exam findings and assessment, I do feel it appropriate to prescribe a short course of opiate/narcotic pain medication from the emergency department.  The patient was " advised of the risks associated with such medication, including risk of dependency.  Patient was advised of medication administration instructions, appropriate use, potential side effects and adverse reactions.  Acknowledged and verbalized understanding and agrees to treatment.       Amount and/or Complexity of Data Reviewed  Clinical lab tests: reviewed and ordered  Tests in the radiology section of CPT®: ordered and reviewed    Risk of Complications, Morbidity, and/or Mortality  Presenting problems: low  Diagnostic procedures: low  Management options: low    Patient Progress  Patient progress: improved      Final diagnoses:   Flank pain   Hypertension, unspecified type   Dysuria       ED Disposition  ED Disposition     None          No follow-up provider specified.       Medication List      No changes were made to your prescriptions during this visit.          Mickie Hernandez, APRN  04/25/22 6931

## 2022-04-26 NOTE — DISCHARGE INSTRUCTIONS
Take Catapres for hypertension and Norco for pain as needed.  Do not drive or operate machinery while taking Norco.  Change positions slowly to prevent dizziness.    Follow-up with primary care provider for blood pressure management as discussed.    Return to the ER for any new or worsening symptoms.

## 2022-04-27 DIAGNOSIS — R60.0 BILATERAL LOWER EXTREMITY EDEMA: ICD-10-CM

## 2022-04-27 RX ORDER — FUROSEMIDE 20 MG/1
TABLET ORAL
Qty: 60 TABLET | Refills: 11 | Status: SHIPPED | OUTPATIENT
Start: 2022-04-27 | End: 2023-04-03

## 2022-04-28 ENCOUNTER — OFFICE VISIT (OUTPATIENT)
Dept: FAMILY MEDICINE CLINIC | Facility: CLINIC | Age: 87
End: 2022-04-28

## 2022-04-28 VITALS
HEART RATE: 61 BPM | TEMPERATURE: 98.4 F | SYSTOLIC BLOOD PRESSURE: 182 MMHG | DIASTOLIC BLOOD PRESSURE: 93 MMHG | OXYGEN SATURATION: 97 %

## 2022-04-28 DIAGNOSIS — I10 HYPERTENSION, UNSPECIFIED TYPE: ICD-10-CM

## 2022-04-28 DIAGNOSIS — R10.9 LEFT FLANK PAIN: Primary | ICD-10-CM

## 2022-04-28 PROCEDURE — 99214 OFFICE O/P EST MOD 30 MIN: CPT | Performed by: NURSE PRACTITIONER

## 2022-04-28 RX ORDER — CLONIDINE HYDROCHLORIDE 0.1 MG/1
0.1 TABLET ORAL 2 TIMES DAILY PRN
Qty: 60 TABLET | Refills: 0 | Status: SHIPPED | OUTPATIENT
Start: 2022-04-28 | End: 2022-05-02 | Stop reason: SDUPTHER

## 2022-04-28 RX ORDER — VIBEGRON 75 MG/1
TABLET, FILM COATED ORAL
COMMUNITY
Start: 2022-04-22 | End: 2022-10-17

## 2022-04-28 NOTE — PATIENT INSTRUCTIONS
Monitor BP 2 times daily  Take clonidine as needed for top BP reading greater the 160.  Hydrate  Apply heat as needed  Take tylenol for pain- hydrocodone for extreme pain  Continue robaxin

## 2022-04-28 NOTE — PROGRESS NOTES
Subjective     Leandra Nuñez is a 86 y.o. female.     Patient is here today for [facility] ER follow-up.  She went to the ER on April 25 with complaints of left flank pain that radiated into the hip.  She was having difficulty walking due to this.  No pain when lying down.  Denies any known injury  She had tried taking a Norco and Robaxin.  She has been getting some relief.  It does worsen with some movement  She has a history of right nephrectomy due to renal cancer.  White blood cell was mildly elevated at 14.  Had a CT of the abdomen and pelvis which was normal besides 2 cystic structures in the pelvis.  She reports that she is seeing Dr. Simpson for this and was told it was nothing to worry about.  Chest x-ray was normal besides cardiomegaly.  Patient's blood pressure was elevated in the ER.  Pt doesn't feel great.     HTN- pt is currently hydralazine 100mg bid, amlodipine 10mg daily, lasix 20mg 1-2 times daily. She was given clonidine to take as needed for SBP >180. She was just able to pick it up today. She        The following portions of the patient's history were reviewed and updated as appropriate: allergies, current medications, past family history, past medical history, past social history, past surgical history and problem list.    Review of Systems   Constitutional: Negative for chills, fatigue and fever.   Respiratory: Negative for chest tightness and shortness of breath.    Gastrointestinal: Negative for abdominal pain, nausea and vomiting.   Genitourinary: Positive for flank pain. Negative for dysuria, frequency and urgency.   Musculoskeletal: Positive for back pain.   Neurological: Negative for dizziness, numbness and headache.       Objective     BP (!) 182/93 (BP Location: Left arm, Patient Position: Sitting, Cuff Size: Large Adult)   Pulse 61   Temp 98.4 °F (36.9 °C) (Tympanic)   SpO2 97%     Current Outpatient Medications on File Prior to Visit   Medication Sig Dispense Refill    • Gemtesa 75 MG tablet      • amLODIPine (NORVASC) 10 MG tablet Take 1 tablet by mouth Daily. 90 tablet 3   • ezetimibe (ZETIA) 10 MG tablet TAKE 1 TABLET DAILY 90 tablet 3   • furosemide (LASIX) 20 MG tablet TAKE ONE TABLET BY MOUTH TWICE A DAY 60 tablet 11   • hydrALAZINE (APRESOLINE) 100 MG tablet Take 100 mg by mouth Every 12 (Twelve) Hours As Needed.     • HYDROcodone-acetaminophen (NORCO) 5-325 MG per tablet Take 1 tablet by mouth Every 6 (Six) Hours As Needed for Moderate Pain  for up to 3 days. 12 tablet 0   • methocarbamol (ROBAXIN) 500 MG tablet TAKE ONE-HALF (1/2) TABLET EVERY 6 HOURS AS NEEDED FOR MUSCLE SPASMS 90 tablet 0   • potassium chloride 10 MEQ CR tablet Take 1 tablet by mouth Daily. 30 tablet 0   • [DISCONTINUED] ALPRAZolam (XANAX) 0.5 MG tablet Take 0.5 mg by mouth 2 (Two) Times a Day As Needed for Anxiety.     • [DISCONTINUED] cloNIDine (CATAPRES) 0.1 MG tablet Take 1 tablet by mouth 2 (Two) Times a Day As Needed for High Blood Pressure for up to 3 days. Take one tablet by mouth for systolic blood pressure over 180. 6 tablet 0   • [DISCONTINUED] oxybutynin XL (DITROPAN-XL) 10 MG 24 hr tablet Take 10 mg by mouth Daily.       No current facility-administered medications on file prior to visit.        Physical Exam  Constitutional:       Appearance: Normal appearance. She is obese. She is not ill-appearing.   HENT:      Head: Normocephalic and atraumatic.   Cardiovascular:      Rate and Rhythm: Normal rate and regular rhythm.      Heart sounds: No murmur heard.  Pulmonary:      Effort: Pulmonary effort is normal. No respiratory distress.      Breath sounds: Normal breath sounds.   Abdominal:      Tenderness: There is no abdominal tenderness.   Musculoskeletal:      Comments: Right flank pain/mid back tenderness to touch. Uses cane to walk   Skin:     General: Skin is warm and dry.   Neurological:      General: No focal deficit present.      Mental Status: She is alert and oriented to person,  place, and time.   Psychiatric:         Mood and Affect: Mood normal.         Behavior: Behavior normal.         Thought Content: Thought content normal.         Judgment: Judgment normal.           Assessment/Plan     Diagnoses and all orders for this visit:    1. Left flank pain (Primary)  Comments:  unknown etiology  CT showed normal kidney  UA normal in ER  recheck BMP and CBC next week  poss musculoskeletal  stretch  warm compress  robaxin  tylenol  Orders:  -     CBC w AUTO Differential; Future  -     Basic metabolic panel; Future    2. Hypertension, unspecified type  Comments:  elevated today  was given clonidine prn- take if SBP >160  nurse BP check next week  monitor at home  hydrate    Other orders  -     cloNIDine (CATAPRES) 0.1 MG tablet; Take 1 tablet by mouth 2 (Two) Times a Day As Needed for High Blood Pressure. Take one tablet by mouth for systolic blood pressure over 160.  Dispense: 60 tablet; Refill: 0

## 2022-05-02 ENCOUNTER — TELEPHONE (OUTPATIENT)
Dept: PEDIATRICS | Facility: OTHER | Age: 87
End: 2022-05-02

## 2022-05-02 RX ORDER — CLONIDINE HYDROCHLORIDE 0.1 MG/1
0.1 TABLET ORAL 2 TIMES DAILY PRN
Qty: 60 TABLET | Refills: 0 | Status: SHIPPED | OUTPATIENT
Start: 2022-05-02 | End: 2022-05-25 | Stop reason: SDUPTHER

## 2022-05-02 NOTE — TELEPHONE ENCOUNTER
Caller: Leandra Nuñez    Relationship: Self    Best call back number: [phone]    Requested Prescriptions:    cloNIDine (CATAPRES) 0.1 MG tablet    Pharmacy where request should be sent:    [facility] - [place], [place] - [address] AT [facility] [address] - [phone]  - [phone] FX  [phone]  Additional details provided by patient: PATIENT IS CALLING TO STATE THE PHARMACY TOLD HER THEY DID NOT RECEIVE THE ABOVE PRESCRIPTION.    Does the patient have less than a 3 day supply:  [x] Yes  [] No    Keturah Walters, RegSched Rep   05/02/22 10:13 EDT     PLEASE ADVISE.

## 2022-05-06 ENCOUNTER — LAB (OUTPATIENT)
Dept: FAMILY MEDICINE CLINIC | Facility: CLINIC | Age: 87
End: 2022-05-06

## 2022-05-06 DIAGNOSIS — R10.9 LEFT FLANK PAIN: ICD-10-CM

## 2022-05-06 LAB
ANION GAP SERPL CALCULATED.3IONS-SCNC: 17.4 MMOL/L (ref 5–15)
BASOPHILS # BLD AUTO: 0.08 10*3/MM3 (ref 0–0.2)
BASOPHILS NFR BLD AUTO: 0.9 % (ref 0–1.5)
BUN SERPL-MCNC: 23 MG/DL (ref 8–23)
BUN/CREAT SERPL: 19.7 (ref 7–25)
CALCIUM SPEC-SCNC: 10 MG/DL (ref 8.6–10.5)
CHLORIDE SERPL-SCNC: 101 MMOL/L (ref 98–107)
CO2 SERPL-SCNC: 18.6 MMOL/L (ref 22–29)
CREAT SERPL-MCNC: 1.17 MG/DL (ref 0.57–1)
DEPRECATED RDW RBC AUTO: 44.8 FL (ref 37–54)
EGFRCR SERPLBLD CKD-EPI 2021: 45.8 ML/MIN/1.73
EOSINOPHIL # BLD AUTO: 0.12 10*3/MM3 (ref 0–0.4)
EOSINOPHIL NFR BLD AUTO: 1.3 % (ref 0.3–6.2)
ERYTHROCYTE [DISTWIDTH] IN BLOOD BY AUTOMATED COUNT: 13.7 % (ref 12.3–15.4)
GLUCOSE SERPL-MCNC: 114 MG/DL (ref 65–99)
HCT VFR BLD AUTO: 40.6 % (ref 34–46.6)
HGB BLD-MCNC: 13.4 G/DL (ref 12–15.9)
IMM GRANULOCYTES # BLD AUTO: 0.04 10*3/MM3 (ref 0–0.05)
IMM GRANULOCYTES NFR BLD AUTO: 0.4 % (ref 0–0.5)
LYMPHOCYTES # BLD AUTO: 1.99 10*3/MM3 (ref 0.7–3.1)
LYMPHOCYTES NFR BLD AUTO: 22 % (ref 19.6–45.3)
MCH RBC QN AUTO: 29.5 PG (ref 26.6–33)
MCHC RBC AUTO-ENTMCNC: 33 G/DL (ref 31.5–35.7)
MCV RBC AUTO: 89.4 FL (ref 79–97)
MONOCYTES # BLD AUTO: 0.73 10*3/MM3 (ref 0.1–0.9)
MONOCYTES NFR BLD AUTO: 8.1 % (ref 5–12)
NEUTROPHILS NFR BLD AUTO: 6.09 10*3/MM3 (ref 1.7–7)
NEUTROPHILS NFR BLD AUTO: 67.3 % (ref 42.7–76)
NRBC BLD AUTO-RTO: 0 /100 WBC (ref 0–0.2)
PLATELET # BLD AUTO: 336 10*3/MM3 (ref 140–450)
PMV BLD AUTO: 10.8 FL (ref 6–12)
POTASSIUM SERPL-SCNC: 4.2 MMOL/L (ref 3.5–5.2)
RBC # BLD AUTO: 4.54 10*6/MM3 (ref 3.77–5.28)
SODIUM SERPL-SCNC: 137 MMOL/L (ref 136–145)
WBC NRBC COR # BLD: 9.05 10*3/MM3 (ref 3.4–10.8)

## 2022-05-06 PROCEDURE — 80048 BASIC METABOLIC PNL TOTAL CA: CPT | Performed by: NURSE PRACTITIONER

## 2022-05-06 PROCEDURE — 85025 COMPLETE CBC W/AUTO DIFF WBC: CPT | Performed by: NURSE PRACTITIONER

## 2022-05-06 PROCEDURE — 36415 COLL VENOUS BLD VENIPUNCTURE: CPT

## 2022-05-10 ENCOUNTER — CLINICAL SUPPORT (OUTPATIENT)
Dept: FAMILY MEDICINE CLINIC | Facility: CLINIC | Age: 87
End: 2022-05-10

## 2022-05-10 VITALS
SYSTOLIC BLOOD PRESSURE: 178 MMHG | HEART RATE: 54 BPM | DIASTOLIC BLOOD PRESSURE: 92 MMHG | BODY MASS INDEX: 38.62 KG/M2 | HEIGHT: 64 IN

## 2022-05-10 DIAGNOSIS — I10 HYPERTENSION, UNSPECIFIED TYPE: ICD-10-CM

## 2022-05-10 NOTE — PROGRESS NOTES
bp check  138/88 at home today, 141/85, 170s/80-90s.   Pt states taking amlodipine 10mg and clonidine 0.1mg once daily, not prn  Per dr. Rios: continue clonidine once daily dosing   No changes in meds at this time.  Keep apt in august.   Record bp readings at home and bring in to next apt.   Pt confirmed understanding while in office.

## 2022-05-25 RX ORDER — CLONIDINE HYDROCHLORIDE 0.1 MG/1
0.1 TABLET ORAL 2 TIMES DAILY PRN
Qty: 60 TABLET | Refills: 0 | Status: SHIPPED | OUTPATIENT
Start: 2022-05-25 | End: 2022-09-02 | Stop reason: SDUPTHER

## 2022-05-25 RX ORDER — VIBEGRON 75 MG/1
TABLET, FILM COATED ORAL
Qty: 30 TABLET | OUTPATIENT
Start: 2022-05-25

## 2022-05-25 RX ORDER — METHOCARBAMOL 500 MG/1
TABLET, FILM COATED ORAL
Qty: 90 TABLET | Refills: 0 | Status: SHIPPED | OUTPATIENT
Start: 2022-05-25 | End: 2023-02-28 | Stop reason: SDUPTHER

## 2022-05-25 NOTE — TELEPHONE ENCOUNTER
Caller: Leandra Nuñez    Relationship: Self    Best call back number: [phone]    Requested Prescriptions:   Requested Prescriptions     Pending Prescriptions Disp Refills   • cloNIDine (CATAPRES) 0.1 MG tablet 60 tablet 0     Sig: Take 1 tablet by mouth 2 (Two) Times a Day As Needed for High Blood Pressure. Take one tablet by mouth for systolic blood pressure over 160.   • Gemtesa 75 MG tablet 30 tablet         Pharmacy where request should be sent: [facility] - [place], [place] - [address] AT [place] [address] - [phone] [facility] [phone] FX     Additional details provided by patient: PT IS OUT OF MEDICATION.     Does the patient have less than a 3 day supply:  [x] Yes  [] No    Waqar Stewart Rep   05/25/22 11:30 EDT

## 2022-06-07 ENCOUNTER — TELEPHONE (OUTPATIENT)
Dept: FAMILY MEDICINE CLINIC | Facility: CLINIC | Age: 87
End: 2022-06-07

## 2022-06-07 NOTE — TELEPHONE ENCOUNTER
Caller: Leandra Nuñez    Relationship: Self    Best call back number: [phone]    Requested Prescriptions: HYDRALAZINE 100 MG,  TAKE ONE TABLET TWICE PER DAY    Pharmacy where request should be sent: [facility] [address] [place] [address], [place] - [address] AT [place] [address] - [phone]  - [phone] FX     Additional details provided by patient: THE PATIENT STATES THAT [facility] IS BACKED UP AND HER MEDICATION WILL BE LATE. THE PATIENT IS COMPLETELY OUT OF THIS MEDICATION AND WILL NEED A REFILL SENT IN TO HER LOCAL [facility] PHARMACY ASAP. PLEASE ADVISE.     Does the patient have less than a 3 day supply:  [x] Yes  [] No    Waqar Hilario Rep   06/07/22 12:16 EDT

## 2022-06-07 NOTE — TELEPHONE ENCOUNTER
Caller: Leandra Nuñez    Relationship: Self    Best call back number: [phone]    PATIENT CALLED BACK FROM HER MISSED CALL FROM THE OFFICE. HUB TRIED TO WARM TRANSFER BUT WAS UNSUCCESSFUL.

## 2022-06-07 NOTE — TELEPHONE ENCOUNTER
I am confused.  When we talked to her may 10th she was only taking amlodipine and clonidine NOT hydralazine.  Is she now taking ALL 3?  If so, is she needing just a 14 or 30 day supply sent to local and [facility] is sending a 90 when they get around to it or is she also needing a 90 day supply to [facility]

## 2022-06-08 RX ORDER — HYDRALAZINE HYDROCHLORIDE 100 MG/1
100 TABLET, FILM COATED ORAL 2 TIMES DAILY
Qty: 180 TABLET | Refills: 0 | Status: SHIPPED | OUTPATIENT
Start: 2022-06-08 | End: 2022-09-02 | Stop reason: SDUPTHER

## 2022-09-02 RX ORDER — CLONIDINE HYDROCHLORIDE 0.1 MG/1
0.1 TABLET ORAL 2 TIMES DAILY PRN
Qty: 60 TABLET | Refills: 0 | Status: SHIPPED | OUTPATIENT
Start: 2022-09-02 | End: 2022-10-17

## 2022-09-02 RX ORDER — AMLODIPINE BESYLATE 10 MG/1
10 TABLET ORAL DAILY
Qty: 90 TABLET | Refills: 0 | Status: SHIPPED | OUTPATIENT
Start: 2022-09-02 | End: 2023-02-24

## 2022-09-02 RX ORDER — HYDRALAZINE HYDROCHLORIDE 100 MG/1
100 TABLET, FILM COATED ORAL 2 TIMES DAILY
Qty: 180 TABLET | Refills: 0 | Status: SHIPPED | OUTPATIENT
Start: 2022-09-02 | End: 2022-12-01

## 2022-10-17 ENCOUNTER — OFFICE VISIT (OUTPATIENT)
Dept: CARDIOLOGY | Facility: CLINIC | Age: 87
End: 2022-10-17

## 2022-10-17 VITALS
BODY MASS INDEX: 40.63 KG/M2 | DIASTOLIC BLOOD PRESSURE: 80 MMHG | SYSTOLIC BLOOD PRESSURE: 142 MMHG | HEART RATE: 51 BPM | HEIGHT: 64 IN | WEIGHT: 238 LBS

## 2022-10-17 DIAGNOSIS — R06.09 DYSPNEA ON EXERTION: ICD-10-CM

## 2022-10-17 DIAGNOSIS — I50.32 CHRONIC DIASTOLIC (CONGESTIVE) HEART FAILURE: Primary | ICD-10-CM

## 2022-10-17 DIAGNOSIS — N28.89 HYPERTENSION SECONDARY TO OTHER RENAL DISORDERS: ICD-10-CM

## 2022-10-17 DIAGNOSIS — I15.1 HYPERTENSION SECONDARY TO OTHER RENAL DISORDERS: ICD-10-CM

## 2022-10-17 DIAGNOSIS — G47.33 SLEEP APNEA, OBSTRUCTIVE: ICD-10-CM

## 2022-10-17 PROCEDURE — 99214 OFFICE O/P EST MOD 30 MIN: CPT | Performed by: INTERNAL MEDICINE

## 2022-10-17 PROCEDURE — 93000 ELECTROCARDIOGRAM COMPLETE: CPT | Performed by: INTERNAL MEDICINE

## 2022-10-17 NOTE — PROGRESS NOTES
CC--exertional dyspnea, hypertension, chronic kidney disease, hyperlipidemia      Sub  87-year-old pleasant patient with history of chronic kidney disease, exertional dyspnea and hypertension comes in for follow-up.  She has chronic ankle edema.  She denies any chest pain or syncope.  She is compliant with her medications.  She had remote history of nephrectomy.  She also has sleep apnea.      My prior history is attached below for reference only which has been reviewed    Sub--86-year-old female patient came as a self-referral--she complained exertional shortness of breath with this class III shortness of breath--chronic medical problems include solitary kidney with prior nephrectomy, chronic kidney disease stage III, hypertension, hyperlipidemia and sleep apnea  Patient had issues with treatment of sleep apnea in the past and she is currently using a different mask and she is under the care of an ENT surgeon  She is under tremendous stress from social situation with her   She complains of exertional shortness of breath and significant fatigue and daytime sleepiness  She had a prior cardiac evaluation several years ago according to her without significant coronary artery disease and heart catheterization done more than 10 years ago according to her  She denies any TIA or stroke and no prior history of any peripheral vascular disease  She claims that her blood pressure is well controlled on multiple home recordings  Since last visit patient has refused noninvasive work-up and feels better with optimization of her hypertension which is been monitoring at home  Patient has noticed chronic vertigo and hearing problems and she is seeing an audiologist   Has bladder issues and shoulder issues  No new cardiac sx        Past Medical History:   Diagnosis Date   • Anxiety    • Arthritis    • Breast nodule 2013    Left breast nodule (fibrocystic disease , no malignancy)    • Cancer (CMS/HCC)    • Cataract    • Chronic  kidney disease    • HL (hearing loss)    • Hyperlipidemia    • Hypertension    • Obesity    • Shortness of breath      Past Surgical History:   Procedure Laterality Date   • BREAST BIOPSY Left 05/21/2013    Benign fibrocystic disease ,Abstracted from [facility].   • CARDIAC CATHETERIZATION     • D&C AND LAPAROSCOPY     • FULGURATION ENDOMETRIOSIS      surgery   • NEPHRECTOMY Right      Physical Exam    General:      well developed, well nourished, in no acute distress.    Head:      normocephalic and atraumatic.    Eyes:      PERRL/EOM intact, conjunctivae and sclerae clear without nystagmus.    Neck:      no  thyromegaly, trachea central with normal respiratory effort  Lungs:      clear bilaterally to auscultation.    Heart:       regular rate and rhythm, S1, S2 without murmurs, rubs, or gallops  Skin:      intact without lesions or rashes.    Psych:      alert and cooperative; normal mood and affect; normal attention span and concentration.            Assessment   CR--1.17, K is 4.2  Hemoglobin 13.4 and normal platelets  Lower extremity edema on Lasix to be continued  Hypertension controlled  Chronic kidney disease stage III stable  Hyperlipidemia on Zetia followed by primary care physician  Chronic class III diastolic heart failure well compensated  Hypertensive heart disease  Medications reviewed  Follow-up appointments made  Untreated sleep apnea from intolerance to CPAP        ECG 12 Lead    Date/Time: 10/17/2022 12:38 PM  Performed by: Damien Ty MD  Authorized by: Damien Ty MD   Comparison: compared with previous ECG   Similar to previous ECG  Rhythm: sinus rhythm  Rate: normal  Conduction: conduction normal  Other findings: non-specific ST-T wave changes and left ventricular hypertrophy            Electronically signed by Damien Ty MD, 10/17/22, 12:38 PM EDT.

## 2022-11-09 ENCOUNTER — TELEPHONE (OUTPATIENT)
Dept: FAMILY MEDICINE CLINIC | Facility: CLINIC | Age: 87
End: 2022-11-09

## 2022-11-09 NOTE — TELEPHONE ENCOUNTER
I left pt a detailed vm that she needs to schedule an apt for early 2023 as she is due for appointment for follow up in January. Also advised to call Dr. Armenta's office has clonidine hcl 0.1mg was discontinued by them.

## 2022-11-09 NOTE — TELEPHONE ENCOUNTER
According to the computer, Dr. Armenta (cardiology) stop this medication in October.  She also needs to be making a follow-up appoint with me sometime in the early part of new year

## 2022-11-09 NOTE — TELEPHONE ENCOUNTER
Caller: Leandra Nuñez    Relationship: Self    Best call back number:     Requested Prescriptions:   Requested Prescriptions      No prescriptions requested or ordered in this encounter    CLONIDINE HCI 0.1MG    Pharmacy where request should be sent: [facility]  [address] [place], [place] [address] [phone]     Additional details provided by patient: SHE WANTS TO KNOW IF SOME REFILLS CAN BE ADDED AS SHE HAS TO CALL EVERY 60 DAYS TO GET THIS REFILLED.     Does the patient have less than a 3 day supply:  [] Yes  [x] No    Waqar Arcos Rep   11/09/22 11:14 EST

## 2022-12-01 RX ORDER — HYDRALAZINE HYDROCHLORIDE 100 MG/1
TABLET, FILM COATED ORAL
Qty: 180 TABLET | Refills: 0 | Status: SHIPPED | OUTPATIENT
Start: 2022-12-01 | End: 2023-03-01

## 2023-01-23 NOTE — TELEPHONE ENCOUNTER
"Subjective:      Patient ID: Irais Mcneil is a 71 y.o. male.    Chief Complaint: Apnea    HPI    Patient presents to the office today for evaluation of sleep.  Patient with mild snoring Patient has problems falling asleep at time and wakes up frequently throughout the night. Some nights difficulty falling back asleep.   Patient does not wake up feeling refreshed in the morning.  Patient with daytime hypersomnolence.  [place] Sleepiness Scale score 15.  Comorbidities include HTN. He started taking benedryl at night which helps with sleep onset  Bedtime: 11PM He may fall asleep on cough at night watching TV after work and also watches TV in bed before attempting sleep.   Wake time: 5AM      Patient Active Problem List   Diagnosis    Shoulder pain    Hypertension    Hyperlipidemia    Chronic renal disease, stage II    Abnormal PSA    Atypical migraine    Dyslipidemia    Light headedness    Kidney disease due to severe high blood pressure    Vasospasm    Elevated PSA    MGUS (monoclonal gammopathy of unknown significance)    Leukopenia    Thrombocytopenia    History of Micaela-Barr virus infection    History of CMV    History of parvovirus B19 infection    Posterior vitreous detachment, left eye    Vitreous hemorrhage, left eye    Asteroid hyalosis, both eyes    Branch retinal vein occlusion of left eye with retinal neovascularization    Chronic pain    Colon cancer screening    Weight loss    Abdominal discomfort    Nausea and vomiting    Increased urinary frequency    Other headache syndrome    Adjustment reaction with anxiety and depression    Psychophysiological insomnia         /82   Pulse 73   Resp 16   Ht 5' 8" (1.727 m)   Wt 67.8 kg (149 lb 7.6 oz)   SpO2 99%   BMI 22.73 kg/m²   Body mass index is 22.73 kg/m².    Review of Systems   Constitutional: Negative.    HENT: Negative.     Respiratory:  Positive for somnolence.    Cardiovascular: Negative.    Musculoskeletal: Negative.  " Last ov 7/31/20     Gastrointestinal: Negative.    Neurological: Negative.    Psychiatric/Behavioral:  Positive for sleep disturbance.        Objective:      Physical Exam  Constitutional:       Appearance: Normal appearance. He is well-developed.   HENT:      Head: Normocephalic and atraumatic.      Nose: Nose normal.      Mouth/Throat:      Pharynx: Oropharynx is clear.   Neck:      Thyroid: No thyroid mass or thyromegaly.      Trachea: Trachea normal.   Cardiovascular:      Rate and Rhythm: Normal rate and regular rhythm.      Heart sounds: Normal heart sounds.   Pulmonary:      Effort: Pulmonary effort is normal.      Breath sounds: Normal breath sounds. No wheezing, rhonchi or rales.   Abdominal:      Palpations: Abdomen is soft. There is no splenomegaly.      Tenderness: There is no abdominal tenderness.   Musculoskeletal:         General: Normal range of motion.      Cervical back: Normal range of motion and neck supple.   Skin:     General: Skin is warm and dry.   Neurological:      Mental Status: He is alert and oriented to person, place, and time.   Psychiatric:         Mood and Affect: Mood normal.         Behavior: Behavior normal.     Personal Diagnostic Review  Polysomnogram negative for sleep apnea.    Assessment:     1. Psychophysiological insomnia    2. Poor sleep hygiene    3. Lack of adequate sleep       Outpatient Encounter Medications as of 1/23/2023   Medication Sig Dispense Refill    acetaminophen (TYLENOL) 500 MG tablet Take 500 mg by mouth every 6 (six) hours as needed for Pain.      amLODIPine (NORVASC) 10 MG tablet Take 1 tablet (10 mg total) by mouth once daily. 30 tablet 11    aspirin (ECOTRIN) 81 MG EC tablet Take by mouth.      atorvastatin (LIPITOR) 20 MG tablet Take 1 tablet (20 mg total) by mouth every evening. 90 tablet 3    cholecalciferol, vitamin D3, 125 mcg (5,000 unit) Tab       docusate sodium (COLACE) 100 MG capsule Take 100 mg by mouth 2 (two) times a day.      folic acid (FOLVITE) 1 MG  "tablet TAKE 1 TABLET BY MOUTH EVERY DAY 90 tablet 2    lifitegrast (XIIDRA OPHT)       multivit-minerals/folic acid (ADULT ONE DAILY MULTIVITAMIN ORAL)       OMEGA-3-DHA-EPA-DPA-FISH OIL ORAL       tadalafiL (CIALIS) 5 MG tablet TAKE ONE TABLET BY MOUTH ONE TIME DAILY at the same time every day      venlafaxine (EFFEXOR-XR) 75 MG 24 hr capsule Take 1 by mouth daily 90 capsule 4    prednisoLONE acetate (PRED FORTE) 1 % DrpS INSTILL 1 DROP INTO BOTH EYES 4 TIMES A DAY      XIIDRA 5 % Dpet Place 1 drop into both eyes 2 (two) times daily.       No facility-administered encounter medications on file as of 1/23/2023.     No orders of the defined types were placed in this encounter.    Plan:   Hypersomnia due to lack of scheduled sleep at night, poor sleep hygiene.   Discussed stimulus control. And avoiding naps, TV in bedroom. Sleep scheduling allowing at least 7 hrs of sleep at night. 8 would be optimal.  Stimulus control -- Stimulus control therapy is based on the idea that some people with insomnia have learned to associate the bedroom with staying awake rather than sleeping.  ?You should spend no more than 20 minutes lying in bed trying to fall asleep.  ?If you cannot fall asleep within 20 minutes, get up, go to another room and read or find another relaxing activity until you feel sleepy again. Activities such as eating, balancing your checkbook, doing housework, watching TV, or studying for a test, which "reward" you for staying awake, should be avoided.  ?When you start to feel sleepy, you can return to bed. If you cannot fall asleep in another 20 minutes, repeat the process.  ?Set an alarm clock and get up at the same time every day, including weekends.  ?Do not take a nap during the day.  You may not sleep much on the first night. However, sleep is more likely on succeeding nights because sleepiness is increased and naps are not allowed.      Problem List Items Addressed This Visit          Other    " Psychophysiological insomnia - Primary     Other Visit Diagnoses       Poor sleep hygiene        Lack of adequate sleep               Thank you Dr. Richardson for this consultation.

## 2023-02-13 ENCOUNTER — TELEPHONE (OUTPATIENT)
Dept: FAMILY MEDICINE CLINIC | Facility: CLINIC | Age: 88
End: 2023-02-13

## 2023-02-13 NOTE — TELEPHONE ENCOUNTER
Caller: Leandra Nuñez    Relationship: Self    Best call back number: [phone]    PATIENT CALLED TO RESCHEDULE APPOINTMENT WITH SHERRY KHALIL ATTEMPTED TO WARM TRANSFER, WAS UNABLE TO DO SO AND RESCHEDULED PATIENT FOR 02/17/23

## 2023-02-14 ENCOUNTER — APPOINTMENT (OUTPATIENT)
Dept: GENERAL RADIOLOGY | Facility: HOSPITAL | Age: 88
End: 2023-02-14
Payer: MEDICARE

## 2023-02-14 ENCOUNTER — OFFICE VISIT (OUTPATIENT)
Dept: CARDIOLOGY | Facility: CLINIC | Age: 88
End: 2023-02-14
Payer: MEDICARE

## 2023-02-14 ENCOUNTER — TELEPHONE (OUTPATIENT)
Dept: FAMILY MEDICINE CLINIC | Facility: CLINIC | Age: 88
End: 2023-02-14
Payer: MEDICARE

## 2023-02-14 ENCOUNTER — APPOINTMENT (OUTPATIENT)
Dept: CT IMAGING | Facility: HOSPITAL | Age: 88
End: 2023-02-14
Payer: MEDICARE

## 2023-02-14 ENCOUNTER — HOSPITAL ENCOUNTER (EMERGENCY)
Facility: HOSPITAL | Age: 88
Discharge: HOME | End: 2023-02-15
Attending: EMERGENCY MEDICINE | Admitting: EMERGENCY MEDICINE
Payer: MEDICARE

## 2023-02-14 VITALS
DIASTOLIC BLOOD PRESSURE: 84 MMHG | SYSTOLIC BLOOD PRESSURE: 142 MMHG | OXYGEN SATURATION: 97 % | BODY MASS INDEX: 39.27 KG/M2 | WEIGHT: 230 LBS | RESPIRATION RATE: 18 BRPM | HEART RATE: 62 BPM | HEIGHT: 64 IN

## 2023-02-14 DIAGNOSIS — R60.0 LOCALIZED EDEMA: ICD-10-CM

## 2023-02-14 DIAGNOSIS — R07.89 CHEST PAIN, ATYPICAL: Primary | ICD-10-CM

## 2023-02-14 DIAGNOSIS — U07.1 COVID-19: Primary | ICD-10-CM

## 2023-02-14 DIAGNOSIS — R06.02 SHORTNESS OF BREATH: ICD-10-CM

## 2023-02-14 DIAGNOSIS — M54.6 ACUTE MIDLINE THORACIC BACK PAIN: ICD-10-CM

## 2023-02-14 DIAGNOSIS — I10 HYPERTENSION, UNSPECIFIED TYPE: ICD-10-CM

## 2023-02-14 LAB
ALBUMIN SERPL-MCNC: 4.2 G/DL (ref 3.5–5.2)
ALBUMIN/GLOB SERPL: 1.4 G/DL
ALP SERPL-CCNC: 84 U/L (ref 39–117)
ALT SERPL W P-5'-P-CCNC: 42 U/L (ref 1–33)
ANION GAP SERPL CALCULATED.3IONS-SCNC: 15 MMOL/L (ref 5–15)
APTT PPP: 29.7 SECONDS (ref 24–31)
AST SERPL-CCNC: 58 U/L (ref 1–32)
BASOPHILS # BLD AUTO: 0.1 10*3/MM3 (ref 0–0.2)
BASOPHILS NFR BLD AUTO: 0.8 % (ref 0–1.5)
BILIRUB SERPL-MCNC: 0.3 MG/DL (ref 0–1.2)
BILIRUB UR QL STRIP: NEGATIVE
BUN SERPL-MCNC: 22 MG/DL (ref 8–23)
BUN/CREAT SERPL: 21 (ref 7–25)
CALCIUM SPEC-SCNC: 9.3 MG/DL (ref 8.6–10.5)
CHLORIDE SERPL-SCNC: 102 MMOL/L (ref 98–107)
CLARITY UR: CLEAR
CO2 SERPL-SCNC: 20 MMOL/L (ref 22–29)
COLOR UR: YELLOW
CREAT SERPL-MCNC: 1.05 MG/DL (ref 0.57–1)
D DIMER PPP FEU-MCNC: 1.86 MG/L (FEU) (ref 0–0.86)
DEPRECATED RDW RBC AUTO: 46.8 FL (ref 37–54)
EGFRCR SERPLBLD CKD-EPI 2021: 51.9 ML/MIN/1.73
EOSINOPHIL # BLD AUTO: 0 10*3/MM3 (ref 0–0.4)
EOSINOPHIL NFR BLD AUTO: 0.6 % (ref 0.3–6.2)
ERYTHROCYTE [DISTWIDTH] IN BLOOD BY AUTOMATED COUNT: 15.1 % (ref 12.3–15.4)
GLOBULIN UR ELPH-MCNC: 3 GM/DL
GLUCOSE SERPL-MCNC: 103 MG/DL (ref 65–99)
GLUCOSE UR STRIP-MCNC: NEGATIVE MG/DL
HCT VFR BLD AUTO: 40.4 % (ref 34–46.6)
HGB BLD-MCNC: 13.3 G/DL (ref 12–15.9)
HGB UR QL STRIP.AUTO: NEGATIVE
INR PPP: 0.95 (ref 0.93–1.1)
KETONES UR QL STRIP: NEGATIVE
LEUKOCYTE ESTERASE UR QL STRIP.AUTO: NEGATIVE
LYMPHOCYTES # BLD AUTO: 2.1 10*3/MM3 (ref 0.7–3.1)
LYMPHOCYTES NFR BLD AUTO: 31.6 % (ref 19.6–45.3)
MCH RBC QN AUTO: 29.1 PG (ref 26.6–33)
MCHC RBC AUTO-ENTMCNC: 32.9 G/DL (ref 31.5–35.7)
MCV RBC AUTO: 88.4 FL (ref 79–97)
MONOCYTES # BLD AUTO: 0.7 10*3/MM3 (ref 0.1–0.9)
MONOCYTES NFR BLD AUTO: 10.2 % (ref 5–12)
NEUTROPHILS NFR BLD AUTO: 3.8 10*3/MM3 (ref 1.7–7)
NEUTROPHILS NFR BLD AUTO: 56.8 % (ref 42.7–76)
NITRITE UR QL STRIP: NEGATIVE
NRBC BLD AUTO-RTO: 0.1 /100 WBC (ref 0–0.2)
NT-PROBNP SERPL-MCNC: 125.1 PG/ML (ref 0–1800)
PH UR STRIP.AUTO: 5.5 [PH] (ref 5–8)
PLATELET # BLD AUTO: 244 10*3/MM3 (ref 140–450)
PMV BLD AUTO: 8.8 FL (ref 6–12)
POTASSIUM SERPL-SCNC: 3.7 MMOL/L (ref 3.5–5.2)
PROCALCITONIN SERPL-MCNC: 0.07 NG/ML (ref 0–0.25)
PROT SERPL-MCNC: 7.2 G/DL (ref 6–8.5)
PROT UR QL STRIP: NEGATIVE
PROTHROMBIN TIME: 9.8 SECONDS (ref 9.6–11.7)
RBC # BLD AUTO: 4.57 10*6/MM3 (ref 3.77–5.28)
SODIUM SERPL-SCNC: 137 MMOL/L (ref 136–145)
SP GR UR STRIP: 1.01 (ref 1–1.03)
TROPONIN T SERPL HS-MCNC: 28 NG/L
TROPONIN T SERPL HS-MCNC: 29 NG/L
UROBILINOGEN UR QL STRIP: NORMAL
WBC NRBC COR # BLD: 6.8 10*3/MM3 (ref 3.4–10.8)
WHOLE BLOOD HOLD COAG: NORMAL
WHOLE BLOOD HOLD SPECIMEN: NORMAL

## 2023-02-14 PROCEDURE — 80053 COMPREHEN METABOLIC PANEL: CPT | Performed by: NURSE PRACTITIONER

## 2023-02-14 PROCEDURE — 36415 COLL VENOUS BLD VENIPUNCTURE: CPT

## 2023-02-14 PROCEDURE — 85730 THROMBOPLASTIN TIME PARTIAL: CPT | Performed by: NURSE PRACTITIONER

## 2023-02-14 PROCEDURE — 0 IOPAMIDOL PER 1 ML: Performed by: EMERGENCY MEDICINE

## 2023-02-14 PROCEDURE — 83880 ASSAY OF NATRIURETIC PEPTIDE: CPT | Performed by: EMERGENCY MEDICINE

## 2023-02-14 PROCEDURE — 85025 COMPLETE CBC W/AUTO DIFF WBC: CPT | Performed by: NURSE PRACTITIONER

## 2023-02-14 PROCEDURE — 84145 PROCALCITONIN (PCT): CPT | Performed by: NURSE PRACTITIONER

## 2023-02-14 PROCEDURE — 85610 PROTHROMBIN TIME: CPT | Performed by: NURSE PRACTITIONER

## 2023-02-14 PROCEDURE — 71275 CT ANGIOGRAPHY CHEST: CPT

## 2023-02-14 PROCEDURE — 85379 FIBRIN DEGRADATION QUANT: CPT | Performed by: EMERGENCY MEDICINE

## 2023-02-14 PROCEDURE — 81003 URINALYSIS AUTO W/O SCOPE: CPT | Performed by: NURSE PRACTITIONER

## 2023-02-14 PROCEDURE — 93000 ELECTROCARDIOGRAM COMPLETE: CPT | Performed by: NURSE PRACTITIONER

## 2023-02-14 PROCEDURE — 99213 OFFICE O/P EST LOW 20 MIN: CPT | Performed by: NURSE PRACTITIONER

## 2023-02-14 PROCEDURE — 84484 ASSAY OF TROPONIN QUANT: CPT | Performed by: EMERGENCY MEDICINE

## 2023-02-14 PROCEDURE — 71045 X-RAY EXAM CHEST 1 VIEW: CPT

## 2023-02-14 PROCEDURE — P9612 CATHETERIZE FOR URINE SPEC: HCPCS

## 2023-02-14 PROCEDURE — 99284 EMERGENCY DEPT VISIT MOD MDM: CPT

## 2023-02-14 RX ORDER — SODIUM CHLORIDE 0.9 % (FLUSH) 0.9 %
10 SYRINGE (ML) INJECTION AS NEEDED
Status: DISCONTINUED | OUTPATIENT
Start: 2023-02-14 | End: 2023-02-15 | Stop reason: HOSPADM

## 2023-02-14 RX ORDER — CLONIDINE HYDROCHLORIDE 0.1 MG/1
1 TABLET ORAL
COMMUNITY
Start: 2022-09-02 | End: 2023-02-14

## 2023-02-14 RX ADMIN — IOPAMIDOL 100 ML: 755 INJECTION, SOLUTION INTRAVENOUS at 22:41

## 2023-02-14 RX ADMIN — SODIUM CHLORIDE, POTASSIUM CHLORIDE, SODIUM LACTATE AND CALCIUM CHLORIDE 500 ML: 600; 310; 30; 20 INJECTION, SOLUTION INTRAVENOUS at 21:07

## 2023-02-14 RX ADMIN — SODIUM CHLORIDE, POTASSIUM CHLORIDE, SODIUM LACTATE AND CALCIUM CHLORIDE 500 ML: 600; 310; 30; 20 INJECTION, SOLUTION INTRAVENOUS at 22:39

## 2023-02-14 NOTE — TELEPHONE ENCOUNTER
Caller: DAYANA POTTER    Relationship to patient: Emergency Contact    Best call back number: [phone]    Date of exposure: N/A    Date of positive COVID19 test: 02/14/23, DRIVE UP TESTING     Date if possible COVID19 exposure: N/A    COVID19 symptoms: COUGH, TIRED, JOINT AND BACK PAIN, PAIN WHEN BEING TOUCHED, TASTE IS NOT RIGHT, INSOMNIA, NO APPETITE     Date of initial quarantine: 02/14/23    Additional information or concerns: PATIENT AND PATIENT'S DAUGHTER CALLED AND SAID THAT THE PATIENT HAS COVID-19 AND SHE STARTED GETTING SICK ON Friday, 02/10/23     SHE SAID RIGHT NOW SHE IS VERY FATIGUED AND BARELY ABLE TO WALK    THE PATIENT IS ALSO SCHEDULED FOR AN APPOINTMENT TOMORROW WITH THE OFFICE AND THEY ARE NOT SURE WHETHER SHE SHOULD KEEP THAT OR NOT     SHE IS WANTING TO SEE WHAT SHE SHOULD DO     What is the patients preferred pharmacy: [facility] [address] [place] [address] [phone] [facility] [phone] [phone]

## 2023-02-14 NOTE — PROGRESS NOTES
[facility] CARDIOLOGY      REASON FOR FOLLOW-UP:  Acute visit for arm/back pain          Chief Complaint   Patient presents with   • Heart Problem     Pain in both arms, shoulders and down the back for last 3 days         Dear Anabelle Sellers MD        History of Present Illness   Ms. Nuñez is an 87-year-old female with no known history of coronary occlusive disease per patient report of prior heart catheterization.  Past medical history includes chronic class III shortness of breath, solitary kidney with prior nephrectomy, stage III chronic kidney disease, hypertension, dyslipidemia, untreated sleep apnea due to CPAP intolerance, obesity, hypertensive heart disease.  She is primarily seen by Dr. Ty.  The patient presents today in acute visit with her daughter.  She reports 4 days ago she developed mild pain in her upper back and both arms.  This extended into the following day.  She felt poorly and ate very little both days.  The discomfort subsided, she felt somewhat better on Sunday and was able to eat some food.  She was concerned that her symptoms may be cardiac related and requested to be seen.  Upon my evaluation today, the patient is exquisitely tender to touch any area on her arms and legs and cries out when I check for pulses or edema.  She states that she still feels poorly but denies any specific complaints of pain today.  She denies any shortness of breath out of character for her.  She has had no nausea or vomiting, dizziness, lightheadedness.  No palpitations.  EKG shows normal sinus rhythm.      ASSESSMENT:  Atypical pain in upper back and arms  Chronic class III shortness of breath  Solitary kidney with prior nephrectomy  Stage III chronic kidney disease    PLAN:  The patient's symptoms are very atypical for cardiovascular source.  Consider COVID-19 testing.  I will check 2D echocardiogram- last echo was 2019.  Follow-up with primary care office as well  for any additional evaluation for noncardiac symptoms  Follow-up our office as scheduled      Diagnoses and all orders for this visit:    1. Chest pain, atypical (Primary)  -     Adult Transthoracic Echo Complete w/ Color, Spectral and Contrast if necessary per protocol; Future    2. Localized edema  -     Adult Transthoracic Echo Complete w/ Color, Spectral and Contrast if necessary per protocol; Future    3. Hypertension, unspecified type  -     Adult Transthoracic Echo Complete w/ Color, Spectral and Contrast if necessary per protocol; Future    Other orders  -     ECG 12 Lead          The following portions of the patient's history were reviewed and updated as appropriate: allergies, current medications, past family history, past medical history, past social history, past surgical history and problem list.    REVIEW OF SYSTEMS:    Review of Systems   Skin:        Painful to touch skin all over   Musculoskeletal: Positive for back pain.   All other systems reviewed and are negative.      Vitals:    02/14/23 1053   BP: 142/84   Pulse: 62   Resp: 18   SpO2: 97%         PHYSICAL EXAM:    General: Alert, cooperative, no distress, appears stated age  Head:  Normocephalic, atraumatic, mucous membranes moist  Eyes:  Conjunctiva/corneas clear, EOM's intact     Neck:  Supple,  no JVD or bruit     Lungs: Clear to auscultation bilaterally, no wheezes rhonchi rales are noted  Chest wall: No tenderness  Musculoskeletal:   Ambulates slowly without assistance  Heart::  Regular rate and rhythm, S1 and S2 normal, no murmur, rub or gallop  Abdomen: Soft, non-tender, nondistended, bowel sounds active, no abdominal bruit  Extremities: No cyanosis, clubbing, or edema   Pulses: 2+ and symmetric all extremities  Skin:  No rashes or lesions  Neuro/psych: A&O x3. CN II through XII are grossly intact with appropriate affect        Past Medical History:   Diagnosis Date   • Anxiety    • Arthritis    • Breast nodule 2013    Left breast  nodule (fibrocystic disease , no malignancy)    • Cancer (HCC)    • Cataract    • Chronic kidney disease    • HL (hearing loss)    • Hyperlipidemia    • Hypertension    • Obesity    • Shortness of breath        Past Surgical History:   Procedure Laterality Date   • BREAST BIOPSY Left 05/21/2013    Benign fibrocystic disease ,[facility].   • CARDIAC CATHETERIZATION     • D & C AND LAPAROSCOPY     • FULGURATION ENDOMETRIOSIS      surgery   • NEPHRECTOMY Right    • ORIF HUMERUS FRACTURE Left 3/24/2021    Procedure: HUMERUS PROXIMAL OPEN REDUCTION INTERNAL FIXATION;  Surgeon: Yari Anne MD;  [facility];  Service: Orthopedics;  Laterality: Left;         Current Outpatient Medications:   •  amLODIPine (NORVASC) 10 MG tablet, Take 1 tablet by mouth Daily., Disp: 90 tablet, Rfl: 0  •  Cholecalciferol 50 MCG (2000 UT) tablet, Take 1 tablet by mouth., Disp: , Rfl:   •  Coenzyme Q10 (CO Q 10 PO), Take  by mouth., Disp: , Rfl:   •  ezetimibe (ZETIA) 10 MG tablet, TAKE 1 TABLET DAILY, Disp: 90 tablet, Rfl: 3  •  furosemide (LASIX) 20 MG tablet, TAKE ONE TABLET BY MOUTH TWICE A DAY, Disp: 60 tablet, Rfl: 11  •  hydrALAZINE (APRESOLINE) 100 MG tablet, TAKE 1 TABLET TWICE A DAY, Disp: 180 tablet, Rfl: 0  •  methocarbamol (ROBAXIN) 500 MG tablet, TAKE ONE-HALF (1/2) TABLET EVERY 6 HOURS AS NEEDED FOR MUSCLE SPASMS, Disp: 90 tablet, Rfl: 0  •  potassium chloride 10 MEQ CR tablet, Take 1 tablet by mouth Daily., Disp: 30 tablet, Rfl: 0  •  Molnupiravir (LAGEVRIO) 200 MG capsule, Take 4 capsules by mouth Every 12 (Twelve) Hours for 5 days., Disp: 40 capsule, Rfl: 0  No current facility-administered medications for this visit.    Allergies   Allergen Reactions   • Statins Myalgia and Other (See Comments)       History reviewed. No pertinent family history.    Social History     Tobacco Use   • Smoking status: Never   • Smokeless tobacco: Never   Substance Use Topics   • Alcohol use: No           Current  "Electrocardiogram:    ECG 12 Lead    Date/Time: 2/14/2023 3:01 PM  Performed by: Krystin Rg APRN  Authorized by: Krystin Rg APRN   Comparison: not compared with previous ECG   Previous ECG: no previous ECG available  Rhythm: sinus rhythm  Ectopy comments: PAC  BPM: 62                  EMR Dragon/Transcription:   \"Dictated utilizing Dragon dictation\".       "

## 2023-02-14 NOTE — TELEPHONE ENCOUNTER
I spoke with patient and she refuses to go to ER. She says that she saw cardiology and they did an EKG and that was normal. She is back home now and does not want to go to ER. She wants to know if you can order the antibody infusion like you did when her daughter got covid in the past.

## 2023-02-14 NOTE — TELEPHONE ENCOUNTER
If she is a sick as they say she is, she needs to be in the emergency room immediately.  If she is too weak to walk to get my car then they need to call an ambulance

## 2023-02-14 NOTE — TELEPHONE ENCOUNTER
No one is really doing antibody infusions anymore  The EKG has nothing to do with her COVID but that is up to her

## 2023-02-14 NOTE — ED PROVIDER NOTES
Subjective    Provider in Triage Note  Patient 87 yof with daughter with 4 day hx of fatigue and generalized weakness. Tested + for covid today. Denies n/v/d/f.     Pcp: marilou duval       History of Present Illness  Agree with above unless otherwise noted.  Patient with some fatigue and generalized weakness.  Symptoms started Friday or Saturday.  Starting to develop some mild shortness of breath.  Not having any chest pain.  Is having some generalized myalgias.  Review of Systems   Constitutional: Positive for fatigue. Negative for fever.   Gastrointestinal: Negative for diarrhea and vomiting.   Skin: Negative for rash.   Neurological: Positive for weakness.       Past Medical History:   Diagnosis Date   • Anxiety    • Arthritis    • Breast nodule 2013    Left breast nodule (fibrocystic disease , no malignancy)    • Cancer (HCC)    • Cataract    • Chronic kidney disease    • HL (hearing loss)    • Hyperlipidemia    • Hypertension    • Obesity    • Shortness of breath        Allergies   Allergen Reactions   • Statins Myalgia and Other (See Comments)       Past Surgical History:   Procedure Laterality Date   • BREAST BIOPSY Left 05/21/2013    Benign fibrocystic disease ,Abstracted from [facility].   • CARDIAC CATHETERIZATION     • D & C AND LAPAROSCOPY     • FULGURATION ENDOMETRIOSIS      surgery   • NEPHRECTOMY Right    • ORIF HUMERUS FRACTURE Left 3/24/2021    Procedure: HUMERUS PROXIMAL OPEN REDUCTION INTERNAL FIXATION;  Surgeon: Yair Anne MD;  [facility];  Service: Orthopedics;  Laterality: Left;       No family history on file.    Social History     Socioeconomic History   • Marital status:    Tobacco Use   • Smoking status: Never   • Smokeless tobacco: Never   Vaping Use   • Vaping Use: Never used   Substance and Sexual Activity   • Alcohol use: No   • Drug use: No   • Sexual activity: Yes     Partners: Male           Objective   Physical Exam  Constitutional:  No  "acute distress.  Head:  Atraumatic.  Normocephalic.   Eyes:  No scleral icterus. Normal conjunctivae  ENT:  Moist mucosa.  No nasal discharge present.  Cardiovascular:  Well perfused.  Equal pulses.  Regular rhythm and normal rate.  Normal capillary refill.    Pulmonary/Chest:  No respiratory distress.  Airway patent.  No tachypnea.  No accessory muscle usage.  Clear to auscultation bilaterally  Abdominal:  Nondistended. Nontender.   Extremities: Trace bilateral lower extremity peripheral edema.  No Deformity  Skin:  Warm, dry  Neurological:  Alert, awake, and appropriate.  Normal speech.      Procedures           ED Course      /87 (BP Location: Left arm, Patient Position: Sitting)   Pulse 64   Temp 97.8 °F (36.6 °C) (Oral)   Resp 18   Ht 160 cm (63\")   Wt 105 kg (231 lb 0.7 oz)   SpO2 94%   BMI 40.93 kg/m²   Labs Reviewed   COMPREHENSIVE METABOLIC PANEL - Abnormal; Notable for the following components:       Result Value    Glucose 103 (*)     Creatinine 1.05 (*)     CO2 20.0 (*)     ALT (SGPT) 42 (*)     AST (SGOT) 58 (*)     eGFR 51.9 (*)     All other components within normal limits    Narrative:     GFR Normal >60  Chronic Kidney Disease <60  Kidney Failure <15    The GFR formula is only valid for adults with stable renal function between ages 18 and 70.   D-DIMER, QUANTITATIVE - Abnormal; Notable for the following components:    D-Dimer, Quantitative 1.86 (*)     All other components within normal limits    Narrative:     According to the assay 's published package insert, a normal (<0.50 mg/L (FEU)) D-dimer result in conjunction with a non-high clinical probability assessment, excludes deep vein thrombosis (DVT) and pulmonary embolism (PE) with high sensitivity.    D-dimer values increase with age and this can make VTE exclusion of an older population difficult. To address this, the [employer] College [employer], based on best available evidence and recent guidelines, recommends " "that clinicians use age-adjusted D-dimer thresholds in patients greater than 50 years of age with: a) a low probability of PE who do not meet all Pulmonary Embolism Rule Out Criteria, or b) in those with intermediate probability of PE.   The formula for an age-adjusted D-dimer cut-off is \"age/100\".  For example, a 60 year old patient would have an age-adjusted cut-off of 0.60 mg/L (FEU) and an 80 year old 0.80 mg/L (FEU).   SINGLE HSTROPONIN T - Abnormal; Notable for the following components:    HS Troponin T 29 (*)     All other components within normal limits    Narrative:     High Sensitive Troponin T Reference Range:  <10.0 ng/L- Negative Female for AMI  <15.0 ng/L- Negative Male for AMI  >=10 - Abnormal Female indicating possible myocardial injury.  >=15 - Abnormal Male indicating possible myocardial injury.   Clinicians would have to utilize clinical acumen, EKG, Troponin, and serial changes to determine if it is an Acute Myocardial Infarction or myocardial injury due to an underlying chronic condition.        SINGLE HSTROPONIN T - Abnormal; Notable for the following components:    HS Troponin T 28 (*)     All other components within normal limits    Narrative:     High Sensitive Troponin T Reference Range:  <10.0 ng/L- Negative Female for AMI  <15.0 ng/L- Negative Male for AMI  >=10 - Abnormal Female indicating possible myocardial injury.  >=15 - Abnormal Male indicating possible myocardial injury.   Clinicians would have to utilize clinical acumen, EKG, Troponin, and serial changes to determine if it is an Acute Myocardial Infarction or myocardial injury due to an underlying chronic condition.        PROCALCITONIN - Normal    Narrative:     As a Marker for Sepsis (Non-Neonates):    1. <0.5 ng/mL represents a low risk of severe sepsis and/or septic shock.  2. >2 ng/mL represents a high risk of severe sepsis and/or septic shock.    As a Marker for Lower Respiratory Tract Infections that require antibiotic " "therapy:    PCT on Admission    Antibiotic Therapy       6-12 Hrs later    >0.5                Strongly Recommended  >0.25 - <0.5        Recommended   0.1 - 0.25          Discouraged              Remeasure/reassess PCT  <0.1                Strongly Discouraged     Remeasure/reassess PCT    As 28 day mortality risk marker: \"Change in Procalcitonin Result\" (>80% or <=80%) if Day 0 (or Day 1) and Day 4 values are available. Refer to http://www.[facility]-pct-calculator.com    Change in PCT <=80%  A decrease of PCT levels below or equal to 80% defines a positive change in PCT test result representing a higher risk for 28-day all-cause mortality of patients diagnosed with severe sepsis for septic shock.    Change in PCT >80%  A decrease of PCT levels of more than 80% defines a negative change in PCT result representing a lower risk for 28-day all-cause mortality of patients diagnosed with severe sepsis or septic shock.      PROTIME-INR - Normal   APTT - Normal   URINALYSIS W/ CULTURE IF INDICATED - Normal    Narrative:     In absence of clinical symptoms, the presence of pyuria, bacteria, and/or nitrites on the urinalysis result does not correlate with infection.  Urine microscopic not indicated.   CBC WITH AUTO DIFFERENTIAL - Normal   BNP (IN-HOUSE) - Normal    Narrative:     Among patients with dyspnea, NT-proBNP is highly sensitive for the detection of acute congestive heart failure. In addition NT-proBNP of <300 pg/ml effectively rules out acute congestive heart failure with 99% negative predictive value.     CBC AND DIFFERENTIAL    Narrative:     The following orders were created for panel order CBC & Differential.  Procedure                               Abnormality         Status                     ---------                               -----------         ------                     CBC Auto Differential[[id number]]        Normal              Final result                 Please view results for these tests on the " individual orders.   EXTRA TUBES    Narrative:     The following orders were created for panel order Extra Tubes.  Procedure                               Abnormality         Status                     ---------                               -----------         ------                     Lavender Top[[id number]]                                     Final result               Light Blue Top[[id number]]                                   Final result                 Please view results for these tests on the individual orders.   LAVENDER TOP   LIGHT BLUE TOP     Medications   sodium chloride 0.9 % flush 10 mL (has no administration in time range)   lactated ringers bolus 500 mL (0 mL Intravenous Stopped 2/14/23 2137)   lactated ringers bolus 500 mL (0 mL Intravenous Stopped 2/14/23 2309)   iopamidol (ISOVUE-370) 76 % injection 100 mL (100 mL Intravenous Given 2/14/23 2241)     CT Angiogram Chest Pulmonary Embolism    Result Date: 2/14/2023  Impression: 1. No acute cardiopulmonary abnormality. No evidence of pulmonary embolism. 2. Cardiomegaly and mild coronary artery calcification. 3. Dependent bibasilar atelectasis. 4. Small hiatal hernia. Electronically Signed: Dillon Pena  2/14/2023 10:48 PM EST  Workstation ID: [id number]    XR Chest AP    Result Date: 2/14/2023  Impression: No radiographic findings of pneumonia Electronically Signed: Jame Gunderson  2/14/2023 5:30 PM EST  Workstation ID: [id number]                                         MDM  Reviewed cardiology note from Dr. Ty dated October 17, 2022 where she had a chief complaint of exertional shortness of breath where he documented an history of sleep apnea that was untreated secondary to intolerance of CPAP.  Patient refusing EKG as she had one earlier today and was told it was normal..  Dimer positive.  On my independent interpretation of CTA chest I do not see a large central pulmonary embolism.  CTA negative for PE or pneumonia per radiologist..  Troponin  downtrending.  No chest pain.  Only shortness of breath.  Reassuring exam.  Pro-Ned negative.  No white count.  No shift.  Will DC with molnupiravir.  Patient and family agreeable with this plan.  Final diagnoses:   COVID-19   Shortness of breath       ED Disposition  ED Disposition     ED Disposition   Discharge    Condition   Stable    Comment   --             Anabelle Sellers MD  [address]  [place] [address] [phone]    In 3 days           Medication List      New Prescriptions    Molnupiravir 200 MG capsule  Commonly known as: LAGEVRIO  Take 4 capsules by mouth Every 12 (Twelve) Hours for 5 days.           Where to Get Your Medications      These medications were sent to [id number] - [place], [place] - [address] AT [place] [address] [address] - [phone]  - [phone] FX  [address] [place] [address]    Phone: [phone]   · Molnupiravir 200 MG capsule          Brandt Moreira MD  02/15/23 0055

## 2023-02-15 VITALS
OXYGEN SATURATION: 94 % | HEIGHT: 63 IN | DIASTOLIC BLOOD PRESSURE: 87 MMHG | HEART RATE: 64 BPM | BODY MASS INDEX: 40.94 KG/M2 | SYSTOLIC BLOOD PRESSURE: 174 MMHG | TEMPERATURE: 97.8 F | RESPIRATION RATE: 18 BRPM | WEIGHT: 231.04 LBS

## 2023-02-15 PROBLEM — M54.6 ACUTE MIDLINE THORACIC BACK PAIN: Status: ACTIVE | Noted: 2023-02-15

## 2023-02-17 ENCOUNTER — OFFICE VISIT (OUTPATIENT)
Dept: FAMILY MEDICINE CLINIC | Facility: CLINIC | Age: 88
End: 2023-02-17
Payer: MEDICARE

## 2023-02-17 VITALS
SYSTOLIC BLOOD PRESSURE: 187 MMHG | TEMPERATURE: 97.5 F | HEART RATE: 60 BPM | HEIGHT: 63 IN | WEIGHT: 230 LBS | DIASTOLIC BLOOD PRESSURE: 75 MMHG | BODY MASS INDEX: 40.75 KG/M2 | OXYGEN SATURATION: 96 %

## 2023-02-17 DIAGNOSIS — Z78.0 ENCOUNTER FOR OSTEOPOROSIS SCREENING IN ASYMPTOMATIC POSTMENOPAUSAL PATIENT: ICD-10-CM

## 2023-02-17 DIAGNOSIS — Z13.820 ENCOUNTER FOR OSTEOPOROSIS SCREENING IN ASYMPTOMATIC POSTMENOPAUSAL PATIENT: ICD-10-CM

## 2023-02-17 DIAGNOSIS — Z00.00 MEDICARE ANNUAL WELLNESS VISIT, SUBSEQUENT: Primary | ICD-10-CM

## 2023-02-17 DIAGNOSIS — E11.9 TYPE 2 DIABETES MELLITUS WITHOUT COMPLICATION, WITHOUT LONG-TERM CURRENT USE OF INSULIN: ICD-10-CM

## 2023-02-17 DIAGNOSIS — E78.2 MIXED HYPERLIPIDEMIA: ICD-10-CM

## 2023-02-17 PROCEDURE — G0439 PPPS, SUBSEQ VISIT: HCPCS

## 2023-02-17 PROCEDURE — 1159F MED LIST DOCD IN RCRD: CPT

## 2023-02-17 PROCEDURE — 1170F FXNL STATUS ASSESSED: CPT

## 2023-02-17 NOTE — PROGRESS NOTES
The ABCs of the Annual Wellness Visit  Subsequent Medicare Wellness Visit    Subjective    Leandra Nuñez is a 87 y.o. female who presents for a Subsequent Medicare Wellness Visit.    The following portions of the patient's history were reviewed and   updated as appropriate: allergies, current medications, past family history, past medical history, past social history, past surgical history and problem list.    Compared to one year ago, the patient feels her physical   health is the same.    Compared to one year ago, the patient feels her mental   health is better.    Recent Hospitalizations:  She was not admitted to the hospital during the last year.       Current Medical Providers:  Patient Care Team:  Anabelle Sellers MD as PCP - Jojo Baez PA as Physician Assistant (Physician Assistant)    Outpatient Medications Prior to Visit   Medication Sig Dispense Refill   • amLODIPine (NORVASC) 10 MG tablet Take 1 tablet by mouth Daily. 90 tablet 0   • Cholecalciferol 50 MCG (2000 UT) tablet Take 1 tablet by mouth.     • Coenzyme Q10 (CO Q 10 PO) Take  by mouth.     • ezetimibe (ZETIA) 10 MG tablet TAKE 1 TABLET DAILY 90 tablet 3   • furosemide (LASIX) 20 MG tablet TAKE ONE TABLET BY MOUTH TWICE A DAY 60 tablet 11   • hydrALAZINE (APRESOLINE) 100 MG tablet TAKE 1 TABLET TWICE A  tablet 0   • methocarbamol (ROBAXIN) 500 MG tablet TAKE ONE-HALF (1/2) TABLET EVERY 6 HOURS AS NEEDED FOR MUSCLE SPASMS 90 tablet 0   • Molnupiravir (LAGEVRIO) 200 MG capsule Take 4 capsules by mouth Every 12 (Twelve) Hours for 5 days. 40 capsule 0   • potassium chloride 10 MEQ CR tablet Take 1 tablet by mouth Daily. 30 tablet 0     No facility-administered medications prior to visit.       No opioid medication identified on active medication list. I have reviewed chart for other potential  high risk medication/s and harmful drug interactions in the elderly.          Aspirin is not on active medication list.   "Aspirin use is not indicated based on review of current medical condition/s. Risk of harm outweighs potential benefits.  .    Patient Active Problem List   Diagnosis   • Anxiety disorder, unspecified   • Asthma   • Stage 3 chronic kidney disease (HCC)   • Hyperlipidemia   • Hypertension   • Obesity   • Renal insufficiency   • Type 2 diabetes mellitus without complication (HCC)   • Visit for screening mammogram   • Adjustment disorder with anxiety   • Immune thrombocytopenia (HCC)   • Obstructive sleep apnea syndrome   • Osteoarthritis   • Clear cell carcinoma of kidney (HCC)   • Thrombocytopenia (HCC)   • Gastroesophageal reflux disease   • White coat syndrome with diagnosis of hypertension   • Leg cramps   • Closed fracture of surgical neck of humerus   • Fall   • Absent kidney   • Edema   • Vitamin deficiency   • Acute midline thoracic back pain     Advance Care Planning  Advance Directive is on file.  ACP discussion was held with the patient during this visit. Patient has an advance directive in EMR which is still valid.      Objective    Vitals:    02/17/23 1348   BP: (!) 187/75   BP Location: Left arm   Patient Position: Sitting   Cuff Size: Adult   Pulse: 60   Temp: 97.5 °F (36.4 °C)   TempSrc: Tympanic   SpO2: 96%   Weight: 104 kg (230 lb)   Height: 160 cm (63\")     Estimated body mass index is 40.74 kg/m² as calculated from the following:    Height as of this encounter: 160 cm (63\").    Weight as of this encounter: 104 kg (230 lb).    Class 3 Severe Obesity (BMI >=40). Obesity-related health conditions include the following: obstructive sleep apnea, hypertension, diabetes mellitus, dyslipidemias, GERD and osteoarthritis. Obesity is unchanged. BMI is is above average; BMI management plan is completed. We discussed low calorie, low carb based diet program, portion control and increasing exercise.      Does the patient have evidence of cognitive impairment? No          HEALTH RISK ASSESSMENT    Smoking " Status:  Social History     Tobacco Use   Smoking Status Never   Smokeless Tobacco Never     Alcohol Consumption:  Social History     Substance and Sexual Activity   Alcohol Use No     Fall Risk Screen:    LILY Fall Risk Assessment has not been completed.    Depression Screening:  PHQ-2/PHQ-9 Depression Screening 2/17/2023   Little Interest or Pleasure in Doing Things 0-->not at all   Feeling Down, Depressed or Hopeless 0-->not at all   PHQ-9: Brief Depression Severity Measure Score 0       Health Habits and Functional and Cognitive Screening:  Functional & Cognitive Status 2/17/2023   Do you have difficulty preparing food and eating? No   Do you have difficulty bathing yourself, getting dressed or grooming yourself? No   Do you have difficulty using the toilet? No   Do you have difficulty moving around from place to place? No   Do you have trouble with steps or getting out of a bed or a chair? No   Current Diet Well Balanced Diet   Dental Exam Up to date   Eye Exam Up to date   Exercise (times per week) 0 times per week   Current Exercises Include No Regular Exercise   Current Exercise Activities Include -   Do you need help using the phone?  No   Are you deaf or do you have serious difficulty hearing?  No   Do you need help with transportation? Yes   Do you need help shopping? No   Do you need help preparing meals?  No   Do you need help with housework?  No   Do you need help with laundry? No   Do you need help taking your medications? No   Do you need help managing money? No   Do you ever drive or ride in a car without wearing a seat belt? No   Have you felt unusual stress, anger or loneliness in the last month? No   Who do you live with? Alone   If you need help, do you have trouble finding someone available to you? Yes   Have you been bothered in the last four weeks by sexual problems? -   Do you have difficulty concentrating, remembering or making decisions? No       Age-appropriate Screening  "Schedule:  Refer to the list below for future screening recommendations based on patient's age, sex and/or medical conditions. Orders for these recommended tests are listed in the plan section. The patient has been provided with a written plan.    Health Maintenance   Topic Date Due   • TDAP/TD VACCINES (1 - Tdap) Never done   • ZOSTER VACCINE (1 of 2) Never done   • DXA SCAN  07/19/2015   • HEMOGLOBIN A1C  07/14/2022   • INFLUENZA VACCINE  08/01/2022   • LIPID PANEL  01/14/2023   • DIABETIC EYE EXAM  02/10/2024   • URINE MICROALBUMIN  Discontinued                CMS Preventative Services Quick Reference  Risk Factors Identified During Encounter  Chronic Pain: Natural history and expected course discussed. Questions answered.  Fall Risk-High or Moderate: Discussed Fall Prevention in the home  Hearing Problem: Followed by audiology  The above risks/problems have been discussed with the patient.  Pertinent information has been shared with the patient in the After Visit Summary.  An After Visit Summary and PPPS were made available to the patient.    Follow Up:   Next Medicare Wellness visit to be scheduled in 1 year.       Additional E&M Note during same encounter follows:  Patient has multiple medical problems which are significant and separately identifiable that require additional work above and beyond the Medicare Wellness Visit.      Chief Complaint  Medicare Wellness-subsequent    Subjective        She will get shingles, pneumonia, tdap once she is feeling better from covid      MMSE 30/30    DEXA ordered    Objective   Vital Signs:  BP (!) 187/75 (BP Location: Left arm, Patient Position: Sitting, Cuff Size: Adult)   Pulse 60   Temp 97.5 °F (36.4 °C) (Tympanic)   Ht 160 cm (63\")   Wt 104 kg (230 lb)   SpO2 96%   BMI 40.74 kg/m²                        Assessment and Plan   Diagnoses and all orders for this visit:    1. Medicare annual wellness visit, subsequent (Primary)  -     Cancel: DEXA Bone Density " Axial  -     DEXA Bone Density Axial; Future    2. Mixed hyperlipidemia  -     Lipid panel; Future    3. Type 2 diabetes mellitus without complication, without long-term current use of insulin (HCC)  -     Hemoglobin A1c; Future    4. Encounter for osteoporosis screening in asymptomatic postmenopausal patient  -     Cancel: DEXA Bone Density Axial  -     DEXA Bone Density Axial; Future             Follow Up   Return for Annual physical.  Patient was given instructions and counseling regarding her condition or for health maintenance advice. Please see specific information pulled into the AVS if appropriate.

## 2023-02-24 RX ORDER — AMLODIPINE BESYLATE 10 MG/1
TABLET ORAL
Qty: 90 TABLET | Refills: 3 | Status: SHIPPED | OUTPATIENT
Start: 2023-02-24

## 2023-02-24 RX ORDER — EZETIMIBE 10 MG/1
TABLET ORAL
Qty: 90 TABLET | Refills: 3 | Status: SHIPPED | OUTPATIENT
Start: 2023-02-24

## 2023-02-28 RX ORDER — METHOCARBAMOL 500 MG/1
TABLET, FILM COATED ORAL
Qty: 90 TABLET | Refills: 0 | Status: SHIPPED | OUTPATIENT
Start: 2023-02-28

## 2023-03-01 RX ORDER — HYDRALAZINE HYDROCHLORIDE 100 MG/1
TABLET, FILM COATED ORAL
Qty: 180 TABLET | Refills: 3 | Status: SHIPPED | OUTPATIENT
Start: 2023-03-01

## 2023-03-05 ENCOUNTER — APPOINTMENT (OUTPATIENT)
Dept: GENERAL RADIOLOGY | Facility: HOSPITAL | Age: 88
End: 2023-03-05
Payer: MEDICARE

## 2023-03-05 ENCOUNTER — HOSPITAL ENCOUNTER (EMERGENCY)
Facility: HOSPITAL | Age: 88
Discharge: HOME | End: 2023-03-05
Attending: EMERGENCY MEDICINE | Admitting: EMERGENCY MEDICINE
Payer: MEDICARE

## 2023-03-05 VITALS
DIASTOLIC BLOOD PRESSURE: 43 MMHG | SYSTOLIC BLOOD PRESSURE: 154 MMHG | BODY MASS INDEX: 40.75 KG/M2 | HEIGHT: 63 IN | WEIGHT: 230 LBS | RESPIRATION RATE: 19 BRPM | TEMPERATURE: 97.9 F | HEART RATE: 61 BPM | OXYGEN SATURATION: 94 %

## 2023-03-05 DIAGNOSIS — M25.512 ACUTE PAIN OF LEFT SHOULDER: Primary | ICD-10-CM

## 2023-03-05 LAB
ANION GAP SERPL CALCULATED.3IONS-SCNC: 14 MMOL/L (ref 5–15)
APTT PPP: 27.2 SECONDS (ref 61–76.5)
BASOPHILS # BLD AUTO: 0 10*3/MM3 (ref 0–0.2)
BASOPHILS NFR BLD AUTO: 0.2 % (ref 0–1.5)
BUN SERPL-MCNC: 17 MG/DL (ref 8–23)
BUN/CREAT SERPL: 17 (ref 7–25)
CALCIUM SPEC-SCNC: 9.1 MG/DL (ref 8.6–10.5)
CHLORIDE SERPL-SCNC: 96 MMOL/L (ref 98–107)
CO2 SERPL-SCNC: 20 MMOL/L (ref 22–29)
CREAT SERPL-MCNC: 1 MG/DL (ref 0.57–1)
DEPRECATED RDW RBC AUTO: 47.3 FL (ref 37–54)
EGFRCR SERPLBLD CKD-EPI 2021: 55 ML/MIN/1.73
EOSINOPHIL # BLD AUTO: 0 10*3/MM3 (ref 0–0.4)
EOSINOPHIL NFR BLD AUTO: 0.1 % (ref 0.3–6.2)
ERYTHROCYTE [DISTWIDTH] IN BLOOD BY AUTOMATED COUNT: 15.2 % (ref 12.3–15.4)
GLUCOSE SERPL-MCNC: 177 MG/DL (ref 65–99)
HCT VFR BLD AUTO: 40.8 % (ref 34–46.6)
HGB BLD-MCNC: 13.2 G/DL (ref 12–15.9)
HOLD SPECIMEN: NORMAL
HOLD SPECIMEN: NORMAL
INR PPP: 1.03 (ref 0.93–1.1)
LYMPHOCYTES # BLD AUTO: 1 10*3/MM3 (ref 0.7–3.1)
LYMPHOCYTES NFR BLD AUTO: 6.2 % (ref 19.6–45.3)
MCH RBC QN AUTO: 29 PG (ref 26.6–33)
MCHC RBC AUTO-ENTMCNC: 32.4 G/DL (ref 31.5–35.7)
MCV RBC AUTO: 89.6 FL (ref 79–97)
MONOCYTES # BLD AUTO: 1 10*3/MM3 (ref 0.1–0.9)
MONOCYTES NFR BLD AUTO: 5.8 % (ref 5–12)
NEUTROPHILS NFR BLD AUTO: 14.8 10*3/MM3 (ref 1.7–7)
NEUTROPHILS NFR BLD AUTO: 87.7 % (ref 42.7–76)
NRBC BLD AUTO-RTO: 0.1 /100 WBC (ref 0–0.2)
NT-PROBNP SERPL-MCNC: 500.6 PG/ML (ref 0–1800)
PLATELET # BLD AUTO: 367 10*3/MM3 (ref 140–450)
PMV BLD AUTO: 8.6 FL (ref 6–12)
POTASSIUM SERPL-SCNC: 3.7 MMOL/L (ref 3.5–5.2)
PROTHROMBIN TIME: 10.6 SECONDS (ref 9.6–11.7)
RBC # BLD AUTO: 4.55 10*6/MM3 (ref 3.77–5.28)
SODIUM SERPL-SCNC: 130 MMOL/L (ref 136–145)
TROPONIN T SERPL HS-MCNC: 26 NG/L
WBC NRBC COR # BLD: 16.9 10*3/MM3 (ref 3.4–10.8)
WHOLE BLOOD HOLD COAG: NORMAL
WHOLE BLOOD HOLD SPECIMEN: NORMAL

## 2023-03-05 PROCEDURE — 85025 COMPLETE CBC W/AUTO DIFF WBC: CPT | Performed by: EMERGENCY MEDICINE

## 2023-03-05 PROCEDURE — 93005 ELECTROCARDIOGRAM TRACING: CPT | Performed by: EMERGENCY MEDICINE

## 2023-03-05 PROCEDURE — 73030 X-RAY EXAM OF SHOULDER: CPT

## 2023-03-05 PROCEDURE — 93005 ELECTROCARDIOGRAM TRACING: CPT

## 2023-03-05 PROCEDURE — 84484 ASSAY OF TROPONIN QUANT: CPT | Performed by: EMERGENCY MEDICINE

## 2023-03-05 PROCEDURE — 99284 EMERGENCY DEPT VISIT MOD MDM: CPT

## 2023-03-05 PROCEDURE — 85730 THROMBOPLASTIN TIME PARTIAL: CPT | Performed by: EMERGENCY MEDICINE

## 2023-03-05 PROCEDURE — 71045 X-RAY EXAM CHEST 1 VIEW: CPT

## 2023-03-05 PROCEDURE — 83880 ASSAY OF NATRIURETIC PEPTIDE: CPT | Performed by: EMERGENCY MEDICINE

## 2023-03-05 PROCEDURE — 80048 BASIC METABOLIC PNL TOTAL CA: CPT | Performed by: EMERGENCY MEDICINE

## 2023-03-05 PROCEDURE — 85610 PROTHROMBIN TIME: CPT | Performed by: EMERGENCY MEDICINE

## 2023-03-05 RX ORDER — HYDROCODONE BITARTRATE AND ACETAMINOPHEN 7.5; 325 MG/1; MG/1
1 TABLET ORAL EVERY 6 HOURS PRN
Qty: 12 TABLET | Refills: 0 | Status: SHIPPED | OUTPATIENT
Start: 2023-03-05

## 2023-03-05 RX ORDER — SODIUM CHLORIDE 0.9 % (FLUSH) 0.9 %
10 SYRINGE (ML) INJECTION AS NEEDED
Status: DISCONTINUED | OUTPATIENT
Start: 2023-03-05 | End: 2023-03-05 | Stop reason: HOSPADM

## 2023-03-05 NOTE — ED PROVIDER NOTES
Subjective   History of Present Illness  Chief complaint: Left shoulder pain    87-year-old female presents with left shoulder pain.  Patient states symptoms started yesterday.  She denies any injury to the area.  She states the pain is going down into the left upper arm and also into the left upper back.  She denies any chest pain.  She denies any shortness of breath, diaphoresis, dizziness, palpitations.  She states pain seems to be worse with movement.  She had a leftover Norco at home and took that this morning.  She does report some improvement with this.    History provided by:  Patient      Review of Systems   Constitutional: Negative for fever.   HENT: Negative for congestion.    Respiratory: Negative for cough and shortness of breath.    Cardiovascular: Negative for chest pain.   Gastrointestinal: Negative for abdominal pain and vomiting.   Musculoskeletal:        Left shoulder pain   Skin: Negative for rash.   Neurological: Negative for weakness and headaches.       Past Medical History:   Diagnosis Date   • Anxiety    • Arthritis    • Breast nodule 2013    Left breast nodule (fibrocystic disease , no malignancy)    • Cancer (HCC)    • Cataract    • Chronic kidney disease    • HL (hearing loss)    • Hyperlipidemia    • Hypertension    • Obesity    • Shortness of breath        Allergies   Allergen Reactions   • Statins Myalgia and Other (See Comments)       Past Surgical History:   Procedure Laterality Date   • BREAST BIOPSY Left 05/21/2013    Benign fibrocystic disease ,Abstracted from [facility].   • CARDIAC CATHETERIZATION     • D & C AND LAPAROSCOPY     • FULGURATION ENDOMETRIOSIS      surgery   • NEPHRECTOMY Right    • ORIF HUMERUS FRACTURE Left 3/24/2021    Procedure: HUMERUS PROXIMAL OPEN REDUCTION INTERNAL FIXATION;  Surgeon: Yair Anne MD;  Location: [facility];  Service: Orthopedics;  Laterality: Left;       No family history on file.    Social History     Socioeconomic History   •  "Marital status:    Tobacco Use   • Smoking status: Never   • Smokeless tobacco: Never   Vaping Use   • Vaping Use: Never used   Substance and Sexual Activity   • Alcohol use: No   • Drug use: No   • Sexual activity: Yes     Partners: Male       /43   Pulse 61   Temp 97.9 °F (36.6 °C)   Resp 19   Ht 160 cm (63\")   Wt 104 kg (230 lb)   SpO2 94%   BMI 40.74 kg/m²       Objective   Physical Exam  Vitals and nursing note reviewed.   Constitutional:       Appearance: She is well-developed.   HENT:      Head: Normocephalic and atraumatic.   Cardiovascular:      Rate and Rhythm: Normal rate and regular rhythm.      Heart sounds: Normal heart sounds.   Pulmonary:      Effort: Pulmonary effort is normal. No respiratory distress.      Breath sounds: Normal breath sounds.   Abdominal:      General: Bowel sounds are normal.      Palpations: Abdomen is soft.      Tenderness: There is no abdominal tenderness.   Musculoskeletal:      Right lower leg: No tenderness.      Left lower leg: No tenderness.      Comments: Mild bilateral lower extremity edema.  Examination of the left shoulder is unremarkable.  There is no tenderness on palpation.  She has good range of motion of the shoulder.  Neurovascular intact distally.   Skin:     General: Skin is warm and dry.   Neurological:      General: No focal deficit present.      Mental Status: She is alert and oriented to person, place, and time.         Procedures           ED Course      My interpretation of EKG shows sinus rhythm, rate of 69, no ST elevation     Results for orders placed or performed during the hospital encounter of 03/05/23   Basic Metabolic Panel    Specimen: Blood   Result Value Ref Range    Glucose 177 (H) 65 - 99 mg/dL    BUN 17 8 - 23 mg/dL    Creatinine 1.00 0.57 - 1.00 mg/dL    Sodium 130 (L) 136 - 145 mmol/L    Potassium 3.7 3.5 - 5.2 mmol/L    Chloride 96 (L) 98 - 107 mmol/L    CO2 20.0 (L) 22.0 - 29.0 mmol/L    Calcium 9.1 8.6 - 10.5 mg/dL "    BUN/Creatinine Ratio 17.0 7.0 - 25.0    Anion Gap 14.0 5.0 - 15.0 mmol/L    eGFR 55.0 (L) >60.0 mL/min/1.73   Protime-INR    Specimen: Blood   Result Value Ref Range    Protime 10.6 9.6 - 11.7 Seconds    INR 1.03 0.93 - 1.10   aPTT    Specimen: Blood   Result Value Ref Range    PTT 27.2 (L) 61.0 - 76.5 seconds   High Sensitivity Troponin T    Specimen: Blood   Result Value Ref Range    HS Troponin T 26 (H) <10 ng/L   BNP    Specimen: Blood   Result Value Ref Range    proBNP 500.6 0.0 - 1,800.0 pg/mL   CBC Auto Differential    Specimen: Blood   Result Value Ref Range    WBC 16.90 (H) 3.40 - 10.80 10*3/mm3    RBC 4.55 3.77 - 5.28 10*6/mm3    Hemoglobin 13.2 12.0 - 15.9 g/dL    Hematocrit 40.8 34.0 - 46.6 %    MCV 89.6 79.0 - 97.0 fL    MCH 29.0 26.6 - 33.0 pg    MCHC 32.4 31.5 - 35.7 g/dL    RDW 15.2 12.3 - 15.4 %    RDW-SD 47.3 37.0 - 54.0 fl    MPV 8.6 6.0 - 12.0 fL    Platelets 367 140 - 450 10*3/mm3    Neutrophil % 87.7 (H) 42.7 - 76.0 %    Lymphocyte % 6.2 (L) 19.6 - 45.3 %    Monocyte % 5.8 5.0 - 12.0 %    Eosinophil % 0.1 (L) 0.3 - 6.2 %    Basophil % 0.2 0.0 - 1.5 %    Neutrophils, Absolute 14.80 (H) 1.70 - 7.00 10*3/mm3    Lymphocytes, Absolute 1.00 0.70 - 3.10 10*3/mm3    Monocytes, Absolute 1.00 (H) 0.10 - 0.90 10*3/mm3    Eosinophils, Absolute 0.00 0.00 - 0.40 10*3/mm3    Basophils, Absolute 0.00 0.00 - 0.20 10*3/mm3    nRBC 0.1 0.0 - 0.2 /100 WBC   ECG 12 Lead Chest Pain   Result Value Ref Range    QT Interval 425 ms   Green Top (Gel)   Result Value Ref Range    Extra Tube Hold for add-ons.    Lavender Top   Result Value Ref Range    Extra Tube hold for add-on    Gold Top - SST   Result Value Ref Range    Extra Tube Hold for add-ons.    Light Blue Top   Result Value Ref Range    Extra Tube Hold for add-ons.      XR Shoulder 2+ View Left    Result Date: 3/5/2023  Impression: There is a screw oriented craniocaudal along the medial plate and screw device. It may be displaced. It was not well seen on the  intraoperative fluoroscopic images. Any other x-rays of the shoulder would be helpful for comparison. Healed comminuted proximal humeral fracture with lateral plate and screw device. Moderate arthritis of the glenohumeral joint with acromioclavicular joint joint. Osteopenia. Electronically Signed: Delicia Reyes  3/5/2023 11:45 AM EST  Workstation ID: [id number]    XR Chest 1 View    Result Date: 3/5/2023  Impression: No acute cardiopulmonary abnormality. Electronically Signed: Delicia Reyes  3/5/2023 9:45 AM EST  Workstation ID: [id number]                                    Medical Decision Making  Acute pain of left shoulder: acute illness or injury  Amount and/or Complexity of Data Reviewed  Labs: ordered. Decision-making details documented in ED Course.  Radiology: ordered. Decision-making details documented in ED Course.  ECG/medicine tests: ordered and independent interpretation performed. Decision-making details documented in ED Course.      Risk  Prescription drug management.         Patient had the above evaluation.  Results were discussed with the patient.  Chest x-ray shows no acute disease.  EKG shows no acute ischemia.   troponin was in the intermediate range at 26.  Patient is not having any chest pain or shortness of breath.  Pain seems to be more musculoskeletal in nature.  White blood cell count is elevated at 16.9.  Patient has no evidence of infection.  She is afebrile.  I do not see any erythema or warmth to the left shoulder.  X-ray of left shoulder is showing the possibility of a displaced screw.  We do not have any get x-rays for comparison.  This was discussed with the patient.  She will follow-up with her orthopedic doctor for further evaluation of this.  She will be given a small prescription for Norco and she will follow-up with her orthopedic doctor as well as her primary doctor for ongoing management.      Final diagnoses:   Acute pain of left shoulder       ED Disposition  ED Disposition     ED  Disposition   Discharge    Condition   Stable    Comment   --             Anabelle Sellers MD  [address]  [place] [address] [phone]    Call in 1 day      Yair Anne MD  [address]  [place] [id number] [phone]    Call in 1 day           Medication List      New Prescriptions    HYDROcodone-acetaminophen 7.5-325 MG per tablet  Commonly known as: NORCO  Take 1 tablet by mouth Every 6 (Six) Hours As Needed for Moderate Pain.           Where to Get Your Medications      These medications were sent to [id number] - [place], [place] - [address] [facility] [place] [facility] [address] [place] - [phone]  - [phone] [address] [address] [place] [address]    Phone: [phone]   · HYDROcodone-acetaminophen 7.5-325 MG per tablet          Yonathan Hernandez MD  03/05/23 1223

## 2023-03-05 NOTE — DISCHARGE INSTRUCTIONS
Follow-up with your primary doctor as well as your orthopedic doctor.  Return to the emergency room for any new or worsening symptoms or if you have any other questions or concerns.  Take medication as prescribed.  Do not drive or drink alcohol while taking pain medication.

## 2023-03-08 LAB — QT INTERVAL: 425 MS

## 2023-04-03 DIAGNOSIS — R60.0 BILATERAL LOWER EXTREMITY EDEMA: ICD-10-CM

## 2023-04-03 RX ORDER — FUROSEMIDE 20 MG/1
TABLET ORAL
Qty: 180 TABLET | Refills: 2 | Status: SHIPPED | OUTPATIENT
Start: 2023-04-03

## 2023-04-17 ENCOUNTER — OFFICE VISIT (OUTPATIENT)
Dept: CARDIOLOGY | Facility: CLINIC | Age: 88
End: 2023-04-17
Payer: MEDICARE

## 2023-04-17 VITALS
BODY MASS INDEX: 41.99 KG/M2 | SYSTOLIC BLOOD PRESSURE: 162 MMHG | HEIGHT: 63 IN | HEART RATE: 60 BPM | WEIGHT: 237 LBS | OXYGEN SATURATION: 97 % | DIASTOLIC BLOOD PRESSURE: 84 MMHG

## 2023-04-17 DIAGNOSIS — I50.32 CHRONIC DIASTOLIC (CONGESTIVE) HEART FAILURE: Primary | ICD-10-CM

## 2023-04-17 DIAGNOSIS — G47.33 SLEEP APNEA, OBSTRUCTIVE: ICD-10-CM

## 2023-04-17 DIAGNOSIS — R60.0 BILATERAL LEG EDEMA: ICD-10-CM

## 2023-04-17 DIAGNOSIS — R06.09 DYSPNEA ON EXERTION: ICD-10-CM

## 2023-04-17 DIAGNOSIS — I10 HYPERTENSION, UNSPECIFIED TYPE: ICD-10-CM

## 2023-04-17 RX ORDER — AMLODIPINE BESYLATE 5 MG/1
5 TABLET ORAL DAILY
Qty: 90 TABLET | Refills: 1 | Status: SHIPPED | OUTPATIENT
Start: 2023-04-17

## 2023-04-17 RX ORDER — SPIRONOLACTONE 25 MG/1
25 TABLET ORAL DAILY
Qty: 30 TABLET | Refills: 2 | Status: SHIPPED | OUTPATIENT
Start: 2023-04-17

## 2023-04-17 NOTE — LETTER
April 17, 2023       No Recipients    Patient: Leandra Nuñez   YOB: 1935   Date of Visit: 4/17/2023       Dear Dr. Bearden Recipients:    Thank you for referring Leandra Nuñez to me for evaluation. Below are the relevant portions of my assessment and plan of care.    If you have questions, please do not hesitate to call me. I look forward to following Leandar along with you.         Sincerely,        Damien Ty MD        CC:   No Recipients    Damien Ty MD  04/17/23 1133  Signed  CC--exertional dyspnea, hypertension, chronic kidney disease, hyperlipidemia      Sub  87-year-old pleasant patient has chronic kidney disease and has history of exertional dyspnea and hypertension comes in for follow-up.  She has chronic ankle edema.  She denies any chest pain or syncope.  She had partial nephrectomy.  She also has sleep apnea.  History of chronic vertigo and hearing problems in the past  Patient recently had atypical chest pain syndrome and was evaluated and underwent CT pulm embolus which revealed      IMPRESSION:  Impression:     1. No acute cardiopulmonary abnormality. No evidence of pulmonary embolism.  2. Cardiomegaly and mild coronary artery calcification.  3. Dependent bibasilar atelectasis.  4. Small hiatal hernia.       Had COVID in 2/23        Past Medical History:   Diagnosis Date   • Anxiety    • Arthritis    • Breast nodule 2013    Left breast nodule (fibrocystic disease , no malignancy)    • Cancer (CMS/HCC)    • Cataract    • Chronic kidney disease    • HL (hearing loss)    • Hyperlipidemia    • Hypertension    • Obesity    • Shortness of breath      Past Surgical History:   Procedure Laterality Date   • BREAST BIOPSY Left 05/21/2013    Benign fibrocystic disease ,Abstracted from [facility].   • CARDIAC CATHETERIZATION     • D&C AND LAPAROSCOPY     • FULGURATION ENDOMETRIOSIS      surgery   • NEPHRECTOMY Right            Physical Exam    General:      well developed, well  nourished, in no acute distress.    Head:      normocephalic and atraumatic.    Eyes:      PERRL/EOM intact, conjunctivae and sclerae clear without nystagmus.    Neck:      no  thyromegaly, trachea central with normal respiratory effort  Lungs:      clear bilaterally to auscultation.    Heart:       regular rate and rhythm, S1, S2 without murmurs, rubs, or gallops  Skin:      intact without lesions or rashes.    Psych:      alert and cooperative; normal mood and affect; normal attention span and concentration.            Assessment    Recent potassium of 3.7 and creatinine 1.0.  BNP is normal  Essential hypertension controlled on home monitoring recommended  Hyperlipidemia followed by primary care physician  Lower extremity edema on Lasix  Chronic kidney disease stable  Hypertensive heart disease  Intolerance to CPAP with untreated sleep apnea  Medications reviewed and follow-up appointments made  Leg edema--reduce norvasc to 5 mg a day  Stop KCL  Add aldactone   Check BMP    Electronically signed by Damien Ty MD, 04/17/23, 11:30 AM EDT.

## 2023-04-17 NOTE — PROGRESS NOTES
CC--exertional dyspnea, hypertension, chronic kidney disease, hyperlipidemia      Sub  87-year-old pleasant patient has chronic kidney disease and has history of exertional dyspnea and hypertension comes in for follow-up.  She has chronic ankle edema.  She denies any chest pain or syncope.  She had partial nephrectomy.  She also has sleep apnea.  History of chronic vertigo and hearing problems in the past  Patient recently had atypical chest pain syndrome and was evaluated and underwent CT pulm embolus which revealed      IMPRESSION:  Impression:     1. No acute cardiopulmonary abnormality. No evidence of pulmonary embolism.  2. Cardiomegaly and mild coronary artery calcification.  3. Dependent bibasilar atelectasis.  4. Small hiatal hernia.       Had COVID in 2/23        Past Medical History:   Diagnosis Date   • Anxiety    • Arthritis    • Breast nodule 2013    Left breast nodule (fibrocystic disease , no malignancy)    • Cancer (CMS/HCC)    • Cataract    • Chronic kidney disease    • HL (hearing loss)    • Hyperlipidemia    • Hypertension    • Obesity    • Shortness of breath      Past Surgical History:   Procedure Laterality Date   • BREAST BIOPSY Left 05/21/2013    Benign fibrocystic disease ,Abstracted from [facility].   • CARDIAC CATHETERIZATION     • D&C AND LAPAROSCOPY     • FULGURATION ENDOMETRIOSIS      surgery   • NEPHRECTOMY Right            Physical Exam    General:      well developed, well nourished, in no acute distress.    Head:      normocephalic and atraumatic.    Eyes:      PERRL/EOM intact, conjunctivae and sclerae clear without nystagmus.    Neck:      no  thyromegaly, trachea central with normal respiratory effort  Lungs:      clear bilaterally to auscultation.    Heart:       regular rate and rhythm, S1, S2 without murmurs, rubs, or gallops  Skin:      intact without lesions or rashes.    Psych:      alert and cooperative; normal mood and affect; normal attention span and concentration.             Assessment    Recent potassium of 3.7 and creatinine 1.0.  BNP is normal  Essential hypertension controlled on home monitoring recommended  Hyperlipidemia followed by primary care physician  Lower extremity edema on Lasix  Chronic kidney disease stable  Hypertensive heart disease  Intolerance to CPAP with untreated sleep apnea  Medications reviewed and follow-up appointments made  Leg edema--reduce norvasc to 5 mg a day  Stop KCL  Add aldactone   Check BMP    Electronically signed by Damien Ty MD, 04/17/23, 11:30 AM EDT.

## 2023-05-16 RX ORDER — SPIRONOLACTONE 25 MG/1
25 TABLET ORAL DAILY
Qty: 90 TABLET | Refills: 0 | Status: SHIPPED | OUTPATIENT
Start: 2023-05-16

## 2023-05-31 RX ORDER — METHOCARBAMOL 500 MG/1
TABLET, FILM COATED ORAL
Qty: 90 TABLET | Refills: 0 | Status: SHIPPED | OUTPATIENT
Start: 2023-05-31

## 2023-05-31 RX ORDER — SPIRONOLACTONE 25 MG/1
25 TABLET ORAL DAILY
Qty: 90 TABLET | Refills: 0 | Status: SHIPPED | OUTPATIENT
Start: 2023-05-31

## 2023-05-31 RX ORDER — CLONIDINE HYDROCHLORIDE 0.1 MG/1
TABLET ORAL
Qty: 60 TABLET | Refills: 11 | OUTPATIENT
Start: 2023-05-31

## 2023-05-31 RX ORDER — AMLODIPINE BESYLATE 5 MG/1
5 TABLET ORAL DAILY
Qty: 90 TABLET | Refills: 0 | Status: SHIPPED | OUTPATIENT
Start: 2023-05-31

## 2023-06-05 ENCOUNTER — LAB (OUTPATIENT)
Dept: LAB | Facility: HOSPITAL | Age: 88
End: 2023-06-05
Payer: MEDICARE

## 2023-06-05 DIAGNOSIS — I50.32 CHRONIC DIASTOLIC (CONGESTIVE) HEART FAILURE: ICD-10-CM

## 2023-06-05 LAB
ANION GAP SERPL CALCULATED.3IONS-SCNC: 14 MMOL/L (ref 5–15)
BUN SERPL-MCNC: 30 MG/DL (ref 8–23)
BUN/CREAT SERPL: 20.1 (ref 7–25)
CALCIUM SPEC-SCNC: 9.5 MG/DL (ref 8.6–10.5)
CHLORIDE SERPL-SCNC: 103 MMOL/L (ref 98–107)
CO2 SERPL-SCNC: 19 MMOL/L (ref 22–29)
CREAT SERPL-MCNC: 1.49 MG/DL (ref 0.57–1)
EGFRCR SERPLBLD CKD-EPI 2021: 34.1 ML/MIN/1.73
GLUCOSE SERPL-MCNC: 103 MG/DL (ref 65–99)
POTASSIUM SERPL-SCNC: 4.4 MMOL/L (ref 3.5–5.2)
SODIUM SERPL-SCNC: 136 MMOL/L (ref 136–145)

## 2023-06-05 PROCEDURE — 36415 COLL VENOUS BLD VENIPUNCTURE: CPT

## 2023-06-05 PROCEDURE — 80048 BASIC METABOLIC PNL TOTAL CA: CPT

## 2023-06-06 ENCOUNTER — TELEPHONE (OUTPATIENT)
Dept: CARDIOLOGY | Facility: CLINIC | Age: 88
End: 2023-06-06
Payer: MEDICARE

## 2023-06-06 DIAGNOSIS — I10 HYPERTENSION, UNSPECIFIED TYPE: Primary | ICD-10-CM

## 2023-06-06 DIAGNOSIS — R79.89 ELEVATED SERUM CREATININE: ICD-10-CM

## 2023-06-06 NOTE — TELEPHONE ENCOUNTER
Per Dr Ty- cut Aldactone to 12.5mg daily and recheck a BMP in 1 Week due to elevated Creatnine levels. Patient informed, voiced understanding. Order for labs entered

## 2023-06-13 ENCOUNTER — LAB (OUTPATIENT)
Dept: LAB | Facility: HOSPITAL | Age: 88
End: 2023-06-13
Payer: MEDICARE

## 2023-06-13 DIAGNOSIS — R79.89 ELEVATED SERUM CREATININE: ICD-10-CM

## 2023-06-13 DIAGNOSIS — I10 HYPERTENSION, UNSPECIFIED TYPE: ICD-10-CM

## 2023-06-13 LAB
ANION GAP SERPL CALCULATED.3IONS-SCNC: 13 MMOL/L (ref 5–15)
BUN SERPL-MCNC: 29 MG/DL (ref 8–23)
BUN/CREAT SERPL: 22.3 (ref 7–25)
CALCIUM SPEC-SCNC: 10.6 MG/DL (ref 8.6–10.5)
CHLORIDE SERPL-SCNC: 103 MMOL/L (ref 98–107)
CO2 SERPL-SCNC: 19 MMOL/L (ref 22–29)
CREAT SERPL-MCNC: 1.3 MG/DL (ref 0.57–1)
EGFRCR SERPLBLD CKD-EPI 2021: 40.1 ML/MIN/1.73
GLUCOSE SERPL-MCNC: 109 MG/DL (ref 65–99)
POTASSIUM SERPL-SCNC: 4.7 MMOL/L (ref 3.5–5.2)
SODIUM SERPL-SCNC: 135 MMOL/L (ref 136–145)

## 2023-06-13 PROCEDURE — 80048 BASIC METABOLIC PNL TOTAL CA: CPT

## 2023-06-13 PROCEDURE — 36415 COLL VENOUS BLD VENIPUNCTURE: CPT

## 2023-08-01 ENCOUNTER — TELEPHONE (OUTPATIENT)
Dept: FAMILY MEDICINE CLINIC | Facility: CLINIC | Age: 88
End: 2023-08-01
Payer: MEDICARE

## 2023-08-01 DIAGNOSIS — G47.33 OBSTRUCTIVE SLEEP APNEA SYNDROME: Primary | ICD-10-CM

## 2023-08-03 ENCOUNTER — OFFICE VISIT (OUTPATIENT)
Dept: CARDIOLOGY | Facility: CLINIC | Age: 88
End: 2023-08-03
Payer: MEDICARE

## 2023-08-03 VITALS
HEIGHT: 63 IN | BODY MASS INDEX: 40.93 KG/M2 | DIASTOLIC BLOOD PRESSURE: 78 MMHG | HEART RATE: 54 BPM | WEIGHT: 231 LBS | SYSTOLIC BLOOD PRESSURE: 128 MMHG | OXYGEN SATURATION: 97 %

## 2023-08-03 DIAGNOSIS — G47.33 SLEEP APNEA, OBSTRUCTIVE: ICD-10-CM

## 2023-08-03 DIAGNOSIS — R06.09 DYSPNEA ON EXERTION: ICD-10-CM

## 2023-08-03 DIAGNOSIS — R60.0 BILATERAL LEG EDEMA: ICD-10-CM

## 2023-08-03 DIAGNOSIS — I50.32 CHRONIC DIASTOLIC (CONGESTIVE) HEART FAILURE: Primary | ICD-10-CM

## 2023-08-03 PROCEDURE — 99214 OFFICE O/P EST MOD 30 MIN: CPT | Performed by: INTERNAL MEDICINE

## 2023-08-03 PROCEDURE — 1160F RVW MEDS BY RX/DR IN RCRD: CPT | Performed by: INTERNAL MEDICINE

## 2023-08-03 PROCEDURE — 93000 ELECTROCARDIOGRAM COMPLETE: CPT | Performed by: INTERNAL MEDICINE

## 2023-08-03 PROCEDURE — 1159F MED LIST DOCD IN RCRD: CPT | Performed by: INTERNAL MEDICINE

## 2023-08-17 NOTE — PROGRESS NOTES
"Chief Complaint  NEW PATIENT (POSS HOMA)    Subjective          Leandra Nuñez presents to [facility] NEUROLOGY  History of Present Illness    Pt was diagnosed with homa several years ago   Tried 3 or 4 different mask,   She did not see that it helped any because of poor mask fit.   Has some nasal  congestion,   Did no try a nasal mask.   Exhausted and doesn't sleep very well   Can't stay awake per her daughter.   The patient c/o daytime sleepiness issues:  chronic fatigue.    The patient complains of snoring has dry mouth or sore mouth when he wakes up.  The patient complains of Leg symptoms:  NO .   The patient complains of problems with insomnia:  has restless sleep.  Pt feels she sleeps well when gets to sleep   Sleep schedule: Bedtime:1030P , gets out of bed at 8AM, sleep latency: 30 MINS, Gets about 7-8 hours of sleep.    EPWORTH SLEEPINESS SCALE  Sitting and reading 0 WatchingTV 0  Sitting, inactive, in a public place 0  As a passenger in a car for 1 hour w/o a break  1  Lying down to rest in the afternoon  2  Sitting and talking to someone  0  Sitting quietly after a lunch  0  In a car, while stopped for traffic or a light  0  Total 3      Review of Systems   Constitutional:  Positive for fatigue.   HENT:  Positive for tinnitus.    Psychiatric/Behavioral:  Positive for sleep disturbance.    All other systems reviewed and are negative.  There is no history of hypnagogic hallucinations, sleep paralysis or cataplexy.    Objective   Vital Signs:   /76   Pulse 60   Resp 18   Ht 160 cm (63\")   Wt 104 kg (229 lb)   BMI 40.57 kg/mý     Physical Exam  Vitals reviewed.   Constitutional:       Appearance: Normal appearance.   HENT:      Nose: Nose normal.   Cardiovascular:      Rate and Rhythm: Normal rate.   Pulmonary:      Effort: Pulmonary effort is normal. No respiratory distress.   Neurological:      General: No focal deficit present.      Mental Status: She is alert.   Psychiatric:    "      Mood and Affect: Mood normal.      Result Review :                 Assessment and Plan    Diagnoses and all orders for this visit:    1. HOMA (obstructive sleep apnea) (Primary)    Will obtain HST and treat with auto cpap if indicated     Follow Up   Return in about 1 year (around 8/22/2024).  Patient was given instructions and counseling regarding her condition or for health maintenance advice. Please see specific information pulled into the AVS if appropriate.       This document has been electronically signed by Joseph Seipel, MD on August 22, 2023 12:07 EDT

## 2023-08-22 ENCOUNTER — OFFICE VISIT (OUTPATIENT)
Dept: NEUROLOGY | Facility: CLINIC | Age: 88
End: 2023-08-22
Payer: MEDICARE

## 2023-08-22 VITALS
BODY MASS INDEX: 40.57 KG/M2 | DIASTOLIC BLOOD PRESSURE: 76 MMHG | WEIGHT: 229 LBS | SYSTOLIC BLOOD PRESSURE: 164 MMHG | HEART RATE: 60 BPM | RESPIRATION RATE: 18 BRPM | HEIGHT: 63 IN

## 2023-08-22 DIAGNOSIS — G47.33 OSA (OBSTRUCTIVE SLEEP APNEA): Primary | ICD-10-CM

## 2023-08-22 PROCEDURE — 99204 OFFICE O/P NEW MOD 45 MIN: CPT | Performed by: PSYCHIATRY & NEUROLOGY

## 2023-08-22 PROCEDURE — 1159F MED LIST DOCD IN RCRD: CPT | Performed by: PSYCHIATRY & NEUROLOGY

## 2023-08-22 PROCEDURE — 1160F RVW MEDS BY RX/DR IN RCRD: CPT | Performed by: PSYCHIATRY & NEUROLOGY

## 2023-08-28 ENCOUNTER — PATIENT ROUNDING (BHMG ONLY) (OUTPATIENT)
Dept: NEUROLOGY | Facility: CLINIC | Age: 88
End: 2023-08-28
Payer: MEDICARE

## 2023-09-13 ENCOUNTER — HOSPITAL ENCOUNTER (OUTPATIENT)
Dept: SLEEP MEDICINE | Facility: HOSPITAL | Age: 88
End: 2023-09-13
Payer: MEDICARE

## 2023-09-13 DIAGNOSIS — G47.33 OSA (OBSTRUCTIVE SLEEP APNEA): ICD-10-CM

## 2023-09-13 PROCEDURE — 95806 SLEEP STUDY UNATT&RESP EFFT: CPT

## 2023-09-20 ENCOUNTER — TELEPHONE (OUTPATIENT)
Dept: NEUROLOGY | Facility: CLINIC | Age: 88
End: 2023-09-20
Payer: MEDICARE

## 2023-09-20 DIAGNOSIS — G47.33 OSA (OBSTRUCTIVE SLEEP APNEA): Primary | ICD-10-CM

## 2023-11-12 ENCOUNTER — HOSPITAL ENCOUNTER (OUTPATIENT)
Facility: HOSPITAL | Age: 88
Setting detail: OBSERVATION
Discharge: HOME | End: 2023-11-14
Attending: EMERGENCY MEDICINE | Admitting: EMERGENCY MEDICINE
Payer: MEDICARE

## 2023-11-12 ENCOUNTER — APPOINTMENT (OUTPATIENT)
Dept: GENERAL RADIOLOGY | Facility: HOSPITAL | Age: 88
End: 2023-11-12
Payer: MEDICARE

## 2023-11-12 DIAGNOSIS — R07.9 CHEST PAIN, UNSPECIFIED TYPE: Primary | ICD-10-CM

## 2023-11-12 LAB
ANION GAP SERPL CALCULATED.3IONS-SCNC: 16 MMOL/L (ref 5–15)
BASOPHILS # BLD AUTO: 0.1 10*3/MM3 (ref 0–0.2)
BASOPHILS NFR BLD AUTO: 1.1 % (ref 0–1.5)
BUN SERPL-MCNC: 21 MG/DL (ref 8–23)
BUN/CREAT SERPL: 19.6 (ref 7–25)
CALCIUM SPEC-SCNC: 10.2 MG/DL (ref 8.6–10.5)
CHLORIDE SERPL-SCNC: 101 MMOL/L (ref 98–107)
CO2 SERPL-SCNC: 20 MMOL/L (ref 22–29)
CREAT SERPL-MCNC: 1.07 MG/DL (ref 0.57–1)
DEPRECATED RDW RBC AUTO: 47.3 FL (ref 37–54)
EGFRCR SERPLBLD CKD-EPI 2021: 50.4 ML/MIN/1.73
EOSINOPHIL # BLD AUTO: 0.9 10*3/MM3 (ref 0–0.4)
EOSINOPHIL NFR BLD AUTO: 6.7 % (ref 0.3–6.2)
ERYTHROCYTE [DISTWIDTH] IN BLOOD BY AUTOMATED COUNT: 14.2 % (ref 12.3–15.4)
GEN 5 2HR TROPONIN T REFLEX: 24 NG/L
GLUCOSE SERPL-MCNC: 137 MG/DL (ref 65–99)
HCT VFR BLD AUTO: 45.5 % (ref 34–46.6)
HGB BLD-MCNC: 15.2 G/DL (ref 12–15.9)
LYMPHOCYTES # BLD AUTO: 3 10*3/MM3 (ref 0.7–3.1)
LYMPHOCYTES NFR BLD AUTO: 22.4 % (ref 19.6–45.3)
MCH RBC QN AUTO: 29.9 PG (ref 26.6–33)
MCHC RBC AUTO-ENTMCNC: 33.3 G/DL (ref 31.5–35.7)
MCV RBC AUTO: 89.9 FL (ref 79–97)
MONOCYTES # BLD AUTO: 1 10*3/MM3 (ref 0.1–0.9)
MONOCYTES NFR BLD AUTO: 7.1 % (ref 5–12)
NEUTROPHILS NFR BLD AUTO: 62.7 % (ref 42.7–76)
NEUTROPHILS NFR BLD AUTO: 8.4 10*3/MM3 (ref 1.7–7)
NRBC BLD AUTO-RTO: 0 /100 WBC (ref 0–0.2)
PLATELET # BLD AUTO: 296 10*3/MM3 (ref 140–450)
PMV BLD AUTO: 8.7 FL (ref 6–12)
POTASSIUM SERPL-SCNC: 3.7 MMOL/L (ref 3.5–5.2)
RBC # BLD AUTO: 5.07 10*6/MM3 (ref 3.77–5.28)
SODIUM SERPL-SCNC: 137 MMOL/L (ref 136–145)
TROPONIN T DELTA: -1 NG/L
TROPONIN T SERPL HS-MCNC: 25 NG/L
WBC NRBC COR # BLD: 13.5 10*3/MM3 (ref 3.4–10.8)

## 2023-11-12 PROCEDURE — 99284 EMERGENCY DEPT VISIT MOD MDM: CPT

## 2023-11-12 PROCEDURE — G0378 HOSPITAL OBSERVATION PER HR: HCPCS

## 2023-11-12 PROCEDURE — 85025 COMPLETE CBC W/AUTO DIFF WBC: CPT | Performed by: EMERGENCY MEDICINE

## 2023-11-12 PROCEDURE — 96374 THER/PROPH/DIAG INJ IV PUSH: CPT

## 2023-11-12 PROCEDURE — 80048 BASIC METABOLIC PNL TOTAL CA: CPT | Performed by: EMERGENCY MEDICINE

## 2023-11-12 PROCEDURE — 84484 ASSAY OF TROPONIN QUANT: CPT | Performed by: EMERGENCY MEDICINE

## 2023-11-12 PROCEDURE — 93005 ELECTROCARDIOGRAM TRACING: CPT | Performed by: EMERGENCY MEDICINE

## 2023-11-12 PROCEDURE — 71045 X-RAY EXAM CHEST 1 VIEW: CPT

## 2023-11-12 PROCEDURE — 25010000002 MORPHINE PER 10 MG: Performed by: NURSE PRACTITIONER

## 2023-11-12 RX ORDER — SODIUM CHLORIDE 0.9 % (FLUSH) 0.9 %
10 SYRINGE (ML) INJECTION AS NEEDED
Status: DISCONTINUED | OUTPATIENT
Start: 2023-11-12 | End: 2023-11-14 | Stop reason: HOSPADM

## 2023-11-12 RX ORDER — HYDRALAZINE HYDROCHLORIDE 25 MG/1
100 TABLET, FILM COATED ORAL 2 TIMES DAILY
Status: DISCONTINUED | OUTPATIENT
Start: 2023-11-12 | End: 2023-11-14 | Stop reason: HOSPADM

## 2023-11-12 RX ORDER — SODIUM CHLORIDE 9 MG/ML
40 INJECTION, SOLUTION INTRAVENOUS AS NEEDED
Status: DISCONTINUED | OUTPATIENT
Start: 2023-11-12 | End: 2023-11-14 | Stop reason: HOSPADM

## 2023-11-12 RX ORDER — AMLODIPINE BESYLATE 5 MG/1
5 TABLET ORAL DAILY
Status: DISCONTINUED | OUTPATIENT
Start: 2023-11-13 | End: 2023-11-14 | Stop reason: HOSPADM

## 2023-11-12 RX ORDER — NITROGLYCERIN 0.4 MG/1
0.4 TABLET SUBLINGUAL
Status: DISCONTINUED | OUTPATIENT
Start: 2023-11-12 | End: 2023-11-12 | Stop reason: SDUPTHER

## 2023-11-12 RX ORDER — AMOXICILLIN 250 MG
2 CAPSULE ORAL 2 TIMES DAILY
Status: DISCONTINUED | OUTPATIENT
Start: 2023-11-12 | End: 2023-11-14 | Stop reason: HOSPADM

## 2023-11-12 RX ORDER — BISACODYL 5 MG/1
5 TABLET, DELAYED RELEASE ORAL DAILY PRN
Status: DISCONTINUED | OUTPATIENT
Start: 2023-11-12 | End: 2023-11-14 | Stop reason: HOSPADM

## 2023-11-12 RX ORDER — CHOLECALCIFEROL (VITAMIN D3) 125 MCG
5 CAPSULE ORAL ONCE
Status: COMPLETED | OUTPATIENT
Start: 2023-11-13 | End: 2023-11-13

## 2023-11-12 RX ORDER — NITROGLYCERIN 0.4 MG/1
0.4 TABLET SUBLINGUAL
Status: DISCONTINUED | OUTPATIENT
Start: 2023-11-12 | End: 2023-11-14 | Stop reason: HOSPADM

## 2023-11-12 RX ORDER — POLYETHYLENE GLYCOL 3350 17 G/17G
17 POWDER, FOR SOLUTION ORAL DAILY PRN
Status: DISCONTINUED | OUTPATIENT
Start: 2023-11-12 | End: 2023-11-14 | Stop reason: HOSPADM

## 2023-11-12 RX ORDER — NALOXONE HCL 0.4 MG/ML
0.4 VIAL (ML) INJECTION
Status: DISCONTINUED | OUTPATIENT
Start: 2023-11-12 | End: 2023-11-14 | Stop reason: HOSPADM

## 2023-11-12 RX ORDER — SODIUM CHLORIDE 0.9 % (FLUSH) 0.9 %
10 SYRINGE (ML) INJECTION EVERY 12 HOURS SCHEDULED
Status: DISCONTINUED | OUTPATIENT
Start: 2023-11-12 | End: 2023-11-14 | Stop reason: HOSPADM

## 2023-11-12 RX ORDER — BISACODYL 10 MG
10 SUPPOSITORY, RECTAL RECTAL DAILY PRN
Status: DISCONTINUED | OUTPATIENT
Start: 2023-11-12 | End: 2023-11-14 | Stop reason: HOSPADM

## 2023-11-12 RX ORDER — HYDROCODONE BITARTRATE AND ACETAMINOPHEN 7.5; 325 MG/1; MG/1
1 TABLET ORAL ONCE
Status: COMPLETED | OUTPATIENT
Start: 2023-11-12 | End: 2023-11-12

## 2023-11-12 RX ORDER — MORPHINE SULFATE 2 MG/ML
1 INJECTION, SOLUTION INTRAMUSCULAR; INTRAVENOUS EVERY 4 HOURS PRN
Status: DISCONTINUED | OUTPATIENT
Start: 2023-11-12 | End: 2023-11-14 | Stop reason: HOSPADM

## 2023-11-12 RX ADMIN — HYDROCODONE BITARTRATE AND ACETAMINOPHEN 1 TABLET: 7.5; 325 TABLET ORAL at 20:30

## 2023-11-12 RX ADMIN — MORPHINE SULFATE 1 MG: 2 INJECTION, SOLUTION INTRAMUSCULAR; INTRAVENOUS at 21:44

## 2023-11-12 RX ADMIN — HYDRALAZINE HYDROCHLORIDE 100 MG: 25 TABLET, FILM COATED ORAL at 21:30

## 2023-11-12 RX ADMIN — Medication 10 ML: at 21:30

## 2023-11-12 NOTE — ED PROVIDER NOTES
Subjective   History of Present Illness  Patient is an 88-year female complaining of right upper chest pain and right shoulder pain that developed over the past 12 hours.  She states the pain is not worse in certain motions or positions.  Denies cough fever shortness of breath or other associated complaints.  Pain is moderate and continuous at this time.      Review of Systems   Constitutional:  Negative for chills, fatigue and fever.   HENT:  Negative for congestion, tinnitus and trouble swallowing.    Eyes:  Negative for photophobia, discharge and redness.   Respiratory:  Negative for cough and shortness of breath.    Cardiovascular:  Positive for chest pain. Negative for palpitations.   Gastrointestinal:  Negative for abdominal pain, diarrhea, nausea and vomiting.   Genitourinary:  Negative for dysuria, frequency and urgency.   Musculoskeletal:  Negative for back pain, joint swelling and myalgias.        Shoulder pain   Skin:  Negative for rash.   Neurological:  Negative for dizziness and headaches.   Psychiatric/Behavioral:  Negative for confusion.    All other systems reviewed and are negative.      Past Medical History:   Diagnosis Date    Anxiety     Arthritis     Breast nodule 2013    Left breast nodule (fibrocystic disease , no malignancy)     Cancer     Cataract     Chronic kidney disease     HL (hearing loss)     Hyperlipidemia     Hypertension     Obesity     Shortness of breath        Allergies   Allergen Reactions    Statins Myalgia and Other (See Comments)       Past Surgical History:   Procedure Laterality Date    BREAST BIOPSY Left 05/21/2013    Benign fibrocystic disease ,Abstracted from [facility].    CARDIAC CATHETERIZATION      D & C AND LAPAROSCOPY      FULGURATION ENDOMETRIOSIS      surgery    NEPHRECTOMY Right     ORIF HUMERUS FRACTURE Left 3/24/2021    Procedure: HUMERUS PROXIMAL OPEN REDUCTION INTERNAL FIXATION;  Surgeon: Yair Anne MD;  Location: [facility];  Service: Orthopedics;   Laterality: Left;       No family history on file.    Social History     Socioeconomic History    Marital status:    Tobacco Use    Smoking status: Never    Smokeless tobacco: Never   Vaping Use    Vaping Use: Never used   Substance and Sexual Activity    Alcohol use: No    Drug use: No    Sexual activity: Yes     Partners: Male           Objective   Physical Exam  Neck has no adenopathy JVD or bruits.  Lungs are clear.  Heart has a regular rate rhythm without murmur rub or gallop.  Chest is nontender to palpation.  Abdomen soft nontender.  Extremity exam shows no point tenderness.  She does have full range of motion of both arms without discomfort.  She has 2+ pulses and is neurovascular intact.  Procedures     EKG interpretation was normal sinus rhythm at a rate of 90 with no acute ST change      ED Course  ED Course as of 11/12/23 1722   Sun Nov 12, 2023   1538 Assumed care from Dr. Charlton for second troponin and disposition [KW]   1626 Second troponin being drawn at this time [KW]   1716 Troponin was found to be 24 which had a delta of -1 the patient will be placed in ED observation unit overnight for further evaluation is agreeable to this plan of care [KW]      ED Course User Index  [KW] Gini Nash, APRN           Results for orders placed or performed during the hospital encounter of 11/12/23   Basic Metabolic Panel    Specimen: Blood   Result Value Ref Range    Glucose 137 (H) 65 - 99 mg/dL    BUN 21 8 - 23 mg/dL    Creatinine 1.07 (H) 0.57 - 1.00 mg/dL    Sodium 137 136 - 145 mmol/L    Potassium 3.7 3.5 - 5.2 mmol/L    Chloride 101 98 - 107 mmol/L    CO2 20.0 (L) 22.0 - 29.0 mmol/L    Calcium 10.2 8.6 - 10.5 mg/dL    BUN/Creatinine Ratio 19.6 7.0 - 25.0    Anion Gap 16.0 (H) 5.0 - 15.0 mmol/L    eGFR 50.4 (L) >60.0 mL/min/1.73   High Sensitivity Troponin T    Specimen: Blood   Result Value Ref Range    HS Troponin T 25 (H) <14 ng/L   CBC Auto Differential    Specimen: Blood   Result Value  Ref Range    WBC 13.50 (H) 3.40 - 10.80 10*3/mm3    RBC 5.07 3.77 - 5.28 10*6/mm3    Hemoglobin 15.2 12.0 - 15.9 g/dL    Hematocrit 45.5 34.0 - 46.6 %    MCV 89.9 79.0 - 97.0 fL    MCH 29.9 26.6 - 33.0 pg    MCHC 33.3 31.5 - 35.7 g/dL    RDW 14.2 12.3 - 15.4 %    RDW-SD 47.3 37.0 - 54.0 fl    MPV 8.7 6.0 - 12.0 fL    Platelets 296 140 - 450 10*3/mm3    Neutrophil % 62.7 42.7 - 76.0 %    Lymphocyte % 22.4 19.6 - 45.3 %    Monocyte % 7.1 5.0 - 12.0 %    Eosinophil % 6.7 (H) 0.3 - 6.2 %    Basophil % 1.1 0.0 - 1.5 %    Neutrophils, Absolute 8.40 (H) 1.70 - 7.00 10*3/mm3    Lymphocytes, Absolute 3.00 0.70 - 3.10 10*3/mm3    Monocytes, Absolute 1.00 (H) 0.10 - 0.90 10*3/mm3    Eosinophils, Absolute 0.90 (H) 0.00 - 0.40 10*3/mm3    Basophils, Absolute 0.10 0.00 - 0.20 10*3/mm3    nRBC 0.0 0.0 - 0.2 /100 WBC   High Sensitivity Troponin T 2Hr    Specimen: Blood   Result Value Ref Range    HS Troponin T 24 (H) <14 ng/L    Troponin T Delta -1 >=-4 - <+4 ng/L   ECG 12 Lead Chest Pain   Result Value Ref Range    QT Interval 426 ms    QTC Interval 498 ms     XR Chest 1 View    Result Date: 11/12/2023  Impression: Ill-defined perihilar opacities are present, favoring mild edema pattern. There is no significant pleural effusion or distinct pneumothorax. Unchanged cardiac enlargement. Electronically Signed: López Toledo MD  11/12/2023 2:14 PM EST  Workstation ID: [id number]                                     Medical Decision Making  My chest x-ray interpretation shows no cardiomegaly effusion or infiltrate.  EKG shows no acute change however the initial troponin is mildly elevated.  Repeat troponin is pending at this time.  Metabolic panel shows no renal insufficiency or electrolyte Alomide.  There is no evidence acute infectious process both on chest x-ray.  Patient does have mild leukocytosis without left shift.  Patient's care will be turned over to Selene Nash NP.  Patient's disposition will be determined at that  time.    Problems Addressed:  Chest pain, unspecified type: complicated acute illness or injury    Amount and/or Complexity of Data Reviewed  Labs: ordered. Decision-making details documented in ED Course.  Radiology: ordered and independent interpretation performed.  ECG/medicine tests: ordered and independent interpretation performed.    Risk  Prescription drug management.  Decision regarding hospitalization.        Final diagnoses:   Chest pain, unspecified type       ED Disposition  ED Disposition       ED Disposition   Decision to Admit    Condition   --    Comment   --               No follow-up provider specified.       Medication List      No changes were made to your prescriptions during this visit.            Gini Nash, APRN  11/12/23 [id number]

## 2023-11-13 ENCOUNTER — APPOINTMENT (OUTPATIENT)
Dept: NUCLEAR MEDICINE | Facility: HOSPITAL | Age: 88
End: 2023-11-13
Payer: MEDICARE

## 2023-11-13 LAB
ALBUMIN SERPL-MCNC: 3.8 G/DL (ref 3.5–5.2)
ALP SERPL-CCNC: 95 U/L (ref 39–117)
ALT SERPL W P-5'-P-CCNC: 12 U/L (ref 1–33)
ANION GAP SERPL CALCULATED.3IONS-SCNC: 14 MMOL/L (ref 5–15)
AST SERPL-CCNC: 22 U/L (ref 1–32)
BACTERIA UR QL AUTO: ABNORMAL /HPF
BH CV REST NUCLEAR ISOTOPE DOSE: 11 MCI
BH CV STRESS BP STAGE 1: NORMAL
BH CV STRESS BP STAGE 2: NORMAL
BH CV STRESS BP STAGE 3: NORMAL
BH CV STRESS BP STAGE 4: NORMAL
BH CV STRESS BP STAGE 5: NORMAL
BH CV STRESS COMMENTS STAGE 1: NORMAL
BH CV STRESS COMMENTS STAGE 2: NORMAL
BH CV STRESS DOSE REGADENOSON STAGE 1: 0.4
BH CV STRESS DURATION MIN STAGE 1: 0
BH CV STRESS DURATION MIN STAGE 2: 4
BH CV STRESS DURATION SEC STAGE 1: 10
BH CV STRESS DURATION SEC STAGE 2: 0
BH CV STRESS HR STAGE 1: 56
BH CV STRESS HR STAGE 2: 67
BH CV STRESS HR STAGE 3: 73
BH CV STRESS HR STAGE 4: 73
BH CV STRESS HR STAGE 5: 67
BH CV STRESS NUCLEAR ISOTOPE DOSE: 32 MCI
BH CV STRESS PROTOCOL 1: NORMAL
BH CV STRESS RECOVERY BP: NORMAL MMHG
BH CV STRESS RECOVERY HR: 67 BPM
BH CV STRESS STAGE 1: 1
BH CV STRESS STAGE 2: 2
BH CV STRESS STAGE 3: 3
BH CV STRESS STAGE 4: 4
BH CV STRESS STAGE 5: 5
BILIRUB CONJ SERPL-MCNC: <0.2 MG/DL (ref 0–0.3)
BILIRUB INDIRECT SERPL-MCNC: NORMAL MG/DL
BILIRUB SERPL-MCNC: 0.3 MG/DL (ref 0–1.2)
BILIRUB UR QL STRIP: NEGATIVE
BUN SERPL-MCNC: 23 MG/DL (ref 8–23)
BUN/CREAT SERPL: 18.5 (ref 7–25)
CALCIUM SPEC-SCNC: 9.7 MG/DL (ref 8.6–10.5)
CHLORIDE SERPL-SCNC: 102 MMOL/L (ref 98–107)
CHOLEST SERPL-MCNC: 244 MG/DL (ref 0–200)
CLARITY UR: CLEAR
CO2 SERPL-SCNC: 20 MMOL/L (ref 22–29)
COLOR UR: YELLOW
CREAT SERPL-MCNC: 1.24 MG/DL (ref 0.57–1)
DEPRECATED RDW RBC AUTO: 45.5 FL (ref 37–54)
EGFRCR SERPLBLD CKD-EPI 2021: 42.2 ML/MIN/1.73
ERYTHROCYTE [DISTWIDTH] IN BLOOD BY AUTOMATED COUNT: 14.3 % (ref 12.3–15.4)
GLUCOSE SERPL-MCNC: 135 MG/DL (ref 65–99)
GLUCOSE UR STRIP-MCNC: NEGATIVE MG/DL
HBA1C MFR BLD: 6.2 % (ref 4.8–5.6)
HCT VFR BLD AUTO: 41.1 % (ref 34–46.6)
HDLC SERPL-MCNC: 41 MG/DL (ref 40–60)
HGB BLD-MCNC: 13.5 G/DL (ref 12–15.9)
HGB UR QL STRIP.AUTO: NEGATIVE
HYALINE CASTS UR QL AUTO: ABNORMAL /LPF
KETONES UR QL STRIP: ABNORMAL
LDLC SERPL CALC-MCNC: 144 MG/DL (ref 0–100)
LDLC/HDLC SERPL: 3.39 {RATIO}
LEUKOCYTE ESTERASE UR QL STRIP.AUTO: ABNORMAL
LV EF NUC BP: 60 %
MAGNESIUM SERPL-MCNC: 2.1 MG/DL (ref 1.6–2.4)
MAXIMAL PREDICTED HEART RATE: 133 BPM
MCH RBC QN AUTO: 30.3 PG (ref 26.6–33)
MCHC RBC AUTO-ENTMCNC: 32.8 G/DL (ref 31.5–35.7)
MCV RBC AUTO: 92.1 FL (ref 79–97)
MUCOUS THREADS URNS QL MICRO: ABNORMAL /HPF
NITRITE UR QL STRIP: NEGATIVE
NT-PROBNP SERPL-MCNC: 144.8 PG/ML (ref 0–1800)
PERCENT MAX PREDICTED HR: 54.89 %
PH UR STRIP.AUTO: 6 [PH] (ref 5–8)
PLATELET # BLD AUTO: 294 10*3/MM3 (ref 140–450)
PMV BLD AUTO: 9.1 FL (ref 6–12)
POTASSIUM SERPL-SCNC: 3.7 MMOL/L (ref 3.5–5.2)
PROT SERPL-MCNC: 6.7 G/DL (ref 6–8.5)
PROT UR QL STRIP: ABNORMAL
QT INTERVAL: 426 MS
QTC INTERVAL: 498 MS
RBC # BLD AUTO: 4.46 10*6/MM3 (ref 3.77–5.28)
RBC # UR STRIP: ABNORMAL /HPF
REF LAB TEST METHOD: ABNORMAL
SODIUM SERPL-SCNC: 136 MMOL/L (ref 136–145)
SP GR UR STRIP: 1.03 (ref 1–1.03)
SQUAMOUS #/AREA URNS HPF: ABNORMAL /HPF
STRESS BASELINE BP: NORMAL MMHG
STRESS BASELINE HR: 56 BPM
STRESS PERCENT HR: 65 %
STRESS POST PEAK BP: NORMAL MMHG
STRESS POST PEAK HR: 73 BPM
STRESS TARGET HR: 113 BPM
T4 FREE SERPL-MCNC: 1.26 NG/DL (ref 0.93–1.7)
TRIGL SERPL-MCNC: 321 MG/DL (ref 0–150)
TROPONIN T SERPL HS-MCNC: 27 NG/L
TSH SERPL DL<=0.05 MIU/L-ACNC: 2.83 UIU/ML (ref 0.27–4.2)
UROBILINOGEN UR QL STRIP: ABNORMAL
VLDLC SERPL-MCNC: 59 MG/DL (ref 5–40)
WBC # UR STRIP: ABNORMAL /HPF
WBC NRBC COR # BLD: 11 10*3/MM3 (ref 3.4–10.8)

## 2023-11-13 PROCEDURE — 93018 CV STRESS TEST I&R ONLY: CPT | Performed by: INTERNAL MEDICINE

## 2023-11-13 PROCEDURE — 96376 TX/PRO/DX INJ SAME DRUG ADON: CPT

## 2023-11-13 PROCEDURE — 85027 COMPLETE CBC AUTOMATED: CPT | Performed by: NURSE PRACTITIONER

## 2023-11-13 PROCEDURE — 0 TECHNETIUM TETROFOSMIN KIT: Performed by: EMERGENCY MEDICINE

## 2023-11-13 PROCEDURE — 93017 CV STRESS TEST TRACING ONLY: CPT

## 2023-11-13 PROCEDURE — 80076 HEPATIC FUNCTION PANEL: CPT | Performed by: NURSE PRACTITIONER

## 2023-11-13 PROCEDURE — 25010000002 REGADENOSON 0.4 MG/5ML SOLUTION: Performed by: EMERGENCY MEDICINE

## 2023-11-13 PROCEDURE — G0378 HOSPITAL OBSERVATION PER HR: HCPCS

## 2023-11-13 PROCEDURE — A9502 TC99M TETROFOSMIN: HCPCS | Performed by: EMERGENCY MEDICINE

## 2023-11-13 PROCEDURE — 84443 ASSAY THYROID STIM HORMONE: CPT | Performed by: NURSE PRACTITIONER

## 2023-11-13 PROCEDURE — 83036 HEMOGLOBIN GLYCOSYLATED A1C: CPT | Performed by: NURSE PRACTITIONER

## 2023-11-13 PROCEDURE — 99204 OFFICE O/P NEW MOD 45 MIN: CPT | Performed by: INTERNAL MEDICINE

## 2023-11-13 PROCEDURE — 84484 ASSAY OF TROPONIN QUANT: CPT | Performed by: NURSE PRACTITIONER

## 2023-11-13 PROCEDURE — 78452 HT MUSCLE IMAGE SPECT MULT: CPT

## 2023-11-13 PROCEDURE — 78452 HT MUSCLE IMAGE SPECT MULT: CPT | Performed by: INTERNAL MEDICINE

## 2023-11-13 PROCEDURE — 83735 ASSAY OF MAGNESIUM: CPT | Performed by: NURSE PRACTITIONER

## 2023-11-13 PROCEDURE — 84439 ASSAY OF FREE THYROXINE: CPT | Performed by: NURSE PRACTITIONER

## 2023-11-13 PROCEDURE — 83880 ASSAY OF NATRIURETIC PEPTIDE: CPT | Performed by: NURSE PRACTITIONER

## 2023-11-13 PROCEDURE — 81001 URINALYSIS AUTO W/SCOPE: CPT | Performed by: NURSE PRACTITIONER

## 2023-11-13 PROCEDURE — 25010000002 MORPHINE PER 10 MG: Performed by: NURSE PRACTITIONER

## 2023-11-13 PROCEDURE — 80048 BASIC METABOLIC PNL TOTAL CA: CPT | Performed by: NURSE PRACTITIONER

## 2023-11-13 PROCEDURE — 80061 LIPID PANEL: CPT | Performed by: NURSE PRACTITIONER

## 2023-11-13 RX ORDER — HYDROCODONE BITARTRATE AND ACETAMINOPHEN 7.5; 325 MG/1; MG/1
1 TABLET ORAL EVERY 6 HOURS PRN
Status: DISCONTINUED | OUTPATIENT
Start: 2023-11-13 | End: 2023-11-14 | Stop reason: HOSPADM

## 2023-11-13 RX ORDER — SODIUM CHLORIDE 9 MG/ML
40 INJECTION, SOLUTION INTRAVENOUS AS NEEDED
Status: DISCONTINUED | OUTPATIENT
Start: 2023-11-13 | End: 2023-11-14 | Stop reason: HOSPADM

## 2023-11-13 RX ORDER — SODIUM CHLORIDE 0.9 % (FLUSH) 0.9 %
10 SYRINGE (ML) INJECTION EVERY 12 HOURS SCHEDULED
Status: DISCONTINUED | OUTPATIENT
Start: 2023-11-13 | End: 2023-11-14 | Stop reason: HOSPADM

## 2023-11-13 RX ORDER — REGADENOSON 0.08 MG/ML
0.4 INJECTION, SOLUTION INTRAVENOUS
Status: COMPLETED | OUTPATIENT
Start: 2023-11-13 | End: 2023-11-13

## 2023-11-13 RX ORDER — ACETAMINOPHEN 325 MG/1
650 TABLET ORAL EVERY 4 HOURS PRN
Status: DISCONTINUED | OUTPATIENT
Start: 2023-11-13 | End: 2023-11-14 | Stop reason: HOSPADM

## 2023-11-13 RX ORDER — ONDANSETRON 4 MG/1
4 TABLET, FILM COATED ORAL EVERY 6 HOURS PRN
Status: DISCONTINUED | OUTPATIENT
Start: 2023-11-13 | End: 2023-11-14 | Stop reason: HOSPADM

## 2023-11-13 RX ORDER — ONDANSETRON 2 MG/ML
4 INJECTION INTRAMUSCULAR; INTRAVENOUS EVERY 6 HOURS PRN
Status: DISCONTINUED | OUTPATIENT
Start: 2023-11-13 | End: 2023-11-14 | Stop reason: HOSPADM

## 2023-11-13 RX ORDER — SODIUM CHLORIDE 0.9 % (FLUSH) 0.9 %
10 SYRINGE (ML) INJECTION AS NEEDED
Status: DISCONTINUED | OUTPATIENT
Start: 2023-11-13 | End: 2023-11-14 | Stop reason: HOSPADM

## 2023-11-13 RX ORDER — KETOROLAC TROMETHAMINE 15 MG/ML
15 INJECTION, SOLUTION INTRAMUSCULAR; INTRAVENOUS ONCE AS NEEDED
Status: ACTIVE | OUTPATIENT
Start: 2023-11-13 | End: 2023-11-14

## 2023-11-13 RX ADMIN — MORPHINE SULFATE 1 MG: 2 INJECTION, SOLUTION INTRAMUSCULAR; INTRAVENOUS at 03:17

## 2023-11-13 RX ADMIN — Medication 5 MG: at 00:02

## 2023-11-13 RX ADMIN — Medication 10 ML: at 08:00

## 2023-11-13 RX ADMIN — TETROFOSMIN 1 DOSE: 1.38 INJECTION, POWDER, LYOPHILIZED, FOR SOLUTION INTRAVENOUS at 10:40

## 2023-11-13 RX ADMIN — HYDRALAZINE HYDROCHLORIDE 100 MG: 25 TABLET, FILM COATED ORAL at 20:51

## 2023-11-13 RX ADMIN — HYDRALAZINE HYDROCHLORIDE 100 MG: 25 TABLET, FILM COATED ORAL at 08:40

## 2023-11-13 RX ADMIN — TETROFOSMIN 1 DOSE: 1.38 INJECTION, POWDER, LYOPHILIZED, FOR SOLUTION INTRAVENOUS at 08:42

## 2023-11-13 RX ADMIN — HYDROCODONE BITARTRATE AND ACETAMINOPHEN 1 TABLET: 7.5; 325 TABLET ORAL at 08:40

## 2023-11-13 RX ADMIN — DOCUSATE SODIUM 50MG AND SENNOSIDES 8.6MG 2 TABLET: 8.6; 5 TABLET, FILM COATED ORAL at 20:51

## 2023-11-13 RX ADMIN — Medication 10 ML: at 20:51

## 2023-11-13 RX ADMIN — AMLODIPINE BESYLATE 5 MG: 5 TABLET ORAL at 08:40

## 2023-11-13 RX ADMIN — REGADENOSON 0.4 MG: 0.08 INJECTION, SOLUTION INTRAVENOUS at 10:40

## 2023-11-13 NOTE — CONSULTS
CARDIOLOGY CONSULT:    Leandra Nuñez  1935  female  [id number]      Referring Provider: Hospitalist  Reason for Consultation: Chest pain    Patient Care Team:  Anabelle Sellers MD as PCP - Jojo Baez PA as Physician Assistant (Physician Assistant)    Chief complaint chest pain    Subjective .     History of present illness:  Leandra Nuñez is a 88 y.o. female with history of hypertension hyperlipidemia chronic renal insufficiency and probably congestive heart failure diastolic dysfunction presented to the hospital complains of chest pain.  Patient's Chest pain is substernal discomfort radiating to the right shoulder.  No complains of any shortness of breath.  No palpitation dizziness syncope or swelling of the feet.  She has been taking her medicines regular.  In the ER patient had an EKG which showed normal sinus rhythm.  She has been admitted and ruled out for MI by EKG and enzymes.    Review of Systems   Constitutional: Negative for fever and malaise/fatigue.   HENT:  Negative for ear pain and nosebleeds.    Eyes:  Negative for blurred vision and double vision.   Cardiovascular:  Positive for chest pain. Negative for dyspnea on exertion and palpitations.   Respiratory:  Negative for cough and shortness of breath.    Skin:  Negative for rash.   Musculoskeletal:  Negative for joint pain.   Gastrointestinal:  Negative for abdominal pain, nausea and vomiting.   Neurological:  Negative for focal weakness and headaches.   Psychiatric/Behavioral:  Negative for depression. The patient is not nervous/anxious.    All other systems reviewed and are negative.      History  Past Medical History:   Diagnosis Date    Anxiety     Arthritis     Breast nodule 2013    Left breast nodule (fibrocystic disease , no malignancy)     Cancer     Cataract     Chronic kidney disease     HL (hearing loss)     Hyperlipidemia     Hypertension     Obesity     Shortness of breath        Past Surgical History:    Procedure Laterality Date    BREAST BIOPSY Left 05/21/2013    Benign fibrocystic disease ,[facility].    CARDIAC CATHETERIZATION      D & C AND LAPAROSCOPY      FULGURATION ENDOMETRIOSIS      surgery    NEPHRECTOMY Right     ORIF HUMERUS FRACTURE Left 3/24/2021    Procedure: HUMERUS PROXIMAL OPEN REDUCTION INTERNAL FIXATION;  Surgeon: Yair Anne MD;  Location: [facility] MAIN OR;  Service: Orthopedics;  Laterality: Left;       History reviewed. No pertinent family history.    Social History     Tobacco Use    Smoking status: Never    Smokeless tobacco: Never   Vaping Use    Vaping Use: Never used   Substance Use Topics    Alcohol use: No    Drug use: No        Medications Prior to Admission   Medication Sig Dispense Refill Last Dose    amLODIPine (NORVASC) 5 MG tablet Take 1 tablet by mouth Daily. (Patient taking differently: Take 2 tablets by mouth Daily.) 90 tablet 0 11/11/2023    hydrALAZINE (APRESOLINE) 100 MG tablet TAKE 1 TABLET TWICE A  tablet 3 11/11/2023       Allergies: Statins    Scheduled Meds:amLODIPine, 5 mg, Oral, Daily  hydrALAZINE, 100 mg, Oral, BID  senna-docusate sodium, 2 tablet, Oral, BID  sodium chloride, 10 mL, Intravenous, Q12H  sodium chloride, 10 mL, Intravenous, Q12H      Continuous Infusions:   PRN Meds:.  acetaminophen    senna-docusate sodium **AND** polyethylene glycol **AND** bisacodyl **AND** bisacodyl    HYDROcodone-acetaminophen    ketorolac    Morphine **AND** naloxone    nitroglycerin    ondansetron **OR** ondansetron    [COMPLETED] Insert Peripheral IV **AND** sodium chloride    sodium chloride    sodium chloride    sodium chloride    sodium chloride    Objective     VITAL SIGNS  Vitals:    11/13/23 0800 11/13/23 0815 11/13/23 0840 11/13/23 1112   BP:   (!) 184/86 144/74   BP Location:   Right arm Right arm   Patient Position:   Lying Sitting   Pulse: 58 67     Resp:    19   Temp:       TempSrc:       SpO2:       Weight:       Height:      "      Flowsheet Rows      Flowsheet Row First Filed Value   Admission Height 160 cm (63\") Documented at 11/12/2023 1336   Admission Weight 102 kg (225 lb) Documented at 11/12/2023 1336             TELEMETRY: Sinus rhythm with nonspecific ST segment normality    Physical Exam:  Constitutional:       Appearance: Well-developed.   Eyes:      General: No scleral icterus.     Conjunctiva/sclera: Conjunctivae normal.      Pupils: Pupils are equal, round, and reactive to light.   HENT:      Head: Normocephalic and atraumatic.   Neck:      Vascular: No carotid bruit or JVD.   Pulmonary:      Effort: Pulmonary effort is normal.      Breath sounds: Normal breath sounds. No wheezing. No rales.   Cardiovascular:      Normal rate. Regular rhythm.   Pulses:     Intact distal pulses.   Abdominal:      General: Bowel sounds are normal.      Palpations: Abdomen is soft.   Musculoskeletal: Normal range of motion.      Cervical back: Normal range of motion and neck supple. Skin:     General: Skin is warm and dry.      Findings: No rash.   Neurological:      Mental Status: Alert.      Comments: No focal deficits          Results Review:   I reviewed the patient's new clinical results.  Lab Results (last 24 hours)       Procedure Component Value Units Date/Time    Hemoglobin A1c [[id number]]  (Abnormal) Collected: 11/13/23 0431    Specimen: Blood Updated: 11/13/23 1115     Hemoglobin A1C 6.20 %     T4, Free [[id number]]  (Normal) Collected: 11/13/23 0431    Specimen: Blood Updated: 11/13/23 1104     Free T4 1.26 ng/dL     Narrative:      Results may be falsely increased if patient taking Biotin.      Lipid Panel [[id number]]  (Abnormal) Collected: 11/13/23 0431    Specimen: Blood Updated: 11/13/23 1059     Total Cholesterol 244 mg/dL      Triglycerides 321 mg/dL      HDL Cholesterol 41 mg/dL      LDL Cholesterol  144 mg/dL      VLDL Cholesterol 59 mg/dL      LDL/HDL Ratio 3.39    Narrative:      Cholesterol Reference Ranges  (U.S. " Department of Health and Human Services ATP III Classifications)    Desirable          <200 mg/dL  Borderline High    200-239 mg/dL  High Risk          >240 mg/dL      Triglyceride Reference Ranges  (U.S. Department of Health and Human Services ATP III Classifications)    Normal           <150 mg/dL  Borderline High  150-199 mg/dL  High             200-499 mg/dL  Very High        >500 mg/dL    HDL Reference Ranges  (U.S. Department of Health and Human Services ATP III Classifications)    Low     <40 mg/dl (major risk factor for CHD)  High    >60 mg/dl ('negative' risk factor for CHD)        LDL Reference Ranges  (U.S. Department of Health and Human Services ATP III Classifications)    Optimal          <100 mg/dL  Near Optimal     100-129 mg/dL  Borderline High  130-159 mg/dL  High             160-189 mg/dL  Very High        >189 mg/dL    High Sensitivity Troponin T [[id number]]  (Abnormal) Collected: 11/13/23 0431    Specimen: Blood Updated: 11/13/23 0836     HS Troponin T 27 ng/L     Narrative:      High Sensitive Troponin T Reference Range:  <14.0 ng/L- Negative Female for AMI  <22.0 ng/L- Negative Male for AMI  >=14 - Abnormal Female indicating possible myocardial injury.  >=22 - Abnormal Male indicating possible myocardial injury.   Clinicians would have to utilize clinical acumen, EKG, Troponin, and serial changes to determine if it is an Acute Myocardial Infarction or myocardial injury due to an underlying chronic condition.         TSH [[id number]]  (Normal) Collected: 11/13/23 0431    Specimen: Blood Updated: 11/13/23 0743     TSH 2.830 uIU/mL     BNP [[id number]]  (Normal) Collected: 11/13/23 0431    Specimen: Blood Updated: 11/13/23 0743     proBNP 144.8 pg/mL     Narrative:      This assay is used as an aid in the diagnosis of individuals suspected of having heart failure. It can be used as an aid in the diagnosis of acute decompensated heart failure (ADHF) in patients presenting with signs and symptoms of  ADHF to the emergency department (ED). In addition, NT-proBNP of <300 pg/mL indicates ADHF is not likely.    Age Range Result Interpretation  NT-proBNP Concentration (pg/mL:      <50             Positive            >450                   Gray                 300-450                    Negative             <300    50-75           Positive            >900                  Gray                300-900                  Negative            <300      >75             Positive            >1800                  Gray                300-1800                  Negative            <300    Hepatic Function Panel [[id number]] Collected: 11/13/23 0431    Specimen: Blood Updated: 11/13/23 0737     Total Protein 6.7 g/dL      Albumin 3.8 g/dL      ALT (SGPT) 12 U/L      AST (SGOT) 22 U/L      Alkaline Phosphatase 95 U/L      Total Bilirubin 0.3 mg/dL      Bilirubin, Direct <0.2 mg/dL      Bilirubin, Indirect --     Comment: Unable to calculate       Magnesium [[id number]]  (Normal) Collected: 11/13/23 0431    Specimen: Blood Updated: 11/13/23 0737     Magnesium 2.1 mg/dL     Basic Metabolic Panel [[id number]]  (Abnormal) Collected: 11/13/23 0431    Specimen: Blood Updated: 11/13/23 0613     Glucose 135 mg/dL      BUN 23 mg/dL      Creatinine 1.24 mg/dL      Sodium 136 mmol/L      Potassium 3.7 mmol/L      Chloride 102 mmol/L      CO2 20.0 mmol/L      Calcium 9.7 mg/dL      BUN/Creatinine Ratio 18.5     Anion Gap 14.0 mmol/L      eGFR 42.2 mL/min/1.73     Narrative:      GFR Normal >60  Chronic Kidney Disease <60  Kidney Failure <15    The GFR formula is only valid for adults with stable renal function between ages 18 and 70.    CBC (No Diff) [[id number]]  (Abnormal) Collected: 11/13/23 0431    Specimen: Blood Updated: 11/13/23 0541     WBC 11.00 10*3/mm3      RBC 4.46 10*6/mm3      Hemoglobin 13.5 g/dL      Hematocrit 41.1 %      MCV 92.1 fL      MCH 30.3 pg      MCHC 32.8 g/dL      RDW 14.3 %      RDW-SD 45.5 fl      MPV 9.1 fL       Platelets 294 10*3/mm3     High Sensitivity Troponin T 2Hr [[id number]]  (Abnormal) Collected: 11/12/23 1628    Specimen: Blood Updated: 11/12/23 1658     HS Troponin T 24 ng/L      Troponin T Delta -1 ng/L     Narrative:      High Sensitive Troponin T Reference Range:  <14.0 ng/L- Negative Female for AMI  <22.0 ng/L- Negative Male for AMI  >=14 - Abnormal Female indicating possible myocardial injury.  >=22 - Abnormal Male indicating possible myocardial injury.   Clinicians would have to utilize clinical acumen, EKG, Troponin, and serial changes to determine if it is an Acute Myocardial Infarction or myocardial injury due to an underlying chronic condition.         Basic Metabolic Panel [[id number]]  (Abnormal) Collected: 11/12/23 1403    Specimen: Blood Updated: 11/12/23 1431     Glucose 137 mg/dL      BUN 21 mg/dL      Creatinine 1.07 mg/dL      Sodium 137 mmol/L      Potassium 3.7 mmol/L      Comment: Slight hemolysis detected by analyzer. Result may be falsely elevated.        Chloride 101 mmol/L      CO2 20.0 mmol/L      Calcium 10.2 mg/dL      BUN/Creatinine Ratio 19.6     Anion Gap 16.0 mmol/L      eGFR 50.4 mL/min/1.73     Narrative:      GFR Normal >60  Chronic Kidney Disease <60  Kidney Failure <15    The GFR formula is only valid for adults with stable renal function between ages 18 and 70.    High Sensitivity Troponin T [[id number]]  (Abnormal) Collected: 11/12/23 1403    Specimen: Blood Updated: 11/12/23 1431     HS Troponin T 25 ng/L     Narrative:      High Sensitive Troponin T Reference Range:  <14.0 ng/L- Negative Female for AMI  <22.0 ng/L- Negative Male for AMI  >=14 - Abnormal Female indicating possible myocardial injury.  >=22 - Abnormal Male indicating possible myocardial injury.   Clinicians would have to utilize clinical acumen, EKG, Troponin, and serial changes to determine if it is an Acute Myocardial Infarction or myocardial injury due to an underlying chronic condition.         CBC &  Differential [[id number]]  (Abnormal) Collected: 11/12/23 1403    Specimen: Blood Updated: 11/12/23 1408    Narrative:      The following orders were created for panel order CBC & Differential.  Procedure                               Abnormality         Status                     ---------                               -----------         ------                     CBC Auto Differential[[id number]]        Abnormal            Final result                 Please view results for these tests on the individual orders.    CBC Auto Differential [[id number]]  (Abnormal) Collected: 11/12/23 1403    Specimen: Blood Updated: 11/12/23 1408     WBC 13.50 10*3/mm3      RBC 5.07 10*6/mm3      Hemoglobin 15.2 g/dL      Hematocrit 45.5 %      MCV 89.9 fL      MCH 29.9 pg      MCHC 33.3 g/dL      RDW 14.2 %      RDW-SD 47.3 fl      MPV 8.7 fL      Platelets 296 10*3/mm3      Neutrophil % 62.7 %      Lymphocyte % 22.4 %      Monocyte % 7.1 %      Eosinophil % 6.7 %      Basophil % 1.1 %      Neutrophils, Absolute 8.40 10*3/mm3      Lymphocytes, Absolute 3.00 10*3/mm3      Monocytes, Absolute 1.00 10*3/mm3      Eosinophils, Absolute 0.90 10*3/mm3      Basophils, Absolute 0.10 10*3/mm3      nRBC 0.0 /100 WBC             Imaging Results (Last 24 Hours)       Procedure Component Value Units Date/Time    XR Chest 1 View [[id number]] Collected: 11/12/23 1412     Updated: 11/12/23 1417    Narrative:      XR CHEST 1 VW    Date of Exam: 11/12/2023 2:00 PM EST    Indication: Chest pain    Comparison: 3/5/2023.    Findings:  Ill-defined perihilar opacities are present, favoring mild edema pattern. There is no significant pleural effusion or distinct pneumothorax. Unchanged cardiac enlargement.      Impression:      Impression:    Ill-defined perihilar opacities are present, favoring mild edema pattern. There is no significant pleural effusion or distinct pneumothorax. Unchanged cardiac enlargement.          Electronically Signed: López Toledo MD     11/12/2023 2:14 PM EST    Workstation ID: [id number]            EKG      I personally viewed and interpreted the patient's EKG/Telemetry data:    ECHOCARDIOGRAM:         STRESS MYOVIEW:       CARDIAC CATHETERIZATION:    No results found for this or any previous visit.       OTHER:         MDM      Angina  Patient presented with chest pain and is ruled out for MI by EKG and enzymes  Patient is having an echocardiogram and a stress Myoview study to rule out ischemia    Hypertension  Patient blood pressure currently stable on medications    Hyperlipidemia  Patient lipid levels are elevated but is not on statins and will require statins    Elevated hemoglobin A1c  Patient has no history of diabetes but hemoglobin A1c is high and hence will be followed by the primary care doctor.    Chronic renal insufficiency  Patient had nephrectomy in the past and is followed by nephrologist.    I discussed the patients findings and my recommendations with patient and nurse    Otis Purcell MD  11/13/23  13:43 EST

## 2023-11-13 NOTE — ED NOTES
Pt stated that she started having some discomfort after eating. On coming RN and provider notified.

## 2023-11-13 NOTE — PLAN OF CARE
Problem: Adult Inpatient Plan of Care  Goal: Plan of Care Review  Outcome: Ongoing, Progressing  Flowsheets (Taken 11/13/2023 0513)  Progress: no change  Plan of Care Reviewed With: patient  Outcome Evaluation: new admit  Goal: Patient-Specific Goal (Individualized)  Outcome: Ongoing, Progressing  Goal: Absence of Hospital-Acquired Illness or Injury  Outcome: Ongoing, Progressing  Intervention: Identify and Manage Fall Risk  Recent Flowsheet Documentation  Taken 11/13/2023 0420 by Vikki Rojas RN  Safety Promotion/Fall Prevention:   safety round/check completed   room organization consistent   nonskid shoes/slippers when out of bed   lighting adjusted   clutter free environment maintained   activity supervised   assistive device/personal items within reach  Taken 11/13/2023 0022 by Vikki Rojas, RN  Safety Promotion/Fall Prevention:   safety round/check completed   room organization consistent   nonskid shoes/slippers when out of bed   lighting adjusted   clutter free environment maintained   assistive device/personal items within reach   activity supervised  Taken 11/12/2023 2205 by Vikki Rojas, RN  Safety Promotion/Fall Prevention:   safety round/check completed   room organization consistent   nonskid shoes/slippers when out of bed   lighting adjusted   clutter free environment maintained   assistive device/personal items within reach   activity supervised  Taken 11/12/2023 2020 by Vikki Rojas, RN  Safety Promotion/Fall Prevention:   safety round/check completed   room organization consistent   nonskid shoes/slippers when out of bed   clutter free environment maintained   activity supervised   assistive device/personal items within reach   lighting adjusted  Intervention: Prevent Skin Injury  Recent Flowsheet Documentation  Taken 11/13/2023 0420 by Vikki Rojas, RN  Body Position: position changed independently  Taken 11/13/2023 0022 by Vikki Rojas, RN  Body  Position: position changed independently  Taken 11/12/2023 2020 by Vikki Rojas RN  Body Position: position changed independently  Intervention: Prevent and Manage VTE (Venous Thromboembolism) Risk  Recent Flowsheet Documentation  Taken 11/13/2023 0420 by Vikki Rojas RN  Activity Management: up to bedside commode  Taken 11/13/2023 0022 by Vikki Rojas RN  Activity Management: up to bedside commode  Taken 11/12/2023 2020 by Vikki Rojas RN  Activity Management: up to bedside commode  Intervention: Prevent Infection  Recent Flowsheet Documentation  Taken 11/13/2023 0420 by Vikki Rojas RN  Infection Prevention:   single patient room provided   rest/sleep promoted   personal protective equipment utilized   hand hygiene promoted  Taken 11/13/2023 0022 by Vikki Rojas RN  Infection Prevention:   single patient room provided   personal protective equipment utilized   rest/sleep promoted   hand hygiene promoted  Taken 11/12/2023 2205 by Vikki Rojas RN  Infection Prevention:   single patient room provided   rest/sleep promoted   personal protective equipment utilized   hand hygiene promoted  Taken 11/12/2023 2020 by Vikki Rojas RN  Infection Prevention:   single patient room provided   rest/sleep promoted   personal protective equipment utilized   hand hygiene promoted  Goal: Optimal Comfort and Wellbeing  Outcome: Ongoing, Progressing  Intervention: Provide Person-Centered Care  Recent Flowsheet Documentation  Taken 11/12/2023 2020 by Vikki Rojas RN  Trust Relationship/Rapport:   care explained   choices provided   questions answered   questions encouraged   reassurance provided   thoughts/feelings acknowledged  Goal: Readiness for Transition of Care  Outcome: Ongoing, Progressing  Intervention: Mutually Develop Transition Plan  Recent Flowsheet Documentation  Taken 11/12/2023 2021 by Vikki Rojas RN  Transportation Anticipated: family or  friend will provide  Patient/Family Anticipated Services at Transition: none  Patient/Family Anticipates Transition to: home  Taken 11/12/2023 2014 by Vikki Rojas RN  Equipment Currently Used at Home:   cane, straight   cpap   grab bar   bp cuff     Problem: Chest Pain  Goal: Resolution of Chest Pain Symptoms  Outcome: Ongoing, Progressing  Intervention: Manage Acute Chest Pain  Recent Flowsheet Documentation  Taken 11/12/2023 2020 by Vikki Rojas, RN  Chest Pain Intervention: (pain meds given)   cardiac monitoring continued   other (see comments)     Problem: Behavioral Health Comorbidity  Goal: Maintenance of Behavioral Health Symptom Control  Outcome: Ongoing, Progressing  Intervention: Maintain Behavioral Health Symptom Control  Recent Flowsheet Documentation  Taken 11/13/2023 0420 by Vikki Rojas RN  Medication Review/Management: medications reviewed  Taken 11/13/2023 0022 by Vikki Rojas RN  Medication Review/Management: medications reviewed  Taken 11/12/2023 2205 by Vikki Rojas RN  Medication Review/Management: medications reviewed  Taken 11/12/2023 2020 by Vikki Rojas RN  Medication Review/Management: medications reviewed     Problem: Hypertension Comorbidity  Goal: Blood Pressure in Desired Range  Outcome: Ongoing, Progressing  Intervention: Maintain Blood Pressure Management  Recent Flowsheet Documentation  Taken 11/13/2023 0420 by Vikki Rojas RN  Medication Review/Management: medications reviewed  Taken 11/13/2023 0022 by Vikki Rojas RN  Medication Review/Management: medications reviewed  Taken 11/12/2023 2205 by Vikki Rojas, RN  Medication Review/Management: medications reviewed  Taken 11/12/2023 2020 by Vikki Rojas RN  Medication Review/Management: medications reviewed   Goal Outcome Evaluation:  Plan of Care Reviewed With: patient        Progress: no change  Outcome Evaluation: new admit

## 2023-11-13 NOTE — SIGNIFICANT NOTE
11/13/23 1424   OTHER   Discipline physical therapist   Rehab Time/Intention   Session Not Performed other (see comments)  (Pt awaiting stress test/myoview to r/u ischemia as her Troponin is elevated.  PT to f/u 11/14.)   Therapy Assessment/Plan (PT)   Criteria for Skilled Interventions Met (PT) yes;meets criteria   Recommendation   PT - Next Appointment 11/14/23

## 2023-11-13 NOTE — CASE MANAGEMENT/SOCIAL WORK
Discharge Planning Assessment  [facility]     Patient Name: Leandra Nuñez  MRN: [id number]  Today's Date: 11/13/2023    Admit Date: 11/12/2023    Plan: From home. PT eval pending   Discharge Needs Assessment       Row Name 11/13/23 1539       Living Environment    People in Home alone    Current Living Arrangements home    Potentially Unsafe Housing Conditions none    Primary Care Provided by self    Provides Primary Care For no one    Family Caregiver if Needed child(regis), adult    Family Caregiver Names cherise Hutchison    Quality of Family Relationships helpful;involved;supportive    Able to Return to Prior Arrangements yes       Resource/Environmental Concerns    Resource/Environmental Concerns none    Transportation Concerns none       Transition Planning    Patient/Family Anticipates Transition to home    Patient/Family Anticipated Services at Transition none    Transportation Anticipated family or friend will provide;car, drives self       Discharge Needs Assessment    Readmission Within the Last 30 Days no previous admission in last 30 days    Equipment Currently Used at Home bp cuff;cane, straight;grab bar    Concerns to be Addressed denies needs/concerns at this time                   Discharge Plan       Row Name 11/13/23 7357       Plan    Plan From home. PT eval pending    Plan Comments CM met with patient at the bedside. Confirmed PCP, insurance, and pharmacy. Patient denies any difficulty affording medications. Patient is not current with any HHC/OPPT/OT services. Patient lives at home alone, is mostly IADLS, only needing help from daughter to get groceries. Patient drives, patient's daughter will provide transportation for patient at discharge. DC Barriers: PT eval pending, Cardiology following, myoview results pending.                   Demographic Summary       Row Name 11/13/23 1530       General Information    Admission Type observation    Arrived From emergency department    Referral Source  admission list    Reason for Consult care coordination/care conference;discharge planning    Preferred Language English       Contact Information    Permission Granted to Share Info With     Contact Information Obtained for                    Functional Status       Row Name 11/13/23 1538       Functional Status    Usual Activity Tolerance moderate    Current Activity Tolerance moderate       Functional Status, IADL    Medications independent    Meal Preparation independent    Housekeeping independent    Laundry independent    Shopping independent;assistive person    IADL Comments daughter Kianna takes patient grocery shopping                Vito Lisa RN     Cell number [phone]  Office number [phone]

## 2023-11-13 NOTE — H&P
[facility]    Patient Name: Leandra Nuñez  : 1935  MRN: [id number]  Primary Care Physician: Anabelle Sellers MD  Date of admission: 2023     Patient Care Team:  Anabelle Sellers MD as PCP - Jojo Baez PA as Physician Assistant (Physician Assistant)          Subjective   History Present Illness     Chief Complaint:   Chief Complaint   Patient presents with    Chest Pain     Chest Pain     Ms. Nuñez is a 88 y.o.  presents to [facility] complaining of chest pain       History of Present Illness    ED 23: Patient is an 88-year female complaining of right upper chest pain and right shoulder pain that developed over the past 12 hours.  She states the pain is not worse in certain motions or positions.  Denies cough fever shortness of breath or other associated complaints.  Pain is moderate and continuous at this time.     Observation 23: Patient is an 88 year old female presenting to the ED with right upper shoulder and chest pain. Patient states no injuries or falls.  Patient denies injury to right shoulder and states right-sided shoulder and chest pain started suddenly.  Patient states she has not had pain like this before.  Patient denies diaphoresis, nausea, vomiting, abdominal pain, or fevers.  Patient states she has bilateral edema that is chronic without erythema. Patient reports living at home alone alone and uses walker at times.    Review of Systems   Constitutional: Positive for malaise/fatigue.   HENT: Negative.     Eyes: Negative.    Cardiovascular:  Positive for leg swelling and palpitations.   Respiratory: Negative.     Endocrine: Negative.    Hematologic/Lymphatic: Bruises/bleeds easily.   Skin: Negative.    Musculoskeletal:  Positive for joint pain, neck pain and stiffness.   Gastrointestinal: Negative.    Genitourinary: Negative.    Neurological:  Positive for weakness.   Psychiatric/Behavioral:  Positive for memory  loss. The patient is nervous/anxious.    Allergic/Immunologic: Negative.            Per  Patient reports no injuries to right shoulder since she wearsonal History     Past Medical History:   Past Medical History:   Diagnosis Date    Anxiety     Arthritis     Breast nodule 2013    Left breast nodule (fibrocystic disease , no malignancy)     Cancer     Cataract     Chronic kidney disease     HL (hearing loss)     Hyperlipidemia     Hypertension     Obesity     Shortness of breath        Surgical History:      Past Surgical History:   Procedure Laterality Date    BREAST BIOPSY Left 05/21/2013    Benign fibrocystic [facility] ,[facility]    CARDIAC CATHETERIZATION      D & C AND LAPAROSCOPY      FULGURATION ENDOMETRIOSIS      surgery    NEPHRECTOMY Right     ORIF HUMERUS FRACTURE Left 3/24/2021    Procedure: HUMERUS PROXIMAL OPEN REDUCTION INTERNAL FIXATION;  Surgeon: Yair Anne MD;  Location: [facility] MAIN OR;  Service: Orthopedics;  Laterality: Left;           Family History: family history is not on file. Otherwise pertinent FHx was reviewed and unremarkable.     Social History:  reports that she has never smoked. She has never used smokeless tobacco. She reports that she does not drink alcohol and does not use drugs.      Medications:  Prior to Admission medications    Medication Sig Start Date End Date Taking? Authorizing Provider   amLODIPine (NORVASC) 5 MG tablet Take 1 tablet by mouth Daily.  Patient taking differently: Take 2 tablets by mouth Daily. 5/31/23  Yes Anabelle Sellers MD   hydrALAZINE (APRESOLINE) 100 MG tablet TAKE 1 TABLET TWICE A DAY 3/1/23  Yes Anabelle Sellers MD       Allergies:    Allergies   Allergen Reactions    Statins Myalgia and Other (See Comments)       Objective   Objective     Vital Signs  Temp:  [97.7 °F (36.5 °C)-98.4 °F (36.9 °C)] 97.7 °F (36.5 °C)  Heart Rate:  [47-84] 67  Resp:  [17-19] 19  BP: (143-184)/(61-88) 144/74  SpO2:  [92 %-98 %] 96 %   on   ;   Device (Oxygen Therapy): room air  Body mass index is 40.15 kg/m².    Physical Exam  Vitals and nursing note reviewed.   Constitutional:       Appearance: Normal appearance. She is obese.   HENT:      Head: Normocephalic and atraumatic.      Right Ear: External ear normal.      Left Ear: External ear normal.      Nose: Nose normal.      Mouth/Throat:      Pharynx: Oropharynx is clear.   Eyes:      Extraocular Movements: Extraocular movements intact.      Conjunctiva/sclera: Conjunctivae normal.      Pupils: Pupils are equal, round, and reactive to light.   Cardiovascular:      Rate and Rhythm: Normal rate. Rhythm irregular.      Pulses: Normal pulses.      Heart sounds: Normal heart sounds.   Pulmonary:      Effort: Pulmonary effort is normal.      Breath sounds: Normal breath sounds.   Abdominal:      General: Bowel sounds are normal.      Palpations: Abdomen is soft.   Musculoskeletal:      Cervical back: Normal range of motion.      Right lower leg: Edema present.      Left lower leg: Edema present.   Skin:     General: Skin is warm.      Capillary Refill: Capillary refill takes less than 2 seconds.   Neurological:      Mental Status: She is alert. She is confused.      Motor: Weakness present.      Gait: Gait abnormal.   Psychiatric:         Attention and Perception: Attention normal.         Mood and Affect: Affect is flat.         Speech: Speech is delayed.         Behavior: Behavior is withdrawn.         Thought Content: Thought content normal.         Cognition and Memory: Memory is impaired.           Results Review:  I have personally reviewed most recent cardiac tracings, lab results, microbiology results, and radiology images and interpretations and agree with findings, most notably: CBC, CMP, troponin, EKG, chest x-ray, view.    Results from last 7 days   Lab Units 11/13/23  0431   WBC 10*3/mm3 11.00*   HEMOGLOBIN g/dL 13.5   HEMATOCRIT % 41.1   PLATELETS 10*3/mm3 294     Results from last 7  days   Lab Units 11/13/23  0431 11/12/23  1628 11/12/23  1403   SODIUM mmol/L 136  --  137   POTASSIUM mmol/L 3.7  --  3.7   CHLORIDE mmol/L 102  --  101   CO2 mmol/L 20.0*  --  20.0*   BUN mg/dL 23  --  21   CREATININE mg/dL 1.24*  --  1.07*   GLUCOSE mg/dL 135*  --  137*   CALCIUM mg/dL 9.7  --  10.2   ALK PHOS U/L 95  --   --    ALT (SGPT) U/L 12  --   --    AST (SGOT) U/L 22  --   --    HSTROP T ng/L 27* 24* 25*   PROBNP pg/mL 144.8  --   --      Estimated Creatinine Clearance: 36.6 mL/min (A) (by C-G formula based on SCr of 1.24 mg/dL (H)).  Brief Urine Lab Results  (Last result in the past 365 days)        Color   Clarity   Blood   Leuk Est   Nitrite   Protein   CREAT   Urine HCG        02/14/23 1654 Yellow   Clear   Negative   Negative   Negative   Negative                   Microbiology Results (last 10 days)       ** No results found for the last 240 hours. **            ECG/EMG Results (most recent)       Procedure Component Value Units Date/Time    ECG 12 Lead Chest Pain [[id number]] Collected: 11/12/23 1350     Updated: 11/13/23 0621     QT Interval 426 ms      QTC Interval 498 ms     Narrative:      HEART RATE= 82  bpm  RR Interval= 733  ms  [place] Interval=   ms  P Horizontal Axis=   deg  P Front Axis=   deg  QRSD Interval= 116  ms  QT Interval= 426  ms  QTcB= 498  ms  QRS Axis= -17  deg  T Wave Axis= 36  deg  - ABNORMAL ECG -  Atrial fibrillation  Ventricular bigeminy  LVH with secondary repolarization abnormality  When compared with ECG of 05-Mar-2023 9:03:33,  Significant change in rhythm: previously sinus  Electronically Signed By: Alex Charlton ([facility]) 13-Nov-2023 06:21:39  Date and Time of Study: 2023-11-12 13:50:19    SCANNED - TELEMETRY   [[id number]] Resulted: 11/12/23     Updated: 11/13/23 1303    SCANNED - TELEMETRY   [[id number]] Resulted: 11/12/23     Updated: 11/13/23 1303                    XR Chest 1 View    Result Date: 11/12/2023  Impression: Ill-defined perihilar opacities are present,  favoring mild edema pattern. There is no significant pleural effusion or distinct pneumothorax. Unchanged cardiac enlargement. Electronically Signed: López Toledo MD  11/12/2023 2:14 PM EST  Workstation ID: [id number]       Estimated Creatinine Clearance: 36.6 mL/min (A) (by C-G formula based on SCr of 1.24 mg/dL (H)).    Assessment & Plan   Assessment/Plan       Active Hospital Problems    Diagnosis  POA    **Chest pain [R07.9]  Yes      Resolved Hospital Problems   No resolved problems to display.     Chest pain  Lab Results   Component Value Date    TROPONINT 27 (H) 11/13/2023    TROPONINT 24 (H) 11/12/2023    TROPONINT 25 (H) 11/12/2023   -  -Lipid panel: Total cholesterol 244, , triglycerides 321; tolerant to statin  -Xray right shoulder pending   -Chest X-ray: Mild edema with no sign of pleural effusion or pneumothorax, and cardiac enlargement  -EKG: Atrial fibrillation with ventricular bigeminy with a rate of 82 previously sinus rhythm; per chart follows with [facility]  -Stress Test and echocardiogram  -Telemetry  -Cardiology consulted    CKD (Stage II)  Lab Results   Component Value Date    CREATININE 1.24 (H) 11/13/2023    BUN 23 11/13/2023    BCR 18.5 11/13/2023    EGFR 42.2 (L) 11/13/2023   -History of partial nephrectomy  -Avoid nephrotoxic medication IV dye unless urgently needed  -Monitor BMP and I's and O's while admitted      Hypertension  -Moderately Controlled   BP Readings from Last 1 Encounters:   11/13/23 144/74     - Continue amlodipine and hydralazine  - Monitor while admitted    Acute confusion  -Urinalysis pending  -WBC 11.00  -Physical therapy consulted       VTE Prophylaxis -   Mechanical Order History:        Ordered        11/13/23 0719  Place Sequential Compression Device  Once            11/13/23 0719  Maintain Sequential Compression Device  Continuous                          Pharmalogical Order History:       None            CODE STATUS:    Code Status and  Medical Interventions:   Ordered at: 11/13/23 0719     Level Of Support Discussed With:    Patient     Code Status (Patient has no pulse and is not breathing):    CPR (Attempt to Resuscitate)     Medical Interventions (Patient has pulse or is breathing):    Full Support       This patient has been examined wearing personal protective equipment.     I discussed the patient's findings and my recommendations with patient, family, nursing staff, primary care team, and consulting provider.      Signature:Electronically signed by JESSIE Farias, 11/13/23, 2:11 PM EST.      I spent 45 minutes caring for Leandra on this date of service. This time includes time spent by me in the following activities: reviewing tests, obtaining and/or reviewing a separately obtained history, performing a medically appropriate examination and/or evaluation, counseling and educating the patient/family/caregiver, ordering medications, tests, or procedures, referring and communicating with other health care professionals, documenting information in the medical record, independently interpreting results and communicating that information with the patient/family/caregiver, and care coordination.

## 2023-11-14 ENCOUNTER — READMISSION MANAGEMENT (OUTPATIENT)
Dept: CALL CENTER | Facility: HOSPITAL | Age: 88
End: 2023-11-14
Payer: MEDICARE

## 2023-11-14 ENCOUNTER — APPOINTMENT (OUTPATIENT)
Dept: CARDIOLOGY | Facility: HOSPITAL | Age: 88
End: 2023-11-14
Payer: MEDICARE

## 2023-11-14 ENCOUNTER — APPOINTMENT (OUTPATIENT)
Dept: GENERAL RADIOLOGY | Facility: HOSPITAL | Age: 88
End: 2023-11-14
Payer: MEDICARE

## 2023-11-14 VITALS
HEART RATE: 55 BPM | HEIGHT: 63 IN | TEMPERATURE: 98.8 F | SYSTOLIC BLOOD PRESSURE: 154 MMHG | BODY MASS INDEX: 40.86 KG/M2 | WEIGHT: 230.6 LBS | DIASTOLIC BLOOD PRESSURE: 73 MMHG | RESPIRATION RATE: 20 BRPM | OXYGEN SATURATION: 95 %

## 2023-11-14 LAB
ANION GAP SERPL CALCULATED.3IONS-SCNC: 14 MMOL/L (ref 5–15)
BASOPHILS # BLD AUTO: 0.1 10*3/MM3 (ref 0–0.2)
BASOPHILS NFR BLD AUTO: 0.6 % (ref 0–1.5)
BUN SERPL-MCNC: 25 MG/DL (ref 8–23)
BUN/CREAT SERPL: 18 (ref 7–25)
CALCIUM SPEC-SCNC: 9.7 MG/DL (ref 8.6–10.5)
CHLORIDE SERPL-SCNC: 104 MMOL/L (ref 98–107)
CO2 SERPL-SCNC: 19 MMOL/L (ref 22–29)
CREAT SERPL-MCNC: 1.39 MG/DL (ref 0.57–1)
DEPRECATED RDW RBC AUTO: 48.1 FL (ref 37–54)
EGFRCR SERPLBLD CKD-EPI 2021: 36.8 ML/MIN/1.73
EOSINOPHIL # BLD AUTO: 0.5 10*3/MM3 (ref 0–0.4)
EOSINOPHIL NFR BLD AUTO: 4.2 % (ref 0.3–6.2)
ERYTHROCYTE [DISTWIDTH] IN BLOOD BY AUTOMATED COUNT: 14.4 % (ref 12.3–15.4)
GLUCOSE SERPL-MCNC: 133 MG/DL (ref 65–99)
HCT VFR BLD AUTO: 40.2 % (ref 34–46.6)
HGB BLD-MCNC: 13.2 G/DL (ref 12–15.9)
LYMPHOCYTES # BLD AUTO: 2.9 10*3/MM3 (ref 0.7–3.1)
LYMPHOCYTES NFR BLD AUTO: 23 % (ref 19.6–45.3)
MCH RBC QN AUTO: 29.6 PG (ref 26.6–33)
MCHC RBC AUTO-ENTMCNC: 32.9 G/DL (ref 31.5–35.7)
MCV RBC AUTO: 90 FL (ref 79–97)
MONOCYTES # BLD AUTO: 0.9 10*3/MM3 (ref 0.1–0.9)
MONOCYTES NFR BLD AUTO: 6.9 % (ref 5–12)
NEUTROPHILS NFR BLD AUTO: 65.3 % (ref 42.7–76)
NEUTROPHILS NFR BLD AUTO: 8.2 10*3/MM3 (ref 1.7–7)
NRBC BLD AUTO-RTO: 0 /100 WBC (ref 0–0.2)
PLATELET # BLD AUTO: 284 10*3/MM3 (ref 140–450)
PMV BLD AUTO: 9.1 FL (ref 6–12)
POTASSIUM SERPL-SCNC: 4.1 MMOL/L (ref 3.5–5.2)
RBC # BLD AUTO: 4.47 10*6/MM3 (ref 3.77–5.28)
SODIUM SERPL-SCNC: 137 MMOL/L (ref 136–145)
WBC NRBC COR # BLD: 12.6 10*3/MM3 (ref 3.4–10.8)

## 2023-11-14 PROCEDURE — 73030 X-RAY EXAM OF SHOULDER: CPT

## 2023-11-14 PROCEDURE — G0378 HOSPITAL OBSERVATION PER HR: HCPCS

## 2023-11-14 PROCEDURE — 85025 COMPLETE CBC W/AUTO DIFF WBC: CPT | Performed by: NURSE PRACTITIONER

## 2023-11-14 PROCEDURE — 97161 PT EVAL LOW COMPLEX 20 MIN: CPT

## 2023-11-14 PROCEDURE — 99214 OFFICE O/P EST MOD 30 MIN: CPT | Performed by: INTERNAL MEDICINE

## 2023-11-14 PROCEDURE — 80048 BASIC METABOLIC PNL TOTAL CA: CPT | Performed by: NURSE PRACTITIONER

## 2023-11-14 RX ORDER — METHOCARBAMOL 500 MG/1
500 TABLET, FILM COATED ORAL 4 TIMES DAILY
Qty: 28 TABLET | Refills: 0 | Status: SHIPPED | OUTPATIENT
Start: 2023-11-14 | End: 2023-11-21

## 2023-11-14 RX ADMIN — Medication 10 ML: at 09:37

## 2023-11-14 RX ADMIN — HYDROCODONE BITARTRATE AND ACETAMINOPHEN 1 TABLET: 7.5; 325 TABLET ORAL at 09:36

## 2023-11-14 RX ADMIN — DOCUSATE SODIUM 50MG AND SENNOSIDES 8.6MG 2 TABLET: 8.6; 5 TABLET, FILM COATED ORAL at 09:34

## 2023-11-14 RX ADMIN — HYDRALAZINE HYDROCHLORIDE 100 MG: 25 TABLET, FILM COATED ORAL at 09:37

## 2023-11-14 RX ADMIN — AMLODIPINE BESYLATE 5 MG: 5 TABLET ORAL at 09:36

## 2023-11-14 NOTE — DISCHARGE SUMMARY
[facility]    Anabelle Sellers MD    CHIEF COMPLAINT:     Chest Pain     HISTORY OF PRESENT ILLNESS:    \[facility]\""    ED 11/12/23: Patient is an 88-year female complaining of right upper chest pain and right shoulder pain that developed over the past 12 hours.  She states the pain is not worse in certain motions or positions.  Denies cough fever shortness of breath or other associated complaints.  Pain is moderate and continuous at this time.      Observation 11/13/23: Patient is an 88 year old female presenting to the ED with right upper shoulder and chest pain. Patient states no injuries or falls.  Patient denies injury to right shoulder and states right-sided shoulder and chest pain started suddenly.  Patient states she has not had pain like this before.  Patient denies diaphoresis, nausea, vomiting, abdominal pain, or fevers.  Patient states she has bilateral edema that is chronic without erythema. Patient reports living at home alone alone and uses walker at times.    Past Medical History:   Diagnosis Date    Anxiety     Arthritis     Breast nodule 2013    Left breast nodule (fibrocystic disease , no malignancy)     Cancer     Cataract     Chronic kidney disease     HL (hearing loss)     Hyperlipidemia     Hypertension     Obesity     Shortness of breath      Past Surgical History:   Procedure Laterality Date    BREAST BIOPSY Left 05/21/2013    Benign fibrocystic disease ,[facility].    CARDIAC CATHETERIZATION      D & C AND LAPAROSCOPY      FULGURATION ENDOMETRIOSIS      surgery    NEPHRECTOMY Right     ORIF HUMERUS FRACTURE Left 3/24/2021    Procedure: HUMERUS PROXIMAL OPEN REDUCTION INTERNAL FIXATION;  Surgeon: Yair Anne MD;  Location: [facility];  Service: Orthopedics;  Laterality: Left;     History reviewed. No pertinent family history.  Social History     Tobacco Use    Smoking status: Never    Smokeless tobacco: Never   Vaping Use    Vaping Use: Never  used   Substance Use Topics    Alcohol use: No    Drug use: No     Medications Prior to Admission   Medication Sig Dispense Refill Last Dose    amLODIPine (NORVASC) 5 MG tablet Take 1 tablet by mouth Daily. (Patient taking differently: Take 2 tablets by mouth Daily.) 90 tablet 0 11/11/2023    hydrALAZINE (APRESOLINE) 100 MG tablet TAKE 1 TABLET TWICE A  tablet 3 11/11/2023     Allergies:  Statins    Immunization History   Administered Date(s) Administered    Fluzone High Dose =>65 Years ([employer] ONLY) 09/18/2017, 09/19/2018    Pneumococcal Polysaccharide (PPSV23) 09/16/2019    flucelvax quad pfs =>4 YRS 09/16/2019, 09/16/2019           REVIEW OF SYSTEMS:    Review of Systems   Constitutional: Positive for malaise/fatigue.   HENT: Negative.     Eyes: Negative.    Cardiovascular: Negative.    Respiratory: Negative.     Endocrine: Negative.    Hematologic/Lymphatic: Negative.    Skin: Negative.    Musculoskeletal:  Positive for back pain and joint pain.   Gastrointestinal: Negative.    Genitourinary: Negative.    Neurological:  Positive for weakness.   Psychiatric/Behavioral:  Positive for memory loss.    Allergic/Immunologic: Negative.        Vital Signs  Temp:  [97.5 °F (36.4 °C)-98.8 °F (37.1 °C)] 98.8 °F (37.1 °C)  Heart Rate:  [52-60] 55  Resp:  [15-18] 18  BP: (135-163)/(58-75) 149/66          Physical Exam:  Physical Exam  Vitals and nursing note reviewed.   Constitutional:       Appearance: Normal appearance. She is obese.   HENT:      Head: Normocephalic and atraumatic.      Right Ear: External ear normal.      Left Ear: External ear normal.      Nose: Nose normal.      Mouth/Throat:      Pharynx: Oropharynx is clear.   Eyes:      Extraocular Movements: Extraocular movements intact.      Conjunctiva/sclera: Conjunctivae normal.      Pupils: Pupils are equal, round, and reactive to light.   Cardiovascular:      Rate and Rhythm: Normal rate and regular rhythm.      Pulses: Normal pulses.      Heart  sounds: Normal heart sounds.   Pulmonary:      Breath sounds: Normal breath sounds.   Abdominal:      General: Bowel sounds are normal.      Palpations: Abdomen is soft.   Musculoskeletal:         General: Tenderness present.      Right shoulder: Decreased range of motion.      Cervical back: Normal range of motion.   Skin:     General: Skin is warm.      Capillary Refill: Capillary refill takes less than 2 seconds.   Neurological:      Mental Status: She is alert. Mental status is at baseline.      Motor: Weakness present.   Psychiatric:         Mood and Affect: Mood normal.         Behavior: Behavior normal.         Thought Content: Thought content normal.         Judgment: Judgment normal.         Emotional Behavior:    WNL   Debilities:   none  Results Review:    I reviewed the patient's new clinical results.  Lab Results (most recent)       Procedure Component Value Units Date/Time    Basic Metabolic Panel [[id number]]  (Abnormal) Collected: 11/14/23 0336    Specimen: Blood Updated: 11/14/23 0433     Glucose 133 mg/dL      BUN 25 mg/dL      Creatinine 1.39 mg/dL      Sodium 137 mmol/L      Potassium 4.1 mmol/L      Chloride 104 mmol/L      CO2 19.0 mmol/L      Calcium 9.7 mg/dL      BUN/Creatinine Ratio 18.0     Anion Gap 14.0 mmol/L      eGFR 36.8 mL/min/1.73     Narrative:      GFR Normal >60  Chronic Kidney Disease <60  Kidney Failure <15    The GFR formula is only valid for adults with stable renal function between ages 18 and 70.    CBC & Differential [[id number]]  (Abnormal) Collected: 11/14/23 0336    Specimen: Blood Updated: 11/14/23 0412    Narrative:      The following orders were created for panel order CBC & Differential.  Procedure                               Abnormality         Status                     ---------                               -----------         ------                     CBC Auto Differential[[id number]]        Abnormal            Final result                 Please view results  for these tests on the individual orders.    CBC Auto Differential [[id number]]  (Abnormal) Collected: 11/14/23 0336    Specimen: Blood Updated: 11/14/23 0412     WBC 12.60 10*3/mm3      RBC 4.47 10*6/mm3      Hemoglobin 13.2 g/dL      Hematocrit 40.2 %      MCV 90.0 fL      MCH 29.6 pg      MCHC 32.9 g/dL      RDW 14.4 %      RDW-SD 48.1 fl      MPV 9.1 fL      Platelets 284 10*3/mm3      Neutrophil % 65.3 %      Lymphocyte % 23.0 %      Monocyte % 6.9 %      Eosinophil % 4.2 %      Basophil % 0.6 %      Neutrophils, Absolute 8.20 10*3/mm3      Lymphocytes, Absolute 2.90 10*3/mm3      Monocytes, Absolute 0.90 10*3/mm3      Eosinophils, Absolute 0.50 10*3/mm3      Basophils, Absolute 0.10 10*3/mm3      nRBC 0.0 /100 WBC     Urinalysis, Microscopic Only - Urine, Clean Catch [[id number]]  (Abnormal) Collected: 11/13/23 1452    Specimen: Urine, Clean Catch Updated: 11/13/23 1524     RBC, UA 0-2 /HPF      WBC, UA 0-2 /HPF      Comment: Urine culture not indicated.        Bacteria, UA 3+ /HPF      Squamous Epithelial Cells, UA 7-12 /HPF      Hyaline Casts, UA None Seen /LPF      Mucus, UA Trace /HPF      Methodology Manual Light Microscopy    Urinalysis With Culture If Indicated - Urine, Clean Catch [[id number]]  (Abnormal) Collected: 11/13/23 1452    Specimen: Urine, Clean Catch Updated: 11/13/23 1511     Color, UA Yellow     Appearance, UA Clear     pH, UA 6.0     Specific Gravity, UA 1.028     Glucose, UA Negative     Ketones, UA Trace     Bilirubin, UA Negative     Blood, UA Negative     Protein, UA Trace     Leuk Esterase, UA Trace     Nitrite, UA Negative     Urobilinogen, UA 1.0 E.U./dL    Narrative:      In absence of clinical symptoms, the presence of pyuria, bacteria, and/or nitrites on the urinalysis result does not correlate with infection.    Hemoglobin A1c [[id number]]  (Abnormal) Collected: 11/13/23 0431    Specimen: Blood Updated: 11/13/23 1115     Hemoglobin A1C 6.20 %     T4, Free [[id number]]  (Normal)  Collected: 11/13/23 0431    Specimen: Blood Updated: 11/13/23 1104     Free T4 1.26 ng/dL     Narrative:      Results may be falsely increased if patient taking Biotin.      Lipid Panel [[id number]]  (Abnormal) Collected: 11/13/23 0431    Specimen: Blood Updated: 11/13/23 1059     Total Cholesterol 244 mg/dL      Triglycerides 321 mg/dL      HDL Cholesterol 41 mg/dL      LDL Cholesterol  144 mg/dL      VLDL Cholesterol 59 mg/dL      LDL/HDL Ratio 3.39    Narrative:      Cholesterol Reference Ranges  (U.S. Department of Health and Human Services ATP III Classifications)    Desirable          <200 mg/dL  Borderline High    200-239 mg/dL  High Risk          >240 mg/dL      Triglyceride Reference Ranges  (U.S. Department of Health and Human Services ATP III Classifications)    Normal           <150 mg/dL  Borderline High  150-199 mg/dL  High             200-499 mg/dL  Very High        >500 mg/dL    HDL Reference Ranges  (U.S. Department of Health and Human Services ATP III Classifications)    Low     <40 mg/dl (major risk factor for CHD)  High    >60 mg/dl ('negative' risk factor for CHD)        LDL Reference Ranges  (U.S. Department of Health and Human Services ATP III Classifications)    Optimal          <100 mg/dL  Near Optimal     100-129 mg/dL  Borderline High  130-159 mg/dL  High             160-189 mg/dL  Very High        >189 mg/dL    High Sensitivity Troponin T [[id number]]  (Abnormal) Collected: 11/13/23 0431    Specimen: Blood Updated: 11/13/23 0836     HS Troponin T 27 ng/L     Narrative:      High Sensitive Troponin T Reference Range:  <14.0 ng/L- Negative Female for AMI  <22.0 ng/L- Negative Male for AMI  >=14 - Abnormal Female indicating possible myocardial injury.  >=22 - Abnormal Male indicating possible myocardial injury.   Clinicians would have to utilize clinical acumen, EKG, Troponin, and serial changes to determine if it is an Acute Myocardial Infarction or myocardial injury due to an underlying  chronic condition.         TSH [[id number]]  (Normal) Collected: 11/13/23 0431    Specimen: Blood Updated: 11/13/23 0743     TSH 2.830 uIU/mL     BNP [[id number]]  (Normal) Collected: 11/13/23 0431    Specimen: Blood Updated: 11/13/23 0743     proBNP 144.8 pg/mL     Narrative:      This assay is used as an aid in the diagnosis of individuals suspected of having heart failure. It can be used as an aid in the diagnosis of acute decompensated heart failure (ADHF) in patients presenting with signs and symptoms of ADHF to the emergency department (ED). In addition, NT-proBNP of <300 pg/mL indicates ADHF is not likely.    Age Range Result Interpretation  NT-proBNP Concentration (pg/mL:      <50             Positive            >450                   Gray                 300-450                    Negative             <300    50-75           Positive            >900                  Gray                300-900                  Negative            <300      >75             Positive            >1800                  Gray                300-1800                  Negative            <300    Hepatic Function Panel [[id number]] Collected: 11/13/23 0431    Specimen: Blood Updated: 11/13/23 0737     Total Protein 6.7 g/dL      Albumin 3.8 g/dL      ALT (SGPT) 12 U/L      AST (SGOT) 22 U/L      Alkaline Phosphatase 95 U/L      Total Bilirubin 0.3 mg/dL      Bilirubin, Direct <0.2 mg/dL      Bilirubin, Indirect --     Comment: Unable to calculate       Magnesium [[id number]]  (Normal) Collected: 11/13/23 0431    Specimen: Blood Updated: 11/13/23 0737     Magnesium 2.1 mg/dL     Basic Metabolic Panel [[id number]]  (Abnormal) Collected: 11/13/23 0431    Specimen: Blood Updated: 11/13/23 0613     Glucose 135 mg/dL      BUN 23 mg/dL      Creatinine 1.24 mg/dL      Sodium 136 mmol/L      Potassium 3.7 mmol/L      Chloride 102 mmol/L      CO2 20.0 mmol/L      Calcium 9.7 mg/dL      BUN/Creatinine Ratio 18.5     Anion Gap 14.0 mmol/L      eGFR  42.2 mL/min/1.73     Narrative:      GFR Normal >60  Chronic Kidney Disease <60  Kidney Failure <15    The GFR formula is only valid for adults with stable renal function between ages 18 and 70.    CBC (No Diff) [[id number]]  (Abnormal) Collected: 11/13/23 0431    Specimen: Blood Updated: 11/13/23 0541     WBC 11.00 10*3/mm3      RBC 4.46 10*6/mm3      Hemoglobin 13.5 g/dL      Hematocrit 41.1 %      MCV 92.1 fL      MCH 30.3 pg      MCHC 32.8 g/dL      RDW 14.3 %      RDW-SD 45.5 fl      MPV 9.1 fL      Platelets 294 10*3/mm3     High Sensitivity Troponin T 2Hr [[id number]]  (Abnormal) Collected: 11/12/23 1628    Specimen: Blood Updated: 11/12/23 1658     HS Troponin T 24 ng/L      Troponin T Delta -1 ng/L     Narrative:      High Sensitive Troponin T Reference Range:  <14.0 ng/L- Negative Female for AMI  <22.0 ng/L- Negative Male for AMI  >=14 - Abnormal Female indicating possible myocardial injury.  >=22 - Abnormal Male indicating possible myocardial injury.   Clinicians would have to utilize clinical acumen, EKG, Troponin, and serial changes to determine if it is an Acute Myocardial Infarction or myocardial injury due to an underlying chronic condition.         High Sensitivity Troponin T [[id number]]  (Abnormal) Collected: 11/12/23 1403    Specimen: Blood Updated: 11/12/23 1431     HS Troponin T 25 ng/L     Narrative:      High Sensitive Troponin T Reference Range:  <14.0 ng/L- Negative Female for AMI  <22.0 ng/L- Negative Male for AMI  >=14 - Abnormal Female indicating possible myocardial injury.  >=22 - Abnormal Male indicating possible myocardial injury.   Clinicians would have to utilize clinical acumen, EKG, Troponin, and serial changes to determine if it is an Acute Myocardial Infarction or myocardial injury due to an underlying chronic condition.         CBC & Differential [[id number]]  (Abnormal) Collected: 11/12/23 1403    Specimen: Blood Updated: 11/12/23 1408    Narrative:      The following orders were  created for panel order CBC & Differential.  Procedure                               Abnormality         Status                     ---------                               -----------         ------                     CBC Auto Differential[[id number]]        Abnormal            Final result                 Please view results for these tests on the individual orders.    CBC Auto Differential [[id number]]  (Abnormal) Collected: 11/12/23 1403    Specimen: Blood Updated: 11/12/23 1408     WBC 13.50 10*3/mm3      RBC 5.07 10*6/mm3      Hemoglobin 15.2 g/dL      Hematocrit 45.5 %      MCV 89.9 fL      MCH 29.9 pg      MCHC 33.3 g/dL      RDW 14.2 %      RDW-SD 47.3 fl      MPV 8.7 fL      Platelets 296 10*3/mm3      Neutrophil % 62.7 %      Lymphocyte % 22.4 %      Monocyte % 7.1 %      Eosinophil % 6.7 %      Basophil % 1.1 %      Neutrophils, Absolute 8.40 10*3/mm3      Lymphocytes, Absolute 3.00 10*3/mm3      Monocytes, Absolute 1.00 10*3/mm3      Eosinophils, Absolute 0.90 10*3/mm3      Basophils, Absolute 0.10 10*3/mm3      nRBC 0.0 /100 WBC             Imaging Results (Most Recent)       Procedure Component Value Units Date/Time    XR Shoulder 2+ View Right [[id number]] Collected: 11/14/23 1104     Updated: 11/14/23 1108    Narrative:      XR SHOULDER 2+ VW RIGHT    Date of Exam: 11/14/2023 10:36 AM EST    Indication: acute new onset pain, cardiac rule out    Comparison: None available.    Findings:  3 views. There is moderately severe narrowing of the AC joint with moderate spurring. There are small old avulsions at the superior aspect of the AC joint. There is mild narrowing of the glenohumeral joint. There is mild spurring from the greater   tuberosity. There is no acute fracture or dislocation. There is spurring from the undersurface of the acromion projecting into the supraspinatus outlet.      Impression:      Impression:  Chronic degenerative changes as detailed. Evidence of old trauma.      Electronically  Signed: Melodie Wolff MD    11/14/2023 11:06 AM EST    Workstation ID: [id number]    XR Chest 1 View [[id number]] Collected: 11/12/23 1412     Updated: 11/12/23 1417    Narrative:      XR CHEST 1 VW    Date of Exam: 11/12/2023 2:00 PM EST    Indication: Chest pain    Comparison: 3/5/2023.    Findings:  Ill-defined perihilar opacities are present, favoring mild edema pattern. There is no significant pleural effusion or distinct pneumothorax. Unchanged cardiac enlargement.      Impression:      Impression:    Ill-defined perihilar opacities are present, favoring mild edema pattern. There is no significant pleural effusion or distinct pneumothorax. Unchanged cardiac enlargement.          Electronically Signed: López Toledo MD    11/12/2023 2:14 PM EST    Workstation ID: [id number]          reviewed    ECG/EMG Results (most recent)       Procedure Component Value Units Date/Time    ECG 12 Lead Chest Pain [[id number]] Collected: 11/12/23 1350     Updated: 11/13/23 0621     QT Interval 426 ms      QTC Interval 498 ms     Narrative:      HEART RATE= 82  bpm  RR Interval= 733  ms  KS Interval=   ms  P Horizontal Axis=   deg  P Front Axis=   deg  QRSD Interval= 116  ms  QT Interval= 426  ms  QTcB= 498  ms  QRS Axis= -17  deg  T Wave Axis= 36  deg  - ABNORMAL ECG -  Atrial fibrillation  Ventricular bigeminy  LVH with secondary repolarization abnormality  When compared with ECG of 05-Mar-2023 9:03:33,  Significant change in rhythm: previously sinus  Electronically Signed By: Alex Chralton ([facility]) 13-Nov-2023 06:21:39  Date and Time of Study: 2023-11-12 13:50:19    SCANNED - TELEMETRY   [[id number]] Resulted: 11/12/23     Updated: 11/13/23 1303    SCANNED - TELEMETRY   [[id number]] Resulted: 11/12/23     Updated: 11/13/23 1303    SCANNED - TELEMETRY   [[id number]] Resulted: 11/12/23     Updated: 11/14/23 0651    SCANNED - TELEMETRY   [[id number]] Resulted: 11/12/23     Updated: 11/14/23 1120    SCANNED - TELEMETRY   [[id number]]  Resulted: 11/12/23     Updated: 11/14/23 1120    SCANNED - TELEMETRY   [[id number]] Resulted: 11/12/23     Updated: 11/14/23 1247          reviewed            Microbiology Results (last 10 days)       ** No results found for the last 240 hours. **            Assessment & Plan     Chest pain        Chest pain        Lab Results   Component Value Date     TROPONINT 27 (H) 11/13/2023     TROPONINT 24 (H) 11/12/2023     TROPONINT 25 (H) 11/12/2023   -  -Lipid panel: Total cholesterol 244, , triglycerides 321; tolerant to statin  -Xray right shoulder pending   -Chest X-ray: Mild edema with no sign of pleural effusion or pneumothorax, and cardiac enlargement  -EKG: Atrial fibrillation with ventricular bigeminy with a rate of 82 previously sinus rhythm; per chart follows with [facility]  -Stress Test and echocardiogram  -Telemetry  -Cardiology consulted     CKD (Stage II)        Lab Results   Component Value Date     CREATININE 1.24 (H) 11/13/2023     BUN 23 11/13/2023     BCR 18.5 11/13/2023     EGFR 42.2 (L) 11/13/2023   -History of partial nephrectomy  -Avoid nephrotoxic medication IV dye unless urgently needed  -Monitor BMP and I's and O's while admitted        Hypertension  -Moderately Controlled       BP Readings from Last 1 Encounters:   11/13/23 144/74      - Continue amlodipine and hydralazine  - Monitor while admitted     Acute confusion  -Urinalysis pending  -WBC 11.00  -Physical therapy consulted     I discussed the patients findings and my recommendations with patient.     Discharge Diagnosis:      Chest pain      Hospital Course  Patient is a 88 y.o. female presented with chest pain and right shoulder pain.  Patient reports no falls at home although she does live at home by herself. High sensitivity troponin 25, 24, and 27.  .  Lipid panel slightly elevated but patient intolerant to statins.  Chest x-ray showed mild edema with no sign of pleural effusion or pneumothorax and  cardiac enlargement.  EKG showed atrial fibrillation with controlled rate.  Patient stress test which showed no ischemia.  Patient continues to report right shoulder pain which showed x-ray chronic degenerative changes without acute dislocation or fracture.  Patient also evaluated by cardiology and physical therapy.  Physical therapy recommended patient safe to go home while using walker.  Cardiology advised patient to monitor blood pressure and take blood pressure.  Patient to follow-up PCP in 1 to 2 weeks for continued care management.  Patient to follow with cardiology and neck scheduled appointment.  Patient take medication as prescribed.  If symptoms worsen patient call 911 or go to nearest ED.    Past Medical History:     Past Medical History:   Diagnosis Date    Anxiety     Arthritis     Breast nodule 2013    Left breast nodule (fibrocystic disease , no malignancy)     Cancer     Cataract     Chronic kidney disease     HL (hearing loss)     Hyperlipidemia     Hypertension     Obesity     Shortness of breath        Past Surgical History:     Past Surgical History:   Procedure Laterality Date    BREAST BIOPSY Left 05/21/2013    Benign fibrocystic disease ,[facility].    CARDIAC CATHETERIZATION      D & C AND LAPAROSCOPY      FULGURATION ENDOMETRIOSIS      surgery    NEPHRECTOMY Right     ORIF HUMERUS FRACTURE Left 3/24/2021    Procedure: HUMERUS PROXIMAL OPEN REDUCTION INTERNAL FIXATION;  Surgeon: Yair Anne MD;  Location: [facility];  Service: Orthopedics;  Laterality: Left;       Social History:   Social History     Socioeconomic History    Marital status:    Tobacco Use    Smoking status: Never    Smokeless tobacco: Never   Vaping Use    Vaping Use: Never used   Substance and Sexual Activity    Alcohol use: No    Drug use: No    Sexual activity: Defer     Partners: Male       Procedures Performed         Consults:   Consults       Date and Time Order Name Status Description     11/13/2023  7:31 AM Inpatient Cardiology Consult Completed             Condition on Discharge:     Stable    Discharge Disposition  Home or Self Care    Discharge Medications     Discharge Medications        New Medications        Instructions Start Date   methocarbamol 500 MG tablet  Commonly known as: ROBAXIN   500 mg, Oral, 4 Times Daily             Changes to Medications        Instructions Start Date   amLODIPine 5 MG tablet  Commonly known as: NORVASC  What changed: how much to take   5 mg, Oral, Daily             Continue These Medications        Instructions Start Date   hydrALAZINE 100 MG tablet  Commonly known as: APRESOLINE   TAKE 1 TABLET TWICE A DAY               Discharge Diet:   Diet Instructions       Diet: Cardiac Diets; Healthy Heart (2-3 Na+); Regular Texture (IDDSI 7); Thin (IDDSI 0)      Discharge Diet: Cardiac Diets    Cardiac Diet: Healthy Heart (2-3 Na+)    Texture: Regular Texture (IDDSI 7)    Fluid Consistency: Thin (IDDSI 0)            Activity at Discharge:   Activity Instructions       Activity as Tolerated      Measure Blood Pressure              Follow-up Appointments  Future Appointments   Date Time Provider Department Center   1/8/2024  2:30 PM Seipel, Joseph F, MD [facility]   2/16/2024 11:00 AM Damien Ty MD [facility]     Additional Instructions for the Follow-ups that You Need to Schedule       Discharge Follow-up with PCP   As directed       Currently Documented PCP:    Anabelle Sellers MD    PCP Phone Number:    [phone]     Follow Up Details: 7-10 days                Test Results Pending at Discharge       Risk for Readmission (LACE) Score: 8 (11/14/2023  6:00 AM)          JESSIE Farias  11/14/23  13:41 EST        I spent 35 minutes caring for Leandra on this date of service. This time includes time spent by me in the following activities: reviewing tests, obtaining and/or reviewing a separately obtained history, performing a medically  appropriate examination and/or evaluation, counseling and educating the patient/family/caregiver, ordering medications, tests, or procedures, referring and communicating with other health care professionals, documenting information in the medical record, independently interpreting results and communicating that information with the patient/family/caregiver, and care coordination.

## 2023-11-14 NOTE — PLAN OF CARE
Goal Outcome Evaluation:  Plan of Care Reviewed With: patient           Outcome Evaluation: 89 y/o F with h/o CA, chronic renal insuffiency presented to the ED with right upper shoulder and chest pain, acute confusion with urinalysis pending, mild elevated troponin, and mild leukocytosis. MI ruled out. Stress test/myocardial perfusion imaging negative. X-ray of R shoulder demonstrated chronic degenerative changes with evidence of old trauma. At baseline, pt lives at home alone and is Mod I with functional mobility/ ADLs without AD in home and use of SPC in community reporting has assist from daughter as needed with household tasks, community errands, and transportation needs. This date, pt is at Mod I level with bed mobility, functional transfers, and gait training for 150 ft without AD with no safety concerns observed. Pt reports no falls in last 6 mo appearing to be at functional baseline with no further skilled PT services warranted. PT d/c recommendation of home with support from daughter as needed and  PT services.      Anticipated Discharge Disposition (PT): home with home health

## 2023-11-14 NOTE — THERAPY EVALUATION
Patient Name: Leandra Nuñez  : 1935    MRN: [id number]                              Today's Date: 2023       Admit Date: 2023    Visit Dx:     ICD-10-CM ICD-9-CM   1. Chest pain, unspecified type  R07.9 786.50     Patient Active Problem List   Diagnosis    Anxiety disorder, unspecified    Asthma    Stage 3 chronic kidney disease    Hyperlipidemia    Hypertension    Obesity    Renal insufficiency    Type 2 diabetes mellitus without complication    Visit for screening mammogram    Adjustment disorder with anxiety    Immune thrombocytopenia    Obstructive sleep apnea syndrome    Osteoarthritis    Clear cell carcinoma of kidney    Thrombocytopenia    Gastroesophageal reflux disease    White coat syndrome with diagnosis of hypertension    Leg cramps    Closed fracture of surgical neck of humerus    Fall    Absent kidney    Edema    Vitamin deficiency    Acute midline thoracic back pain    Chest pain     Past Medical History:   Diagnosis Date    Anxiety     Arthritis     Breast nodule     Left breast nodule (fibrocystic disease , no malignancy)     Cancer     Cataract     Chronic kidney disease     HL (hearing loss)     Hyperlipidemia     Hypertension     Obesity     Shortness of breath      Past Surgical History:   Procedure Laterality Date    BREAST BIOPSY Left 2013    Benign fibrocystic disease ,Abstracted from [facility].    CARDIAC CATHETERIZATION      D & C AND LAPAROSCOPY      FULGURATION ENDOMETRIOSIS      surgery    NEPHRECTOMY Right     ORIF HUMERUS FRACTURE Left 3/24/2021    Procedure: HUMERUS PROXIMAL OPEN REDUCTION INTERNAL FIXATION;  Surgeon: Yair Anne MD;  Location: [facility];  Service: Orthopedics;  Laterality: Left;      General Information       Row Name 23 1304          Physical Therapy Time and Intention    Document Type evaluation  -RM     Mode of Treatment physical therapy  -RM       Row Name 23 1308          General Information    Patient  Profile Reviewed yes  -RM     Prior Level of Function --  Mod I with functional mobility/ ADLs without AD in home and use cane as needed within community. Daughter assists with household tasks as needed, community errands, grocery shopping, and transportation needs.  -RM     Existing Precautions/Restrictions no known precautions/restrictions  -RM       Row Name 11/14/23 1308          Living Environment    People in Home --  Pt was living in patio home alone with no IVAN/ inside home. Pt reports does not access shower prefering to sponge bathe. Pt reports strong support from daughter upon d/c.  -RM       Row Name 11/14/23 1308          Cognition    Orientation Status (Cognition) oriented x 4  -RM       Row Name 11/14/23 1308          Safety Issues, Functional Mobility    Impairments Affecting Function (Mobility) endurance/activity tolerance  -RM               User Key  (r) = Recorded By, (t) = Taken By, (c) = Cosigned By      Initials Name Provider Type    Rosalina Darling, PT Physical Therapist                   Mobility       Row Name 11/14/23 1311          Bed Mobility    Bed Mobility bed mobility (all) activities  -RM     All Activities, Kleberg (Bed Mobility) modified independence  -RM     Comment, (Bed Mobility) Without bed rail; no PT cuing needed  -RM       Row Name 11/14/23 1311          Bed-Chair Transfer    Bed-Chair Kleberg (Transfers) modified independence  -RM     Comment, (Bed-Chair Transfer) Without AD; no safety concerns observed  -RM       Row Name 11/14/23 1311          Sit-Stand Transfer    Sit-Stand Kleberg (Transfers) modified independence  -RM     Comment, (Sit-Stand Transfer) Without AD; no safety concerns observed  -RM       Row Name 11/14/23 1311          Gait/Stairs (Locomotion)    Kleberg Level (Gait) modified independence  -RM     Distance in Feet (Gait) x 150 ft without AD; no safety concerns observed; pt reports has FWW and cane can use upon d/c if needed  -RM      Deviations/Abnormal Patterns (Gait) base of support, narrow  -RM               User Key  (r) = Recorded By, (t) = Taken By, (c) = Cosigned By      Initials Name Provider Type    Rosalina Darling PT Physical Therapist                   Obj/Interventions       Row Name 11/14/23 1312          Range of Motion Comprehensive    General Range of Motion bilateral lower extremity ROM WFL  -RM       Row Name 11/14/23 1312          Strength Comprehensive (MMT)    Comment, General Manual Muscle Testing (MMT) Assessment MMT of BLE grossly 4/5  -RM       Row Name 11/14/23 1312          Balance    Comment, Balance No LOB observed this date  -               User Key  (r) = Recorded By, (t) = Taken By, (c) = Cosigned By      Initials Name Provider Type    Rosalina Darling, IAIN Physical Therapist                   Goals/Plan    No documentation.                  Clinical Impression       Row Name 11/14/23 1313          Pain    Pretreatment Pain Rating 0/10 - no pain  -RM     Posttreatment Pain Rating 0/10 - no pain  -RM       Row Name 11/14/23 1313          Plan of Care Review    Plan of Care Reviewed With patient  -RM     Outcome Evaluation 89 y/o F with h/o CA, chronic renal insuffiency presented to the ED with right upper shoulder and chest pain, acute confusion with urinalysis pending, mild elevated troponin, and mild leukocytosis. MI ruled out. Stress test/myocardial perfusion imaging negative. X-ray of R shoulder demonstrated chronic degenerative changes with evidence of old trauma. At baseline, pt lives at home alone and is Mod I with functional mobility/ ADLs without AD in home and use of SPC in community reporting has assist from daughter as needed with household tasks, community errands, and transportation needs. This date, pt is at Mod I level with bed mobility, functional transfers, and gait training for 150 ft without AD with no safety concerns observed. Pt reports no falls in last 6 mo appearing to be at functional  baseline with no further skilled PT services warranted. PT d/c recommendation of home with support from daughter as needed and  PT services.  -RM       Row Name 11/14/23 1313          Therapy Assessment/Plan (PT)    Criteria for Skilled Interventions Met (PT) no;no problems identified which require skilled intervention  -RM     Therapy Frequency (PT) evaluation only  -RM       Row Name 11/14/23 1313          Positioning and Restraints    Pre-Treatment Position sitting in chair/recliner  -RM     Post Treatment Position chair  -RM     In Chair call light within reach  -RM               User Key  (r) = Recorded By, (t) = Taken By, (c) = Cosigned By      Initials Name Provider Type    Rosalina Darling, PT Physical Therapist                   Outcome Measures       Row Name 11/14/23 1317 11/14/23 0752       How much help from another person do you currently need...    Turning from your back to your side while in flat bed without using bedrails? 4  -RM 4  -DS    Moving from lying on back to sitting on the side of a flat bed without bedrails? 4  -RM 4  -DS    Moving to and from a bed to a chair (including a wheelchair)? 4  -RM 4  -DS    Standing up from a chair using your arms (e.g., wheelchair, bedside chair)? 4  -RM 4  -DS    Climbing 3-5 steps with a railing? 4  -RM 3  -DS    To walk in hospital room? 4  -RM 4  -DS    AM-PAC 6 Clicks Score (PT) 24  -RM 23  -DS    Highest Level of Mobility Goal 8 --> Walked 250 feet or more  -RM 7 --> Walk 25 feet or more  -DS      Row Name 11/14/23 1317          Functional Assessment    Outcome Measure Options AM-PAC 6 Clicks Basic Mobility (PT)  -RM               User Key  (r) = Recorded By, (t) = Taken By, (c) = Cosigned By      Initials Name Provider Type    Denisse Garcia RN Registered Nurse    Rosalina Darling, PT Physical Therapist                                   PT Recommendation and Plan     Plan of Care Reviewed With: patient  Outcome Evaluation: 89 y/o F with h/o CA,  chronic renal insuffiency presented to the ED with right upper shoulder and chest pain, acute confusion with urinalysis pending, mild elevated troponin, and mild leukocytosis. MI ruled out. Stress test/myocardial perfusion imaging negative. X-ray of R shoulder demonstrated chronic degenerative changes with evidence of old trauma. At baseline, pt lives at home alone and is Mod I with functional mobility/ ADLs without AD in home and use of SPC in community reporting has assist from daughter as needed with household tasks, community errands, and transportation needs. This date, pt is at Mod I level with bed mobility, functional transfers, and gait training for 150 ft without AD with no safety concerns observed. Pt reports no falls in last 6 mo appearing to be at functional baseline with no further skilled PT services warranted. PT d/c recommendation of home with support from daughter as needed and  PT services.     Time Calculation:         PT Charges       Row Name 11/14/23 1318             Time Calculation    Start Time 1252  -RM      Stop Time 1303  -RM      Time Calculation (min) 11 min  -RM      PT Received On 11/14/23  -RM         Time Calculation- PT    Total Timed Code Minutes- PT 0 minute(s)  -RM                User Key  (r) = Recorded By, (t) = Taken By, (c) = Cosigned By      Initials Name Provider Type    Rosalina Darling, IAIN Physical Therapist                  Therapy Charges for Today       Code Description Service Date Service Provider Modifiers Qty    84418487451 HC PT EVAL LOW COMPLEXITY 4 11/14/2023 Rosalina Clark, PT GP 1            PT G-Codes  Outcome Measure Options: AM-PAC 6 Clicks Basic Mobility (PT)  AM-PAC 6 Clicks Score (PT): 24  PT Discharge Summary  Anticipated Discharge Disposition (PT): home with home health    Rosalina Clark PT  11/14/2023

## 2023-11-14 NOTE — PROGRESS NOTES
CARDIOLOGY PROGRESS NOTE:    Leandra Nuñez  88 y.o.  female  1935  [id number]      Referring Provider: Hospitalist    Reason for follow-up: Chest pain     Patient Care Team:  Anabelle Sellers MD as PCP - Jojo Baez PA as Physician Assistant (Physician Assistant)    Subjective .  Patient is doing well without any chest pain or shortness of breath    Objective lying in bed comfortably     Review of Systems   Constitutional: Negative for malaise/fatigue.   Cardiovascular:  Negative for chest pain, dyspnea on exertion, leg swelling and palpitations.   Respiratory:  Negative for cough and shortness of breath.    Gastrointestinal:  Negative for abdominal pain, nausea and vomiting.   Neurological:  Negative for dizziness, focal weakness, headaches, light-headedness and numbness.   All other systems reviewed and are negative.      Allergies: Statins    Scheduled Meds:amLODIPine, 5 mg, Oral, Daily  hydrALAZINE, 100 mg, Oral, BID  senna-docusate sodium, 2 tablet, Oral, BID  sodium chloride, 10 mL, Intravenous, Q12H  sodium chloride, 10 mL, Intravenous, Q12H      Continuous Infusions:   PRN Meds:.  acetaminophen    senna-docusate sodium **AND** polyethylene glycol **AND** bisacodyl **AND** bisacodyl    HYDROcodone-acetaminophen    Morphine **AND** naloxone    nitroglycerin    ondansetron **OR** ondansetron    [COMPLETED] Insert Peripheral IV **AND** sodium chloride    sodium chloride    sodium chloride    sodium chloride    sodium chloride        VITAL SIGNS  Vitals:    11/14/23 0300 11/14/23 0500 11/14/23 1056 11/14/23 1419   BP: 135/69 135/61 149/66 154/73   BP Location: Right arm Right arm Right arm Right arm   Patient Position: Sitting Lying Lying Sitting   Pulse: 52 52 55    Resp: 16 16 18 20   Temp: 98 °F (36.7 °C) 97.5 °F (36.4 °C) 98.8 °F (37.1 °C)    TempSrc: Oral Oral Oral    SpO2: 94% 95% 95%    Weight:       Height:           Flowsheet Rows      Flowsheet Row First Filed Value  "  Admission Height 160 cm (63\") Documented at 11/12/2023 1336   Admission Weight 102 kg (225 lb) Documented at 11/12/2023 1336             TELEMETRY: Sinus rhythm    Physical Exam:  Constitutional:       Appearance: Well-developed.   Eyes:      General: No scleral icterus.     Conjunctiva/sclera: Conjunctivae normal.   HENT:      Head: Normocephalic and atraumatic.   Neck:      Vascular: No carotid bruit or JVD.   Pulmonary:      Effort: Pulmonary effort is normal.      Breath sounds: Normal breath sounds. No wheezing. No rales.   Cardiovascular:      Normal rate. Regular rhythm.   Pulses:     Intact distal pulses.   Abdominal:      General: Bowel sounds are normal.      Palpations: Abdomen is soft.   Musculoskeletal:      Cervical back: Normal range of motion and neck supple. Skin:     General: Skin is warm and dry.      Findings: No rash.   Neurological:      Mental Status: Alert.          Results Review:   I reviewed the patient's new clinical results.  Lab Results (last 24 hours)       Procedure Component Value Units Date/Time    Basic Metabolic Panel [[id number]]  (Abnormal) Collected: 11/14/23 0336    Specimen: Blood Updated: 11/14/23 0433     Glucose 133 mg/dL      BUN 25 mg/dL      Creatinine 1.39 mg/dL      Sodium 137 mmol/L      Potassium 4.1 mmol/L      Chloride 104 mmol/L      CO2 19.0 mmol/L      Calcium 9.7 mg/dL      BUN/Creatinine Ratio 18.0     Anion Gap 14.0 mmol/L      eGFR 36.8 mL/min/1.73     Narrative:      GFR Normal >60  Chronic Kidney Disease <60  Kidney Failure <15    The GFR formula is only valid for adults with stable renal function between ages 18 and 70.    CBC & Differential [[id number]]  (Abnormal) Collected: 11/14/23 0336    Specimen: Blood Updated: 11/14/23 0412    Narrative:      The following orders were created for panel order CBC & Differential.  Procedure                               Abnormality         Status                     ---------                               " -----------         ------                     CBC Auto Differential[[id number]]        Abnormal            Final result                 Please view results for these tests on the individual orders.    CBC Auto Differential [[id number]]  (Abnormal) Collected: 11/14/23 0336    Specimen: Blood Updated: 11/14/23 0412     WBC 12.60 10*3/mm3      RBC 4.47 10*6/mm3      Hemoglobin 13.2 g/dL      Hematocrit 40.2 %      MCV 90.0 fL      MCH 29.6 pg      MCHC 32.9 g/dL      RDW 14.4 %      RDW-SD 48.1 fl      MPV 9.1 fL      Platelets 284 10*3/mm3      Neutrophil % 65.3 %      Lymphocyte % 23.0 %      Monocyte % 6.9 %      Eosinophil % 4.2 %      Basophil % 0.6 %      Neutrophils, Absolute 8.20 10*3/mm3      Lymphocytes, Absolute 2.90 10*3/mm3      Monocytes, Absolute 0.90 10*3/mm3      Eosinophils, Absolute 0.50 10*3/mm3      Basophils, Absolute 0.10 10*3/mm3      nRBC 0.0 /100 WBC             Imaging Results (Last 24 Hours)       Procedure Component Value Units Date/Time    XR Shoulder 2+ View Right [[id number]] Collected: 11/14/23 1104     Updated: 11/14/23 1108    Narrative:      XR SHOULDER 2+ VW RIGHT    Date of Exam: 11/14/2023 10:36 AM EST    Indication: acute new onset pain, cardiac rule out    Comparison: None available.    Findings:  3 views. There is moderately severe narrowing of the AC joint with moderate spurring. There are small old avulsions at the superior aspect of the AC joint. There is mild narrowing of the glenohumeral joint. There is mild spurring from the greater   tuberosity. There is no acute fracture or dislocation. There is spurring from the undersurface of the acromion projecting into the supraspinatus outlet.      Impression:      Impression:  Chronic degenerative changes as detailed. Evidence of old trauma.      Electronically Signed: Melodie Wolff MD    11/14/2023 11:06 AM EST    Workstation ID: [id number]            EKG      I personally viewed and interpreted the patient's EKG/Telemetry  data:    ECHOCARDIOGRAM:       STRESS MYOVIEW:  Results for orders placed during the hospital encounter of 11/12/23    Stress Test With Myocardial Perfusion One Day    Interpretation Summary    Left ventricular ejection fraction is normal (Calculated EF = 60%).    Myocardial perfusion imaging indicates a normal myocardial perfusion study with no evidence of ischemia.    Impressions are consistent with a low risk study.       CARDIAC CATHETERIZATION:  No results found for this or any previous visit.       OTHER:         Assessment & Plan     Angina  Patient presented with chest pain is ruled out for MI by EKG and enzymes  Patient had a stress myoview study which did not show any ischemia  Patient will have work-up for other cause of chest pain as an outpatient    Hypertension  Patient blood pressure currently stable on home medications    Hyperlipidemia  Patient is not on any medicines and will need statins because of high cholesterol    Elevated hemoglobin A1c  Patient has no history of her diabetes but her hemoglobin A1c is high and will be followed by primary care doctor and be initiated on diet therapy    Chronic renal insufficiency  Patient's renal function is being closely monitored by the f primary doctor    I discussed the patients findings and my recommendations with patient and nurse    Otis Purcell MD  11/14/23  16:46 EST

## 2023-11-14 NOTE — PLAN OF CARE
Problem: Adult Inpatient Plan of Care  Goal: Plan of Care Review  Outcome: Ongoing, Progressing  Flowsheets (Taken 11/14/2023 0259)  Progress: improving  Plan of Care Reviewed With: patient  Goal: Patient-Specific Goal (Individualized)  Outcome: Ongoing, Progressing  Goal: Absence of Hospital-Acquired Illness or Injury  Outcome: Ongoing, Progressing  Intervention: Identify and Manage Fall Risk  Recent Flowsheet Documentation  Taken 11/14/2023 0200 by Vikki Rojas RN  Safety Promotion/Fall Prevention:   safety round/check completed   room organization consistent   nonskid shoes/slippers when out of bed   lighting adjusted   clutter free environment maintained   assistive device/personal items within reach   activity supervised  Taken 11/14/2023 0045 by Vikki Rojas RN  Safety Promotion/Fall Prevention:   safety round/check completed   room organization consistent   nonskid shoes/slippers when out of bed   lighting adjusted   clutter free environment maintained   assistive device/personal items within reach   activity supervised  Taken 11/13/2023 2200 by Vikki Rojas RN  Safety Promotion/Fall Prevention:   safety round/check completed   room organization consistent   nonskid shoes/slippers when out of bed   lighting adjusted   clutter free environment maintained   activity supervised   assistive device/personal items within reach  Taken 11/13/2023 2015 by Vikki Rojas RN  Safety Promotion/Fall Prevention:   safety round/check completed   room organization consistent   nonskid shoes/slippers when out of bed   lighting adjusted   clutter free environment maintained   assistive device/personal items within reach   activity supervised  Intervention: Prevent Skin Injury  Recent Flowsheet Documentation  Taken 11/14/2023 0045 by Vikki Rojas RN  Body Position: position changed independently  Taken 11/13/2023 2015 by Vikki Rojas RN  Body Position: position changed  independently  Intervention: Prevent and Manage VTE (Venous Thromboembolism) Risk  Recent Flowsheet Documentation  Taken 11/14/2023 0045 by Vikki Rojas RN  Activity Management: up to bedside commode  Taken 11/13/2023 2015 by Vikki Rojas RN  Activity Management: up to bedside commode  Intervention: Prevent Infection  Recent Flowsheet Documentation  Taken 11/14/2023 0200 by Vikki Rojas, RN  Infection Prevention:   single patient room provided   rest/sleep promoted   personal protective equipment utilized   hand hygiene promoted  Taken 11/14/2023 0045 by Vikki Rojas RN  Infection Prevention:   single patient room provided   rest/sleep promoted   personal protective equipment utilized   hand hygiene promoted  Taken 11/13/2023 2200 by Vikki Rojas RN  Infection Prevention:   rest/sleep promoted   single patient room provided   personal protective equipment utilized   hand hygiene promoted  Taken 11/13/2023 2015 by Vikki Rojas RN  Infection Prevention:   single patient room provided   rest/sleep promoted   personal protective equipment utilized   hand hygiene promoted  Goal: Optimal Comfort and Wellbeing  Outcome: Ongoing, Progressing  Intervention: Provide Person-Centered Care  Recent Flowsheet Documentation  Taken 11/13/2023 2015 by Vikki Rojas RN  Trust Relationship/Rapport:   choices provided   care explained   questions answered   questions encouraged   reassurance provided   thoughts/feelings acknowledged  Goal: Readiness for Transition of Care  Outcome: Ongoing, Progressing     Problem: Chest Pain  Goal: Resolution of Chest Pain Symptoms  Outcome: Ongoing, Progressing     Problem: Behavioral Health Comorbidity  Goal: Maintenance of Behavioral Health Symptom Control  Outcome: Ongoing, Progressing  Intervention: Maintain Behavioral Health Symptom Control  Recent Flowsheet Documentation  Taken 11/14/2023 0200 by Vikki Rojas RN  Medication  Review/Management: medications reviewed  Taken 11/14/2023 0045 by Vikki Rojas RN  Medication Review/Management: medications reviewed  Taken 11/13/2023 2200 by Vikki Rojas RN  Medication Review/Management: medications reviewed  Taken 11/13/2023 2015 by Vikki Rojas RN  Medication Review/Management: medications reviewed     Problem: Hypertension Comorbidity  Goal: Blood Pressure in Desired Range  Outcome: Ongoing, Progressing  Intervention: Maintain Blood Pressure Management  Recent Flowsheet Documentation  Taken 11/14/2023 0200 by Vikki Rojas RN  Medication Review/Management: medications reviewed  Taken 11/14/2023 0045 by Vikki Rojas RN  Medication Review/Management: medications reviewed  Taken 11/13/2023 2200 by Vikik Rojas RN  Medication Review/Management: medications reviewed  Taken 11/13/2023 2015 by Vikki Rojas RN  Medication Review/Management: medications reviewed     Problem: Pain Acute  Goal: Acceptable Pain Control and Functional Ability  Outcome: Ongoing, Progressing  Intervention: Prevent or Manage Pain  Recent Flowsheet Documentation  Taken 11/14/2023 0200 by Vikki Rojas RN  Medication Review/Management: medications reviewed  Taken 11/14/2023 0045 by Vikki Rojas RN  Medication Review/Management: medications reviewed  Taken 11/13/2023 2200 by Vikki Rojas RN  Medication Review/Management: medications reviewed  Taken 11/13/2023 2015 by Vikki Rojas RN  Medication Review/Management: medications reviewed  Intervention: Optimize Psychosocial Wellbeing  Recent Flowsheet Documentation  Taken 11/13/2023 2015 by Vikki Rojas RN  Supportive Measures: active listening utilized  Diversional Activities: television     Problem: Activity and Energy Impairment (Anxiety Signs/Symptoms)  Goal: Optimized Energy Level (Anxiety Signs/Symptoms)  Outcome: Ongoing, Progressing     Problem: Cognitive Impairment (Anxiety  Signs/Symptoms)  Goal: Optimized Cognitive Function (Anxiety Signs/Symptoms)  Outcome: Ongoing, Progressing     Problem: Mood Impairment (Anxiety Signs/Symptoms)  Goal: Improved Mood Symptoms (Anxiety Signs/Symptoms)  Outcome: Ongoing, Progressing  Intervention: Optimize Emotion and Mood  Recent Flowsheet Documentation  Taken 11/13/2023 2015 by Vikki Rojas RN  Supportive Measures: active listening utilized     Problem: Sleep Impairment (Anxiety Signs/Symptoms)  Goal: Improved Sleep (Anxiety Signs/Symptoms)  Outcome: Ongoing, Progressing     Problem: Social, Occupational or Functional Impairment (Anxiety Signs/Symptoms)  Goal: Enhanced Social, Occupational or Functional Skills (Anxiety Signs/Symptoms)  Outcome: Ongoing, Progressing  Intervention: Promote Social, Occupational and Functional Ability  Recent Flowsheet Documentation  Taken 11/13/2023 2015 by Vikki Rojas RN  Trust Relationship/Rapport:   choices provided   care explained   questions answered   questions encouraged   reassurance provided   thoughts/feelings acknowledged     Problem: Somatic Disturbance (Anxiety Signs/Symptoms)  Goal: Improved Somatic Symptoms (Anxiety Signs/Symptoms)  Outcome: Ongoing, Progressing     Problem: Fall Injury Risk  Goal: Absence of Fall and Fall-Related Injury  Outcome: Ongoing, Progressing  Intervention: Identify and Manage Contributors  Recent Flowsheet Documentation  Taken 11/14/2023 0200 by Vikki Rojas RN  Medication Review/Management: medications reviewed  Taken 11/14/2023 0045 by Vikki Rojas, RN  Medication Review/Management: medications reviewed  Taken 11/13/2023 2200 by Vikki Rojas, RN  Medication Review/Management: medications reviewed  Taken 11/13/2023 2015 by Vikki Rojas, RN  Medication Review/Management: medications reviewed  Intervention: Promote Injury-Free Environment  Recent Flowsheet Documentation  Taken 11/14/2023 0200 by Vikki Rojas, RN  Safety  Promotion/Fall Prevention:   safety round/check completed   room organization consistent   nonskid shoes/slippers when out of bed   lighting adjusted   clutter free environment maintained   assistive device/personal items within reach   activity supervised  Taken 11/14/2023 0045 by Vikki Rojas, RN  Safety Promotion/Fall Prevention:   safety round/check completed   room organization consistent   nonskid shoes/slippers when out of bed   lighting adjusted   clutter free environment maintained   assistive device/personal items within reach   activity supervised  Taken 11/13/2023 2200 by Vikki Rojas, RN  Safety Promotion/Fall Prevention:   safety round/check completed   room organization consistent   nonskid shoes/slippers when out of bed   lighting adjusted   clutter free environment maintained   activity supervised   assistive device/personal items within reach  Taken 11/13/2023 2015 by Vikki Rojas, RN  Safety Promotion/Fall Prevention:   safety round/check completed   room organization consistent   nonskid shoes/slippers when out of bed   lighting adjusted   clutter free environment maintained   assistive device/personal items within reach   activity supervised   Goal Outcome Evaluation:  Plan of Care Reviewed With: patient        Progress: improving  Outcome Evaluation: new admit

## 2023-11-15 ENCOUNTER — TRANSITIONAL CARE MANAGEMENT TELEPHONE ENCOUNTER (OUTPATIENT)
Dept: CALL CENTER | Facility: HOSPITAL | Age: 88
End: 2023-11-15
Payer: MEDICARE

## 2023-11-15 NOTE — OUTREACH NOTE
Call Center TCM Note      Flowsheet Row Responses   [facility] [place] patient discharged from? [facility]   Does the patient have one of the following disease processes/diagnoses(primary or secondary)? Other   TCM attempt successful? Yes   Call start time 1618   Call end time 1620   Discharge diagnosis Chest pain   Meds reviewed with patient/caregiver? Yes   Does the patient have all medications ordered at discharge? No   Nursing Interventions No intervention needed   Prescription comments Patient states she has a prescription for Robaxin at home.   Is the patient taking all medications as directed (includes completed medication regime)? Yes   Does the patient have an appointment with their PCP within 7-14 days of discharge? No   Nursing Interventions Patient declined scheduling/rescheduling appointment at this time  [She states her daughter will schedule her appointment.]   Has home health visited the patient within 72 hours of discharge? N/A   Psychosocial issues? No   Did the patient receive a copy of their discharge instructions? Yes   Nursing interventions Reviewed instructions with patient   What is the patient's perception of their health status since discharge? Improving   Is the patient/caregiver able to teach back signs and symptoms related to disease process for when to call PCP? Yes   Is the patient/caregiver able to teach back signs and symptoms related to disease process for when to call 911? Yes   Is the patient/caregiver able to teach back the hierarchy of who to call/visit for symptoms/problems? PCP, Specialist, Home health nurse, Urgent Care, ED, 911 Yes   If the patient is a current smoker, are they able to teach back resources for cessation? Not a smoker   TCM call completed? Yes   Call end time 1620   Would this patient benefit from a Referral to Amb Social Work? No   Is the patient interested in additional calls from an ambulatory ? No            Genesis Bob LPN    11/15/2023,  16:23 EST

## 2023-11-15 NOTE — OUTREACH NOTE
Call Center TCM Note      Flowsheet Row Responses   [facility] facility patient discharged from? [facility]   Does the patient have one of the following disease processes/diagnoses(primary or secondary)? Other   TCM attempt successful? No   Unsuccessful attempts Attempt 1            Genesis Bob LPN    11/15/2023, 14:38 EST

## 2023-11-15 NOTE — CASE MANAGEMENT/SOCIAL WORK
Case Management Discharge Note      Final Note: Routine home         Selected Continued Care - Discharged on 11/14/2023 Admission date: 11/12/2023 - Discharge disposition: Home or Self Care         Transportation Services  Private: Car    Final Discharge Disposition Code: 01 - home or self-care

## 2023-11-15 NOTE — OUTREACH NOTE
Prep Survey      Flowsheet Row Responses   [facility] facility patient discharged from? [facility]   Is LACE score < 7 ? No   Eligibility [facility]   Hospital [facility]   Date of Admission 11/12/23   Date of Discharge 11/14/23   Discharge Disposition Home or Self Care   Discharge diagnosis Chest pain   Does the patient have one of the following disease processes/diagnoses(primary or secondary)? Other   Does the patient have Home health ordered? No   Is there a DME ordered? No   Prep survey completed? Yes            KAMALJIT VILLAVICENCIO - Registered Nurse

## 2023-11-27 ENCOUNTER — OFFICE VISIT (OUTPATIENT)
Dept: FAMILY MEDICINE CLINIC | Facility: CLINIC | Age: 88
End: 2023-11-27
Payer: MEDICARE

## 2023-11-27 VITALS
TEMPERATURE: 98.5 F | SYSTOLIC BLOOD PRESSURE: 172 MMHG | BODY MASS INDEX: 39.39 KG/M2 | HEIGHT: 63 IN | HEART RATE: 59 BPM | WEIGHT: 222.3 LBS | DIASTOLIC BLOOD PRESSURE: 71 MMHG | OXYGEN SATURATION: 97 %

## 2023-11-27 DIAGNOSIS — M25.512 CHRONIC LEFT SHOULDER PAIN: ICD-10-CM

## 2023-11-27 DIAGNOSIS — Z09 HOSPITAL DISCHARGE FOLLOW-UP: Primary | ICD-10-CM

## 2023-11-27 DIAGNOSIS — G89.29 CHRONIC LEFT SHOULDER PAIN: ICD-10-CM

## 2023-11-27 DIAGNOSIS — M62.838 MUSCLE SPASM: ICD-10-CM

## 2023-11-27 RX ORDER — CYCLOBENZAPRINE HCL 5 MG
5 TABLET ORAL 3 TIMES DAILY PRN
Qty: 30 TABLET | Refills: 1 | Status: SHIPPED | OUTPATIENT
Start: 2023-11-27

## 2023-11-27 RX ORDER — CYCLOBENZAPRINE HCL 10 MG
10 TABLET ORAL 3 TIMES DAILY PRN
Qty: 30 TABLET | Refills: 0 | Status: CANCELLED | OUTPATIENT
Start: 2023-11-27

## 2023-11-27 RX ORDER — HYDROCODONE BITARTRATE AND ACETAMINOPHEN 5; 325 MG/1; MG/1
1 TABLET ORAL EVERY 6 HOURS PRN
Qty: 30 TABLET | Refills: 0 | Status: SHIPPED | OUTPATIENT
Start: 2023-11-27

## 2023-11-27 NOTE — PROGRESS NOTES
"Transitional Care Follow Up Visit  Subjective     Leandra Nuñez is a 88 y.o. female who presents for a transitional care management visit.    Within 48 business hours after discharge our office contacted her via telephone to coordinate her care and needs.      I reviewed and discussed the details of that call along with the discharge summary, hospital problems, inpatient lab results, inpatient diagnostic studies, and consultation reports with Leandra.     Current outpatient and discharge medications have been reconciled for the patient.  Reviewed by: JESSIE Brito          11/14/2023     8:16 PM   Date of TCM Phone Call   [facility]   Date of Admission 11/12/2023   Date of Discharge 11/14/2023   Discharge Disposition Home or Self Care     Risk for Readmission (LACE) Score: 8 (11/14/2023  6:00 AM)      History of Present Illness   Course During Hospital Stay:  \"Patient is a 88 y.o. female presented with chest pain and right shoulder pain.  Patient reports no falls at home although she does live at home by herself. High sensitivity troponin 25, 24, and 27.  .  Lipid panel slightly elevated but patient intolerant to statins.  Chest x-ray showed mild edema with no sign of pleural effusion or pneumothorax and cardiac enlargement.  EKG showed atrial fibrillation with controlled rate.  Patient stress test which showed no ischemia.  Patient continues to report right shoulder pain which showed x-ray chronic degenerative changes without acute dislocation or fracture.  Patient also evaluated by cardiology and physical therapy.  Physical therapy recommended patient safe to go home while using walker.  Cardiology advised patient to monitor blood pressure and take blood pressure.  Patient to follow-up PCP in 1 to 2 weeks for continued care management.  Patient to follow with cardiology and neck scheduled appointment.  Patient take medication as prescribed.  If symptoms worsen patient call 911 or go to " "nearest ED.\"    Pt is here today following up on 11/12 hospital admission for chest pain, right shoulder pain. She broke her left arm 3 years ago and her shoulder has bothered her since. Her right shoulder also showed bone spurs on her xray and bothers her occasionally. Once a week her shoulders flare up and she can't get comfortable to sleep. Especially the left one. Tylenol doesn't help. She is unable to take ibuprofen or nsaids due to having 1 kidney. She is not open to doing injections. Since leaving the hospital she denies chest pain, SOA, dizziness.        The following portions of the patient's history were reviewed and updated as appropriate: allergies, current medications, past family history, past medical history, past social history, past surgical history, and problem list.    Review of Systems    Objective   Physical Exam  Vitals reviewed.   Constitutional:       General: She is not in acute distress.     Appearance: Normal appearance. She is not ill-appearing, toxic-appearing or diaphoretic.   Cardiovascular:      Rate and Rhythm: Normal rate and regular rhythm.      Pulses: Normal pulses.      Heart sounds: Normal heart sounds.   Pulmonary:      Effort: Pulmonary effort is normal. No respiratory distress.      Breath sounds: Normal breath sounds.   Musculoskeletal:      Right shoulder: Crepitus present. Decreased range of motion.      Left shoulder: Deformity and crepitus present. Decreased range of motion.   Skin:     General: Skin is warm and dry.      Capillary Refill: Capillary refill takes less than 2 seconds.   Neurological:      General: No focal deficit present.      Mental Status: She is alert and oriented to person, place, and time.   Psychiatric:         Mood and Affect: Mood normal.         Behavior: Behavior normal.         Thought Content: Thought content normal.         Judgment: Judgment normal.         Assessment & Plan   Diagnoses and all orders for this visit:    1. Hospital " discharge follow-up (Primary)    2. Chronic left shoulder pain  Comments:  -controlled substance agreement signed  -f/u with Dr. Rios in 3 months  Orders:  -     HYDROcodone-acetaminophen (Norco) 5-325 MG per tablet; Take 1 tablet by mouth Every 6 (Six) Hours As Needed for Moderate Pain or Severe Pain.  Dispense: 30 tablet; Refill: 0    3. Muscle spasm  -     cyclobenzaprine (FLEXERIL) 5 MG tablet; Take 1 tablet by mouth 3 (Three) Times a Day As Needed for Muscle Spasms.  Dispense: 30 tablet; Refill: 1                    no diabetes and no thyroid trouble.

## 2023-12-06 ENCOUNTER — TELEPHONE (OUTPATIENT)
Dept: FAMILY MEDICINE CLINIC | Facility: CLINIC | Age: 88
End: 2023-12-06
Payer: MEDICARE

## 2023-12-06 NOTE — TELEPHONE ENCOUNTER
Caller: Leandra Nuñez    Relationship: Self    Best call back number: [phone]    What medication are you requestinMG TRAZADONE    Have you had these symptoms before:    [] Yes  [x] No    Have you been treated for these symptoms before:   [] Yes  [x] No    If a prescription is needed, what is your preferred pharmacy and phone number: [facility] #[id number] [address] AT [address] [phone] [facility] [phone]      Additional notes:    HAD AN APPOINTMENT WITH JEAN-PIERRE ROJAS ABOUT STARTING THIS MEDICATION.     PLEASE CALL TO CONFIRM OR DENY.

## 2023-12-06 NOTE — TELEPHONE ENCOUNTER
Her daughter mentioned it while in with her  at a separate visit. Was asking if it would be an option for her mom and stated she had given her mother her trazodone and it worked for her. I advised her to reach out/follow up with you on it.

## 2023-12-06 NOTE — TELEPHONE ENCOUNTER
This medication is strongly discouraged in the elderly.  I do not think it would be wise and would increase her risk of falls  I know her daughter meant well but it is never good to share meds  She can try giving her 5mg of melatonin nightly  It might take a month to see the full benefit

## 2023-12-06 NOTE — TELEPHONE ENCOUNTER
I said I recommended it being discussed with her PCP, I agree with Dr. Rios that it is not recommended in elderly especially since she is on a narcotic.

## 2023-12-13 RX ORDER — TRAZODONE HYDROCHLORIDE 50 MG/1
50 TABLET ORAL NIGHTLY
Qty: 30 TABLET | Refills: 1 | Status: SHIPPED | OUTPATIENT
Start: 2023-12-13

## 2024-01-04 NOTE — PROGRESS NOTES
"Chief Complaint  Sleep Apnea    Subjective          Leandra Nuñez presents to [facility]  History of Present Illness  HOMA f/u patient   She couldn't tolerate the CPAP, she stated the HST was ordered to verify if she does have HOMA.    Sleep testing history:    On NPSG at [facility] , 9/13/23 patient had Mild obstructive sleep apnea syndrome with apnea-hypopnea index of 6.7 per sleep hour, minimum SpO2 of 81%  Pt is not able to use CPAP   [place] Sleepiness Scale:  Sitting and reading 1 WatchingTV 1  Sitting, inactive, in a public place 0  As a passenger in a car for 1 hour w/o a break  2  Lying down to rest in the afternoon  2  Sitting and talking to someone  0  Sitting quietly after a lunch  0  In a car, while stopped for traffic or a light  0  Total 6  Ct of head 10 years ago   There is a moderate degree of diffuse atrophy. 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       Review of Systems   Constitutional:  Positive for fatigue.   HENT:  Positive for hearing loss and tinnitus.    Respiratory:  Positive for apnea.    Musculoskeletal:  Positive for back pain and joint swelling.   Psychiatric/Behavioral:  Positive for confusion, decreased concentration and sleep disturbance.    All other systems reviewed and are negative.        Objective   Vital Signs:   /84 (BP Location: Right arm, Patient Position: Sitting, Cuff Size: Adult)   Pulse 54   Ht 160 cm (63\")   Wt 102 kg (224 lb)   BMI 39.68 kg/m²     Physical Exam  Vitals reviewed.   Constitutional:       Appearance: Normal appearance.   Cardiovascular:      Rate and Rhythm: Normal rate.   Neurological:      General: No focal deficit present.      Mental Status: She is alert and oriented to person, place, and time.   Psychiatric:         Mood and Affect: Mood normal.        Result Review :                 Assessment and Plan  "   Diagnoses and all orders for this visit:    1. Obstructive sleep apnea syndrome (Primary)    2. Memory loss  -     Vitamin B12; Future  -     Folate; Future  -     Calcitriol (1,25 di-OH Vitamin D); Future  -     CBC & Differential; Future  -     Comprehensive Metabolic Panel; Future  -     T Pallidum Antibody (FTA-Ab); Future  -     Vitamin B6; Future  -     Vitamin E; Future  -     Copper, Serum; Future  -     MRI Brain Without Contrast; Future  -     donepezil (Aricept) 5 MG tablet; Take 1 tablet by mouth Every Night for 30 days.  Dispense: 30 tablet; Refill: 0  -     donepezil (Aricept) 10 MG tablet; Take 1 tablet by mouth Every Night.  Dispense: 30 tablet; Refill: 11        The patient is compliant with and benefiting from PAP therapy.  Pt had mild viral, unable to do cpap, so dc this effort    For the memory problems, will check labs and mri brain and start aricept       Follow Up   Return in about 6 months (around 7/8/2024).    Patient was given instructions and counseling regarding her condition or for health maintenance advice. Please see specific information pulled into the AVS if appropriate.       This document has been electronically signed by Joseph Seipel, MD on January 8, 2024 15:06 EST

## 2024-01-08 ENCOUNTER — OFFICE VISIT (OUTPATIENT)
Dept: NEUROLOGY | Facility: CLINIC | Age: 89
End: 2024-01-08
Payer: MEDICARE

## 2024-01-08 VITALS
HEART RATE: 54 BPM | DIASTOLIC BLOOD PRESSURE: 84 MMHG | HEIGHT: 63 IN | SYSTOLIC BLOOD PRESSURE: 179 MMHG | WEIGHT: 224 LBS | BODY MASS INDEX: 39.69 KG/M2

## 2024-01-08 DIAGNOSIS — M25.512 CHRONIC LEFT SHOULDER PAIN: ICD-10-CM

## 2024-01-08 DIAGNOSIS — G47.33 OBSTRUCTIVE SLEEP APNEA SYNDROME: Primary | ICD-10-CM

## 2024-01-08 DIAGNOSIS — G89.29 CHRONIC LEFT SHOULDER PAIN: ICD-10-CM

## 2024-01-08 DIAGNOSIS — R41.3 MEMORY LOSS: ICD-10-CM

## 2024-01-08 PROCEDURE — 1160F RVW MEDS BY RX/DR IN RCRD: CPT | Performed by: PSYCHIATRY & NEUROLOGY

## 2024-01-08 PROCEDURE — 1159F MED LIST DOCD IN RCRD: CPT | Performed by: PSYCHIATRY & NEUROLOGY

## 2024-01-08 PROCEDURE — 99214 OFFICE O/P EST MOD 30 MIN: CPT | Performed by: PSYCHIATRY & NEUROLOGY

## 2024-01-08 RX ORDER — DONEPEZIL HYDROCHLORIDE 5 MG/1
5 TABLET, FILM COATED ORAL NIGHTLY
Qty: 30 TABLET | Refills: 0 | Status: SHIPPED | OUTPATIENT
Start: 2024-01-08 | End: 2024-02-07

## 2024-01-08 RX ORDER — DONEPEZIL HYDROCHLORIDE 10 MG/1
10 TABLET, FILM COATED ORAL NIGHTLY
Qty: 30 TABLET | Refills: 11 | Status: SHIPPED | OUTPATIENT
Start: 2024-02-08 | End: 2025-02-07

## 2024-01-08 RX ORDER — FUROSEMIDE 20 MG/1
TABLET ORAL
COMMUNITY
Start: 2023-12-11

## 2024-01-08 RX ORDER — HYDROCODONE BITARTRATE AND ACETAMINOPHEN 5; 325 MG/1; MG/1
1 TABLET ORAL EVERY 6 HOURS PRN
Qty: 30 TABLET | Refills: 0 | Status: SHIPPED | OUTPATIENT
Start: 2024-01-08

## 2024-01-08 RX ORDER — EZETIMIBE 10 MG/1
TABLET ORAL
COMMUNITY
Start: 2023-12-11

## 2024-01-08 NOTE — TELEPHONE ENCOUNTER
Caller: Leandra Nuñez    Relationship: Self    Best call back number: [phone]     Requested Prescriptions:   Requested Prescriptions     Pending Prescriptions Disp Refills    HYDROcodone-acetaminophen (Norco) 5-325 MG per tablet 30 tablet 0     Sig: Take 1 tablet by mouth Every 6 (Six) Hours As Needed for Moderate Pain or Severe Pain.        Pharmacy where request should be sent: [facility] #[id number] [address] AT [address] [phone] [facility] [phone]      Last office visit with prescribing clinician: 1/14/2022   Last telemedicine visit with prescribing clinician: Visit date not found   Next office visit with prescribing clinician: 3/13/2024     Additional details provided by patient: PATIENT IS OUT OF MEDICATION    Does the patient have less than a 3 day supply:  [x] Yes  [] No    Would you like a call back once the refill request has been completed: [] Yes [x] No    If the office needs to give you a call back, can they leave a voicemail: [] Yes [x] No    Waqar Ballard Rep   01/08/24 13:51 EST

## 2024-01-09 RX ORDER — DONEPEZIL HYDROCHLORIDE 10 MG/1
10 TABLET, FILM COATED ORAL NIGHTLY
Qty: 90 TABLET | OUTPATIENT
Start: 2024-01-09 | End: 2025-01-08

## 2024-01-09 RX ORDER — DONEPEZIL HYDROCHLORIDE 5 MG/1
5 TABLET, FILM COATED ORAL NIGHTLY
Qty: 90 TABLET | OUTPATIENT
Start: 2024-01-09

## 2024-01-10 ENCOUNTER — LAB (OUTPATIENT)
Dept: LAB | Facility: HOSPITAL | Age: 89
End: 2024-01-10
Payer: MEDICARE

## 2024-01-10 DIAGNOSIS — R41.3 MEMORY LOSS: ICD-10-CM

## 2024-01-10 LAB
ALBUMIN SERPL-MCNC: 4.7 G/DL (ref 3.5–5.2)
ALBUMIN/GLOB SERPL: 1.7 G/DL
ALP SERPL-CCNC: 94 U/L (ref 39–117)
ALT SERPL W P-5'-P-CCNC: 19 U/L (ref 1–33)
ANION GAP SERPL CALCULATED.3IONS-SCNC: 15 MMOL/L (ref 5–15)
AST SERPL-CCNC: 28 U/L (ref 1–32)
BASOPHILS # BLD AUTO: 0.08 10*3/MM3 (ref 0–0.2)
BASOPHILS NFR BLD AUTO: 0.7 % (ref 0–1.5)
BILIRUB SERPL-MCNC: 0.3 MG/DL (ref 0–1.2)
BUN SERPL-MCNC: 24 MG/DL (ref 8–23)
BUN/CREAT SERPL: 19.2 (ref 7–25)
CALCIUM SPEC-SCNC: 10.2 MG/DL (ref 8.6–10.5)
CHLORIDE SERPL-SCNC: 102 MMOL/L (ref 98–107)
CO2 SERPL-SCNC: 21 MMOL/L (ref 22–29)
CREAT SERPL-MCNC: 1.25 MG/DL (ref 0.57–1)
DEPRECATED RDW RBC AUTO: 43 FL (ref 37–54)
EGFRCR SERPLBLD CKD-EPI 2021: 41.8 ML/MIN/1.73
EOSINOPHIL # BLD AUTO: 0.17 10*3/MM3 (ref 0–0.4)
EOSINOPHIL NFR BLD AUTO: 1.4 % (ref 0.3–6.2)
ERYTHROCYTE [DISTWIDTH] IN BLOOD BY AUTOMATED COUNT: 13.4 % (ref 12.3–15.4)
FOLATE SERPL-MCNC: 8.3 NG/ML (ref 4.78–24.2)
GLOBULIN UR ELPH-MCNC: 2.7 GM/DL
GLUCOSE SERPL-MCNC: 100 MG/DL (ref 65–99)
HCT VFR BLD AUTO: 40.9 % (ref 34–46.6)
HGB BLD-MCNC: 13.5 G/DL (ref 12–15.9)
IMM GRANULOCYTES # BLD AUTO: 0.04 10*3/MM3 (ref 0–0.05)
IMM GRANULOCYTES NFR BLD AUTO: 0.3 % (ref 0–0.5)
LYMPHOCYTES # BLD AUTO: 2.63 10*3/MM3 (ref 0.7–3.1)
LYMPHOCYTES NFR BLD AUTO: 21.4 % (ref 19.6–45.3)
MCH RBC QN AUTO: 29.4 PG (ref 26.6–33)
MCHC RBC AUTO-ENTMCNC: 33 G/DL (ref 31.5–35.7)
MCV RBC AUTO: 89.1 FL (ref 79–97)
MONOCYTES # BLD AUTO: 0.92 10*3/MM3 (ref 0.1–0.9)
MONOCYTES NFR BLD AUTO: 7.5 % (ref 5–12)
NEUTROPHILS NFR BLD AUTO: 68.7 % (ref 42.7–76)
NEUTROPHILS NFR BLD AUTO: 8.46 10*3/MM3 (ref 1.7–7)
NRBC BLD AUTO-RTO: 0 /100 WBC (ref 0–0.2)
PLATELET # BLD AUTO: 408 10*3/MM3 (ref 140–450)
PMV BLD AUTO: 10.6 FL (ref 6–12)
POTASSIUM SERPL-SCNC: 4.2 MMOL/L (ref 3.5–5.2)
PROT SERPL-MCNC: 7.4 G/DL (ref 6–8.5)
RBC # BLD AUTO: 4.59 10*6/MM3 (ref 3.77–5.28)
SODIUM SERPL-SCNC: 138 MMOL/L (ref 136–145)
VIT B12 BLD-MCNC: 360 PG/ML (ref 211–946)
WBC NRBC COR # BLD AUTO: 12.3 10*3/MM3 (ref 3.4–10.8)

## 2024-01-10 PROCEDURE — 82652 VIT D 1 25-DIHYDROXY: CPT

## 2024-01-10 PROCEDURE — 82746 ASSAY OF FOLIC ACID SERUM: CPT

## 2024-01-10 PROCEDURE — 36415 COLL VENOUS BLD VENIPUNCTURE: CPT

## 2024-01-10 PROCEDURE — 84446 ASSAY OF VITAMIN E: CPT

## 2024-01-10 PROCEDURE — 85025 COMPLETE CBC W/AUTO DIFF WBC: CPT

## 2024-01-10 PROCEDURE — 84207 ASSAY OF VITAMIN B-6: CPT

## 2024-01-10 PROCEDURE — 82607 VITAMIN B-12: CPT

## 2024-01-10 PROCEDURE — 80053 COMPREHEN METABOLIC PANEL: CPT

## 2024-01-10 PROCEDURE — 86780 TREPONEMA PALLIDUM: CPT

## 2024-01-10 PROCEDURE — 82525 ASSAY OF COPPER: CPT

## 2024-01-11 LAB — T PALLIDUM AB SER QL IF: NON REACTIVE

## 2024-01-12 LAB
A-TOCOPHEROL VIT E SERPL-MCNC: 19.1 MG/L (ref 9–29)
GAMMA TOCOPHEROL SERPL-MCNC: 1.8 MG/L (ref 0.5–4.9)

## 2024-01-13 LAB — COPPER SERPL-MCNC: 154 UG/DL (ref 80–158)

## 2024-01-14 LAB — PYRIDOXAL PHOS SERPL-MCNC: 5.9 UG/L (ref 3.4–65.2)

## 2024-01-15 LAB — 1,25(OH)2D SERPL-MCNC: 38.6 PG/ML (ref 24.8–81.5)

## 2024-02-06 DIAGNOSIS — R41.3 MEMORY LOSS: ICD-10-CM

## 2024-02-06 RX ORDER — DONEPEZIL HYDROCHLORIDE 5 MG/1
5 TABLET, FILM COATED ORAL NIGHTLY
Qty: 90 TABLET | OUTPATIENT
Start: 2024-02-06

## 2024-02-21 RX ORDER — TRAZODONE HYDROCHLORIDE 50 MG/1
50 TABLET ORAL NIGHTLY
Qty: 30 TABLET | Refills: 1 | Status: SHIPPED | OUTPATIENT
Start: 2024-02-21

## 2024-02-24 ENCOUNTER — APPOINTMENT (OUTPATIENT)
Dept: CARDIOLOGY | Facility: HOSPITAL | Age: 89
End: 2024-02-24
Payer: MEDICARE

## 2024-02-24 ENCOUNTER — HOSPITAL ENCOUNTER (EMERGENCY)
Facility: HOSPITAL | Age: 89
Discharge: HOME | End: 2024-02-24
Attending: EMERGENCY MEDICINE
Payer: MEDICARE

## 2024-02-24 ENCOUNTER — APPOINTMENT (OUTPATIENT)
Dept: GENERAL RADIOLOGY | Facility: HOSPITAL | Age: 89
End: 2024-02-24
Payer: MEDICARE

## 2024-02-24 VITALS
TEMPERATURE: 98.2 F | OXYGEN SATURATION: 93 % | HEIGHT: 63 IN | BODY MASS INDEX: 39.69 KG/M2 | HEART RATE: 63 BPM | SYSTOLIC BLOOD PRESSURE: 158 MMHG | RESPIRATION RATE: 18 BRPM | WEIGHT: 224 LBS | DIASTOLIC BLOOD PRESSURE: 70 MMHG

## 2024-02-24 DIAGNOSIS — M10.9 ACUTE GOUT OF LEFT FOOT, UNSPECIFIED CAUSE: Primary | ICD-10-CM

## 2024-02-24 LAB
ANION GAP SERPL CALCULATED.3IONS-SCNC: 17 MMOL/L (ref 5–15)
BASOPHILS # BLD AUTO: 0.1 10*3/MM3 (ref 0–0.2)
BASOPHILS NFR BLD AUTO: 0.7 % (ref 0–1.5)
BH CV LOWER VASCULAR LEFT COMMON FEMORAL AUGMENT: NORMAL
BH CV LOWER VASCULAR LEFT COMMON FEMORAL COMPETENT: NORMAL
BH CV LOWER VASCULAR LEFT COMMON FEMORAL COMPRESS: NORMAL
BH CV LOWER VASCULAR LEFT COMMON FEMORAL PHASIC: NORMAL
BH CV LOWER VASCULAR LEFT COMMON FEMORAL SPONT: NORMAL
BH CV LOWER VASCULAR LEFT DISTAL FEMORAL COMPRESS: NORMAL
BH CV LOWER VASCULAR LEFT GASTRONEMIUS COMPRESS: NORMAL
BH CV LOWER VASCULAR LEFT GREATER SAPH AK COMPRESS: NORMAL
BH CV LOWER VASCULAR LEFT GREATER SAPH BK COMPRESS: NORMAL
BH CV LOWER VASCULAR LEFT LESSER SAPH COMPRESS: NORMAL
BH CV LOWER VASCULAR LEFT MID FEMORAL AUGMENT: NORMAL
BH CV LOWER VASCULAR LEFT MID FEMORAL COMPETENT: NORMAL
BH CV LOWER VASCULAR LEFT MID FEMORAL COMPRESS: NORMAL
BH CV LOWER VASCULAR LEFT MID FEMORAL PHASIC: NORMAL
BH CV LOWER VASCULAR LEFT MID FEMORAL SPONT: NORMAL
BH CV LOWER VASCULAR LEFT PERONEAL COMPRESS: NORMAL
BH CV LOWER VASCULAR LEFT POPLITEAL AUGMENT: NORMAL
BH CV LOWER VASCULAR LEFT POPLITEAL COMPETENT: NORMAL
BH CV LOWER VASCULAR LEFT POPLITEAL COMPRESS: NORMAL
BH CV LOWER VASCULAR LEFT POPLITEAL PHASIC: NORMAL
BH CV LOWER VASCULAR LEFT POPLITEAL SPONT: NORMAL
BH CV LOWER VASCULAR LEFT POSTERIOR TIBIAL COMPRESS: NORMAL
BH CV LOWER VASCULAR LEFT PROXIMAL FEMORAL COMPRESS: NORMAL
BH CV LOWER VASCULAR LEFT SAPHENOFEMORAL JUNCTION COMPRESS: NORMAL
BH CV LOWER VASCULAR RIGHT COMMON FEMORAL AUGMENT: NORMAL
BH CV LOWER VASCULAR RIGHT COMMON FEMORAL COMPETENT: NORMAL
BH CV LOWER VASCULAR RIGHT COMMON FEMORAL COMPRESS: NORMAL
BH CV LOWER VASCULAR RIGHT COMMON FEMORAL PHASIC: NORMAL
BH CV LOWER VASCULAR RIGHT COMMON FEMORAL SPONT: NORMAL
BH CV VAS PRELIMINARY FINDINGS SCRIPTING: 1
BUN SERPL-MCNC: 19 MG/DL (ref 8–23)
BUN/CREAT SERPL: 17.3 (ref 7–25)
CALCIUM SPEC-SCNC: 10.1 MG/DL (ref 8.6–10.5)
CHLORIDE SERPL-SCNC: 103 MMOL/L (ref 98–107)
CO2 SERPL-SCNC: 21 MMOL/L (ref 22–29)
CREAT SERPL-MCNC: 1.1 MG/DL (ref 0.57–1)
CRP SERPL-MCNC: 5.05 MG/DL (ref 0–0.5)
DEPRECATED RDW RBC AUTO: 45.1 FL (ref 37–54)
EGFRCR SERPLBLD CKD-EPI 2021: 48.7 ML/MIN/1.73
EOSINOPHIL # BLD AUTO: 0 10*3/MM3 (ref 0–0.4)
EOSINOPHIL NFR BLD AUTO: 0.1 % (ref 0.3–6.2)
ERYTHROCYTE [DISTWIDTH] IN BLOOD BY AUTOMATED COUNT: 14.6 % (ref 12.3–15.4)
ERYTHROCYTE [SEDIMENTATION RATE] IN BLOOD: 64 MM/HR (ref 0–30)
GLUCOSE SERPL-MCNC: 172 MG/DL (ref 65–99)
HCT VFR BLD AUTO: 39.6 % (ref 34–46.6)
HGB BLD-MCNC: 13 G/DL (ref 12–15.9)
LYMPHOCYTES # BLD AUTO: 2 10*3/MM3 (ref 0.7–3.1)
LYMPHOCYTES NFR BLD AUTO: 12.1 % (ref 19.6–45.3)
MCH RBC QN AUTO: 29.1 PG (ref 26.6–33)
MCHC RBC AUTO-ENTMCNC: 32.8 G/DL (ref 31.5–35.7)
MCV RBC AUTO: 88.9 FL (ref 79–97)
MONOCYTES # BLD AUTO: 1.1 10*3/MM3 (ref 0.1–0.9)
MONOCYTES NFR BLD AUTO: 6.6 % (ref 5–12)
NEUTROPHILS NFR BLD AUTO: 13 10*3/MM3 (ref 1.7–7)
NEUTROPHILS NFR BLD AUTO: 80.5 % (ref 42.7–76)
NRBC BLD AUTO-RTO: 0 /100 WBC (ref 0–0.2)
PLATELET # BLD AUTO: 340 10*3/MM3 (ref 140–450)
PMV BLD AUTO: 8.7 FL (ref 6–12)
POTASSIUM SERPL-SCNC: 3.8 MMOL/L (ref 3.5–5.2)
RBC # BLD AUTO: 4.45 10*6/MM3 (ref 3.77–5.28)
SODIUM SERPL-SCNC: 141 MMOL/L (ref 136–145)
URATE SERPL-MCNC: 9.6 MG/DL (ref 2.4–5.7)
WBC NRBC COR # BLD AUTO: 16.2 10*3/MM3 (ref 3.4–10.8)

## 2024-02-24 PROCEDURE — 84550 ASSAY OF BLOOD/URIC ACID: CPT | Performed by: EMERGENCY MEDICINE

## 2024-02-24 PROCEDURE — 85025 COMPLETE CBC W/AUTO DIFF WBC: CPT | Performed by: EMERGENCY MEDICINE

## 2024-02-24 PROCEDURE — 36415 COLL VENOUS BLD VENIPUNCTURE: CPT

## 2024-02-24 PROCEDURE — 63710000001 PREDNISONE PER 1 MG: Performed by: EMERGENCY MEDICINE

## 2024-02-24 PROCEDURE — 93971 EXTREMITY STUDY: CPT

## 2024-02-24 PROCEDURE — 99284 EMERGENCY DEPT VISIT MOD MDM: CPT

## 2024-02-24 PROCEDURE — 73630 X-RAY EXAM OF FOOT: CPT

## 2024-02-24 PROCEDURE — 85652 RBC SED RATE AUTOMATED: CPT | Performed by: EMERGENCY MEDICINE

## 2024-02-24 PROCEDURE — 86140 C-REACTIVE PROTEIN: CPT | Performed by: EMERGENCY MEDICINE

## 2024-02-24 PROCEDURE — 80048 BASIC METABOLIC PNL TOTAL CA: CPT | Performed by: EMERGENCY MEDICINE

## 2024-02-24 RX ORDER — METHYLPREDNISOLONE 4 MG/1
TABLET ORAL
Qty: 1 EACH | Refills: 0 | Status: SHIPPED | OUTPATIENT
Start: 2024-02-24

## 2024-02-24 RX ORDER — PREDNISONE 20 MG/1
60 TABLET ORAL ONCE
Status: COMPLETED | OUTPATIENT
Start: 2024-02-24 | End: 2024-02-24

## 2024-02-24 RX ORDER — SODIUM CHLORIDE 0.9 % (FLUSH) 0.9 %
10 SYRINGE (ML) INJECTION AS NEEDED
Status: DISCONTINUED | OUTPATIENT
Start: 2024-02-24 | End: 2024-02-24 | Stop reason: HOSPADM

## 2024-02-24 RX ADMIN — PREDNISONE 60 MG: 20 TABLET ORAL at 17:28

## 2024-02-24 NOTE — DISCHARGE INSTRUCTIONS
Weight-bear as tolerated with your walker.  Elevate the extremity.  Follow-up with your doctor this week for reexamination and further gout management.

## 2024-02-24 NOTE — ED PROVIDER NOTES
Subjective   History of Present Illness  88-year-old female describes some pain in her left foot and to a lesser degree her left ankle that she believes started last night.  Patient is somewhat of a poor historian as far as giving detail of her discomfort.  She reports no calf or thigh pain or trauma or fevers.  She denies history of gout or inflammatory arthritis  Review of Systems  Negative for chest pain or shortness of breath or abdominal pain  Past Medical History:   Diagnosis Date    Anxiety     Arthritis     Breast nodule 2013    Left breast nodule (fibrocystic disease , no malignancy)     Cancer     Cataract     Chronic kidney disease     HL (hearing loss)     Hyperlipidemia     Hypertension     Obesity     Shortness of breath        Allergies   Allergen Reactions    Statins Myalgia and Other (See Comments)       Past Surgical History:   Procedure Laterality Date    BREAST BIOPSY Left 05/21/2013    Benign fibrocystic disease ,[facility]    CARDIAC CATHETERIZATION      D & C AND LAPAROSCOPY      FULGURATION ENDOMETRIOSIS      surgery    NEPHRECTOMY Right     ORIF HUMERUS FRACTURE Left 3/24/2021    Procedure: HUMERUS PROXIMAL OPEN REDUCTION INTERNAL FIXATION;  Surgeon: Yair Anne MD;  Location: [facility];  Service: Orthopedics;  Laterality: Left;       No family history on file.    Social History     Socioeconomic History    Marital status:    Tobacco Use    Smoking status: Never    Smokeless tobacco: Never   Vaping Use    Vaping Use: Never used   Substance and Sexual Activity    Alcohol use: No    Drug use: No    Sexual activity: Defer     Partners: Male     Prior to Admission medications    Medication Sig Start Date End Date Taking? Authorizing Provider   amLODIPine (NORVASC) 5 MG tablet Take 1 tablet by mouth Daily.  Patient taking differently: Take 2 tablets by mouth Daily. 5/31/23   Anabelle Sellers MD   cyclobenzaprine (FLEXERIL) 5 MG tablet Take 1 tablet by  "mouth 3 (Three) Times a Day As Needed for Muscle Spasms. 11/27/23   Germaine Daily APRN   donepezil (Aricept) 10 MG tablet Take 1 tablet by mouth Every Night. 2/8/24 2/7/25  Seipel, Joseph F, MD   donepezil (Aricept) 5 MG tablet Take 1 tablet by mouth Every Night for 30 days. 1/8/24 2/7/24  Seipel, Joseph F, MD   ezetimibe (ZETIA) 10 MG tablet  12/11/23   ProviderJory MD   furosemide (LASIX) 20 MG tablet  12/11/23   ProviderJory MD   hydrALAZINE (APRESOLINE) 100 MG tablet TAKE 1 TABLET TWICE A DAY 3/1/23   Anabelle Sellers MD   HYDROcodone-acetaminophen (Norco) 5-325 MG per tablet Take 1 tablet by mouth Every 6 (Six) Hours As Needed for Moderate Pain or Severe Pain. 1/8/24   Anabelle Sellers MD   traZODone (DESYREL) 50 MG tablet TAKE 1 TABLET BY MOUTH EVERY NIGHT 2/21/24   Anabelle Sellers MD     /69   Pulse 74   Temp 98.1 °F (36.7 °C)   Resp 17   Ht 160 cm (63\")   Wt 102 kg (224 lb)   SpO2 93%   BMI 39.68 kg/m²         Objective   Physical Exam  General: Well-appearing, no acute distress  Psych: Oriented, cooperative  Respirations: Clear, nonlabored respirations  Abdomen soft nontender, nondistended  Extremity: No localized fever or soft tissue swelling of the left foot, there is some mild tenderness palpation in the dorsal aspect of the foot diffusely, the plantar surface is normal, she has normal pulses in the foot and ankle, there is no ankle joint swelling, she has brisk cap refill in the toes, there is no discoloration of the feet or toes; Achilles nontender, she has sensory and motor function of the feet and toes intact, calves and thighs are symmetric and nontender  Skin: No rash, normal color  Procedures           ED Course  ED Course as of 02/24/24 1708   Sat Feb 24, 2024 1708 Patient was eager to go home on reevaluation. [SH]      ED Course User Index  [SH] Cayden Hernandez MD      Results for orders placed or performed during " the hospital encounter of 02/24/24   Basic Metabolic Panel    Specimen: Blood   Result Value Ref Range    Glucose 172 (H) 65 - 99 mg/dL    BUN 19 8 - 23 mg/dL    Creatinine 1.10 (H) 0.57 - 1.00 mg/dL    Sodium 141 136 - 145 mmol/L    Potassium 3.8 3.5 - 5.2 mmol/L    Chloride 103 98 - 107 mmol/L    CO2 21.0 (L) 22.0 - 29.0 mmol/L    Calcium 10.1 8.6 - 10.5 mg/dL    BUN/Creatinine Ratio 17.3 7.0 - 25.0    Anion Gap 17.0 (H) 5.0 - 15.0 mmol/L    eGFR 48.7 (L) >60.0 mL/min/1.73   Sedimentation Rate    Specimen: Blood   Result Value Ref Range    Sed Rate 64 (H) 0 - 30 mm/hr   C-reactive Protein    Specimen: Blood   Result Value Ref Range    C-Reactive Protein 5.05 (H) 0.00 - 0.50 mg/dL   Uric Acid    Specimen: Blood   Result Value Ref Range    Uric Acid 9.6 (H) 2.4 - 5.7 mg/dL   CBC Auto Differential    Specimen: Blood   Result Value Ref Range    WBC 16.20 (H) 3.40 - 10.80 10*3/mm3    RBC 4.45 3.77 - 5.28 10*6/mm3    Hemoglobin 13.0 12.0 - 15.9 g/dL    Hematocrit 39.6 34.0 - 46.6 %    MCV 88.9 79.0 - 97.0 fL    MCH 29.1 26.6 - 33.0 pg    MCHC 32.8 31.5 - 35.7 g/dL    RDW 14.6 12.3 - 15.4 %    RDW-SD 45.1 37.0 - 54.0 fl    MPV 8.7 6.0 - 12.0 fL    Platelets 340 140 - 450 10*3/mm3    Neutrophil % 80.5 (H) 42.7 - 76.0 %    Lymphocyte % 12.1 (L) 19.6 - 45.3 %    Monocyte % 6.6 5.0 - 12.0 %    Eosinophil % 0.1 (L) 0.3 - 6.2 %    Basophil % 0.7 0.0 - 1.5 %    Neutrophils, Absolute 13.00 (H) 1.70 - 7.00 10*3/mm3    Lymphocytes, Absolute 2.00 0.70 - 3.10 10*3/mm3    Monocytes, Absolute 1.10 (H) 0.10 - 0.90 10*3/mm3    Eosinophils, Absolute 0.00 0.00 - 0.40 10*3/mm3    Basophils, Absolute 0.10 0.00 - 0.20 10*3/mm3    nRBC 0.0 0.0 - 0.2 /100 WBC   Duplex Venous Lower Extremity - LEFT   Result Value Ref Range    Right Common Femoral Spont Y     Right Common Femoral Competent Y     Right Common Femoral Phasic Y     Right Common Femoral Compress C     Right Common Femoral Augment Y     Left Common Femoral Spont Y     Left Common  Femoral Competent Y     Left Common Femoral Phasic Y     Left Common Femoral Compress C     Left Common Femoral Augment Y     Left Saphenofemoral Junction Compress C     Left Proximal Femoral Compress C     Left Mid Femoral Spont Y     Left Mid Femoral Competent Y     Left Mid Femoral Phasic Y     Left Mid Femoral Compress C     Left Mid Femoral Augment Y     Left Distal Femoral Compress C     Left Popliteal Spont Y     Left Popliteal Competent Y     Left Popliteal Phasic Y     Left Popliteal Compress C     Left Popliteal Augment Y     Left Posterior Tibial Compress C     Left Peroneal Compress C     Left Gastronemius Compress C     Left Greater Saph AK Compress C     Left Greater Saph BK Compress C     Left Lesser Saph Compress C     BH CV VAS PRELIMINARY FINDINGS SCRIPTING 1.0      XR Foot 3+ View Left    Result Date: 2/24/2024  No acute fracture or dislocation. Electronically Signed: Sam Serrano MD  2/24/2024 2:11 PM EST  Workstation ID: [id number]                                          Medical Decision Making  Patient presents with left foot pain differential diagnosis including trauma, gout, ischemia, compartment syndrome, cellulitis or necrotizing fasciitis, septic arthritis, DVT    Doppler left lower extremity reportedly negative.  Patient was advised of findings.  Notably she has normal perfusion of the extremity without signs of ischemia and there is no sign of infection of the foot or leg.  Gout is suspected based on laboratory and physical exam evaluation.  She was ordered prednisone in the emergency room and prescribed Medrol Dosepak.  She is encouraged to follow-up with her primary care this week for recheck and further gout management.  Patient is agreeable plan and discharged in good condition she was given warning signs for return.    Problems Addressed:  Acute gout of left foot, unspecified cause: complicated acute illness or injury    Amount and/or Complexity of Data Reviewed  Labs: ordered.  Decision-making details documented in ED Course.     Details: Patient has elevated inflammatory markers and uric acid.  She also has some leukocytosis.  I reviewed the records she does have a chronic persistent leukocytosis of unclear significance.  Chem-7 shows a mild renal insufficiency and hyperglycemia  Radiology: ordered and independent interpretation performed.     Details: My independent interpretation of x-ray image of the left foot no apparent acute bony abnormality    Risk  Prescription drug management.        Final diagnoses:   Acute gout of left foot, unspecified cause       ED Disposition  ED Disposition       ED Disposition   Discharge    Condition   Stable    Comment   --               Anabelle Sellers MD  [address]  [address]  [place] [address] [phone]    Schedule an appointment as soon as possible for a visit in 1 week           Medication List        New Prescriptions      methylPREDNISolone 4 MG dose pack  Commonly known as: MEDROL  Take as directed on package instructions.            Changed      amLODIPine 5 MG tablet  Commonly known as: NORVASC  Take 1 tablet by mouth Daily.  What changed: how much to take               Where to Get Your Medications        These medications were sent to [id number] - [facility] [place] - [address] AT [address] - [phone] [facility] [phone]   [address] [facility] [place] [address]      Phone: [phone]   methylPREDNISolone 4 MG dose pack            Cayden Hernandez MD  02/24/24 1709       Cayden Hernandez MD  02/24/24 5231

## 2024-02-24 NOTE — ED NOTES
Pt refused to get undressed and into a gown and on monitor. Pt told tech she did not have to despite education on importance of being on the monitor and in a gown

## 2024-03-13 ENCOUNTER — OFFICE VISIT (OUTPATIENT)
Dept: FAMILY MEDICINE CLINIC | Facility: CLINIC | Age: 89
End: 2024-03-13
Payer: MEDICARE

## 2024-03-13 ENCOUNTER — LAB (OUTPATIENT)
Dept: FAMILY MEDICINE CLINIC | Facility: CLINIC | Age: 89
End: 2024-03-13
Payer: MEDICARE

## 2024-03-13 VITALS
RESPIRATION RATE: 18 BRPM | SYSTOLIC BLOOD PRESSURE: 138 MMHG | HEIGHT: 63 IN | TEMPERATURE: 98.4 F | WEIGHT: 212.4 LBS | BODY MASS INDEX: 37.63 KG/M2 | OXYGEN SATURATION: 97 % | DIASTOLIC BLOOD PRESSURE: 78 MMHG | HEART RATE: 49 BPM

## 2024-03-13 DIAGNOSIS — Z00.00 ENCOUNTER FOR ANNUAL WELLNESS EXAM IN MEDICARE PATIENT: Primary | ICD-10-CM

## 2024-03-13 DIAGNOSIS — I10 PRIMARY HYPERTENSION: ICD-10-CM

## 2024-03-13 DIAGNOSIS — E11.9 TYPE 2 DIABETES MELLITUS WITHOUT COMPLICATION, WITHOUT LONG-TERM CURRENT USE OF INSULIN: ICD-10-CM

## 2024-03-13 DIAGNOSIS — E78.1 PURE HYPERTRIGLYCERIDEMIA: ICD-10-CM

## 2024-03-13 DIAGNOSIS — R60.0 PEDAL EDEMA: ICD-10-CM

## 2024-03-13 LAB
CHOLEST SERPL-MCNC: 231 MG/DL (ref 0–200)
HBA1C MFR BLD: 6 % (ref 4.8–5.6)
HDLC SERPL-MCNC: 44 MG/DL (ref 40–60)
LDLC SERPL CALC-MCNC: 146 MG/DL (ref 0–100)
LDLC/HDLC SERPL: 3.24 {RATIO}
TRIGL SERPL-MCNC: 223 MG/DL (ref 0–150)
VLDLC SERPL-MCNC: 41 MG/DL (ref 5–40)

## 2024-03-13 PROCEDURE — 83036 HEMOGLOBIN GLYCOSYLATED A1C: CPT | Performed by: FAMILY MEDICINE

## 2024-03-13 PROCEDURE — 36415 COLL VENOUS BLD VENIPUNCTURE: CPT

## 2024-03-13 PROCEDURE — 80061 LIPID PANEL: CPT | Performed by: FAMILY MEDICINE

## 2024-03-13 RX ORDER — POTASSIUM CHLORIDE 750 MG/1
10 TABLET, FILM COATED, EXTENDED RELEASE ORAL DAILY
Qty: 90 TABLET | Refills: 3 | Status: SHIPPED | OUTPATIENT
Start: 2024-03-13

## 2024-03-13 RX ORDER — FUROSEMIDE 20 MG/1
20 TABLET ORAL DAILY
Qty: 90 TABLET | Refills: 3 | Status: SHIPPED | OUTPATIENT
Start: 2024-03-13

## 2024-03-13 NOTE — PROGRESS NOTES
The ABCs of the Annual Wellness Visit  Subsequent Medicare Wellness Visit    Subjective    Leandra Nuñez is a 88 y.o. female who presents for a Subsequent Medicare Wellness Visit.    The following portions of the patient's history were reviewed and   updated as appropriate: allergies, current medications, past family history, past medical history, past social history, past surgical history, and problem list.    Compared to one year ago, the patient feels her physical   health is the same.    Compared to one year ago, the patient feels her mental   health is the same.    Recent Hospitalizations:  This patient has had a [facility] admission record on file within the last 365 days.    Current Medical Providers:  Patient Care Team:  Anabelle Sellers MD as PCP - Jojo Baez PA as Physician Assistant (Physician Assistant)    Outpatient Medications Prior to Visit   Medication Sig Dispense Refill    amLODIPine (NORVASC) 5 MG tablet Take 1 tablet by mouth Daily. (Patient taking differently: Take 2 tablets by mouth Daily.) 90 tablet 0    cyclobenzaprine (FLEXERIL) 5 MG tablet Take 1 tablet by mouth 3 (Three) Times a Day As Needed for Muscle Spasms. 30 tablet 1    donepezil (Aricept) 10 MG tablet Take 1 tablet by mouth Every Night. 30 tablet 11    donepezil (Aricept) 5 MG tablet Take 1 tablet by mouth Every Night for 30 days. 30 tablet 0    hydrALAZINE (APRESOLINE) 100 MG tablet TAKE 1 TABLET TWICE A  tablet 3    HYDROcodone-acetaminophen (Norco) 5-325 MG per tablet Take 1 tablet by mouth Every 6 (Six) Hours As Needed for Moderate Pain or Severe Pain. 30 tablet 0    traZODone (DESYREL) 50 MG tablet TAKE 1 TABLET BY MOUTH EVERY NIGHT 30 tablet 1    furosemide (LASIX) 20 MG tablet       ezetimibe (ZETIA) 10 MG tablet  (Patient not taking: Reported on 3/13/2024)      methylPREDNISolone (MEDROL) 4 MG dose pack Take as directed on package instructions. (Patient not taking: Reported on  "3/13/2024) 1 each 0     No facility-administered medications prior to visit.       Opioid medication/s are on active medication list.  and I have evaluated her active treatment plan and pain score trends (see table).  There were no vitals filed for this visit.  I have reviewed the chart for potential of high risk medication and harmful drug interactions in the elderly.          Aspirin is not on active medication list.  Aspirin use is not indicated based on review of current medical condition/s. Risk of harm outweighs potential benefits.  .    Patient Active Problem List   Diagnosis    Anxiety disorder, unspecified    Asthma    Stage 3 chronic kidney disease    Hyperlipidemia    Hypertension    Obesity    Renal insufficiency    Type 2 diabetes mellitus without complication    Encounter for annual wellness exam in Medicare patient    Adjustment disorder with anxiety    Immune thrombocytopenia    Obstructive sleep apnea syndrome    Osteoarthritis    Clear cell carcinoma of kidney    Thrombocytopenia    Gastroesophageal reflux disease    White coat syndrome with diagnosis of hypertension    Leg cramps    Closed fracture of surgical neck of humerus    Fall    Absent kidney    Edema    Vitamin deficiency    Acute midline thoracic back pain    Chest pain    Hyperlipidemia due to type 2 diabetes mellitus     Advance Care Planning   Advance Care Planning     Advance Directive is on file.  ACP discussion was held with the patient during this visit. Patient has an advance directive in EMR which is still valid.      Objective    Vitals:    03/13/24 1004   BP: 138/78   BP Location: Left arm   Patient Position: Sitting   Cuff Size: Large Adult   Pulse: (!) 49   Resp: 18   Temp: 98.4 °F (36.9 °C)   TempSrc: Temporal   SpO2: 97%   Weight: 96.3 kg (212 lb 6.4 oz)   Height: 160 cm (62.99\")     Estimated body mass index is 37.63 kg/m² as calculated from the following:    Height as of this encounter: 160 cm (62.99\").    Weight as of " this encounter: 96.3 kg (212 lb 6.4 oz).    Class 2 Severe Obesity (BMI >=35 and <=39.9). Obesity-related health conditions include the following: diabetes mellitus. Obesity is improving with lifestyle modifications. BMI is is above average; BMI management plan is completed. We discussed portion control and increasing exercise.      Does the patient have evidence of cognitive impairment? Yes  MMSE done 28/30        HEALTH RISK ASSESSMENT    Smoking Status:  Social History     Tobacco Use   Smoking Status Never   Smokeless Tobacco Never     Alcohol Consumption:  Social History     Substance and Sexual Activity   Alcohol Use No     Fall Risk Screen:    STEADI Fall Risk Assessment was completed, and patient is at LOW risk for falls.Assessment completed on:3/13/2024    Depression Screening:      3/13/2024    10:24 AM   PHQ-2/PHQ-9 Depression Screening   Little Interest or Pleasure in Doing Things 0-->not at all   Feeling Down, Depressed or Hopeless 0-->not at all   PHQ-9: Brief Depression Severity Measure Score 0       Health Habits and Functional and Cognitive Screening:      3/13/2024    10:20 AM   Functional & Cognitive Status   Do you have difficulty preparing food and eating? Yes   Do you have difficulty bathing yourself, getting dressed or grooming yourself? No   Do you have difficulty using the toilet? No   Do you have difficulty moving around from place to place? Yes   Do you have trouble with steps or getting out of a bed or a chair? Yes   Current Diet Unhealthy Diet   Exercise (times per week) 0 times per week   Current Exercises Include No Regular Exercise   Do you need help using the phone?  No   Are you deaf or do you have serious difficulty hearing?  Yes   Do you need help to go to places out of walking distance? Yes   Do you need help shopping? Yes   Do you need help preparing meals?  Yes   Do you need help with housework?  Yes   Do you need help with laundry? Yes   Do you need help taking your  medications? Yes   Do you need help managing money? No   Do you ever drive or ride in a car without wearing a seat belt? No   Who do you live with? Alone   If you need help, do you have trouble finding someone available to you? No   Have you been bothered in the last four weeks by sexual problems? No       Age-appropriate Screening Schedule:  Refer to the list below for future screening recommendations based on patient's age, sex and/or medical conditions. Orders for these recommended tests are listed in the plan section. The patient has been provided with a written plan.    Health Maintenance   Topic Date Due    TDAP/TD VACCINES (1 - Tdap) Never done    ZOSTER VACCINE (1 of 2) Never done    RSV Vaccine - Adults (1 - 1-dose 60+ series) Never done    Pneumococcal Vaccine 65+ (2 of 2 - PCV) 09/16/2020    INFLUENZA VACCINE  08/01/2023    COVID-19 Vaccine (1 - 2023-24 season) Never done    DIABETIC EYE EXAM  02/10/2024    HEMOGLOBIN A1C  05/13/2024    LIPID PANEL  11/13/2024    ANNUAL WELLNESS VISIT  03/13/2025    BMI FOLLOWUP  03/13/2025    DXA SCAN  04/05/2025    URINE MICROALBUMIN  Discontinued                  CMS Preventative Services Quick Reference  Risk Factors Identified During Encounter  Immunizations Discussed/Encouraged: RSV (Respiratory Syncytial Virus)  The above risks/problems have been discussed with the patient.  Pertinent information has been shared with the patient in the After Visit Summary.  An After Visit Summary and PPPS were made available to the patient.    Follow Up:   Next Medicare Wellness visit to be scheduled in 1 year.       Additional E&M Note during same encounter follows:  Patient has multiple medical problems which are significant and separately identifiable that require additional work above and beyond the Medicare Wellness Visit.      Chief Complaint  Annual Exam (Medicare Wellness ) and Foot Swelling (Both feet )    Subjective        HPI  Leandra MAYE [place] is also being seen today  "for follow up on bp/chol/dm  Seen in the ER on 2/24 and dx with gout and given medrol dose pack  Symptoms resolved  Both feet are swelling  Worse as day goes on  Could not wear uses this am sec to swelling  No change in diet or meds  Has had lasix rx in past  Daughter is with pt  Sees neuro and was started on aricept for memory loss  Daughter says symptoms are worsening rapidly    Review of Systems   Constitutional: Negative.    Respiratory: Negative.     Cardiovascular:  Positive for leg swelling. Negative for chest pain and palpitations.   Neurological:  Positive for dizziness.   Psychiatric/Behavioral: Negative.         Objective   Vital Signs:  /78 (BP Location: Left arm, Patient Position: Sitting, Cuff Size: Large Adult)   Pulse (!) 49   Temp 98.4 °F (36.9 °C) (Temporal)   Resp 18   Ht 160 cm (62.99\")   Wt 96.3 kg (212 lb 6.4 oz)   SpO2 97%   BMI 37.63 kg/m²     Physical Exam  Vitals and nursing note reviewed.   Constitutional:       Appearance: Normal appearance. She is obese.   Cardiovascular:      Rate and Rhythm: Normal rate and regular rhythm.      Heart sounds: Normal heart sounds.   Pulmonary:      Effort: Pulmonary effort is normal.      Breath sounds: Normal breath sounds.   Musculoskeletal:      Right lower leg: Edema (trace) present.      Left lower leg: Edema (trace) present.   Neurological:      Mental Status: She is alert.      Comments: Using cane  Ambulates slowly   Psychiatric:         Mood and Affect: Mood normal.                  Lab Results   Component Value Date    GLUCOSE 172 (H) 02/24/2024    BUN 19 02/24/2024    CREATININE 1.10 (H) 02/24/2024    EGFR 48.7 (L) 02/24/2024    BCR 17.3 02/24/2024    K 3.8 02/24/2024    CO2 21.0 (L) 02/24/2024    CALCIUM 10.1 02/24/2024    ALBUMIN 4.7 01/10/2024    BILITOT 0.3 01/10/2024    AST 28 01/10/2024    ALT 19 01/10/2024          Assessment and Plan   Diagnoses and all orders for this visit:    1. Encounter for annual wellness exam in " Medicare patient (Primary)    2. Primary hypertension    3. Pure hypertriglyceridemia  -     Lipid Panel; Future    4. Type 2 diabetes mellitus without complication, without long-term current use of insulin  -     Lipid Panel; Future  -     Hemoglobin A1c; Future    5. Pedal edema  -     furosemide (LASIX) 20 MG tablet; Take 1 tablet by mouth Daily. For swelling  Dispense: 90 tablet; Refill: 3    Other orders  -     potassium chloride 10 MEQ CR tablet; Take 1 tablet by mouth Daily. When taking furosemide  Dispense: 90 tablet; Refill: 3    Despite the results of her MMSE pt is unable to answer most of my questions and seems amnestic of recent events  They will f/u with neuro  Will refill lasix and add potassium   Counseled on lifestyle modifications as well  Daughter visits mother and brings food 5 days a week  Pt's granddaughter is with her on weekends and makes food and meal preps for her   She also loads the pill minder  Counseled on RSV vaccine  Refuses flu shot  She will continue current meds for dm, htn and hlp  She does not check her BS  Recent labs reviewed  Lipid and A1C ordered  Will see her back in 6 months  Weight loss noted  Suspect pt is missing meals           Follow Up   Return in about 6 months (around 9/13/2024) for Recheck.  Patient was given instructions and counseling regarding her condition or for health maintenance advice. Please see specific information pulled into the AVS if appropriate.

## 2024-03-13 NOTE — PATIENT INSTRUCTIONS
Limit salt  Consider compression/support socks  Elevate feet when possible  Drink more water  Consider getting the RSV vaccine at the pharmacy

## 2024-03-16 DIAGNOSIS — G89.29 CHRONIC LEFT SHOULDER PAIN: ICD-10-CM

## 2024-03-16 DIAGNOSIS — M62.838 MUSCLE SPASM: ICD-10-CM

## 2024-03-16 DIAGNOSIS — M25.512 CHRONIC LEFT SHOULDER PAIN: ICD-10-CM

## 2024-03-17 RX ORDER — HYDROCODONE BITARTRATE AND ACETAMINOPHEN 5; 325 MG/1; MG/1
1 TABLET ORAL EVERY 12 HOURS PRN
Qty: 14 TABLET | Refills: 0 | Status: SHIPPED | OUTPATIENT
Start: 2024-03-17

## 2024-03-18 RX ORDER — CYCLOBENZAPRINE HCL 5 MG
5 TABLET ORAL 3 TIMES DAILY PRN
Qty: 30 TABLET | Refills: 1 | Status: SHIPPED | OUTPATIENT
Start: 2024-03-18

## 2024-04-05 ENCOUNTER — APPOINTMENT (OUTPATIENT)
Dept: CT IMAGING | Facility: HOSPITAL | Age: 89
End: 2024-04-05
Payer: MEDICARE

## 2024-04-05 ENCOUNTER — APPOINTMENT (OUTPATIENT)
Dept: GENERAL RADIOLOGY | Facility: HOSPITAL | Age: 89
End: 2024-04-05
Payer: MEDICARE

## 2024-04-05 ENCOUNTER — HOSPITAL ENCOUNTER (INPATIENT)
Facility: HOSPITAL | Age: 89
LOS: 4 days | Discharge: NURSING, REHAB OR LONG-TERM CARE | End: 2024-04-09
Attending: EMERGENCY MEDICINE
Payer: MEDICARE

## 2024-04-05 DIAGNOSIS — G89.29 CHRONIC LEFT SHOULDER PAIN: ICD-10-CM

## 2024-04-05 DIAGNOSIS — M25.512 CHRONIC LEFT SHOULDER PAIN: ICD-10-CM

## 2024-04-05 DIAGNOSIS — K57.92 ACUTE DIVERTICULITIS: Primary | ICD-10-CM

## 2024-04-05 PROBLEM — F03.90 DEMENTIA: Status: ACTIVE | Noted: 2024-04-05

## 2024-04-05 LAB
ALBUMIN SERPL-MCNC: 4.3 G/DL (ref 3.5–5.2)
ALBUMIN/GLOB SERPL: 1.4 G/DL
ALP SERPL-CCNC: 104 U/L (ref 39–117)
ALT SERPL W P-5'-P-CCNC: 10 U/L (ref 1–33)
ANION GAP SERPL CALCULATED.3IONS-SCNC: 15 MMOL/L (ref 5–15)
AST SERPL-CCNC: 19 U/L (ref 1–32)
BASOPHILS # BLD AUTO: 0.12 10*3/MM3 (ref 0–0.2)
BASOPHILS NFR BLD AUTO: 0.6 % (ref 0–1.5)
BILIRUB SERPL-MCNC: 0.6 MG/DL (ref 0–1.2)
BILIRUB UR QL STRIP: NEGATIVE
BUN SERPL-MCNC: 19 MG/DL (ref 8–23)
BUN/CREAT SERPL: 17.1 (ref 7–25)
CALCIUM SPEC-SCNC: 10.2 MG/DL (ref 8.6–10.5)
CHLORIDE SERPL-SCNC: 99 MMOL/L (ref 98–107)
CK SERPL-CCNC: 46 U/L (ref 20–180)
CLARITY UR: CLEAR
CO2 SERPL-SCNC: 23 MMOL/L (ref 22–29)
COLOR UR: YELLOW
CREAT SERPL-MCNC: 1.11 MG/DL (ref 0.57–1)
D-LACTATE SERPL-SCNC: 0.6 MMOL/L (ref 0.3–2)
DEPRECATED RDW RBC AUTO: 49.2 FL (ref 37–54)
EGFRCR SERPLBLD CKD-EPI 2021: 48.2 ML/MIN/1.73
EOSINOPHIL # BLD AUTO: 0.09 10*3/MM3 (ref 0–0.4)
EOSINOPHIL NFR BLD AUTO: 0.5 % (ref 0.3–6.2)
ERYTHROCYTE [DISTWIDTH] IN BLOOD BY AUTOMATED COUNT: 14.6 % (ref 12.3–15.4)
FLUAV RNA RESP QL NAA+PROBE: NOT DETECTED
FLUBV RNA RESP QL NAA+PROBE: NOT DETECTED
GLOBULIN UR ELPH-MCNC: 3 GM/DL
GLUCOSE BLDC GLUCOMTR-MCNC: 95 MG/DL (ref 70–105)
GLUCOSE SERPL-MCNC: 116 MG/DL (ref 65–99)
GLUCOSE UR STRIP-MCNC: NEGATIVE MG/DL
HCT VFR BLD AUTO: 41.5 % (ref 34–46.6)
HGB BLD-MCNC: 13 G/DL (ref 12–15.9)
HGB UR QL STRIP.AUTO: NEGATIVE
HOLD SPECIMEN: NORMAL
IMM GRANULOCYTES # BLD AUTO: 0.09 10*3/MM3 (ref 0–0.05)
IMM GRANULOCYTES NFR BLD AUTO: 0.5 % (ref 0–0.5)
KETONES UR QL STRIP: NEGATIVE
LEUKOCYTE ESTERASE UR QL STRIP.AUTO: NEGATIVE
LIPASE SERPL-CCNC: 44 U/L (ref 13–60)
LYMPHOCYTES # BLD AUTO: 1.95 10*3/MM3 (ref 0.7–3.1)
LYMPHOCYTES NFR BLD AUTO: 10.2 % (ref 19.6–45.3)
MAGNESIUM SERPL-MCNC: 2.2 MG/DL (ref 1.6–2.4)
MCH RBC QN AUTO: 28.6 PG (ref 26.6–33)
MCHC RBC AUTO-ENTMCNC: 31.3 G/DL (ref 31.5–35.7)
MCV RBC AUTO: 91.4 FL (ref 79–97)
MONOCYTES # BLD AUTO: 1.28 10*3/MM3 (ref 0.1–0.9)
MONOCYTES NFR BLD AUTO: 6.7 % (ref 5–12)
NEUTROPHILS NFR BLD AUTO: 15.59 10*3/MM3 (ref 1.7–7)
NEUTROPHILS NFR BLD AUTO: 81.5 % (ref 42.7–76)
NITRITE UR QL STRIP: NEGATIVE
NRBC BLD AUTO-RTO: 0 /100 WBC (ref 0–0.2)
PH UR STRIP.AUTO: <=5 [PH] (ref 5–8)
PLATELET # BLD AUTO: 361 10*3/MM3 (ref 140–450)
PMV BLD AUTO: 10.2 FL (ref 6–12)
POTASSIUM SERPL-SCNC: 3.9 MMOL/L (ref 3.5–5.2)
PROT SERPL-MCNC: 7.3 G/DL (ref 6–8.5)
PROT UR QL STRIP: NEGATIVE
RBC # BLD AUTO: 4.54 10*6/MM3 (ref 3.77–5.28)
RSV RNA RESP QL NAA+PROBE: NOT DETECTED
SARS-COV-2 RNA RESP QL NAA+PROBE: NOT DETECTED
SODIUM SERPL-SCNC: 137 MMOL/L (ref 136–145)
SP GR UR STRIP: 1.01 (ref 1–1.03)
UROBILINOGEN UR QL STRIP: NORMAL
WBC NRBC COR # BLD AUTO: 19.12 10*3/MM3 (ref 3.4–10.8)
WHOLE BLOOD HOLD SPECIMEN: NORMAL

## 2024-04-05 PROCEDURE — 87040 BLOOD CULTURE FOR BACTERIA: CPT | Performed by: EMERGENCY MEDICINE

## 2024-04-05 PROCEDURE — 99285 EMERGENCY DEPT VISIT HI MDM: CPT

## 2024-04-05 PROCEDURE — 25010000002 ONDANSETRON PER 1 MG: Performed by: EMERGENCY MEDICINE

## 2024-04-05 PROCEDURE — 25010000002 METRONIDAZOLE 500 MG/100ML SOLUTION: Performed by: EMERGENCY MEDICINE

## 2024-04-05 PROCEDURE — 71045 X-RAY EXAM CHEST 1 VIEW: CPT

## 2024-04-05 PROCEDURE — 82550 ASSAY OF CK (CPK): CPT | Performed by: EMERGENCY MEDICINE

## 2024-04-05 PROCEDURE — 82948 REAGENT STRIP/BLOOD GLUCOSE: CPT

## 2024-04-05 PROCEDURE — P9612 CATHETERIZE FOR URINE SPEC: HCPCS

## 2024-04-05 PROCEDURE — 83735 ASSAY OF MAGNESIUM: CPT | Performed by: EMERGENCY MEDICINE

## 2024-04-05 PROCEDURE — 25010000002 ENOXAPARIN PER 10 MG

## 2024-04-05 PROCEDURE — 81003 URINALYSIS AUTO W/O SCOPE: CPT | Performed by: EMERGENCY MEDICINE

## 2024-04-05 PROCEDURE — 85025 COMPLETE CBC W/AUTO DIFF WBC: CPT | Performed by: EMERGENCY MEDICINE

## 2024-04-05 PROCEDURE — 36415 COLL VENOUS BLD VENIPUNCTURE: CPT

## 2024-04-05 PROCEDURE — 83605 ASSAY OF LACTIC ACID: CPT | Performed by: EMERGENCY MEDICINE

## 2024-04-05 PROCEDURE — 87637 SARSCOV2&INF A&B&RSV AMP PRB: CPT | Performed by: EMERGENCY MEDICINE

## 2024-04-05 PROCEDURE — 83690 ASSAY OF LIPASE: CPT | Performed by: EMERGENCY MEDICINE

## 2024-04-05 PROCEDURE — 25010000002 CEFTRIAXONE PER 250 MG: Performed by: EMERGENCY MEDICINE

## 2024-04-05 PROCEDURE — 74176 CT ABD & PELVIS W/O CONTRAST: CPT

## 2024-04-05 PROCEDURE — 80053 COMPREHEN METABOLIC PANEL: CPT | Performed by: EMERGENCY MEDICINE

## 2024-04-05 PROCEDURE — 25010000002 MORPHINE PER 10 MG: Performed by: EMERGENCY MEDICINE

## 2024-04-05 RX ORDER — SODIUM CHLORIDE 9 MG/ML
40 INJECTION, SOLUTION INTRAVENOUS AS NEEDED
Status: DISCONTINUED | OUTPATIENT
Start: 2024-04-05 | End: 2024-04-09 | Stop reason: HOSPADM

## 2024-04-05 RX ORDER — AMOXICILLIN 250 MG
2 CAPSULE ORAL 2 TIMES DAILY PRN
Status: DISCONTINUED | OUTPATIENT
Start: 2024-04-05 | End: 2024-04-09 | Stop reason: HOSPADM

## 2024-04-05 RX ORDER — ACETAMINOPHEN 325 MG/1
650 TABLET ORAL EVERY 4 HOURS PRN
Status: DISCONTINUED | OUTPATIENT
Start: 2024-04-05 | End: 2024-04-05

## 2024-04-05 RX ORDER — FUROSEMIDE 20 MG/1
20 TABLET ORAL DAILY
Status: DISCONTINUED | OUTPATIENT
Start: 2024-04-06 | End: 2024-04-09 | Stop reason: HOSPADM

## 2024-04-05 RX ORDER — SODIUM CHLORIDE 0.9 % (FLUSH) 0.9 %
10 SYRINGE (ML) INJECTION AS NEEDED
Status: DISCONTINUED | OUTPATIENT
Start: 2024-04-05 | End: 2024-04-09 | Stop reason: HOSPADM

## 2024-04-05 RX ORDER — ACETAMINOPHEN 325 MG/1
650 TABLET ORAL EVERY 4 HOURS PRN
Status: DISCONTINUED | OUTPATIENT
Start: 2024-04-05 | End: 2024-04-09 | Stop reason: HOSPADM

## 2024-04-05 RX ORDER — ONDANSETRON 4 MG/1
4 TABLET, ORALLY DISINTEGRATING ORAL EVERY 6 HOURS PRN
Status: DISCONTINUED | OUTPATIENT
Start: 2024-04-05 | End: 2024-04-09 | Stop reason: HOSPADM

## 2024-04-05 RX ORDER — HYDRALAZINE HYDROCHLORIDE 25 MG/1
100 TABLET, FILM COATED ORAL 2 TIMES DAILY
Status: DISCONTINUED | OUTPATIENT
Start: 2024-04-05 | End: 2024-04-09 | Stop reason: HOSPADM

## 2024-04-05 RX ORDER — ONDANSETRON 2 MG/ML
4 INJECTION INTRAMUSCULAR; INTRAVENOUS EVERY 6 HOURS PRN
Status: DISCONTINUED | OUTPATIENT
Start: 2024-04-05 | End: 2024-04-09 | Stop reason: HOSPADM

## 2024-04-05 RX ORDER — CHOLECALCIFEROL (VITAMIN D3) 125 MCG
5 CAPSULE ORAL NIGHTLY
COMMUNITY

## 2024-04-05 RX ORDER — TRAZODONE HYDROCHLORIDE 50 MG/1
50 TABLET ORAL NIGHTLY
Status: DISCONTINUED | OUTPATIENT
Start: 2024-04-05 | End: 2024-04-09 | Stop reason: HOSPADM

## 2024-04-05 RX ORDER — AMLODIPINE BESYLATE 10 MG/1
10 TABLET ORAL DAILY
COMMUNITY
End: 2024-04-05

## 2024-04-05 RX ORDER — METRONIDAZOLE 500 MG/100ML
500 INJECTION, SOLUTION INTRAVENOUS ONCE
Status: COMPLETED | OUTPATIENT
Start: 2024-04-05 | End: 2024-04-05

## 2024-04-05 RX ORDER — METRONIDAZOLE 500 MG/100ML
500 INJECTION, SOLUTION INTRAVENOUS EVERY 8 HOURS
Qty: 500 ML | Refills: 0 | Status: DISCONTINUED | OUTPATIENT
Start: 2024-04-06 | End: 2024-04-09

## 2024-04-05 RX ORDER — HYDROCODONE BITARTRATE AND ACETAMINOPHEN 7.5; 325 MG/1; MG/1
2 TABLET ORAL EVERY 4 HOURS PRN
Status: DISCONTINUED | OUTPATIENT
Start: 2024-04-05 | End: 2024-04-09 | Stop reason: HOSPADM

## 2024-04-05 RX ORDER — CHOLECALCIFEROL (VITAMIN D3) 125 MCG
5 CAPSULE ORAL NIGHTLY PRN
Status: DISCONTINUED | OUTPATIENT
Start: 2024-04-05 | End: 2024-04-09 | Stop reason: HOSPADM

## 2024-04-05 RX ORDER — ACETAMINOPHEN 650 MG/1
650 SUPPOSITORY RECTAL EVERY 4 HOURS PRN
Status: DISCONTINUED | OUTPATIENT
Start: 2024-04-05 | End: 2024-04-09 | Stop reason: HOSPADM

## 2024-04-05 RX ORDER — SODIUM CHLORIDE 0.9 % (FLUSH) 0.9 %
10 SYRINGE (ML) INJECTION EVERY 12 HOURS SCHEDULED
Status: DISCONTINUED | OUTPATIENT
Start: 2024-04-05 | End: 2024-04-09 | Stop reason: HOSPADM

## 2024-04-05 RX ORDER — NICOTINE POLACRILEX 4 MG
15 LOZENGE BUCCAL
Status: DISCONTINUED | OUTPATIENT
Start: 2024-04-05 | End: 2024-04-09 | Stop reason: HOSPADM

## 2024-04-05 RX ORDER — CYCLOBENZAPRINE HCL 10 MG
5 TABLET ORAL 3 TIMES DAILY PRN
Status: DISCONTINUED | OUTPATIENT
Start: 2024-04-05 | End: 2024-04-09 | Stop reason: HOSPADM

## 2024-04-05 RX ORDER — MORPHINE SULFATE 2 MG/ML
2 INJECTION, SOLUTION INTRAMUSCULAR; INTRAVENOUS ONCE
Status: COMPLETED | OUTPATIENT
Start: 2024-04-05 | End: 2024-04-05

## 2024-04-05 RX ORDER — BISACODYL 10 MG
10 SUPPOSITORY, RECTAL RECTAL DAILY PRN
Status: DISCONTINUED | OUTPATIENT
Start: 2024-04-05 | End: 2024-04-09 | Stop reason: HOSPADM

## 2024-04-05 RX ORDER — DEXTROSE MONOHYDRATE 25 G/50ML
25 INJECTION, SOLUTION INTRAVENOUS
Status: DISCONTINUED | OUTPATIENT
Start: 2024-04-05 | End: 2024-04-09 | Stop reason: HOSPADM

## 2024-04-05 RX ORDER — IBUPROFEN 600 MG/1
1 TABLET ORAL
Status: DISCONTINUED | OUTPATIENT
Start: 2024-04-05 | End: 2024-04-09 | Stop reason: HOSPADM

## 2024-04-05 RX ORDER — POLYETHYLENE GLYCOL 3350 17 G/17G
17 POWDER, FOR SOLUTION ORAL DAILY PRN
Status: DISCONTINUED | OUTPATIENT
Start: 2024-04-05 | End: 2024-04-09 | Stop reason: HOSPADM

## 2024-04-05 RX ORDER — ACETAMINOPHEN 160 MG/5ML
650 SOLUTION ORAL EVERY 4 HOURS PRN
Status: DISCONTINUED | OUTPATIENT
Start: 2024-04-05 | End: 2024-04-09 | Stop reason: HOSPADM

## 2024-04-05 RX ORDER — ALLOPURINOL 100 MG/1
100 TABLET ORAL DAILY
Status: DISCONTINUED | OUTPATIENT
Start: 2024-04-06 | End: 2024-04-09 | Stop reason: HOSPADM

## 2024-04-05 RX ORDER — ENOXAPARIN SODIUM 100 MG/ML
40 INJECTION SUBCUTANEOUS DAILY
Status: DISCONTINUED | OUTPATIENT
Start: 2024-04-05 | End: 2024-04-09 | Stop reason: HOSPADM

## 2024-04-05 RX ORDER — CALCIUM CARBONATE 500 MG/1
2 TABLET, CHEWABLE ORAL 2 TIMES DAILY PRN
Status: DISCONTINUED | OUTPATIENT
Start: 2024-04-05 | End: 2024-04-09 | Stop reason: HOSPADM

## 2024-04-05 RX ORDER — ALLOPURINOL 100 MG/1
100 TABLET ORAL DAILY
COMMUNITY

## 2024-04-05 RX ORDER — DONEPEZIL HYDROCHLORIDE 5 MG/1
10 TABLET, FILM COATED ORAL NIGHTLY
Status: DISCONTINUED | OUTPATIENT
Start: 2024-04-05 | End: 2024-04-09 | Stop reason: HOSPADM

## 2024-04-05 RX ORDER — ONDANSETRON 2 MG/ML
4 INJECTION INTRAMUSCULAR; INTRAVENOUS ONCE
Status: COMPLETED | OUTPATIENT
Start: 2024-04-05 | End: 2024-04-05

## 2024-04-05 RX ORDER — INSULIN LISPRO 100 [IU]/ML
2-7 INJECTION, SOLUTION INTRAVENOUS; SUBCUTANEOUS
Status: DISCONTINUED | OUTPATIENT
Start: 2024-04-05 | End: 2024-04-06

## 2024-04-05 RX ORDER — BISACODYL 5 MG/1
5 TABLET, DELAYED RELEASE ORAL DAILY PRN
Status: DISCONTINUED | OUTPATIENT
Start: 2024-04-05 | End: 2024-04-09 | Stop reason: HOSPADM

## 2024-04-05 RX ORDER — HYDROCODONE BITARTRATE AND ACETAMINOPHEN 5; 325 MG/1; MG/1
1 TABLET ORAL EVERY 12 HOURS PRN
Qty: 14 TABLET | Refills: 0 | Status: SHIPPED | OUTPATIENT
Start: 2024-04-05 | End: 2024-04-05

## 2024-04-05 RX ADMIN — Medication 10 ML: at 20:23

## 2024-04-05 RX ADMIN — METRONIDAZOLE 500 MG: 500 INJECTION, SOLUTION INTRAVENOUS at 17:19

## 2024-04-05 RX ADMIN — DONEPEZIL HYDROCHLORIDE 10 MG: 5 TABLET, FILM COATED ORAL at 20:23

## 2024-04-05 RX ADMIN — TRAZODONE HYDROCHLORIDE 50 MG: 50 TABLET ORAL at 20:23

## 2024-04-05 RX ADMIN — MORPHINE SULFATE 2 MG: 2 INJECTION, SOLUTION INTRAMUSCULAR; INTRAVENOUS at 14:34

## 2024-04-05 RX ADMIN — HYDRALAZINE HYDROCHLORIDE 100 MG: 25 TABLET ORAL at 20:23

## 2024-04-05 RX ADMIN — ONDANSETRON 4 MG: 2 INJECTION INTRAMUSCULAR; INTRAVENOUS at 14:34

## 2024-04-05 RX ADMIN — ENOXAPARIN SODIUM 40 MG: 100 INJECTION SUBCUTANEOUS at 20:08

## 2024-04-05 RX ADMIN — CEFTRIAXONE 2000 MG: 2 INJECTION, POWDER, FOR SOLUTION INTRAMUSCULAR; INTRAVENOUS at 17:14

## 2024-04-05 RX ADMIN — CYCLOBENZAPRINE 5 MG: 10 TABLET, FILM COATED ORAL at 20:09

## 2024-04-05 RX ADMIN — HYDROCODONE BITARTRATE AND ACETAMINOPHEN 2 TABLET: 7.5; 325 TABLET ORAL at 20:07

## 2024-04-05 NOTE — ED NOTES
Pt started with LLQ pain last night that she rates 25/10. Pt has no c/o of n/v. Pt believes her last BM was 1-2 days ago

## 2024-04-05 NOTE — H&P
[facility]  History & Physical    Patient Name: Leandra Nuñez  : 1935  MRN: [id number]  Primary Care Physician:  Anabelle Sellers MD  Date of admission: 2024  Date and Time of Service: 2024 at 2140      Assessment & Plan      Chief Complaint: abdominal pain    Plan:    Acute Diverticulitis  -CT of the abdomen and pelvis reviewed, acute diverticulitis at the junction of the descending/sigmoid colon noted  -WBC 19.12, monitor  -Lactate 0.6  -Rocephin and Flagyl ordered  -Analgesics as needed  -Clear liquid diet    Essential Hypertension  -Controlled  -Monitor BP  -Continue home hydralazine    Diabetes Mellitus Type 2  -Accu-Cheks AC at bedtime  -SSI ordered  -No home medication reported    Dementia  Insomnia  -Chronic, stable  -Continue home Aricept, trazodone    Hyperlipidemia  -Zetia is nonformulary, restart at discharge    History of Present Illness     History of Present Illness: Leandra Nuñez is a 88 y.o. female with a previous medical history of dementia, hypertension, hyperlipidemia, and DM  who presented to [facility] on 2024 with  lower abdominal pain that began last night.  She denies nausea, vomiting, diarrhea, fever or chills.  She had no other complaints.    In the ED, CT of the abdomen and pelvis showed acute diverticulitis at the junction of the descending/sigmoid colon.  WBC 19.12, lactate 0.6, creatinine 1.11 (baseline).  Otherwise, labs are unremarkable.  UA shows no sign of infection and chest x-ray shows stable cardiomegaly but no acute process.  She is afebrile, all vital signs are stable.  She received morphine and Zofran in the ED.  Hospitalist was consulted for further management.    12 point ROS reviewed and negative except as mentioned above    Objective      Vitals:   Temp:  [97.8 °F (36.6 °C)-98.5 °F (36.9 °C)] 98.5 °F (36.9 °C)  Heart Rate:  [55-69] 55  Resp:  [18] 18  BP: (127-162)/(60-93) 155/76  Body mass index is  37.72 kg/m².    Physical Exam  Vitals and nursing note reviewed.   Constitutional:       General: She is sleeping.      Appearance: Normal appearance.   HENT:      Mouth/Throat:      Mouth: Mucous membranes are moist.   Cardiovascular:      Rate and Rhythm: Normal rate and regular rhythm.   Pulmonary:      Effort: Pulmonary effort is normal.   Abdominal:      General: Bowel sounds are normal.      Palpations: Abdomen is soft.      Tenderness: There is generalized abdominal tenderness.   Musculoskeletal:         General: Normal range of motion.   Skin:     General: Skin is warm and dry.   Neurological:      General: No focal deficit present.      Mental Status: She is oriented to person, place, and time and easily aroused. Mental status is at baseline.   Psychiatric:         Mood and Affect: Mood normal.         Behavior: Behavior normal.            Personal History     This is a 88 y.o. female with:    Past Medical History:   Diagnosis Date    Anxiety     Arthritis     Breast nodule 2013    Left breast nodule (fibrocystic disease , no malignancy)     Cancer     Cataract     Chronic kidney disease     HL (hearing loss)     Hyperlipidemia     Hypertension     Obesity     Shortness of breath        Past Surgical History:   Procedure Laterality Date    BREAST BIOPSY Left 05/21/2013    Benign fibrocystic disease ,Abstracted from [facility].    CARDIAC CATHETERIZATION      D & C AND LAPAROSCOPY      FULGURATION ENDOMETRIOSIS      surgery    NEPHRECTOMY Right     ORIF HUMERUS FRACTURE Left 3/24/2021    Procedure: HUMERUS PROXIMAL OPEN REDUCTION INTERNAL FIXATION;  Surgeon: Yair Anne MD;  Location: [facility] MAIN OR;  Service: Orthopedics;  Laterality: Left;       Active and Resolved Problems  Active Hospital Problems    Diagnosis  POA    **Acute diverticulitis [K57.92]  Yes      Resolved Hospital Problems   No resolved problems to display.       Family History: family history is not on file. Otherwise pertinent FHx was  reviewed and not pertinent to current issue.    Social History:  reports that she has never smoked. She has never used smokeless tobacco. She reports that she does not drink alcohol and does not use drugs.    Home Medications:  Prior to Admission Medications       Prescriptions Last Dose Informant Patient Reported? Taking?    allopurinol (ZYLOPRIM) 100 MG tablet 4/5/2024  Yes Yes    Take 1 tablet by mouth Daily.    donepezil (Aricept) 10 MG tablet 4/4/2024  No Yes    Take 1 tablet by mouth Every Night.    ezetimibe (ZETIA) 10 MG tablet 4/5/2024  Yes Yes    Take 1 tablet by mouth Daily.    furosemide (LASIX) 20 MG tablet 4/5/2024  No Yes    Take 1 tablet by mouth Daily. For swelling    hydrALAZINE (APRESOLINE) 100 MG tablet 4/5/2024  No Yes    TAKE 1 TABLET TWICE A DAY    melatonin 5 MG tablet tablet 4/4/2024  Yes Yes    Take 1 tablet by mouth Every Night.    potassium chloride 10 MEQ CR tablet 4/5/2024  No Yes    Take 1 tablet by mouth Daily. When taking furosemide    traZODone (DESYREL) 50 MG tablet 4/4/2024  No Yes    TAKE 1 TABLET BY MOUTH EVERY NIGHT    cyclobenzaprine (FLEXERIL) 5 MG tablet   No No    Take 1 tablet by mouth 3 (Three) Times a Day As Needed for Muscle Spasms.              Allergies:  Allergies   Allergen Reactions    Statins Myalgia and Other (See Comments)           DVT prophylaxis:  Medical DVT prophylaxis orders are present.        CODE STATUS:           Admission Status:  I believe this patient meets inpatient status.    I discussed the patient's findings and my recommendations with patient.    Signature:     This document has been electronically signed by Cristina Odom DNP, APRN on April 5, 2024 19:17 EDT   [facility], [place], operated by [facility] Hospitalist Team

## 2024-04-05 NOTE — TELEPHONE ENCOUNTER
Caller: DAYANA POTTER    Relationship: Emergency Contact    Best call back number: [phone]     Requested Prescriptions:   Requested Prescriptions     Pending Prescriptions Disp Refills    HYDROcodone-acetaminophen (Norco) 5-325 MG per tablet 14 tablet 0     Sig: Take 1 tablet by mouth Every 12 (Twelve) Hours As Needed for Moderate Pain or Severe Pain.        Pharmacy where request should be sent: [id number] [place] [id number] [facility] AT [address] [phone] [facility] [phone]      Last office visit with prescribing clinician: 3/13/2024   Last telemedicine visit with prescribing clinician: Visit date not found   Next office visit with prescribing clinician: 9/13/2024     Additional details provided by patient:     Does the patient have less than a 3 day supply:  [x] Yes  [] No    Would you like a call back once the refill request has been completed: [] Yes [] No    If the office needs to give you a call back, can they leave a voicemail: [] Yes [] No    April Waqar Underwood Rep   04/05/24 10:56 EDT

## 2024-04-05 NOTE — Clinical Note
Level of Care: Med/Surg [1]   Admitting Physician: JOSUE ABARCA [[id number]]   Attending Physician: JOSUE ABARCA [[id number]]

## 2024-04-06 LAB
ANION GAP SERPL CALCULATED.3IONS-SCNC: 12 MMOL/L (ref 5–15)
BASOPHILS # BLD AUTO: 0.08 10*3/MM3 (ref 0–0.2)
BASOPHILS NFR BLD AUTO: 0.5 % (ref 0–1.5)
BUN SERPL-MCNC: 15 MG/DL (ref 8–23)
BUN/CREAT SERPL: 13.3 (ref 7–25)
CALCIUM SPEC-SCNC: 9.5 MG/DL (ref 8.6–10.5)
CHLORIDE SERPL-SCNC: 100 MMOL/L (ref 98–107)
CO2 SERPL-SCNC: 23 MMOL/L (ref 22–29)
CREAT SERPL-MCNC: 1.13 MG/DL (ref 0.57–1)
DEPRECATED RDW RBC AUTO: 48.8 FL (ref 37–54)
EGFRCR SERPLBLD CKD-EPI 2021: 47.2 ML/MIN/1.73
EOSINOPHIL # BLD AUTO: 0.14 10*3/MM3 (ref 0–0.4)
EOSINOPHIL NFR BLD AUTO: 0.9 % (ref 0.3–6.2)
ERYTHROCYTE [DISTWIDTH] IN BLOOD BY AUTOMATED COUNT: 14.6 % (ref 12.3–15.4)
GLUCOSE BLDC GLUCOMTR-MCNC: 102 MG/DL (ref 70–105)
GLUCOSE BLDC GLUCOMTR-MCNC: 109 MG/DL (ref 70–105)
GLUCOSE BLDC GLUCOMTR-MCNC: 133 MG/DL (ref 70–105)
GLUCOSE SERPL-MCNC: 111 MG/DL (ref 65–99)
HCT VFR BLD AUTO: 36 % (ref 34–46.6)
HGB BLD-MCNC: 11.6 G/DL (ref 12–15.9)
IMM GRANULOCYTES # BLD AUTO: 0.06 10*3/MM3 (ref 0–0.05)
IMM GRANULOCYTES NFR BLD AUTO: 0.4 % (ref 0–0.5)
LYMPHOCYTES # BLD AUTO: 2.45 10*3/MM3 (ref 0.7–3.1)
LYMPHOCYTES NFR BLD AUTO: 16.5 % (ref 19.6–45.3)
MAGNESIUM SERPL-MCNC: 2.2 MG/DL (ref 1.6–2.4)
MCH RBC QN AUTO: 29.2 PG (ref 26.6–33)
MCHC RBC AUTO-ENTMCNC: 32.2 G/DL (ref 31.5–35.7)
MCV RBC AUTO: 90.7 FL (ref 79–97)
MONOCYTES # BLD AUTO: 1.07 10*3/MM3 (ref 0.1–0.9)
MONOCYTES NFR BLD AUTO: 7.2 % (ref 5–12)
NEUTROPHILS NFR BLD AUTO: 11.04 10*3/MM3 (ref 1.7–7)
NEUTROPHILS NFR BLD AUTO: 74.5 % (ref 42.7–76)
NRBC BLD AUTO-RTO: 0 /100 WBC (ref 0–0.2)
PLATELET # BLD AUTO: 302 10*3/MM3 (ref 140–450)
PMV BLD AUTO: 10.1 FL (ref 6–12)
POTASSIUM SERPL-SCNC: 3.6 MMOL/L (ref 3.5–5.2)
RBC # BLD AUTO: 3.97 10*6/MM3 (ref 3.77–5.28)
SODIUM SERPL-SCNC: 135 MMOL/L (ref 136–145)
WBC NRBC COR # BLD AUTO: 14.84 10*3/MM3 (ref 3.4–10.8)

## 2024-04-06 PROCEDURE — 82948 REAGENT STRIP/BLOOD GLUCOSE: CPT

## 2024-04-06 PROCEDURE — 97162 PT EVAL MOD COMPLEX 30 MIN: CPT

## 2024-04-06 PROCEDURE — 80048 BASIC METABOLIC PNL TOTAL CA: CPT

## 2024-04-06 PROCEDURE — 25010000002 METRONIDAZOLE 500 MG/100ML SOLUTION

## 2024-04-06 PROCEDURE — 85025 COMPLETE CBC W/AUTO DIFF WBC: CPT

## 2024-04-06 PROCEDURE — 25010000002 CEFTRIAXONE PER 250 MG

## 2024-04-06 PROCEDURE — 25010000002 ENOXAPARIN PER 10 MG

## 2024-04-06 PROCEDURE — 83735 ASSAY OF MAGNESIUM: CPT

## 2024-04-06 RX ORDER — POTASSIUM CHLORIDE 20 MEQ/1
40 TABLET, EXTENDED RELEASE ORAL EVERY 4 HOURS
Status: COMPLETED | OUTPATIENT
Start: 2024-04-06 | End: 2024-04-06

## 2024-04-06 RX ADMIN — POTASSIUM CHLORIDE 40 MEQ: 1500 TABLET, EXTENDED RELEASE ORAL at 05:49

## 2024-04-06 RX ADMIN — CYCLOBENZAPRINE 5 MG: 10 TABLET, FILM COATED ORAL at 20:19

## 2024-04-06 RX ADMIN — HYDROCODONE BITARTRATE AND ACETAMINOPHEN 2 TABLET: 7.5; 325 TABLET ORAL at 00:49

## 2024-04-06 RX ADMIN — HYDRALAZINE HYDROCHLORIDE 100 MG: 25 TABLET ORAL at 20:19

## 2024-04-06 RX ADMIN — Medication 10 ML: at 08:40

## 2024-04-06 RX ADMIN — HYDROCODONE BITARTRATE AND ACETAMINOPHEN 2 TABLET: 7.5; 325 TABLET ORAL at 16:59

## 2024-04-06 RX ADMIN — ALLOPURINOL 100 MG: 100 TABLET ORAL at 08:40

## 2024-04-06 RX ADMIN — Medication 10 ML: at 20:20

## 2024-04-06 RX ADMIN — ENOXAPARIN SODIUM 40 MG: 100 INJECTION SUBCUTANEOUS at 15:44

## 2024-04-06 RX ADMIN — METRONIDAZOLE 500 MG: 500 INJECTION, SOLUTION INTRAVENOUS at 17:00

## 2024-04-06 RX ADMIN — CEFTRIAXONE 2000 MG: 2 INJECTION, POWDER, FOR SOLUTION INTRAMUSCULAR; INTRAVENOUS at 16:59

## 2024-04-06 RX ADMIN — DONEPEZIL HYDROCHLORIDE 10 MG: 5 TABLET, FILM COATED ORAL at 20:19

## 2024-04-06 RX ADMIN — TRAZODONE HYDROCHLORIDE 50 MG: 50 TABLET ORAL at 20:20

## 2024-04-06 RX ADMIN — HYDROCODONE BITARTRATE AND ACETAMINOPHEN 2 TABLET: 7.5; 325 TABLET ORAL at 23:57

## 2024-04-06 RX ADMIN — POTASSIUM CHLORIDE 40 MEQ: 1500 TABLET, EXTENDED RELEASE ORAL at 10:36

## 2024-04-06 RX ADMIN — METRONIDAZOLE 500 MG: 500 INJECTION, SOLUTION INTRAVENOUS at 08:39

## 2024-04-06 RX ADMIN — METRONIDAZOLE 500 MG: 500 INJECTION, SOLUTION INTRAVENOUS at 00:49

## 2024-04-06 NOTE — PLAN OF CARE
Goal Outcome Evaluation:      Pt is alert and oriented to person and place. Minimal c/o pain throughout shift. Pt is able to stand at side of bed with assist x 1-2. Education is complete, call light is in reach, and no other needs at this time. Continue to monitor.

## 2024-04-06 NOTE — PLAN OF CARE
Goal Outcome Evaluation:  Patient with frequent complaints of ble pain, given prn pain meds as ordered, continues on Flagyl for diverticulitis, WBC trending down on am labs

## 2024-04-06 NOTE — THERAPY EVALUATION
Patient Name: Leandra Nuñez  : 1935    MRN: [id number]                              Today's Date: 2024       Admit Date: 2024    Visit Dx:     ICD-10-CM ICD-9-CM   1. Acute diverticulitis  K57.92 562.11     Patient Active Problem List   Diagnosis    Anxiety disorder, unspecified    Asthma    Stage 3 chronic kidney disease    Hyperlipidemia    Hypertension    Obesity    Renal insufficiency    Type 2 diabetes mellitus without complication    Encounter for annual wellness exam in Medicare patient    Adjustment disorder with anxiety    Immune thrombocytopenia    Obstructive sleep apnea syndrome    Osteoarthritis    Clear cell carcinoma of kidney    Thrombocytopenia    Gastroesophageal reflux disease    White coat syndrome with diagnosis of hypertension    Leg cramps    Closed fracture of surgical neck of humerus    Fall    Absent kidney    Edema    Vitamin deficiency    Acute midline thoracic back pain    Chest pain    Hyperlipidemia due to type 2 diabetes mellitus    Acute diverticulitis    Dementia     Past Medical History:   Diagnosis Date    Anxiety     Arthritis     Breast nodule     Left breast nodule (fibrocystic disease , no malignancy)     Cancer     Cataract     Chronic kidney disease     HL (hearing loss)     Hyperlipidemia     Hypertension     Obesity     Shortness of breath      Past Surgical History:   Procedure Laterality Date    BREAST BIOPSY Left 2013    Benign fibrocystic disease ,Abstracted from [facility].    CARDIAC CATHETERIZATION      D & C AND LAPAROSCOPY      FULGURATION ENDOMETRIOSIS      surgery    NEPHRECTOMY Right     ORIF HUMERUS FRACTURE Left 3/24/2021    Procedure: HUMERUS PROXIMAL OPEN REDUCTION INTERNAL FIXATION;  Surgeon: Yair Anne MD;  Location: [facility];  Service: Orthopedics;  Laterality: Left;      General Information       Row Name 24          Physical Therapy Time and Intention    Document Type evaluation  -EL     Mode of  Treatment individual therapy;physical therapy  -       Row Name 04/06/24 1817          General Information    Prior Level of Function independent:;all household mobility;ADL's  -       Row Name 04/06/24 1817          Living Environment    People in Home other (see comments)  patio home  -       Row Name 04/06/24 1817          Home Main Entrance    Number of Stairs, Main Entrance none  -       Row Name 04/06/24 1817          Cognition    Orientation Status (Cognition) oriented to;person;time;disoriented to;place;situation  -       Row Name 04/06/24 1817          Safety Issues, Functional Mobility    Impairments Affecting Function (Mobility) balance;pain;strength  -EL               User Key  (r) = Recorded By, (t) = Taken By, (c) = Cosigned By      Initials Name Provider Type    Spencer Caldwell PT Physical Therapist                   Mobility       Row Name 04/06/24 1820          Bed Mobility    Bed Mobility bed mobility (all) activities  -EL     All Activities, Bond (Bed Mobility) minimum assist (75% patient effort)  -EL       Row Name 04/06/24 1820          Sit-Stand Transfer    Sit-Stand Bond (Transfers) minimum assist (75% patient effort);2 person assist  -EL     Assistive Device (Sit-Stand Transfers) walker, front-wheeled  -EL       Row Name 04/06/24 1820          Gait/Stairs (Locomotion)    Bond Level (Gait) minimum assist (75% patient effort)  -EL     Assistive Device (Gait) walker, front-wheeled  -EL     Distance in Feet (Gait) 3  -EL     Comment, (Gait/Stairs) severe pain in R foot when attempting to ambulate or transfer to bedside chair  -EL               User Key  (r) = Recorded By, (t) = Taken By, (c) = Cosigned By      Initials Name Provider Type    Spencer Caldwell PT Physical Therapist                   Obj/Interventions       Row Name 04/06/24 1822          Range of Motion Comprehensive    General Range of Motion bilateral lower extremity ROM WFL  -       Row Name  04/06/24 1822          Strength Comprehensive (MMT)    General Manual Muscle Testing (MMT) Assessment lower extremity strength deficits identified  -EL     Comment, General Manual Muscle Testing (MMT) Assessment BLE 3+/5 gross  -EL       Row Name 04/06/24 1822          Balance    Balance Assessment sitting static balance;standing dynamic balance;standing static balance  -EL     Static Sitting Balance supervision  -EL     Static Standing Balance minimal assist  -EL     Dynamic Standing Balance minimal assist  -EL       Row Name 04/06/24 1822          Sensory Assessment (Somatosensory)    Sensory Assessment (Somatosensory) sensation intact  -EL               User Key  (r) = Recorded By, (t) = Taken By, (c) = Cosigned By      Initials Name Provider Type    EL Spencer Acevedo, PT Physical Therapist                   Goals/Plan       Row Name 04/06/24 1834          Bed Mobility Goal 1 (PT)    Activity/Assistive Device (Bed Mobility Goal 1, PT) bed mobility activities, all  -EL     Porter Level/Cues Needed (Bed Mobility Goal 1, PT) modified independence  -EL     Time Frame (Bed Mobility Goal 1, PT) long term goal (LTG);2 weeks  -[facility] Name 04/06/24 1834          Transfer Goal 1 (PT)    Activity/Assistive Device (Transfer Goal 1, PT) transfers, all;walker, rolling  -EL     Porter Level/Cues Needed (Transfer Goal 1, PT) supervision required  -EL     Time Frame (Transfer Goal 1, PT) long term goal (LTG);2 weeks  -[facility] Name 04/06/24 1834          Gait Training Goal 1 (PT)    Activity/Assistive Device (Gait Training Goal 1, PT) gait (walking locomotion);walker, rolling  -EL     Porter Level (Gait Training Goal 1, PT) standby assist  -EL     Distance (Gait Training Goal 1, PT) 50  -EL     Time Frame (Gait Training Goal 1, PT) long term goal (LTG);2 weeks  -[facility] Name 04/06/24 1834          Therapy Assessment/Plan (PT)    Planned Therapy Interventions (PT) neuromuscular re-education;balance  training;bed mobility training;transfer training;gait training;patient/family education;strengthening  -EL               User Key  (r) = Recorded By, (t) = Taken By, (c) = Cosigned By      Initials Name Provider Type    Spencer Caldwell, PT Physical Therapist                   Clinical Impression       Row Name 04/06/24 1826          Pain    Pretreatment Pain Rating 6/10  -EL     Posttreatment Pain Rating 10/10  -EL     Pain Location - Side/Orientation Right  -EL     Pain Location - foot  -EL       Row Name 04/06/24 1826          Plan of Care Review    Plan of Care Reviewed With patient  -EL     Outcome Evaluation Pt is an 87 YO F admitted with diverticular pain. Pt this date not complaining of significant pain in abdomen, but significant pain in R foot. Pt states she lives home alone, typcially caring for self and cat. Pt reports she performs all ADLs, and ambulates without AD. Pt this date requiring assistance for bed mobility and transfers with RWx. Pt with pain in R foot in standing, using RWx with cueing for UE usage. Pt attempted to ambulate but was unable to bear weight long enough to ambulate or pivot to bedside chair. Pt is well below functional baseline and recommendaiton is SNF at d/c.  -EL       Row Name 04/06/24 1826          Therapy Assessment/Plan (PT)    Rehab Potential (PT) good, to achieve stated therapy goals  -EL     Criteria for Skilled Interventions Met (PT) yes  -EL     Therapy Frequency (PT) 5 times/wk  -EL     Predicted Duration of Therapy Intervention (PT) until d/c  -EL       Row Name 04/06/24 1826          Vital Signs    O2 Delivery Pre Treatment room air  -EL     O2 Delivery Intra Treatment room air  -EL     O2 Delivery Post Treatment room air  -EL     Pre Patient Position Supine  -EL     Intra Patient Position Standing  -EL     Post Patient Position Supine  -EL       Row Name 04/06/24 1826          Positioning and Restraints    Pre-Treatment Position in bed  -EL     Post Treatment Position  bed  -EL     In Bed notified nsg;supine;call light within reach;encouraged to call for assist;exit alarm on  -EL               User Key  (r) = Recorded By, (t) = Taken By, (c) = Cosigned By      Initials Name Provider Type    Spencer Caldwell, PT Physical Therapist                   Outcome Measures       Row Name 04/06/24 1835 04/06/24 0809       How much help from another person do you currently need...    Turning from your back to your side while in flat bed without using bedrails? 3  -EL 2  -MS    Moving from lying on back to sitting on the side of a flat bed without bedrails? 3  -EL 2  -MS    Moving to and from a bed to a chair (including a wheelchair)? 1  -EL 2  -MS    Standing up from a chair using your arms (e.g., wheelchair, bedside chair)? 2  -EL 2  -MS    Climbing 3-5 steps with a railing? 1  -EL 2  -MS    To walk in hospital room? 1  -EL 2  -MS    AM-PAC 6 Clicks Score (PT) 11  -EL 12  -MS    Highest Level of Mobility Goal 4 --> Transfer to chair/commode  -EL 4 --> Transfer to chair/commode  -MS      Row Name 04/06/24 1835          Functional Assessment    Outcome Measure Options AM-PAC 6 Clicks Basic Mobility (PT)  -EL               User Key  (r) = Recorded By, (t) = Taken By, (c) = Cosigned By      Initials Name Provider Type    Spencer Caldwell PT Physical Therapist    Jenni Adam LPN Licensed Nurse                                 Physical Therapy Education       Title: PT OT SLP Therapies (Done)       Topic: Physical Therapy (Done)       Point: Mobility training (Done)       Learning Progress Summary             Patient Acceptance, E,TB, VU by  at 4/6/2024 1836                         Point: Precautions (Done)       Learning Progress Summary             Patient Acceptance, E,TB, VU by  at 4/6/2024 1836                                         User Key       Initials Effective Dates Name Provider Type  06/23/20 -  Spencer Acevedo, PT Physical Therapist PT                  PT  Recommendation and Plan  Planned Therapy Interventions (PT): neuromuscular re-education, balance training, bed mobility training, transfer training, gait training, patient/family education, strengthening  Plan of Care Reviewed With: patient  Outcome Evaluation: Pt is an 89 YO F admitted with diverticular pain. Pt this date not complaining of significant pain in abdomen, but significant pain in R foot. Pt states she lives home alone, typcially caring for self and cat. Pt reports she performs all ADLs, and ambulates without AD. Pt this date requiring assistance for bed mobility and transfers with RWx. Pt with pain in R foot in standing, using RWx with cueing for UE usage. Pt attempted to ambulate but was unable to bear weight long enough to ambulate or pivot to bedside chair. Pt is well below functional baseline and recommendaiton is SNF at d/c.     Time Calculation:   PT Evaluation Complexity  History, PT Evaluation Complexity: 1-2 personal factors and/or comorbidities  Examination of Body Systems (PT Eval Complexity): total of 3 or more elements  Clinical Presentation (PT Evaluation Complexity): evolving  Clinical Decision Making (PT Evaluation Complexity): moderate complexity  Overall Complexity (PT Evaluation Complexity): moderate complexity     PT Charges       Row Name 04/06/24 1836             Time Calculation    Start Time 1504  -EL      Stop Time 1526  -EL      Time Calculation (min) 22 min  -EL      PT Received On 04/06/24  -EL      PT - Next Appointment 04/08/24  -EL      PT Goal Re-Cert Due Date 04/20/24  -EL                User Key  (r) = Recorded By, (t) = Taken By, (c) = Cosigned By      Initials Name Provider Type    EL Spencer Acevedo PT Physical Therapist                  Therapy Charges for Today       Code Description Service Date Service Provider Modifiers Qty    [id number]  PT EVAL MOD COMPLEXITY 4 4/6/2024 Spencer Acevedo, PT GP 1            PT G-Codes  Outcome Measure Options: AM-PAC 6 Clicks Basic  Mobility (PT)  AM-PAC 6 Clicks Score (PT): 11  PT Discharge Summary  Anticipated Discharge Disposition (PT): skilled nursing facility    Spencer Acevedo, PT  4/6/2024

## 2024-04-06 NOTE — PLAN OF CARE
Goal Outcome Evaluation:  Plan of Care Reviewed With: patient           Outcome Evaluation: Pt is an 89 YO F admitted with diverticular pain. Pt this date not complaining of significant pain in abdomen, but significant pain in R foot. Pt states she lives home alone, typcially caring for self and cat. Pt reports she performs all ADLs, and ambulates without AD. Pt this date requiring assistance for bed mobility and transfers with RWx. Pt with pain in R foot in standing, using RWx with cueing for UE usage. Pt attempted to ambulate but was unable to bear weight long enough to ambulate or pivot to bedside chair. Pt is well below functional baseline and recommendaiton is SNF at d/c.      Anticipated Discharge Disposition (PT): skilled nursing facility

## 2024-04-06 NOTE — ED PROVIDER NOTES
Subjective   History of Present Illness  Abdominal pain    History of present illness 88-year-old female complains of severe pain left lower abdomen nausea little bit of diarrhea and constipation since yesterday.  No fever chills no trauma no chest pain neck arm jaw pain no recent injury illness flus viruses vaccinations nonradiating severe sharp stabbing nothing makes it better or worse.  No one in the home with similar illness.  No foreign travels no recent hospitalization      Review of Systems   Constitutional:  Negative for chills and fever.   Respiratory:  Negative for chest tightness and shortness of breath.    Gastrointestinal:  Positive for abdominal pain. Negative for blood in stool and vomiting.   Genitourinary:  Negative for difficulty urinating and dysuria.   Musculoskeletal:  Negative for back pain.   Skin:  Negative for rash.   Neurological:  Negative for dizziness and light-headedness.   Psychiatric/Behavioral:  Negative for confusion.        Past Medical History:   Diagnosis Date    Anxiety     Arthritis     Breast nodule 2013    Left breast nodule (fibrocystic disease , no malignancy)     Cancer     Cataract     Chronic kidney disease     HL (hearing loss)     Hyperlipidemia     Hypertension     Obesity     Shortness of breath        Allergies   Allergen Reactions    Statins Myalgia and Other (See Comments)       Past Surgical History:   Procedure Laterality Date    BREAST BIOPSY Left 05/21/2013    Benign fibrocystic disease ,Abstracted from [facility].    CARDIAC CATHETERIZATION      D & C AND LAPAROSCOPY      FULGURATION ENDOMETRIOSIS      surgery    NEPHRECTOMY Right     ORIF HUMERUS FRACTURE Left 3/24/2021    Procedure: HUMERUS PROXIMAL OPEN REDUCTION INTERNAL FIXATION;  Surgeon: Yair Anne MD;  Location: [facility] OR;  Service: Orthopedics;  Laterality: Left;       History reviewed. No pertinent family history.    Social History     Socioeconomic History    Marital status:     Tobacco Use    Smoking status: Never    Smokeless tobacco: Never   Vaping Use    Vaping status: Never Used   Substance and Sexual Activity    Alcohol use: No    Drug use: No    Sexual activity: Defer     Partners: Male     Prior to Admission medications    Medication Sig Start Date End Date Taking? Authorizing Provider   allopurinol (ZYLOPRIM) 100 MG tablet Take 1 tablet by mouth Daily.   Yes Jory Nick MD   donepezil (Aricept) 10 MG tablet Take 1 tablet by mouth Every Night. 2/8/24 2/7/25 Yes Seipel, Joseph F, MD   ezetimibe (ZETIA) 10 MG tablet Take 1 tablet by mouth Daily.   Yes ProviderJory MD   furosemide (LASIX) 20 MG tablet Take 1 tablet by mouth Daily. For swelling 3/13/24  Yes Anabelle Sellers MD   hydrALAZINE (APRESOLINE) 100 MG tablet TAKE 1 TABLET TWICE A DAY 3/1/23  Yes Anabelle Sellers MD   melatonin 5 MG tablet tablet Take 1 tablet by mouth Every Night.   Yes ProviderJory MD   potassium chloride 10 MEQ CR tablet Take 1 tablet by mouth Daily. When taking furosemide 3/13/24  Yes Anabelle Sellers MD   traZODone (DESYREL) 50 MG tablet TAKE 1 TABLET BY MOUTH EVERY NIGHT 2/21/24  Yes Anabelle Sellers MD   cyclobenzaprine (FLEXERIL) 5 MG tablet Take 1 tablet by mouth 3 (Three) Times a Day As Needed for Muscle Spasms. 3/18/24   Germaine Daily APRN          Objective   Physical Exam  Constitutional this is a 88-year-old female awake alert no acute distress triage vital signs reviewed HEENT unremarkable.  Neck supple no adenopathy lungs clear no retraction heart regular without murmur abdomen soft with moderate tenderness left lower quadrant no rebound no guarding good bowel sounds no peritoneal findings or pulsatile mass extremities no edema cords or Homans' sign skin warm dry without rashes neurologic awake alert follows commands motor strength normal without focal weakness  Procedures           ED Course      Results for  orders placed or performed during the hospital encounter of 04/05/24   COVID-19, FLU A/B, RSV PCR 1 HR TAT - Swab, Nasopharynx    Specimen: Nasopharynx; Swab   Result Value Ref Range    COVID19 Not Detected Not Detected - Ref. Range    Influenza A PCR Not Detected Not Detected    Influenza B PCR Not Detected Not Detected    RSV, PCR Not Detected Not Detected   Blood Culture - Blood, Arm, Right    Specimen: Arm, Right; Blood   Result Value Ref Range    Blood Culture No growth at 24 hours    Blood Culture - Blood, Arm, Right    Specimen: Arm, Right; Blood   Result Value Ref Range    Blood Culture No growth at 24 hours    Comprehensive Metabolic Panel    Specimen: Arm, Right; Blood   Result Value Ref Range    Glucose 116 (H) 65 - 99 mg/dL    BUN 19 8 - 23 mg/dL    Creatinine 1.11 (H) 0.57 - 1.00 mg/dL    Sodium 137 136 - 145 mmol/L    Potassium 3.9 3.5 - 5.2 mmol/L    Chloride 99 98 - 107 mmol/L    CO2 23.0 22.0 - 29.0 mmol/L    Calcium 10.2 8.6 - 10.5 mg/dL    Total Protein 7.3 6.0 - 8.5 g/dL    Albumin 4.3 3.5 - 5.2 g/dL    ALT (SGPT) 10 1 - 33 U/L    AST (SGOT) 19 1 - 32 U/L    Alkaline Phosphatase 104 39 - 117 U/L    Total Bilirubin 0.6 0.0 - 1.2 mg/dL    Globulin 3.0 gm/dL    A/G Ratio 1.4 g/dL    BUN/Creatinine Ratio 17.1 7.0 - 25.0    Anion Gap 15.0 5.0 - 15.0 mmol/L    eGFR 48.2 (L) >60.0 mL/min/1.73   Lipase    Specimen: Blood   Result Value Ref Range    Lipase 44 13 - 60 U/L   Urinalysis With Microscopic If Indicated (No Culture) - Urine, Catheter In/Out    Specimen: Urine, Catheter In/Out   Result Value Ref Range    Color, UA Yellow Yellow, Straw    Appearance, UA Clear Clear    pH, UA <=5.0 5.0 - 8.0    Specific Gravity, UA 1.007 1.005 - 1.030    Glucose, UA Negative Negative    Ketones, UA Negative Negative    Bilirubin, UA Negative Negative    Blood, UA Negative Negative    Protein, UA Negative Negative    Leuk Esterase, UA Negative Negative    Nitrite, UA Negative Negative    Urobilinogen, UA 0.2 E.U./dL  0.2 - 1.0 E.U./dL   CK    Specimen: Arm, Right; Blood   Result Value Ref Range    Creatine Kinase 46 20 - 180 U/L   Magnesium    Specimen: Arm, Right; Blood   Result Value Ref Range    Magnesium 2.2 1.6 - 2.4 mg/dL   CBC Auto Differential    Specimen: Blood   Result Value Ref Range    WBC 19.12 (H) 3.40 - 10.80 10*3/mm3    RBC 4.54 3.77 - 5.28 10*6/mm3    Hemoglobin 13.0 12.0 - 15.9 g/dL    Hematocrit 41.5 34.0 - 46.6 %    MCV 91.4 79.0 - 97.0 fL    MCH 28.6 26.6 - 33.0 pg    MCHC 31.3 (L) 31.5 - 35.7 g/dL    RDW 14.6 12.3 - 15.4 %    RDW-SD 49.2 37.0 - 54.0 fl    MPV 10.2 6.0 - 12.0 fL    Platelets 361 140 - 450 10*3/mm3    Neutrophil % 81.5 (H) 42.7 - 76.0 %    Lymphocyte % 10.2 (L) 19.6 - 45.3 %    Monocyte % 6.7 5.0 - 12.0 %    Eosinophil % 0.5 0.3 - 6.2 %    Basophil % 0.6 0.0 - 1.5 %    Immature Grans % 0.5 0.0 - 0.5 %    Neutrophils, Absolute 15.59 (H) 1.70 - 7.00 10*3/mm3    Lymphocytes, Absolute 1.95 0.70 - 3.10 10*3/mm3    Monocytes, Absolute 1.28 (H) 0.10 - 0.90 10*3/mm3    Eosinophils, Absolute 0.09 0.00 - 0.40 10*3/mm3    Basophils, Absolute 0.12 0.00 - 0.20 10*3/mm3    Immature Grans, Absolute 0.09 (H) 0.00 - 0.05 10*3/mm3    nRBC 0.0 0.0 - 0.2 /100 WBC   Magnesium    Specimen: Blood   Result Value Ref Range    Magnesium 2.2 1.6 - 2.4 mg/dL   Basic Metabolic Panel    Specimen: Blood   Result Value Ref Range    Glucose 111 (H) 65 - 99 mg/dL    BUN 15 8 - 23 mg/dL    Creatinine 1.13 (H) 0.57 - 1.00 mg/dL    Sodium 135 (L) 136 - 145 mmol/L    Potassium 3.6 3.5 - 5.2 mmol/L    Chloride 100 98 - 107 mmol/L    CO2 23.0 22.0 - 29.0 mmol/L    Calcium 9.5 8.6 - 10.5 mg/dL    BUN/Creatinine Ratio 13.3 7.0 - 25.0    Anion Gap 12.0 5.0 - 15.0 mmol/L    eGFR 47.2 (L) >60.0 mL/min/1.73   CBC Auto Differential    Specimen: Blood   Result Value Ref Range    WBC 14.84 (H) 3.40 - 10.80 10*3/mm3    RBC 3.97 3.77 - 5.28 10*6/mm3    Hemoglobin 11.6 (L) 12.0 - 15.9 g/dL    Hematocrit 36.0 34.0 - 46.6 %    MCV 90.7 79.0 -  97.0 fL    MCH 29.2 26.6 - 33.0 pg    MCHC 32.2 31.5 - 35.7 g/dL    RDW 14.6 12.3 - 15.4 %    RDW-SD 48.8 37.0 - 54.0 fl    MPV 10.1 6.0 - 12.0 fL    Platelets 302 140 - 450 10*3/mm3    Neutrophil % 74.5 42.7 - 76.0 %    Lymphocyte % 16.5 (L) 19.6 - 45.3 %    Monocyte % 7.2 5.0 - 12.0 %    Eosinophil % 0.9 0.3 - 6.2 %    Basophil % 0.5 0.0 - 1.5 %    Immature Grans % 0.4 0.0 - 0.5 %    Neutrophils, Absolute 11.04 (H) 1.70 - 7.00 10*3/mm3    Lymphocytes, Absolute 2.45 0.70 - 3.10 10*3/mm3    Monocytes, Absolute 1.07 (H) 0.10 - 0.90 10*3/mm3    Eosinophils, Absolute 0.14 0.00 - 0.40 10*3/mm3    Basophils, Absolute 0.08 0.00 - 0.20 10*3/mm3    Immature Grans, Absolute 0.06 (H) 0.00 - 0.05 10*3/mm3    nRBC 0.0 0.0 - 0.2 /100 WBC   POC Lactate    Specimen: Blood   Result Value Ref Range    Lactate 0.6 0.3 - 2.0 mmol/L   POC Glucose 4x Daily Before Meals & at Bedtime    Specimen: Blood   Result Value Ref Range    Glucose 133 (H) 70 - 105 mg/dL   POC Glucose Once    Specimen: Blood   Result Value Ref Range    Glucose 95 70 - 105 mg/dL   POC Glucose Once    Specimen: Blood   Result Value Ref Range    Glucose 102 70 - 105 mg/dL   Green Top (Gel)   Result Value Ref Range    Extra Tube Hold for add-ons.    Lavender Top   Result Value Ref Range    Extra Tube hold for add-on      CT Abdomen Pelvis Without Contrast    Result Date: 4/5/2024  Impression: 1.Acute diverticulitis centered at the junction of the descending/sigmoid colon. Additional inflamed diverticulum more distally within the sigmoid colon (series 2, image 100). No free air or abscess formation is identified. 2.5.8 x 4.2 cm left adnexal lesion is incompletely characterized and appears slightly larger in comparison with 4/25/2022. Recommend further characterization with pelvic ultrasound. 3.Additional findings as detailed above. Electronically Signed: Robbi Song MD  4/5/2024 4:32 PM EDT  Workstation ID: [id number]    XR Chest 1 View    Result Date:  4/5/2024  Impression: Stable cardiomegaly. No acute process. Electronically Signed: Melodie Wolff MD  4/5/2024 1:34 PM EDT  Workstation ID: [id number]   Medications   sodium chloride 0.9 % flush 10 mL (has no administration in time range)   sodium chloride 0.9 % flush 10 mL (10 mL Intravenous Given 4/6/24 0840)   sodium chloride 0.9 % flush 10 mL (has no administration in time range)   sodium chloride 0.9 % infusion 40 mL (has no administration in time range)   Enoxaparin Sodium (LOVENOX) syringe 40 mg (40 mg Subcutaneous Given 4/5/24 2008)   Potassium Replacement - Follow Nurse / BPA Driven Protocol (has no administration in time range)   Magnesium Standard Dose Replacement - Follow Nurse / BPA Driven Protocol (has no administration in time range)   Phosphorus Replacement - Follow Nurse / BPA Driven Protocol (has no administration in time range)   Calcium Replacement - Follow Nurse / BPA Driven Protocol (has no administration in time range)   HYDROcodone-acetaminophen (NORCO) 7.5-325 MG per tablet 2 tablet (2 tablets Oral Given 4/6/24 0049)   sennosides-docusate (PERICOLACE) 8.6-50 MG per tablet 2 tablet (has no administration in time range)     And   polyethylene glycol (MIRALAX) packet 17 g (has no administration in time range)     And   bisacodyl (DULCOLAX) EC tablet 5 mg (has no administration in time range)     And   bisacodyl (DULCOLAX) suppository 10 mg (has no administration in time range)   ondansetron ODT (ZOFRAN-ODT) disintegrating tablet 4 mg (has no administration in time range)     Or   ondansetron (ZOFRAN) injection 4 mg (has no administration in time range)   cefTRIAXone (ROCEPHIN) 2,000 mg in sodium chloride 0.9 % 100 mL MBP (has no administration in time range)   metroNIDAZOLE (FLAGYL) IVPB 500 mg (500 mg Intravenous New Bag 4/6/24 0839)   dextrose (GLUTOSE) oral gel 15 g (has no administration in time range)   dextrose (D50W) (25 g/50 mL) IV injection 25 g (has no administration in time range)    glucagon (GLUCAGEN) injection 1 mg (has no administration in time range)   insulin lispro (HUMALOG/ADMELOG) injection 2-7 Units ( Subcutaneous Not Given 4/6/24 1143)   allopurinol (ZYLOPRIM) tablet 100 mg (100 mg Oral Given 4/6/24 0840)   cyclobenzaprine (FLEXERIL) tablet 5 mg (5 mg Oral Given 4/5/24 2009)   donepezil (ARICEPT) tablet 10 mg (10 mg Oral Given 4/5/24 2023)   furosemide (LASIX) tablet 20 mg (20 mg Oral Not Given 4/6/24 0845)   hydrALAZINE (APRESOLINE) tablet 100 mg (100 mg Oral Not Given 4/6/24 0845)   traZODone (DESYREL) tablet 50 mg (50 mg Oral Given 4/5/24 2023)   acetaminophen (TYLENOL) tablet 650 mg (has no administration in time range)     Or   acetaminophen (TYLENOL) 160 MG/5ML oral solution 650 mg (has no administration in time range)     Or   acetaminophen (TYLENOL) suppository 650 mg (has no administration in time range)   calcium carbonate (TUMS) chewable tablet 500 mg (200 mg elemental) (has no administration in time range)   melatonin tablet 5 mg (has no administration in time range)   morphine injection 2 mg (2 mg Intravenous Given 4/5/24 1434)   ondansetron (ZOFRAN) injection 4 mg (4 mg Intravenous Given 4/5/24 1434)   cefTRIAXone (ROCEPHIN) 2,000 mg in sodium chloride 0.9 % 100 mL MBP (0 mg Intravenous Stopped 4/5/24 1810)   metroNIDAZOLE (FLAGYL) IVPB 500 mg (0 mg Intravenous Stopped 4/5/24 1810)   potassium chloride (KLOR-CON M20) CR tablet 40 mEq (40 mEq Oral Given 4/6/24 1036)                                              Medical Decision Making  Medical decision making IV established patient had morphine 2 mg IV Zofran 4 mg IV.  Labs obtained independent reviewed by me COVID-19 flu negative.  The patient had a CBC unremarkable other than a white count of 19,000 Printz metabolic profile liver lipase unremarkable urine negative.  Cultures pending lactate normal at 0.6.  CT abdomen pelvis obtained my independent review I do not see any aneurysm dissection or perforation there is no  evidence of diverticulitis radiology reviewed acute diverticulitis in the junction descending and sigmoid colon.  The patient has no free air or abscess noted.  Has an adnexal lesion incompletely characterized on this slightly larger than 4/25/2022.  Patient on repeat exam is feeling better had some mild tenderness left lower abdomen no rebound no guarding.  No peritoneal findings.  We talked about the findings.  I do not see evidence of an aneurysm or dissection or a kidney stone or UTI.  Not complete list of all possibilities she was started on Rocephin and Flagyl IV.  I talked to the hospitalist we discussed the case and the patient will be admitted for further workup and care stable unremarkable improved ER course    Problems Addressed:  Acute diverticulitis: complicated acute illness or injury    Amount and/or Complexity of Data Reviewed  Labs: ordered. Decision-making details documented in ED Course.  Radiology: ordered and independent interpretation performed. Decision-making details documented in ED Course.    Risk  Parenteral controlled substances.  Decision regarding hospitalization.        Final diagnoses:   Acute diverticulitis       ED Disposition  ED Disposition       ED Disposition   Decision to Admit    Condition   --    Comment   Level of Care: Med/Surg [1]   Diagnosis: Acute diverticulitis [[id number]]   Admitting Physician: JOSUE ABARCA [[id number]]   Attending Physician: JOSUE ABARCA [[id number]]   Certification: I Certify That Inpatient Hospital Services Are Medically Necessary For Greater Than 2 Midnights                 No follow-up provider specified.       Medication List        Stop      HYDROcodone-acetaminophen 5-325 MG per tablet  Commonly known as: Norco            ASK your doctor about these medications      donepezil 10 MG tablet  Commonly known as: Aricept  Take 1 tablet by mouth Every Night.  Ask about: Which instructions should I use?                 Nikhil Reynolds MD  04/06/24 7201

## 2024-04-06 NOTE — PROGRESS NOTES
[facility] MEDICINE SERVICE  DAILY PROGRESS NOTE    NAME: Leandra Nuñez  : 1935  MRN: [id number]      LOS: 1 day     PROVIDER OF SERVICE: JESSIE Floyd    Chief Complaint: Acute diverticulitis    Subjective:     Interval History:  History taken from: patient  Patient Complaints: None at this time   Patient Denies:  Chest pain, dizziness, headache, shortness of breath     Review of Systems:   Review of Systems   Constitutional:  Negative for chills and fever.   Respiratory:  Negative for chest tightness and shortness of breath.    Cardiovascular:  Negative for chest pain.   Gastrointestinal:  Positive for abdominal pain.   Neurological:  Negative for dizziness and headaches.       Objective:     Vital Signs  Temp:  [98.4 °F (36.9 °C)-98.9 °F (37.2 °C)] 98.5 °F (36.9 °C)  Heart Rate:  [48-71] 71  Resp:  [14-21] 19  BP: (105-162)/(54-76) 127/67   Body mass index is 37.72 kg/m².    Physical Exam  Physical Exam  Constitutional:       Appearance: Normal appearance.   HENT:      Head: Normocephalic and atraumatic.      Nose: Nose normal.      Mouth/Throat:      Mouth: Mucous membranes are moist.   Eyes:      Extraocular Movements: Extraocular movements intact.      Conjunctiva/sclera: Conjunctivae normal.      Pupils: Pupils are equal, round, and reactive to light.   Cardiovascular:      Rate and Rhythm: Normal rate and regular rhythm.      Pulses: Normal pulses.      Heart sounds: Normal heart sounds.   Pulmonary:      Effort: Pulmonary effort is normal.      Breath sounds: Normal breath sounds.   Abdominal:      General: Abdomen is flat. Bowel sounds are normal.      Palpations: Abdomen is soft.      Tenderness: There is abdominal tenderness.   Musculoskeletal:         General: Normal range of motion.      Cervical back: Normal range of motion.   Skin:     General: Skin is warm and dry.   Neurological:      General: No focal deficit present.      Mental Status: She is alert and oriented to  person, place, and time. Mental status is at baseline.   Psychiatric:         Mood and Affect: Mood normal.         Behavior: Behavior normal.         Thought Content: Thought content normal.         Judgment: Judgment normal.         Scheduled Meds   allopurinol, 100 mg, Oral, Daily  cefTRIAXone, 2,000 mg, Intravenous, Q24H  donepezil, 10 mg, Oral, Nightly  enoxaparin, 40 mg, Subcutaneous, Daily  furosemide, 20 mg, Oral, Daily  hydrALAZINE, 100 mg, Oral, BID  insulin lispro, 2-7 Units, Subcutaneous, 4x Daily AC & at Bedtime  metroNIDAZOLE, 500 mg, Intravenous, Q8H  sodium chloride, 10 mL, Intravenous, Q12H  traZODone, 50 mg, Oral, Nightly       PRN Meds     acetaminophen **OR** acetaminophen **OR** acetaminophen    senna-docusate sodium **AND** polyethylene glycol **AND** bisacodyl **AND** bisacodyl    calcium carbonate    Calcium Replacement - Follow Nurse / BPA Driven Protocol    cyclobenzaprine    dextrose    dextrose    glucagon (human recombinant)    HYDROcodone-acetaminophen    Magnesium Standard Dose Replacement - Follow Nurse / BPA Driven Protocol    melatonin    ondansetron ODT **OR** ondansetron    Phosphorus Replacement - Follow Nurse / BPA Driven Protocol    Potassium Replacement - Follow Nurse / BPA Driven Protocol    [COMPLETED] Insert Peripheral IV **AND** sodium chloride    sodium chloride    sodium chloride   Infusions         Diagnostic Data    Results from last 7 days   Lab Units 04/06/24  0035 04/05/24  1404   WBC 10*3/mm3 14.84*  --    HEMOGLOBIN g/dL 11.6*  --    HEMATOCRIT % 36.0  --    PLATELETS 10*3/mm3 302  --    GLUCOSE mg/dL 111* 116*   CREATININE mg/dL 1.13* 1.11*   BUN mg/dL 15 19   SODIUM mmol/L 135* 137   POTASSIUM mmol/L 3.6 3.9   AST (SGOT) U/L  --  19   ALT (SGPT) U/L  --  10   ALK PHOS U/L  --  104   BILIRUBIN mg/dL  --  0.6   ANION GAP mmol/L 12.0 15.0       CT Abdomen Pelvis Without Contrast    Result Date: 4/5/2024  Impression: 1.Acute diverticulitis centered at the junction  of the descending/sigmoid colon. Additional inflamed diverticulum more distally within the sigmoid colon (series 2, image 100). No free air or abscess formation is identified. 2.5.8 x 4.2 cm left adnexal lesion is incompletely characterized and appears slightly larger in comparison with 4/25/2022. Recommend further characterization with pelvic ultrasound. 3.Additional findings as detailed above. Electronically Signed: Robbi Song MD  4/5/2024 4:32 PM EDT  Workstation ID: [id number]    XR Chest 1 View    Result Date: 4/5/2024  Impression: Stable cardiomegaly. No acute process. Electronically Signed: Melodie Wolff MD  4/5/2024 1:34 PM EDT  Workstation ID: [id number]       I reviewed the patient's new clinical results.    Assessment/Plan:     Active and Resolved Problems  Active Hospital Problems    Diagnosis  POA    **Acute diverticulitis [K57.92]  Yes    Dementia [F03.90]  Yes    Hypertension [I10]  Yes    Type 2 diabetes mellitus without complication [E11.9]  Yes    Hyperlipidemia [E78.5]  Yes      Resolved Hospital Problems   No resolved problems to display.       Acute Diverticulitis  -CT of the abdomen and pelvis reviewed, acute diverticulitis at the junction of the descending/sigmoid colon noted  -WBC 14.8 today, down from 19.12 yesterday  -Continue Rocephin and Flagyl   -Analgesics as needed  -Clear liquid diet       Essential Hypertension  -Controlled  -Monitor BP  -Continue home hydralazine  -/67      Diabetes Mellitus Type 2  -Accu-Cheks AC at bedtime  -SSI ordered  -No home medication reported     Dementia  Insomnia  -Chronic, stable  -Continue home Aricept, trazodone     Hyperlipidemia  -Zetia is nonformulary, restart at discharge    DVT prophylaxis:  Medical DVT prophylaxis orders are present.         Code status is   Code Status and Medical Interventions:   Ordered at: 04/05/24 1950     Code Status (Patient has no pulse and is not breathing):    CPR (Attempt to Resuscitate)     Medical  Interventions (Patient has pulse or is breathing):    Full Support       Plan for disposition:Pending clinical course     Time: 30 minutes    Signature: Electronically signed by JESSIE Floyd, 04/06/24, 13:28 EDT.  [facility] Hospitalist Team

## 2024-04-07 LAB
ANION GAP SERPL CALCULATED.3IONS-SCNC: 13 MMOL/L (ref 5–15)
BASOPHILS # BLD AUTO: 0.07 10*3/MM3 (ref 0–0.2)
BASOPHILS NFR BLD AUTO: 0.7 % (ref 0–1.5)
BUN SERPL-MCNC: 13 MG/DL (ref 8–23)
BUN/CREAT SERPL: 13.4 (ref 7–25)
CALCIUM SPEC-SCNC: 9.5 MG/DL (ref 8.6–10.5)
CHLORIDE SERPL-SCNC: 101 MMOL/L (ref 98–107)
CO2 SERPL-SCNC: 22 MMOL/L (ref 22–29)
CREAT SERPL-MCNC: 0.97 MG/DL (ref 0.57–1)
DEPRECATED RDW RBC AUTO: 52.5 FL (ref 37–54)
EGFRCR SERPLBLD CKD-EPI 2021: 56.7 ML/MIN/1.73
EOSINOPHIL # BLD AUTO: 0.22 10*3/MM3 (ref 0–0.4)
EOSINOPHIL NFR BLD AUTO: 2.2 % (ref 0.3–6.2)
ERYTHROCYTE [DISTWIDTH] IN BLOOD BY AUTOMATED COUNT: 14.6 % (ref 12.3–15.4)
GLUCOSE SERPL-MCNC: 100 MG/DL (ref 65–99)
HCT VFR BLD AUTO: 38.4 % (ref 34–46.6)
HGB BLD-MCNC: 11.4 G/DL (ref 12–15.9)
IMM GRANULOCYTES # BLD AUTO: 0.05 10*3/MM3 (ref 0–0.05)
IMM GRANULOCYTES NFR BLD AUTO: 0.5 % (ref 0–0.5)
LARGE PLATELETS: NORMAL
LYMPHOCYTES # BLD AUTO: 2.52 10*3/MM3 (ref 0.7–3.1)
LYMPHOCYTES NFR BLD AUTO: 24.7 % (ref 19.6–45.3)
MCH RBC QN AUTO: 28.7 PG (ref 26.6–33)
MCHC RBC AUTO-ENTMCNC: 29.7 G/DL (ref 31.5–35.7)
MCV RBC AUTO: 96.7 FL (ref 79–97)
MONOCYTES # BLD AUTO: 0.87 10*3/MM3 (ref 0.1–0.9)
MONOCYTES NFR BLD AUTO: 8.5 % (ref 5–12)
NEUTROPHILS NFR BLD AUTO: 6.48 10*3/MM3 (ref 1.7–7)
NEUTROPHILS NFR BLD AUTO: 63.4 % (ref 42.7–76)
NEUTS VAC BLD QL SMEAR: NORMAL
NRBC BLD AUTO-RTO: 0 /100 WBC (ref 0–0.2)
PLATELET # BLD AUTO: 272 10*3/MM3 (ref 140–450)
PMV BLD AUTO: 10.1 FL (ref 6–12)
POTASSIUM SERPL-SCNC: 4 MMOL/L (ref 3.5–5.2)
RBC # BLD AUTO: 3.97 10*6/MM3 (ref 3.77–5.28)
RBC MORPH BLD: NORMAL
SODIUM SERPL-SCNC: 136 MMOL/L (ref 136–145)
WBC NRBC COR # BLD AUTO: 10.21 10*3/MM3 (ref 3.4–10.8)

## 2024-04-07 PROCEDURE — 25010000002 CEFTRIAXONE PER 250 MG

## 2024-04-07 PROCEDURE — 25010000002 METRONIDAZOLE 500 MG/100ML SOLUTION

## 2024-04-07 PROCEDURE — 80048 BASIC METABOLIC PNL TOTAL CA: CPT | Performed by: FAMILY MEDICINE

## 2024-04-07 PROCEDURE — 85025 COMPLETE CBC W/AUTO DIFF WBC: CPT | Performed by: FAMILY MEDICINE

## 2024-04-07 PROCEDURE — 25010000002 ENOXAPARIN PER 10 MG

## 2024-04-07 PROCEDURE — 85007 BL SMEAR W/DIFF WBC COUNT: CPT | Performed by: FAMILY MEDICINE

## 2024-04-07 RX ADMIN — HYDROCODONE BITARTRATE AND ACETAMINOPHEN 2 TABLET: 7.5; 325 TABLET ORAL at 23:09

## 2024-04-07 RX ADMIN — ENOXAPARIN SODIUM 40 MG: 100 INJECTION SUBCUTANEOUS at 16:54

## 2024-04-07 RX ADMIN — TRAZODONE HYDROCHLORIDE 50 MG: 50 TABLET ORAL at 20:07

## 2024-04-07 RX ADMIN — METRONIDAZOLE 500 MG: 500 INJECTION, SOLUTION INTRAVENOUS at 17:15

## 2024-04-07 RX ADMIN — CEFTRIAXONE 2000 MG: 2 INJECTION, POWDER, FOR SOLUTION INTRAMUSCULAR; INTRAVENOUS at 17:15

## 2024-04-07 RX ADMIN — DONEPEZIL HYDROCHLORIDE 10 MG: 5 TABLET, FILM COATED ORAL at 20:07

## 2024-04-07 RX ADMIN — FUROSEMIDE 20 MG: 20 TABLET ORAL at 08:34

## 2024-04-07 RX ADMIN — POLYETHYLENE GLYCOL 3350 17 G: 17 POWDER, FOR SOLUTION ORAL at 08:46

## 2024-04-07 RX ADMIN — METRONIDAZOLE 500 MG: 500 INJECTION, SOLUTION INTRAVENOUS at 00:01

## 2024-04-07 RX ADMIN — DOCUSATE SODIUM AND SENNOSIDES 2 TABLET: 8.6; 5 TABLET, FILM COATED ORAL at 20:09

## 2024-04-07 RX ADMIN — ALLOPURINOL 100 MG: 100 TABLET ORAL at 08:34

## 2024-04-07 RX ADMIN — HYDRALAZINE HYDROCHLORIDE 100 MG: 25 TABLET ORAL at 20:07

## 2024-04-07 RX ADMIN — METRONIDAZOLE 500 MG: 500 INJECTION, SOLUTION INTRAVENOUS at 08:34

## 2024-04-07 RX ADMIN — HYDRALAZINE HYDROCHLORIDE 100 MG: 25 TABLET ORAL at 08:35

## 2024-04-07 RX ADMIN — Medication 10 ML: at 08:35

## 2024-04-07 RX ADMIN — HYDROCODONE BITARTRATE AND ACETAMINOPHEN 2 TABLET: 7.5; 325 TABLET ORAL at 18:18

## 2024-04-07 RX ADMIN — Medication 10 ML: at 20:07

## 2024-04-07 RX ADMIN — HYDROCODONE BITARTRATE AND ACETAMINOPHEN 2 TABLET: 7.5; 325 TABLET ORAL at 08:34

## 2024-04-07 NOTE — PLAN OF CARE
Goal Outcome Evaluation:  Patients pain well controlled with prn pain meds, staff encouraging mobility, makes needs known, patient's family and patient at start of shift stated patient not diabetic-did not want to receive insulin, requested doctor to be notified, blood sugars have been in normal range, new orders was received to change diet to regular clear liquids and to discontinue accuchecks and insulin.

## 2024-04-07 NOTE — PLAN OF CARE
Goal Outcome Evaluation:      Pt is able to make needs known. Pain is managed with medication regimen. Pt did not ambulate during shift. Pt refused bath several times throughout shift. I spoke to daughter and told her that we will continue to ask. Education is complete, call light is in reach, and no other needs at this time. Continue to monitor.

## 2024-04-07 NOTE — PROGRESS NOTES
[facility] MEDICINE SERVICE  DAILY PROGRESS NOTE    NAME: Leandra Nuñez  : 1935  MRN: [id number]      LOS: 2 days     PROVIDER OF SERVICE: JESSIE Floyd    Chief Complaint: Acute diverticulitis    Subjective:     Interval History:  History taken from: patient  Patient Complaints: Hungry   Patient Denies:  Shortness of breath, dizziness, headache, chest pain     Review of Systems:   Review of Systems   Constitutional:  Negative for chills and fever.   Respiratory:  Negative for chest tightness and shortness of breath.    Musculoskeletal:  Positive for myalgias.   Neurological:  Negative for dizziness and headaches.       Objective:     Vital Signs  Temp:  [97.7 °F (36.5 °C)-99.1 °F (37.3 °C)] 97.7 °F (36.5 °C)  Heart Rate:  [54-73] 73  Resp:  [16-18] 18  BP: (111-150)/(62-81) 116/62   Body mass index is 37.72 kg/m².    Physical Exam  Physical Exam  Constitutional:       Appearance: She is obese.   HENT:      Head: Normocephalic and atraumatic.      Nose: Nose normal.      Mouth/Throat:      Mouth: Mucous membranes are moist.   Eyes:      Extraocular Movements: Extraocular movements intact.      Conjunctiva/sclera: Conjunctivae normal.      Pupils: Pupils are equal, round, and reactive to light.   Cardiovascular:      Rate and Rhythm: Normal rate and regular rhythm.      Pulses: Normal pulses.      Heart sounds: Normal heart sounds.   Pulmonary:      Effort: Pulmonary effort is normal.      Breath sounds: Normal breath sounds.   Abdominal:      General: Abdomen is flat. Bowel sounds are normal.      Palpations: Abdomen is soft.      Tenderness: There is abdominal tenderness.   Musculoskeletal:         General: Normal range of motion.      Cervical back: Normal range of motion.   Skin:     General: Skin is warm and dry.   Neurological:      General: No focal deficit present.      Mental Status: She is alert and oriented to person, place, and time. Mental status is at baseline.   Psychiatric:          Mood and Affect: Mood normal.         Behavior: Behavior normal.         Thought Content: Thought content normal.         Judgment: Judgment normal.         Scheduled Meds   allopurinol, 100 mg, Oral, Daily  cefTRIAXone, 2,000 mg, Intravenous, Q24H  donepezil, 10 mg, Oral, Nightly  enoxaparin, 40 mg, Subcutaneous, Daily  furosemide, 20 mg, Oral, Daily  hydrALAZINE, 100 mg, Oral, BID  metroNIDAZOLE, 500 mg, Intravenous, Q8H  sodium chloride, 10 mL, Intravenous, Q12H  traZODone, 50 mg, Oral, Nightly       PRN Meds     acetaminophen **OR** acetaminophen **OR** acetaminophen    senna-docusate sodium **AND** polyethylene glycol **AND** bisacodyl **AND** bisacodyl    calcium carbonate    Calcium Replacement - Follow Nurse / BPA Driven Protocol    cyclobenzaprine    dextrose    dextrose    glucagon (human recombinant)    HYDROcodone-acetaminophen    Magnesium Standard Dose Replacement - Follow Nurse / BPA Driven Protocol    melatonin    ondansetron ODT **OR** ondansetron    Phosphorus Replacement - Follow Nurse / BPA Driven Protocol    Potassium Replacement - Follow Nurse / BPA Driven Protocol    [COMPLETED] Insert Peripheral IV **AND** sodium chloride    sodium chloride    sodium chloride   Infusions         Diagnostic Data    Results from last 7 days   Lab Units 04/07/24  0402 04/06/24  0035 04/05/24  1404   WBC 10*3/mm3 10.21   < >  --    HEMOGLOBIN g/dL 11.4*   < >  --    HEMATOCRIT % 38.4   < >  --    PLATELETS 10*3/mm3 272   < >  --    GLUCOSE mg/dL 100*   < > 116*   CREATININE mg/dL 0.97   < > 1.11*   BUN mg/dL 13   < > 19   SODIUM mmol/L 136   < > 137   POTASSIUM mmol/L 4.0   < > 3.9   AST (SGOT) U/L  --   --  19   ALT (SGPT) U/L  --   --  10   ALK PHOS U/L  --   --  104   BILIRUBIN mg/dL  --   --  0.6   ANION GAP mmol/L 13.0   < > 15.0    < > = values in this interval not displayed.       CT Abdomen Pelvis Without Contrast    Result Date: 4/5/2024  Impression: 1.Acute diverticulitis centered at the  junction of the descending/sigmoid colon. Additional inflamed diverticulum more distally within the sigmoid colon (series 2, image 100). No free air or abscess formation is identified. 2.5.8 x 4.2 cm left adnexal lesion is incompletely characterized and appears slightly larger in comparison with 4/25/2022. Recommend further characterization with pelvic ultrasound. 3.Additional findings as detailed above. Electronically Signed: Robbi Song MD  4/5/2024 4:32 PM EDT  Workstation ID: [id number]       I reviewed the patient's new clinical results.    Assessment/Plan:     Active and Resolved Problems  Active Hospital Problems    Diagnosis  POA    **Acute diverticulitis [K57.92]  Yes    Dementia [F03.90]  Yes    Hypertension [I10]  Yes    Type 2 diabetes mellitus without complication [E11.9]  Yes    Hyperlipidemia [E78.5]  Yes      Resolved Hospital Problems   No resolved problems to display.       Acute Diverticulitis  -CT of the abdomen and pelvis reviewed, acute diverticulitis at the junction of the descending/sigmoid colon noted  -WBC 10.2 today, down from 14.8 yesterday  -Continue Rocephin and Flagyl   -Analgesics as needed  -Advance diet to GI soft/low residue         Essential Hypertension  -Controlled  -Monitor BP  -Continue home hydralazine  -/62     Diabetes Mellitus Type 2  -Accu-Cheks AC at bedtime  -SSI ordered  -No home medication reported     Dementia  Insomnia  -Chronic, stable  -Continue home Aricept, trazodone     Hyperlipidemia  -Zetia is nonformulary, restart at discharge    DVT prophylaxis:  Medical DVT prophylaxis orders are present.         Code status is   Code Status and Medical Interventions:   Ordered at: 04/05/24 1950     Code Status (Patient has no pulse and is not breathing):    CPR (Attempt to Resuscitate)     Medical Interventions (Patient has pulse or is breathing):    Full Support       Plan for disposition:1-2 days if tolerating advanced diet     Time: 30 minutes    Signature:  Electronically signed by JESSIE Floyd, 04/07/24, 14:08 EDT.  [facility] Hospitalist Team

## 2024-04-08 PROCEDURE — 25010000002 ENOXAPARIN PER 10 MG

## 2024-04-08 PROCEDURE — 97110 THERAPEUTIC EXERCISES: CPT

## 2024-04-08 PROCEDURE — 97530 THERAPEUTIC ACTIVITIES: CPT

## 2024-04-08 PROCEDURE — 97112 NEUROMUSCULAR REEDUCATION: CPT

## 2024-04-08 PROCEDURE — 25010000002 METRONIDAZOLE 500 MG/100ML SOLUTION

## 2024-04-08 PROCEDURE — 25010000002 CEFTRIAXONE PER 250 MG

## 2024-04-08 RX ADMIN — HYDRALAZINE HYDROCHLORIDE 100 MG: 25 TABLET ORAL at 20:35

## 2024-04-08 RX ADMIN — HYDRALAZINE HYDROCHLORIDE 100 MG: 25 TABLET ORAL at 10:10

## 2024-04-08 RX ADMIN — CEFTRIAXONE 2000 MG: 2 INJECTION, POWDER, FOR SOLUTION INTRAMUSCULAR; INTRAVENOUS at 16:48

## 2024-04-08 RX ADMIN — TRAZODONE HYDROCHLORIDE 50 MG: 50 TABLET ORAL at 20:35

## 2024-04-08 RX ADMIN — DONEPEZIL HYDROCHLORIDE 10 MG: 5 TABLET, FILM COATED ORAL at 20:35

## 2024-04-08 RX ADMIN — METRONIDAZOLE 500 MG: 500 INJECTION, SOLUTION INTRAVENOUS at 16:12

## 2024-04-08 RX ADMIN — HYDROCODONE BITARTRATE AND ACETAMINOPHEN 2 TABLET: 7.5; 325 TABLET ORAL at 03:23

## 2024-04-08 RX ADMIN — METRONIDAZOLE 500 MG: 500 INJECTION, SOLUTION INTRAVENOUS at 01:29

## 2024-04-08 RX ADMIN — ENOXAPARIN SODIUM 40 MG: 100 INJECTION SUBCUTANEOUS at 16:12

## 2024-04-08 RX ADMIN — METRONIDAZOLE 500 MG: 500 INJECTION, SOLUTION INTRAVENOUS at 10:10

## 2024-04-08 RX ADMIN — Medication 10 ML: at 20:36

## 2024-04-08 RX ADMIN — FUROSEMIDE 20 MG: 20 TABLET ORAL at 10:09

## 2024-04-08 RX ADMIN — Medication 10 ML: at 10:13

## 2024-04-08 RX ADMIN — BISACODYL 5 MG: 5 TABLET, COATED ORAL at 16:57

## 2024-04-08 RX ADMIN — HYDROCODONE BITARTRATE AND ACETAMINOPHEN 2 TABLET: 7.5; 325 TABLET ORAL at 18:56

## 2024-04-08 RX ADMIN — ALLOPURINOL 100 MG: 100 TABLET ORAL at 10:09

## 2024-04-08 NOTE — DISCHARGE PLACEMENT REQUEST
"Leandra Nuñez (88 y.o. Female)       Date of Birth   1935    Social Security Number       Address   [address] [place] [facility]    Home Phone   [phone]    MRN   [id number]       Religious   Anglican    Marital Status                               Admission Date   4/5/24    Admission Type   Emergency    Admitting Provider       Attending Provider   Cathy Aranda MD    Department, Room/Bed   [facility] SURGICAL INPATIENT, 4126/1       Discharge Date       Discharge Disposition       Discharge Destination                                 Attending Provider: Cathy Aranda MD    Allergies: Statins    Isolation: None   Infection: None   Code Status: CPR    Ht: 160 cm (63\")   Wt: 96.6 kg (212 lb 15.4 oz)    Admission Cmt: None   Principal Problem: Acute diverticulitis [K57.92]                   Active Insurance as of 4/5/2024       Primary Coverage       Payor Plan Insurance Group Employer/Plan Group    ANTHEM MEDICARE REPLACEMENT ANTHEM MEDICARE ADVANTAGE [id number]       Payor Plan Address Payor Plan Phone Number Payor Plan Fax Number Effective Dates    [address] [phone]  1/1/2024 - None Entered    [facility] [address]         Subscriber Name Subscriber Birth Date Member ID       LEANDRA NUÑEZ 1935 [id number]               Secondary Coverage       Payor Plan Insurance Group Employer/Plan Group     FOR LIFE  FOR LIFE MC SUP         Payor Plan Address Payor Plan Phone Number Payor Plan Fax Number Effective Dates    [address] [phone]  8/14/2013 - None Entered    [facility] [id number]         Subscriber Name Subscriber Birth Date Member ID       LEANDRA NUÑEZ 1935 [id number]                     Emergency Contacts        (Rel.) Home Phone Work Phone Mobile Phone    TARIQCAYETANODAYANA (Daughter) [phone] -- --    BHAVYA POTTER (Relative) -- -- [phone]    KENNY DE LA ROSA (Grandchild) -- -- [phone]      "

## 2024-04-08 NOTE — PLAN OF CARE
Assessment: Leandra Nuñez presents with functional mobility impairments which indicate the need for skilled intervention. Pt pleasant and agreeable to therapy this date. Kishan required for supine>sit EOB d/t RLE pain and weakness. Pt tolerated seated there ex well without pain. Pt required min-mod A x 1 for STS x 4 bouts. Pt able to tolerate standing ~ 30 seconds each bout without fatigue or pain. Pt demo reduced ability to bear weight RLE, preventing ambulation. Ax2 needed for walking. Tolerating session today without incident. Will continue to follow and progress as tolerated.

## 2024-04-08 NOTE — PROGRESS NOTES
[facility] MEDICINE SERVICE  DAILY PROGRESS NOTE    NAME: Leandra Nuñez  : 1935  MRN: [id number]      LOS: 3 days     PROVIDER OF SERVICE: Cathy Aranda MD    Chief Complaint: Acute diverticulitis    Subjective:     Interval History:  History taken from: patient chart  Patient Complaints: Denies any concerns at this time.  Daughter reminds her that she had some painful gas last night but that has resolved.  Patient Denies: Chest pain, shortness of breath, dyspnea     Patient reports has had 2 bowel movements, not charted,    Review of Systems:   Review of Systems    Objective:     Vital Signs  Temp:  [97.7 °F (36.5 °C)-98.4 °F (36.9 °C)] 98.4 °F (36.9 °C)  Heart Rate:  [45-73] 45  Resp:  [10-18] 10  BP: (116-156)/(62-73) 148/67   Body mass index is 37.72 kg/m².    Physical Exam  General: Well appearing, well nourished, in no distress. Appears stated age     HEENT: Head: Normocephalic, atraumatic. Conjunctiva clear, sclera non-icteric, EOM intact, PERRL, hearing intact. Nose without external lesions. Mucous membranes moist, no mucosal lesions   Neck: Supple, without lesions   Heart: Regular rate and rhythm, no murmurs / rubs / gallops    Lungs: No signs of respiratory distress. Moving air well. Clear without crackles, rhonchi, wheezes   Abdomen: Bowel sounds normal, no tenderness, no distension, no masses   Musculoskeletal: No edema. No joint swelling, or pain on palpation. No tenderness on palpation over the spinal processes  Skin: Warm, dry, intact without rashes or lesions. Appropriate color for ethnicity.   Neurologic: Alert and oriented to self and hospital, CN 2-12 normal. Motor function intact bilaterally. Sensation intact bilaterally.   Psychiatric: Appropriate mood and affect.    Scheduled Meds   allopurinol, 100 mg, Oral, Daily  cefTRIAXone, 2,000 mg, Intravenous, Q24H  donepezil, 10 mg, Oral, Nightly  enoxaparin, 40 mg, Subcutaneous, Daily  furosemide, 20 mg, Oral, Daily  hydrALAZINE,  100 mg, Oral, BID  metroNIDAZOLE, 500 mg, Intravenous, Q8H  sodium chloride, 10 mL, Intravenous, Q12H  traZODone, 50 mg, Oral, Nightly       PRN Meds     acetaminophen **OR** acetaminophen **OR** acetaminophen    senna-docusate sodium **AND** polyethylene glycol **AND** bisacodyl **AND** bisacodyl    calcium carbonate    Calcium Replacement - Follow Nurse / BPA Driven Protocol    cyclobenzaprine    dextrose    dextrose    glucagon (human recombinant)    HYDROcodone-acetaminophen    Magnesium Standard Dose Replacement - Follow Nurse / BPA Driven Protocol    melatonin    ondansetron ODT **OR** ondansetron    Phosphorus Replacement - Follow Nurse / BPA Driven Protocol    Potassium Replacement - Follow Nurse / BPA Driven Protocol    [COMPLETED] Insert Peripheral IV **AND** sodium chloride    sodium chloride    sodium chloride   Infusions         Diagnostic Data    Results from last 7 days   Lab Units 04/07/24  0402 04/06/24  0035 04/05/24  1404   WBC 10*3/mm3 10.21   < >  --    HEMOGLOBIN g/dL 11.4*   < >  --    HEMATOCRIT % 38.4   < >  --    PLATELETS 10*3/mm3 272   < >  --    GLUCOSE mg/dL 100*   < > 116*   CREATININE mg/dL 0.97   < > 1.11*   BUN mg/dL 13   < > 19   SODIUM mmol/L 136   < > 137   POTASSIUM mmol/L 4.0   < > 3.9   AST (SGOT) U/L  --   --  19   ALT (SGPT) U/L  --   --  10   ALK PHOS U/L  --   --  104   BILIRUBIN mg/dL  --   --  0.6   ANION GAP mmol/L 13.0   < > 15.0    < > = values in this interval not displayed.       No radiology results for the last day      I reviewed the patient's new clinical results.  I reviewed the patient's new imaging results and agree with the interpretation.    Assessment/Plan:     Active and Resolved Problems  Active Hospital Problems    Diagnosis  POA    **Acute diverticulitis [K57.92]  Yes    Dementia [F03.90]  Yes    Hypertension [I10]  Yes    Type 2 diabetes mellitus without complication [E11.9]  Yes    Hyperlipidemia [E78.5]  Yes      Resolved Hospital Problems   No  resolved problems to display.       88-year-old woman with history of dementia, hypertension, type 2 diabetes mellitus, hyperlipidemia who presented with nausea, vomiting, diarrhea fever and chills found to have acute diverticulitis on CT abdomen.    # Acute diverticulitis, improving  Leukocytosis has down trended.  Diet was advanced yesterday and patient has been tolerating it well.  Will continue Rocephin and Flagyl.  PT recommending SNF, pending placement. Clinically ready, will require transition to PO meds.  -AM labs: CBC, BMP  -Continue ceftriaxone and Flagyl    # Hypertension  Continue home hydralazine and furosemide    # Insomnia  Continue home trazodone    # Dementia  Continue home donepezil    # Gout  Continue home allopurinol    DVT prophylaxis:  Medical DVT prophylaxis orders are present.         Code status is   Code Status and Medical Interventions:   Ordered at: 04/05/24 1950     Code Status (Patient has no pulse and is not breathing):    CPR (Attempt to Resuscitate)     Medical Interventions (Patient has pulse or is breathing):    Full Support       Plan for disposition:pending placement, clinically ready.days    Time: 30 minutes    Signature: Electronically signed by Cathy Aranda MD, 04/08/24, 08:37 EDT.  [facility], [place], operated by [facility] Hospitalist Team

## 2024-04-08 NOTE — CASE MANAGEMENT/SOCIAL WORK
Discharge Planning Assessment   [facility]     Patient Name: Leandra Nuñez  MRN: [id number]  Today's Date: 4/8/2024    Admit Date: 4/5/2024    Plan: Plan to dc to [facility], pending acceptance.  Precert required.  PASRR approved.   Discharge Needs Assessment       Row Name 04/08/24 1518       Living Environment    People in Home alone    Current Living Arrangements home    Potentially Unsafe Housing Conditions none    In the past 12 months has the electric, gas, oil, or water company threatened to shut off services in your home? No    Primary Care Provided by self    Provides Primary Care For no one    Family Caregiver if Needed child(regis), adult    Family Caregiver Names Kianna - daughter    Quality of Family Relationships helpful;involved;supportive    Able to Return to Prior Arrangements yes       Resource/Environmental Concerns    Resource/Environmental Concerns none    Transportation Concerns none       Transportation Needs    In the past 12 months, has lack of transportation kept you from medical appointments or from getting medications? no    In the past 12 months, has lack of transportation kept you from meetings, work, or from getting things needed for daily living? No       Food Insecurity    Within the past 12 months, you worried that your food would run out before you got the money to buy more. Never true    Within the past 12 months, the food you bought just didn't last and you didn't have money to get more. Never true       Transition Planning    Patient/Family Anticipates Transition to home    Patient/Family Anticipated Services at Transition none    Transportation Anticipated car, drives self;family or friend will provide       Discharge Needs Assessment    Readmission Within the Last 30 Days no previous admission in last 30 days    Equipment Currently Used at Home cane, quad tip;walker, standard;shower chair;cane, straight;bp cuff    Concerns to be Addressed discharge planning     Anticipated Changes Related to Illness none    Equipment Needed After Discharge none    Discharge Facility/Level of Care Needs nursing facility, skilled                   Discharge Plan       Row Name 04/08/24 1521       Plan    Plan Plan to dc to [facility], pending acceptance.  Precert required.  PASRR approved.    Patient/Family in Agreement with Plan yes    Plan Comments Patient lives at home alone.  Daughter Kianna will transport at discharge. Patient performs IADLs but does have a walker and cane when needed. PCP and pharmacy confirmed. Declines M2B.  Denies financial assistance needs for medication and/or food.  CM spoke with patient and daughter Kianna, agreeable to [facility], referral in basket, liaison notified, pending acceptance.  DC Barriers:  IV Abxs.                 Expected Discharge Date and Time       Expected Discharge Date Expected Discharge Time    Apr 9, 2024            Demographic Summary       Row Name 04/08/24 1516       General Information    Admission Type inpatient    Arrived From emergency department    Required Notices Provided Important Message from Medicare    Referral Source admission list    Reason for Consult discharge planning    Preferred Language English                   Functional Status       Row Name 04/08/24 1516       Functional Status    Usual Activity Tolerance moderate    Current Activity Tolerance moderate       Functional Status, IADL    Medications independent    Meal Preparation independent    Housekeeping independent    Laundry independent    Shopping independent       Mental Status    General Appearance WDL WDL       Mental Status Summary    Recent Changes in Mental Status/Cognitive Functioning no changes             MEHRAN Rodarte RN  SIPS/ICU   O: [phone]  C: [phone]  [email or web address]

## 2024-04-08 NOTE — THERAPY TREATMENT NOTE
"Subjective: Pt agreeable to therapeutic plan of care.    Objective:     Bed mobility - Min-A  Transfers - Mod-A and with rolling walker  Ambulation - 1 feet Mod-A and with rolling walker    Therapeutic Exercise - 10 Reps B LE AROM unsupported sitting / EOB    Vitals: WNL    Pain: 5 VAS   Location: RLE and abdomen  Intervention for pain: Repositioned, Increased Activity, and Therapeutic Presence    Education: Provided education on the importance of mobility in the acute care setting, Verbal/Tactile Cues, Transfer Training, and Energy conservation strategies    Assessment: Leandra Nuñez presents with functional mobility impairments which indicate the need for skilled intervention. Pt pleasant and agreeable to therapy this date. Kishan required for supine>sit EOB d/t RLE pain and weakness. Pt tolerated seated there ex well without pain. Pt required min-mod A x 1 for STS x 4 bouts. Pt able to tolerate standing ~ 30 seconds each bout without fatigue or pain. Pt demo reduced ability to bear weight RLE, preventing ambulation. Ax2 needed for walking. Tolerating session today without incident. Will continue to follow and progress as tolerated.     Plan/Recommendations:   If medically appropriate, Moderate Intensity Therapy recommended post-acute care. This is recommended as therapy feels the patient would require 3-4 days per week and wouldn't tolerate \"3 hour daily\" rehab intensity. SNF would be the preferred choice. If the patient does not agree to SNF, arrange HH or OP depending on home bound status. If patient is medically complex, consider LTACH. Pt requires no DME at discharge.     Pt desires Skilled Rehab placement at discharge. Pt cooperative; agreeable to therapeutic recommendations and plan of care.         Basic Mobility 6-click:  Rollin = Total, A lot = 2, A little = 3; 4 = None  Supine>Sit:   1 = Total, A lot = 2, A little = 3; 4 = None   Sit>Stand with arms:  1 = Total, A lot = 2, A little = 3; 4 = " None  Bed>Chair:   1 = Total, A lot = 2, A little = 3; 4 = None  Ambulate in room:  1 = Total, A lot = 2, A little = 3; 4 = None  3-5 Steps with railin = Total, A lot = 2, A little = 3; 4 = None  Score: 16    Modified Home: N/A = No pre-op stroke/TIA    Post-Tx Position: Supine with HOB Elevated, Alarms activated, and Call light and personal items within reach  PPE: gloves

## 2024-04-08 NOTE — PLAN OF CARE
Goal Outcome Evaluation:  Patient with occasional complaints of pain in BLE, pain controlled with prn pain medications, given prn stool softeners to facilitate BM, no results thus far, however has had gas, patient made 1 attempt at unsafe transfer with bed alarm sounding, patient was found sitting on side of bed, assisted x 2 to BSC, bed alarm resumed once back to bed and patient was reeducated on use of call light system.

## 2024-04-08 NOTE — PLAN OF CARE
Goal Outcome Evaluation:         Patient resting in bed. Alewrt and answers questions appropriately. No c/o pain. Plan for rehab placement

## 2024-04-09 VITALS
HEART RATE: 48 BPM | TEMPERATURE: 97.8 F | SYSTOLIC BLOOD PRESSURE: 115 MMHG | HEIGHT: 63 IN | BODY MASS INDEX: 37.73 KG/M2 | OXYGEN SATURATION: 94 % | DIASTOLIC BLOOD PRESSURE: 65 MMHG | RESPIRATION RATE: 17 BRPM | WEIGHT: 212.96 LBS

## 2024-04-09 PROBLEM — K57.92 ACUTE DIVERTICULITIS: Status: RESOLVED | Noted: 2024-04-05 | Resolved: 2024-04-09

## 2024-04-09 PROCEDURE — 25010000002 METRONIDAZOLE 500 MG/100ML SOLUTION

## 2024-04-09 PROCEDURE — 97116 GAIT TRAINING THERAPY: CPT

## 2024-04-09 PROCEDURE — 97110 THERAPEUTIC EXERCISES: CPT

## 2024-04-09 PROCEDURE — 97530 THERAPEUTIC ACTIVITIES: CPT

## 2024-04-09 PROCEDURE — 97166 OT EVAL MOD COMPLEX 45 MIN: CPT

## 2024-04-09 PROCEDURE — 25010000002 ENOXAPARIN PER 10 MG

## 2024-04-09 RX ORDER — POLYETHYLENE GLYCOL 3350 17 G/17G
17 POWDER, FOR SOLUTION ORAL DAILY PRN
Qty: 30 PACKET | Refills: 0 | Status: SHIPPED | OUTPATIENT
Start: 2024-04-09

## 2024-04-09 RX ORDER — METRONIDAZOLE 500 MG/1
500 TABLET ORAL EVERY 8 HOURS SCHEDULED
Qty: 6 TABLET | Refills: 0 | Status: SHIPPED | OUTPATIENT
Start: 2024-04-09 | End: 2024-04-11

## 2024-04-09 RX ORDER — CEFDINIR 300 MG/1
300 CAPSULE ORAL EVERY 12 HOURS SCHEDULED
Status: DISCONTINUED | OUTPATIENT
Start: 2024-04-09 | End: 2024-04-09 | Stop reason: HOSPADM

## 2024-04-09 RX ORDER — BISACODYL 10 MG
10 SUPPOSITORY, RECTAL RECTAL DAILY PRN
Qty: 30 SUPPOSITORY | Refills: 0 | Status: SHIPPED | OUTPATIENT
Start: 2024-04-09

## 2024-04-09 RX ORDER — METRONIDAZOLE 500 MG/1
500 TABLET ORAL EVERY 8 HOURS SCHEDULED
Status: DISCONTINUED | OUTPATIENT
Start: 2024-04-09 | End: 2024-04-09 | Stop reason: HOSPADM

## 2024-04-09 RX ORDER — GABAPENTIN 100 MG/1
100 CAPSULE ORAL NIGHTLY
Qty: 30 CAPSULE | Refills: 0 | Status: SHIPPED | OUTPATIENT
Start: 2024-04-09

## 2024-04-09 RX ORDER — AMOXICILLIN 250 MG
2 CAPSULE ORAL 2 TIMES DAILY PRN
Qty: 30 TABLET | Refills: 0 | Status: SHIPPED | OUTPATIENT
Start: 2024-04-09

## 2024-04-09 RX ORDER — GABAPENTIN 100 MG/1
100 CAPSULE ORAL NIGHTLY
Status: DISCONTINUED | OUTPATIENT
Start: 2024-04-09 | End: 2024-04-09 | Stop reason: HOSPADM

## 2024-04-09 RX ORDER — CEFDINIR 300 MG/1
300 CAPSULE ORAL EVERY 12 HOURS SCHEDULED
Qty: 3 CAPSULE | Refills: 0 | Status: SHIPPED | OUTPATIENT
Start: 2024-04-09 | End: 2024-04-11

## 2024-04-09 RX ORDER — HYDROCODONE BITARTRATE AND ACETAMINOPHEN 7.5; 325 MG/1; MG/1
2 TABLET ORAL EVERY 4 HOURS PRN
Qty: 8 TABLET | Refills: 0 | Status: SHIPPED | OUTPATIENT
Start: 2024-04-09 | End: 2024-04-10

## 2024-04-09 RX ORDER — ONDANSETRON 4 MG/1
4 TABLET, ORALLY DISINTEGRATING ORAL EVERY 6 HOURS PRN
Qty: 12 TABLET | Refills: 0 | Status: SHIPPED | OUTPATIENT
Start: 2024-04-09

## 2024-04-09 RX ORDER — NALOXONE HYDROCHLORIDE 4 MG/.1ML
SPRAY NASAL
Qty: 2 EACH | Refills: 0 | Status: SHIPPED | OUTPATIENT
Start: 2024-04-09

## 2024-04-09 RX ADMIN — METRONIDAZOLE 500 MG: 500 TABLET ORAL at 15:25

## 2024-04-09 RX ADMIN — BISACODYL 10 MG: 10 SUPPOSITORY RECTAL at 13:59

## 2024-04-09 RX ADMIN — METRONIDAZOLE 500 MG: 500 INJECTION, SOLUTION INTRAVENOUS at 02:00

## 2024-04-09 RX ADMIN — ALLOPURINOL 100 MG: 100 TABLET ORAL at 09:28

## 2024-04-09 RX ADMIN — HYDRALAZINE HYDROCHLORIDE 100 MG: 25 TABLET ORAL at 09:28

## 2024-04-09 RX ADMIN — HYDROCODONE BITARTRATE AND ACETAMINOPHEN 2 TABLET: 7.5; 325 TABLET ORAL at 15:25

## 2024-04-09 RX ADMIN — FUROSEMIDE 20 MG: 20 TABLET ORAL at 09:28

## 2024-04-09 RX ADMIN — METRONIDAZOLE 500 MG: 500 INJECTION, SOLUTION INTRAVENOUS at 09:28

## 2024-04-09 RX ADMIN — ENOXAPARIN SODIUM 40 MG: 100 INJECTION SUBCUTANEOUS at 15:25

## 2024-04-09 RX ADMIN — Medication 10 ML: at 09:29

## 2024-04-09 RX ADMIN — BISACODYL 5 MG: 5 TABLET, COATED ORAL at 12:29

## 2024-04-09 RX ADMIN — DOCUSATE SODIUM AND SENNOSIDES 2 TABLET: 8.6; 5 TABLET, FILM COATED ORAL at 09:32

## 2024-04-09 RX ADMIN — HYDROCODONE BITARTRATE AND ACETAMINOPHEN 2 TABLET: 7.5; 325 TABLET ORAL at 09:28

## 2024-04-09 RX ADMIN — POLYETHYLENE GLYCOL 3350 17 G: 17 POWDER, FOR SOLUTION ORAL at 12:30

## 2024-04-09 NOTE — CASE MANAGEMENT/SOCIAL WORK
Continued Stay Note   [facility]     Patient Name: Leandra Nuñez  MRN: [id number]  Today's Date: 4/9/2024    Admit Date: 4/5/2024    Plan: Plan to dc to [facility], pending acceptance. Precert required. PASRR approved.   Discharge Plan       Row Name 04/09/24 1149       Plan    Plan Plan to dc to [facility], pending acceptance. Precert required. PASRR approved.    Plan Comments , SNF, declined - no beds available.  Cm spoke with liasudha Hernandez at , [facility], family to tour today then can start Precert.  DC Barriers:  IV Abxs.               Expected Discharge Date and Time       Expected Discharge Date Expected Discharge Time    Apr 10, 2024               MEHRAN Rodarte RN  SIPS/ICU   O: [phone]  C: [phone]  [email or web address]

## 2024-04-09 NOTE — DISCHARGE SUMMARY
"             [facility] Medicine Services  Discharge Summary    Date of Service: 2024  Patient Name: Leandra Nuñez  : 1935  MRN: [id number]    Date of Admission: 2024  Discharge Diagnosis: Acute diverticulitis  Date of Discharge:  2024  Primary Care Physician: Anabelle Sellers MD      Presenting Problem:   Acute diverticulitis [K57.92]    Active and Resolved Hospital Problems:  Active Hospital Problems    Diagnosis POA    Dementia [F03.90] Yes    Hypertension [I10] Yes    Type 2 diabetes mellitus without complication [E11.9] Yes    Hyperlipidemia [E78.5] Yes      Resolved Hospital Problems    Diagnosis POA    **Acute diverticulitis [K57.92] Yes         Hospital Course     HPI:  Per the H&P written by Cristina Odom, dated 2024:  \"Leandra Nuñez is a 88 y.o. female with a previous medical history of dementia, hypertension, hyperlipidemia, and DM  who presented to [facility] with  lower abdominal pain that began last night.  She denies nausea, vomiting, diarrhea, fever or chills.  She had no other complaints.     In the ED, CT of the abdomen and pelvis showed acute diverticulitis at the junction of the descending/sigmoid colon.  WBC 19.12, lactate 0.6, creatinine 1.11 (baseline).  Otherwise, labs are unremarkable.  UA shows no sign of infection and chest x-ray shows stable cardiomegaly but no acute process.  She is afebrile, all vital signs are stable.  She received morphine and Zofran in the ED.  Hospitalist was consulted for further management\"    Hospital Course:  88-year-old woman with history of dementia, hypertension, type 2 diabetes mellitus, hyperlipidemia who presented with nausea, vomiting, diarrhea fever and chills found to have acute diverticulitis on CT abdomen.    # Acute diverticulitis, resolving  Leukocytosis has down trended.  Diet was advanced and patient has been tolerating it well.  Discontinued ceftriaxone and Flagyl IV " to oral cefdinir and metronidazole, to complete a 5 day course.   - cefdinir 300mg twice daily through 4/10/2024  - metronidazole 500mg every 8 hours through 4/10/2024    # Hypertension  Continue home hydralazine and furosemide    # Insomnia  Continue home trazodone    # Dementia  Continue home donepezil    # Gout  Continue home allopurinol    # Bilat feet pain  # Peripheral neuropathy  Patient with reported diffuse feet pain and on exam with pain to gentle touch on skin/toes. Suggesting neuropathic pain. Will start gabapentin 100mg at bedtime and can titrate up to 100mg TID if clinically indicated.  - gabapentin 100mg at bedtime    DISCHARGE Follow Up Recommendations for labs and diagnostics:       Reasons For Change In Medications and Indications for New Medications:      Day of Discharge     Vital Signs:  Temp:  [97.6 °F (36.4 °C)-98.4 °F (36.9 °C)] 97.8 °F (36.6 °C)  Heart Rate:  [48-66] 48  Resp:  [14-17] 17  BP: (115-148)/(65-80) 115/65    Physical Exam:  General: Well appearing, well nourished, in no distress. Appears stated age    HEENT: Head: Normocephalic, atraumatic. Conjunctiva clear, sclera non-icteric, EOM intact, PERRL, hearing intact. Nose without external lesions. Mucous membranes moist, no mucosal lesions.  Neck: Supple, without lesions  Heart: Regular rate and rhythm, no murmurs / rubs / gallops   Lungs: No signs of respiratory distress. Moving air well. Clear without crackles, rhonchi, wheezes  Abdomen: Bowel sounds normal, no tenderness, no distension, no masses  Musculoskeletal: No edema. No joint swelling, or pain on palpation. No tenderness on palpation over the spinal processes  Skin: Warm, dry, intact without rashes or lesions. Appropriate color for ethnicity.   Neurologic: Alert and oriented to self, hospital, CN 2-12 normal. Motor function intact bilaterally. Sensation intact bilaterally.   Psychiatric: Appropriate mood and affect.       Pertinent  and/or Most Recent Results     LAB  RESULTS:      Lab 04/07/24  0402 04/06/24  0035 04/05/24  1408 04/05/24  1259   WBC 10.21 14.84*  --  19.12*   HEMOGLOBIN 11.4* 11.6*  --  13.0   HEMATOCRIT 38.4 36.0  --  41.5   PLATELETS 272 302  --  361   NEUTROS ABS 6.48 11.04*  --  15.59*   IMMATURE GRANS (ABS) 0.05 0.06*  --  0.09*   LYMPHS ABS 2.52 2.45  --  1.95   MONOS ABS 0.87 1.07*  --  1.28*   EOS ABS 0.22 0.14  --  0.09   MCV 96.7 90.7  --  91.4   LACTATE  --   --  0.6  --          Lab 04/07/24 0402 04/06/24  0035 04/05/24  1404   SODIUM 136 135* 137   POTASSIUM 4.0 3.6 3.9   CHLORIDE 101 100 99   CO2 22.0 23.0 23.0   ANION GAP 13.0 12.0 15.0   BUN 13 15 19   CREATININE 0.97 1.13* 1.11*   EGFR 56.7* 47.2* 48.2*   GLUCOSE 100* 111* 116*   CALCIUM 9.5 9.5 10.2   MAGNESIUM  --  2.2 2.2         Lab 04/05/24  1404 04/05/24  1259   TOTAL PROTEIN 7.3  --    ALBUMIN 4.3  --    GLOBULIN 3.0  --    ALT (SGPT) 10  --    AST (SGOT) 19  --    BILIRUBIN 0.6  --    ALK PHOS 104  --    LIPASE  --  44                     Brief Urine Lab Results  (Last result in the past 365 days)        Color   Clarity   Blood   Leuk Est   Nitrite   Protein   CREAT   Urine HCG        04/05/24 1527 Yellow   Clear   Negative   Negative   Negative   Negative                 Microbiology Results (last 10 days)       Procedure Component Value - Date/Time    Blood Culture - Blood, Arm, Right [[id number]]  (Normal) Collected: 04/05/24 1442    Lab Status: Preliminary result Specimen: Blood from Arm, Right Updated: 04/08/24 1500     Blood Culture No growth at 3 days    COVID-19, FLU A/B, RSV PCR 1 HR TAT - Swab, Nasopharynx [[id number]]  (Normal) Collected: 04/05/24 1404    Lab Status: Final result Specimen: Swab from Nasopharynx Updated: 04/05/24 1447     COVID19 Not Detected     Influenza A PCR Not Detected     Influenza B PCR Not Detected     RSV, PCR Not Detected    Narrative:      Fact sheet for providers: https://www.fda.gov/media/292859/download    Fact sheet for patients:  https://www.fda.gov/media/281410/download    Test performed by PCR.    Blood Culture - Blood, Arm, Right [[id number]]  (Normal) Collected: 04/05/24 1404    Lab Status: Preliminary result Specimen: Blood from Arm, Right Updated: 04/09/24 1415     Blood Culture No growth at 4 days    Narrative:      Less than seven (7) mL's of blood was collected.  Insufficient quantity may yield false negative results.            CT Abdomen Pelvis Without Contrast    Result Date: 4/5/2024  Impression: Impression: 1.Acute diverticulitis centered at the junction of the descending/sigmoid colon. Additional inflamed diverticulum more distally within the sigmoid colon (series 2, image 100). No free air or abscess formation is identified. 2.5.8 x 4.2 cm left adnexal lesion is incompletely characterized and appears slightly larger in comparison with 4/25/2022. Recommend further characterization with pelvic ultrasound. 3.Additional findings as detailed above. Electronically Signed: Robbi Song MD  4/5/2024 4:32 PM EDT  Workstation ID: [id number]    XR Chest 1 View    Result Date: 4/5/2024  Impression: Impression: Stable cardiomegaly. No acute process. Electronically Signed: Melodie Wolff MD  4/5/2024 1:34 PM EDT  Workstation ID: [id number]     Results for orders placed during the hospital encounter of 02/24/24    Duplex Venous Lower Extremity - LEFT    Interpretation Summary    Normal left lower extremity venous duplex scan.      Results for orders placed during the hospital encounter of 02/24/24    Duplex Venous Lower Extremity - LEFT    Interpretation Summary    Normal left lower extremity venous duplex scan.          Labs Pending at Discharge:  Pending Labs       Order Current Status    Blood Culture - Blood, Arm, Right Preliminary result    Blood Culture - Blood, Arm, Right Preliminary result            Procedures Performed           Consults:   Consults       Date and Time Order Name Status Description    4/5/2024  4:51 PM Hospitalist  (on-call MD unless specified)                Discharge Details        Discharge Medications        New Medications        Instructions Start Date   bisacodyl 10 MG suppository  Commonly known as: DULCOLAX   10 mg, Rectal, Daily PRN      cefdinir 300 MG capsule  Commonly known as: OMNICEF   300 mg, Oral, Every 12 Hours Scheduled      gabapentin 100 MG capsule  Commonly known as: NEURONTIN   100 mg, Oral, Nightly      HYDROcodone-acetaminophen 7.5-325 MG per tablet  Commonly known as: NORCO  Replaces: HYDROcodone-acetaminophen 5-325 MG per tablet   2 tablets, Oral, Every 4 Hours PRN      metroNIDAZOLE 500 MG tablet  Commonly known as: FLAGYL   500 mg, Oral, Every 8 Hours Scheduled      naloxone 4 MG/0.1ML nasal spray  Commonly known as: NARCAN   Call 911. Don't prime. Fountain in 1 nostril for overdose. Repeat in 2-3 minutes in other nostril if no or minimal breathing/responsiveness.      ondansetron ODT 4 MG disintegrating tablet  Commonly known as: ZOFRAN-ODT   4 mg, Oral, Every 6 Hours PRN      polyethylene glycol 17 g packet  Commonly known as: MIRALAX   17 g, Oral, Daily PRN      sennosides-docusate 8.6-50 MG per tablet  Commonly known as: PERICOLACE   2 tablets, Oral, 2 Times Daily PRN             Continue These Medications        Instructions Start Date   allopurinol 100 MG tablet  Commonly known as: ZYLOPRIM   100 mg, Oral, Daily      cyclobenzaprine 5 MG tablet  Commonly known as: FLEXERIL   5 mg, Oral, 3 Times Daily PRN      donepezil 10 MG tablet  Commonly known as: Aricept   10 mg, Oral, Nightly      ezetimibe 10 MG tablet  Commonly known as: ZETIA   Take 1 tablet by mouth Daily.      furosemide 20 MG tablet  Commonly known as: LASIX   20 mg, Oral, Daily, For swelling      hydrALAZINE 100 MG tablet  Commonly known as: APRESOLINE   TAKE 1 TABLET TWICE A DAY      melatonin 5 MG tablet tablet   5 mg, Oral, Nightly      potassium chloride 10 MEQ CR tablet   10 mEq, Oral, Daily, When taking furosemide       traZODone 50 MG tablet  Commonly known as: DESYREL   50 mg, Oral, Nightly             Stop These Medications      HYDROcodone-acetaminophen 5-325 MG per tablet  Commonly known as: Norco  Replaced by: HYDROcodone-acetaminophen 7.5-325 MG per tablet              Allergies   Allergen Reactions    Statins Myalgia and Other (See Comments)         Discharge Disposition: SNF  Skilled Nursing Facility (DC - External)    Diet:  Hospital:  Diet Order   Procedures    Diet: Gastrointestinal; Fiber-Restricted; Texture: Soft to Chew (NDD 3); Soft to Chew: Whole Meat; Fluid Consistency: Thin (IDDSI 0)         Discharge Activity:         CODE STATUS:  Code Status and Medical Interventions:   Ordered at: 04/05/24 1950     Code Status (Patient has no pulse and is not breathing):    CPR (Attempt to Resuscitate)     Medical Interventions (Patient has pulse or is breathing):    Full Support         Future Appointments   Date Time Provider Department Center   7/9/2024 11:30 AM Seipel, Joseph F, MD [facility] NA [facility]   9/13/2024 11:15 AM Anabelle Sellers MD [facility] PC NWAL[facility]           Time spent on Discharge including face to face service:  >30 minutes    Signature: Electronically signed by Cathy Aranda MD, 04/09/24, 14:53 EDT.  [facility] Hospitalist Team

## 2024-04-09 NOTE — SIGNIFICANT NOTE
Case Management/Social Work    Patient Name:  Leandra Nuñez  YOB: 1935  MRN: [id number]  Admit Date:  4/5/2024 04/09/24 1209   Post Acute Pre-Cert Documentation   Request Submitted by Facility - Type: Hospital   Post-Acute Authorization Type Submitted: SNF   Date Post Acute Pre-Cert Inititated per Facility 04/09/24   Verification from Payer Yes   Date Post Acute Pre-Cert Completed 04/09/24   Accepting Facility [facility]   Hospital Discharge Date Requested 04/09/24   All Clinicals Submitted? Yes   Had Accepting Facility at Time of Submission Yes   Response Received from Insurance? Approval   Response Communicated to:    Authorization Number: [id number]   Post Acute Pre-Cert Initiated Comment BRIGITTE submitted SNF precert for [facility] via [employer] portal. Authorization ID [id number]. SNF precert auto approved. Valid 4/9-4/15. Approval letter indexed to media. CM made aware.           Electronically signed by:  Danny Vaughan CMA  04/09/24 12:13 EDT    Danny Vaughan  Case Management Associate  [address] [address]  P: [phone]  F: [phone]

## 2024-04-09 NOTE — PLAN OF CARE
Goal Outcome Evaluation:  Plan of Care Reviewed With: patient           Outcome Evaluation: 87 YO F admitted with diverticular pain with ED workup revealing acute diverticulitis. Pt also has gout and BLE neuropathy. Pt states she lives home alone, typcially caring for herself and cat independently. Pt reports she performs all ADLs, and ambulates without AD. This date, pt in chair upon arrival. She has good BUE ROM and fair strength as MMT reveals. Min A to come to standing with immediate increase in pain and feeling nauseted. She was able to ambulate just 5 feet with RW this date prior to needing to return to seated. In chair, patient unable to reach feet for lower body dressing, and fearful of falling with attempts to don socks. Ultimately, pt requires Max A for completion. She is not safe for return home at this time, thus OT feels SNF is most appropriate. Will follow while inpatient at [facility].

## 2024-04-09 NOTE — PLAN OF CARE
"Assessment: Leandra Nuñez presents with functional mobility impairments which indicate the need for skilled intervention. Tolerating session today without incident. Pt with fair mobilization this session, and requires increased time to complete functional tasks. Pt reports nausea, dizziness, and B feet pain while ambulating. All vital signs WFL throughout session. Pt requires max encouragement to continue ambulating, with extensive education provided on importance of upright mobility. Will continue to follow and progress as tolerated.     Plan/Recommendations:   If medically appropriate, Moderate Intensity Therapy recommended post-acute care. This is recommended as therapy feels the patient would require 3-4 days per week and wouldn't tolerate \"3 hour daily\" rehab intensity. SNF would be the preferred choice. If the patient does not agree to SNF, arrange HH or OP depending on home bound status. If patient is medically complex, consider LTACH. Pt requires no DME at discharge.     Pt desires Skilled Rehab placement at discharge. Pt cooperative; agreeable to therapeutic recommendations and plan of care.     "

## 2024-04-09 NOTE — CASE MANAGEMENT/SOCIAL WORK
Continued Stay Note   Genaro     Patient Name: Leandra Nuñez  MRN: [id number]  Today's Date: 4/9/2024    Admit Date: 4/5/2024    Plan: Plan to dc to [facility], accepted.  Precert required, started by [facility] 4/9.  PASRR approved.   Discharge Plan       Row Name 04/09/24 1158       Plan    Plan Plan to dc to [facility], accepted.  Precert required, started by [facility] 4/9.  PASRR approved.      Row Name 04/09/24 1149       Plan    Plan Plan to dc to [facility], pending acceptance. Precert required. PASRR approved.    Plan Comments , SNF, declined - no beds available.  Cm spoke with liasudha Hernandez at , SNF, family to tour today then can start Precert.  DC Barriers:  IV Abxs.               Expected Discharge Date and Time       Expected Discharge Date Expected Discharge Time    Apr 10, 2024               MEHRAN Rodarte RN  SIPS/ICU   O: [phone]  C: [phone]  [email or web address]

## 2024-04-09 NOTE — THERAPY EVALUATION
Patient Name: Leandra Nuñez  : 1935    MRN: [id number]                              Today's Date: 2024       Admit Date: 2024    Visit Dx:     ICD-10-CM ICD-9-CM   1. Acute diverticulitis  K57.92 562.11     Patient Active Problem List   Diagnosis    Anxiety disorder, unspecified    Asthma    Stage 3 chronic kidney disease    Hyperlipidemia    Hypertension    Obesity    Renal insufficiency    Type 2 diabetes mellitus without complication    Encounter for annual wellness exam in Medicare patient    Adjustment disorder with anxiety    Immune thrombocytopenia    Obstructive sleep apnea syndrome    Osteoarthritis    Clear cell carcinoma of kidney    Thrombocytopenia    Gastroesophageal reflux disease    White coat syndrome with diagnosis of hypertension    Leg cramps    Closed fracture of surgical neck of humerus    Fall    Absent kidney    Edema    Vitamin deficiency    Acute midline thoracic back pain    Chest pain    Hyperlipidemia due to type 2 diabetes mellitus    Dementia     Past Medical History:   Diagnosis Date    Anxiety     Arthritis     Breast nodule     Left breast nodule (fibrocystic disease , no malignancy)     Cancer     Cataract     Chronic kidney disease     HL (hearing loss)     Hyperlipidemia     Hypertension     Obesity     Shortness of breath      Past Surgical History:   Procedure Laterality Date    BREAST BIOPSY Left 2013    Benign fibrocystic disease ,Abstracted from [facility].    CARDIAC CATHETERIZATION      D & C AND LAPAROSCOPY      FULGURATION ENDOMETRIOSIS      surgery    NEPHRECTOMY Right     ORIF HUMERUS FRACTURE Left 3/24/2021    Procedure: HUMERUS PROXIMAL OPEN REDUCTION INTERNAL FIXATION;  Surgeon: Yair Anne MD;  Location: [facility];  Service: Orthopedics;  Laterality: Left;      General Information       Row Name 24 1315          OT Time and Intention    Document Type evaluation  -LS     Mode of Treatment occupational therapy  -LS        Row Name 04/09/24 1315          General Information    Patient Profile Reviewed yes  -LS     Prior Level of Function independent:;ADL's;all household mobility  Reports using a RW at home  -     Existing Precautions/Restrictions fall  -     Barriers to Rehab previous functional deficit;medically complex  -       Row Name 04/09/24 1315          Living Environment    People in Home alone  -       Row Name 04/09/24 1315          Home Main Entrance    Number of Stairs, Main Entrance none  -       Row Name 04/09/24 1315          Stairs Within Home, Primary    Stair Railings, Within Home, Primary none  -LS       Row Name 04/09/24 1315          Cognition    Orientation Status (Cognition) oriented to;place;person;time  -       Row Name 04/09/24 1315          Safety Issues, Functional Mobility    Impairments Affecting Function (Mobility) balance;pain;strength;endurance/activity tolerance  -               User Key  (r) = Recorded By, (t) = Taken By, (c) = Cosigned By      Initials Name Provider Type    LS Keegan Nguyễn OT Occupational Therapist                     Mobility/ADL's       Row Name 04/09/24 1317          Bed Mobility    Bed Mobility bed mobility (all) activities  -     Comment, (Bed Mobility) NT - in chair on arrival  -       Row Name 04/09/24 1317          Sit-Stand Transfer    Sit-Stand [place] (Transfers) minimum assist (75% patient effort)  -     Assistive Device (Sit-Stand Transfers) walker, front-wheeled  -       Row Name 04/09/24 1317          Functional Mobility    Patient was able to Ambulate yes  -       Row Name 04/09/24 1317          Activities of Daily Living    BADL Assessment/Intervention lower body dressing  -       Row Name 04/09/24 1317          Lower Body Dressing Assessment/Training    [place] Level (Lower Body Dressing) lower body dressing skills;maximum assist (25% patient effort)  -               User Key  (r) = Recorded By, (t) = Taken By, (c) =  Cosigned By      Initials Name Provider Type     Keegan Nguyễn OT Occupational Therapist                   Obj/Interventions       Row Name 04/09/24 1423          Sensory Assessment (Somatosensory)    Sensory Assessment BLE neuropathy  -       Row Name 04/09/24 1423          Range of Motion Comprehensive    General Range of Motion bilateral upper extremity ROM WFL  -[facility] Name 04/09/24 1423          Strength Comprehensive (MMT)    Comment, General Manual Muscle Testing (MMT) Assessment BUEs grossly 3/5  -       Row Name 04/09/24 1423          Balance    Balance Assessment sitting static balance;sitting dynamic balance;standing static balance;standing dynamic balance  -LS     Static Sitting Balance independent  -LS     Dynamic Sitting Balance standby assist  -LS     Position, Sitting Balance sitting in chair  -LS     Static Standing Balance contact guard  -LS     Dynamic Standing Balance minimal assist  -LS     Position/Device Used, Standing Balance walker, front-wheeled  -LS               User Key  (r) = Recorded By, (t) = Taken By, (c) = Cosigned By      Initials Name Provider Type     Keegan Nguyễn OT Occupational Therapist                   Goals/Plan       Row Name 04/09/24 1506          Bed Mobility Goal 1 (OT)    Activity/Assistive Device (Bed Mobility Goal 1, OT) bed mobility activities, all  -LS     Wells Level/Cues Needed (Bed Mobility Goal 1, OT) modified independence  -LS     Time Frame (Bed Mobility Goal 1, OT) long term goal (LTG);2 weeks  -[facility] Name 04/09/24 1506          Transfer Goal 1 (OT)    Activity/Assistive Device (Transfer Goal 1, OT) transfers, all  -LS     Wells Level/Cues Needed (Transfer Goal 1, OT) modified independence  -LS     Time Frame (Transfer Goal 1, OT) long term goal (LTG);2 weeks  -[facility] Name 04/09/24 1506          Dressing Goal 1 (OT)    Activity/Device (Dressing Goal 1, OT) dressing skills, all  -LS     Wells/Cues Needed  (Dressing Goal 1, OT) modified independence  -LS     Time Frame (Dressing Goal 1, OT) long term goal (LTG);2 weeks  -LS       Row Name 04/09/24 1506          Toileting Goal 1 (OT)    Activity/Device (Toileting Goal 1, OT) toileting skills, all  -LS     [place] Level/Cues Needed (Toileting Goal 1, OT) modified independence  -LS     Time Frame (Toileting Goal 1, OT) long term goal (LTG);2 weeks  -LS       Row Name 04/09/24 1506          Therapy Assessment/Plan (OT)    Planned Therapy Interventions (OT) activity tolerance training;BADL retraining;functional balance retraining;IADL retraining;occupation/activity based interventions;ROM/therapeutic exercise;strengthening exercise;transfer/mobility retraining;patient/caregiver education/training  -LS               User Key  (r) = Recorded By, (t) = Taken By, (c) = Cosigned By      Initials Name Provider Type    LS Keegan Nguyễn OT Occupational Therapist                   Clinical Impression       Row Name 04/09/24 1501          Pain Assessment    Pretreatment Pain Rating 2/10  -LS     Posttreatment Pain Rating 4/10  -LS     Pain Location - Side/Orientation Bilateral  -LS     Pain Location lower  -LS     Pain Location - extremity  -LS       Row Name 04/09/24 1501          Plan of Care Review    Plan of Care Reviewed With patient  -LS     Outcome Evaluation 89 YO F admitted with diverticular pain with ED workup revealing acute diverticulitis. Pt also has gout and BLE neuropathy. Pt states she lives home alone, typcially caring for herself and cat independently. Pt reports she performs all ADLs, and ambulates without AD. This date, pt in chair upon arrival. She has good BUE ROM and fair strength as MMT reveals. Min A to come to standing with immediate increase in pain and feeling nauseted. She was able to ambulate just 5 feet with RW this date prior to needing to return to seated. In chair, patient unable to reach feet for lower body dressing, and fearful of falling  with attempts to don socks. Ultimately, pt requires Max A for completion. She is not safe for return home at this time, thus OT feels SNF is most appropriate. Will follow while inpatient at [facility].  -       Row Name 04/09/24 1501          Therapy Assessment/Plan (OT)    Rehab Potential (OT) good, to achieve stated therapy goals  -LS     Criteria for Skilled Therapeutic Interventions Met (OT) yes;skilled treatment is necessary  -LS     Therapy Frequency (OT) 3 times/wk  -LS     Predicted Duration of Therapy Intervention (OT) until dc  -LS       Row Name 04/09/24 1501          Vital Signs    O2 Delivery Pre Treatment room air  -LS     O2 Delivery Intra Treatment room air  -LS     O2 Delivery Post Treatment room air  -LS     Pre Patient Position Sitting  -LS     Intra Patient Position Standing  -LS     Post Patient Position Sitting  -LS       Row Name 04/09/24 1501          Positioning and Restraints    Pre-Treatment Position sitting in chair/recliner  -LS     Post Treatment Position chair  -LS     In Chair notified nsg;reclined;call light within reach;encouraged to call for assist  -LS               User Key  (r) = Recorded By, (t) = Taken By, (c) = Cosigned By      Initials Name Provider Type    LS Keegan Nguyễn, LUCIE Occupational Therapist                   Outcome Measures       Row Name 04/09/24 1506          How much help from another is currently needed...    Putting on and taking off regular lower body clothing? 2  -LS     Bathing (including washing, rinsing, and drying) 2  -LS     Toileting (which includes using toilet bed pan or urinal) 2  -LS     Putting on and taking off regular upper body clothing 3  -LS     Taking care of personal grooming (such as brushing teeth) 4  -LS     Eating meals 4  -LS     AM-PAC 6 Clicks Score (OT) 17  -LS       Row Name 04/09/24 0800          How much help from another person do you currently need...    Turning from your back to your side while in flat bed without using bedrails?  3  -DA     Moving from lying on back to sitting on the side of a flat bed without bedrails? 3  -DA     Moving to and from a bed to a chair (including a wheelchair)? 2  -DA     Standing up from a chair using your arms (e.g., wheelchair, bedside chair)? 2  -DA     Climbing 3-5 steps with a railing? 2  -DA     To walk in hospital room? 2  -DA     AM-PAC 6 Clicks Score (PT) 14  -DA     Highest Level of Mobility Goal 4 --> Transfer to chair/commode  -DA       Row Name 04/09/24 1506          Functional Assessment    Outcome Measure Options AM-PAC 6 Clicks Daily Activity (OT)  -               User Key  (r) = Recorded By, (t) = Taken By, (c) = Cosigned By      Initials Name Provider Type    Vickie Mena LPN Licensed Nurse    Keegan Juarez OT Occupational Therapist                    Occupational Therapy Education       Title: PT OT SLP Therapies (Done)       Topic: Occupational Therapy (Done)       Point: ADL training (Done)       Description:   Instruct learner(s) on proper safety adaptation and remediation techniques during self care or transfers.   Instruct in proper use of assistive devices.                  Learning Progress Summary             Patient Acceptance, E,TB, VU by  at 4/9/2024 1506                                         User Key       Initials Effective Dates Name Provider Type Discipline     09/22/22 -  Keegan Nguyễn OT Occupational Therapist OT                  OT Recommendation and Plan  Planned Therapy Interventions (OT): activity tolerance training, BADL retraining, functional balance retraining, IADL retraining, occupation/activity based interventions, ROM/therapeutic exercise, strengthening exercise, transfer/mobility retraining, patient/caregiver education/training  Therapy Frequency (OT): 3 times/wk  Plan of Care Review  Plan of Care Reviewed With: patient  Outcome Evaluation: 89 YO F admitted with diverticular pain with ED workup revealing acute diverticulitis. Pt also has  gout and BLE neuropathy. Pt states she lives home alone, typcially caring for herself and cat independently. Pt reports she performs all ADLs, and ambulates without AD. This date, pt in chair upon arrival. She has good BUE ROM and fair strength as MMT reveals. Min A to come to standing with immediate increase in pain and feeling nauseted. She was able to ambulate just 5 feet with RW this date prior to needing to return to seated. In chair, patient unable to reach feet for lower body dressing, and fearful of falling with attempts to don socks. Ultimately, pt requires Max A for completion. She is not safe for return home at this time, thus OT feels SNF is most appropriate. Will follow while inpatient at [facility].     Time Calculation:         Time Calculation- OT       Row Name 04/09/24 1506             Time Calculation- OT    OT Start Time 1110  -LS      OT Stop Time 1132  -LS      OT Time Calculation (min) 22 min  -LS      OT Received On 04/09/24  -LS      OT - Next Appointment 04/11/24  -      OT Goal Re-Cert Due Date 04/23/24  -         Untimed Charges    OT Eval/Re-eval Minutes 22  -LS         Total Minutes    Untimed Charges Total Minutes 22  -LS       Total Minutes 22  -LS                User Key  (r) = Recorded By, (t) = Taken By, (c) = Cosigned By      Initials Name Provider Type    Keegan Juarez OT Occupational Therapist                  Therapy Charges for Today       Code Description Service Date Service Provider Modifiers Qty    97909332821 HC OT EVAL MOD COMPLEXITY 4 4/9/2024 Keegan Nguyễn OT GO 1                 Keegan Nguyễn OT  4/9/2024

## 2024-04-09 NOTE — PLAN OF CARE
Goal Outcome Evaluation:  Awaiting return of bowel function before d/c. Bowel care meds given, pt reports passing flatus.

## 2024-04-09 NOTE — THERAPY TREATMENT NOTE
"Subjective: Pt agreeable to therapeutic plan of care.    Objective:     Bed mobility - N/A or Not attempted. Pt up in chair upon arrival.     Transfers - x4 STS from chair CGA and with rolling walker. Increased time required to come to full time each trial.     Ambulation - 3 feet + 25 feet CGA and with rolling walker. Close chair follow. Pt reports nausea, dizziness, and increased B foot pain while upright. All VSS while ambulating. Pt takes multiple standing rest breaks, and requires max encouragement to continue ambulating.     Therapeutic Exercise - 20 Reps B LE AROM supported sitting / chair    Vitals: WNL    Pain: 10 VAS   Location: B feet  Intervention for pain: Repositioned, RN provided medication, Increased Activity, and Therapeutic Presence    Education: Provided education on the importance of mobility in the acute care setting, Verbal/Tactile Cues, Transfer Training, and Gait Training    Assessment: Leandra Nuñez presents with functional mobility impairments which indicate the need for skilled intervention. Tolerating session today without incident. Pt with fair mobilization this session, and requires increased time to complete functional tasks. Pt reports nausea, dizziness, and B feet pain while ambulating. All vital signs WFL throughout session. Pt requires max encouragement to continue ambulating, with extensive education provided on importance of upright mobility. Will continue to follow and progress as tolerated.     Plan/Recommendations:   If medically appropriate, Moderate Intensity Therapy recommended post-acute care. This is recommended as therapy feels the patient would require 3-4 days per week and wouldn't tolerate \"3 hour daily\" rehab intensity. SNF would be the preferred choice. If the patient does not agree to SNF, arrange HH or OP depending on home bound status. If patient is medically complex, consider LTACH. Pt requires no DME at discharge.     Pt desires Skilled Rehab placement at " discharge. Pt cooperative; agreeable to therapeutic recommendations and plan of care.         Basic Mobility 6-click:  Rollin = Total, A lot = 2, A little = 3; 4 = None  Supine>Sit:   1 = Total, A lot = 2, A little = 3; 4 = None   Sit>Stand with arms:  1 = Total, A lot = 2, A little = 3; 4 = None  Bed>Chair:   1 = Total, A lot = 2, A little = 3; 4 = None  Ambulate in room:  1 = Total, A lot = 2, A little = 3; 4 = None  3-5 Steps with railin = Total, A lot = 2, A little = 3; 4 = None  Score: 16    Modified [place]: N/A = No pre-op stroke/TIA    Post-Tx Position: Up in Chair, Alarms activated, and Call light and personal items within reach  PPE: gloves

## 2024-04-09 NOTE — CASE MANAGEMENT/SOCIAL WORK
Continued Stay Note  [facility]     Patient Name: Leandra Nuñez  MRN: [id number]  Today's Date: 4/9/2024    Admit Date: 4/5/2024    Plan: Plan to dc to [facility], accepted. Precert approved 4/9 - 4/15, bed ready. PASRR approved.   Discharge Plan       Row Name 04/09/24 1245       Plan    Plan Plan to dc to [facility], accepted. Precert approved 4/9 - 4/15, bed ready. PASRR approved.    Plan Comments Raciel spoke with daughter Kianna, agreeable to , precert approved 4/9- 4/15, bed ready per robin Hernandez MD and floor Rn updated.      Row Name 04/09/24 1158       Plan    Plan Plan to dc to [facility], accepted.  Precert required, started by [facility] 4/9.  PASRR approved.      Row Name 04/09/24 1149       Plan    Plan Plan to dc to [facility], pending acceptance. Precert required. PASRR approved.    Plan Comments , SNF, declined - no beds available.  Raciel spoke with robin Hernandez at , [facility], family to tour today then can start Precert.  DC Barriers:  IV Abxs.                 Expected Discharge Date and Time       Expected Discharge Date Expected Discharge Time    Apr 10, 2024               MEHRAN Rodarte RN  SIPS/ICU   O: [phone]  C: [phone]  [email or web address]

## 2024-04-10 LAB
BACTERIA SPEC AEROBE CULT: NORMAL
BACTERIA SPEC AEROBE CULT: NORMAL

## 2024-04-10 NOTE — CASE MANAGEMENT/SOCIAL WORK
Case Management Discharge Note      Final Note: SNF - [place]         Selected Continued Care - Discharged on 4/9/2024 Admission date: 4/5/2024 - Discharge disposition: Skilled Nursing Facility (DC - External)      Destination Coordination complete.      Service Provider Selected Services Address Phone Fax Patient Preferred    [facility] [address] [place] [address] [phone] --               Transportation Services  Private: Car    Final Discharge Disposition Code: 03 - skilled nursing facility (SNF)    MEHRAN Rodarte RN  SIPS/ICU   O: [phone]  C: [phone]  [email or web address]

## 2024-04-23 RX ORDER — TRAZODONE HYDROCHLORIDE 50 MG/1
50 TABLET ORAL NIGHTLY
Qty: 30 TABLET | Refills: 1 | Status: SHIPPED | OUTPATIENT
Start: 2024-04-23

## 2024-05-03 ENCOUNTER — HOME HEALTH ADMISSION (OUTPATIENT)
Dept: HOME HEALTH SERVICES | Facility: HOME HEALTHCARE | Age: 89
End: 2024-05-03
Payer: MEDICARE

## 2024-05-03 ENCOUNTER — TRANSCRIBE ORDERS (OUTPATIENT)
Dept: HOME HEALTH SERVICES | Facility: HOME HEALTHCARE | Age: 89
End: 2024-05-03
Payer: MEDICARE

## 2024-05-03 ENCOUNTER — READMISSION MANAGEMENT (OUTPATIENT)
Dept: CALL CENTER | Facility: HOSPITAL | Age: 89
End: 2024-05-03
Payer: MEDICARE

## 2024-05-03 DIAGNOSIS — K57.92 DIVERTICULITIS: Primary | ICD-10-CM

## 2024-05-04 NOTE — OUTREACH NOTE
Prep Survey      Flowsheet Row Responses   [facility] facility patient discharged from? Non-BH   Is LACE score < 7 ? Non-BH Discharge   Eligibility [facility] - Inpatient   Date of Discharge 05/03/24   Discharge Disposition Home-Health Care [facility]   Discharge diagnosis Diverticulitis of intestine, part unspecified, without perforation or abscess without bleeding   Does the patient have one of the following disease processes/diagnoses(primary or secondary)? Other   Does the patient have Home health ordered? No   Prep survey completed? Yes            Monica KINNEY - Registered Nurse

## 2024-05-06 ENCOUNTER — TRANSITIONAL CARE MANAGEMENT TELEPHONE ENCOUNTER (OUTPATIENT)
Dept: CALL CENTER | Facility: HOSPITAL | Age: 89
End: 2024-05-06
Payer: MEDICARE

## 2024-05-06 ENCOUNTER — OFFICE VISIT (OUTPATIENT)
Dept: FAMILY MEDICINE CLINIC | Facility: CLINIC | Age: 89
End: 2024-05-06
Payer: MEDICARE

## 2024-05-06 VITALS
BODY MASS INDEX: 36.36 KG/M2 | TEMPERATURE: 98 F | DIASTOLIC BLOOD PRESSURE: 75 MMHG | OXYGEN SATURATION: 95 % | WEIGHT: 205.2 LBS | HEART RATE: 70 BPM | HEIGHT: 63 IN | SYSTOLIC BLOOD PRESSURE: 119 MMHG

## 2024-05-06 DIAGNOSIS — G62.9 NEUROPATHY: ICD-10-CM

## 2024-05-06 DIAGNOSIS — G89.29 CHRONIC LEFT SHOULDER PAIN: ICD-10-CM

## 2024-05-06 DIAGNOSIS — Z09 HOSPITAL DISCHARGE FOLLOW-UP: Primary | ICD-10-CM

## 2024-05-06 DIAGNOSIS — M79.672 BILATERAL FOOT PAIN: ICD-10-CM

## 2024-05-06 DIAGNOSIS — K57.92 DIVERTICULITIS: ICD-10-CM

## 2024-05-06 DIAGNOSIS — M79.671 BILATERAL FOOT PAIN: ICD-10-CM

## 2024-05-06 DIAGNOSIS — M25.512 CHRONIC LEFT SHOULDER PAIN: ICD-10-CM

## 2024-05-06 DIAGNOSIS — R11.0 NAUSEA: ICD-10-CM

## 2024-05-06 PROCEDURE — 1160F RVW MEDS BY RX/DR IN RCRD: CPT

## 2024-05-06 PROCEDURE — 1111F DSCHRG MED/CURRENT MED MERGE: CPT

## 2024-05-06 PROCEDURE — 99495 TRANSJ CARE MGMT MOD F2F 14D: CPT

## 2024-05-06 PROCEDURE — 1159F MED LIST DOCD IN RCRD: CPT

## 2024-05-06 RX ORDER — GABAPENTIN 100 MG/1
100 CAPSULE ORAL NIGHTLY
Qty: 30 CAPSULE | Refills: 0 | Status: SHIPPED | OUTPATIENT
Start: 2024-05-06

## 2024-05-06 RX ORDER — ONDANSETRON 4 MG/1
4 TABLET, ORALLY DISINTEGRATING ORAL EVERY 8 HOURS PRN
Qty: 30 TABLET | Refills: 0 | Status: SHIPPED | OUTPATIENT
Start: 2024-05-06

## 2024-05-06 RX ORDER — HYDROCODONE BITARTRATE AND ACETAMINOPHEN 7.5; 325 MG/1; MG/1
1 TABLET ORAL EVERY 6 HOURS PRN
Qty: 30 TABLET | Refills: 0 | Status: SHIPPED | OUTPATIENT
Start: 2024-05-06

## 2024-05-06 RX ORDER — HYDROCODONE BITARTRATE AND ACETAMINOPHEN 5; 325 MG/1; MG/1
TABLET ORAL
COMMUNITY
Start: 2024-04-05 | End: 2024-05-06 | Stop reason: DRUGHIGH

## 2024-05-09 ENCOUNTER — TELEPHONE (OUTPATIENT)
Dept: FAMILY MEDICINE CLINIC | Facility: CLINIC | Age: 89
End: 2024-05-09
Payer: MEDICARE

## 2024-05-09 DIAGNOSIS — K62.9 RECTAL ABNORMALITY: Primary | ICD-10-CM

## 2024-05-10 ENCOUNTER — TELEPHONE (OUTPATIENT)
Dept: FAMILY MEDICINE CLINIC | Facility: CLINIC | Age: 89
End: 2024-05-10
Payer: MEDICARE

## 2024-05-13 ENCOUNTER — LAB (OUTPATIENT)
Dept: FAMILY MEDICINE CLINIC | Facility: CLINIC | Age: 89
End: 2024-05-13
Payer: MEDICARE

## 2024-05-13 ENCOUNTER — OFFICE VISIT (OUTPATIENT)
Dept: FAMILY MEDICINE CLINIC | Facility: CLINIC | Age: 89
End: 2024-05-13
Payer: MEDICARE

## 2024-05-13 ENCOUNTER — TELEPHONE (OUTPATIENT)
Dept: FAMILY MEDICINE CLINIC | Facility: CLINIC | Age: 89
End: 2024-05-13

## 2024-05-13 ENCOUNTER — HOSPITAL ENCOUNTER (OUTPATIENT)
Dept: GENERAL RADIOLOGY | Facility: HOSPITAL | Age: 89
Discharge: HOME | End: 2024-05-13
Admitting: NURSE PRACTITIONER
Payer: MEDICARE

## 2024-05-13 VITALS
SYSTOLIC BLOOD PRESSURE: 155 MMHG | WEIGHT: 199 LBS | HEART RATE: 64 BPM | DIASTOLIC BLOOD PRESSURE: 71 MMHG | TEMPERATURE: 98.8 F | HEIGHT: 63 IN | BODY MASS INDEX: 35.26 KG/M2 | OXYGEN SATURATION: 96 %

## 2024-05-13 DIAGNOSIS — R11.2 NAUSEA AND VOMITING, UNSPECIFIED VOMITING TYPE: ICD-10-CM

## 2024-05-13 DIAGNOSIS — K62.9 RECTAL ABNORMALITY: ICD-10-CM

## 2024-05-13 DIAGNOSIS — R10.30 LOWER ABDOMINAL PAIN: ICD-10-CM

## 2024-05-13 DIAGNOSIS — R10.30 LOWER ABDOMINAL PAIN: Primary | ICD-10-CM

## 2024-05-13 LAB
ALBUMIN SERPL-MCNC: 4.1 G/DL (ref 3.5–5.2)
ALBUMIN/GLOB SERPL: 1.6 G/DL
ALP SERPL-CCNC: 77 U/L (ref 39–117)
ALT SERPL W P-5'-P-CCNC: 11 U/L (ref 1–33)
ANION GAP SERPL CALCULATED.3IONS-SCNC: 13 MMOL/L (ref 5–15)
AST SERPL-CCNC: 17 U/L (ref 1–32)
BASOPHILS # BLD AUTO: 0.08 10*3/MM3 (ref 0–0.2)
BASOPHILS NFR BLD AUTO: 1 % (ref 0–1.5)
BILIRUB SERPL-MCNC: 0.2 MG/DL (ref 0–1.2)
BUN SERPL-MCNC: 16 MG/DL (ref 8–23)
BUN/CREAT SERPL: 17 (ref 7–25)
CALCIUM SPEC-SCNC: 9.5 MG/DL (ref 8.6–10.5)
CHLORIDE SERPL-SCNC: 106 MMOL/L (ref 98–107)
CO2 SERPL-SCNC: 21 MMOL/L (ref 22–29)
CREAT SERPL-MCNC: 0.94 MG/DL (ref 0.57–1)
DEPRECATED RDW RBC AUTO: 45.6 FL (ref 37–54)
EGFRCR SERPLBLD CKD-EPI 2021: 58.8 ML/MIN/1.73
EOSINOPHIL # BLD AUTO: 0.15 10*3/MM3 (ref 0–0.4)
EOSINOPHIL NFR BLD AUTO: 1.8 % (ref 0.3–6.2)
ERYTHROCYTE [DISTWIDTH] IN BLOOD BY AUTOMATED COUNT: 14.2 % (ref 12.3–15.4)
GLOBULIN UR ELPH-MCNC: 2.5 GM/DL
GLUCOSE SERPL-MCNC: 122 MG/DL (ref 65–99)
HCT VFR BLD AUTO: 37.3 % (ref 34–46.6)
HGB BLD-MCNC: 12.3 G/DL (ref 12–15.9)
IMM GRANULOCYTES # BLD AUTO: 0.02 10*3/MM3 (ref 0–0.05)
IMM GRANULOCYTES NFR BLD AUTO: 0.2 % (ref 0–0.5)
LYMPHOCYTES # BLD AUTO: 1.82 10*3/MM3 (ref 0.7–3.1)
LYMPHOCYTES NFR BLD AUTO: 22.4 % (ref 19.6–45.3)
MCH RBC QN AUTO: 29.5 PG (ref 26.6–33)
MCHC RBC AUTO-ENTMCNC: 33 G/DL (ref 31.5–35.7)
MCV RBC AUTO: 89.4 FL (ref 79–97)
MONOCYTES # BLD AUTO: 0.57 10*3/MM3 (ref 0.1–0.9)
MONOCYTES NFR BLD AUTO: 7 % (ref 5–12)
NEUTROPHILS NFR BLD AUTO: 5.49 10*3/MM3 (ref 1.7–7)
NEUTROPHILS NFR BLD AUTO: 67.6 % (ref 42.7–76)
NRBC BLD AUTO-RTO: 0 /100 WBC (ref 0–0.2)
PLATELET # BLD AUTO: 351 10*3/MM3 (ref 140–450)
PMV BLD AUTO: 10.6 FL (ref 6–12)
POTASSIUM SERPL-SCNC: 3.9 MMOL/L (ref 3.5–5.2)
PROT SERPL-MCNC: 6.6 G/DL (ref 6–8.5)
RBC # BLD AUTO: 4.17 10*6/MM3 (ref 3.77–5.28)
SODIUM SERPL-SCNC: 140 MMOL/L (ref 136–145)
WBC NRBC COR # BLD AUTO: 8.13 10*3/MM3 (ref 3.4–10.8)

## 2024-05-13 PROCEDURE — 99214 OFFICE O/P EST MOD 30 MIN: CPT | Performed by: NURSE PRACTITIONER

## 2024-05-13 PROCEDURE — 85025 COMPLETE CBC W/AUTO DIFF WBC: CPT | Performed by: NURSE PRACTITIONER

## 2024-05-13 PROCEDURE — 74018 RADEX ABDOMEN 1 VIEW: CPT

## 2024-05-13 PROCEDURE — 36415 COLL VENOUS BLD VENIPUNCTURE: CPT

## 2024-05-13 PROCEDURE — 80053 COMPREHEN METABOLIC PANEL: CPT | Performed by: NURSE PRACTITIONER

## 2024-05-13 PROCEDURE — 1125F AMNT PAIN NOTED PAIN PRSNT: CPT | Performed by: NURSE PRACTITIONER

## 2024-05-13 NOTE — PROGRESS NOTES
Subjective   Leandra Nuñez is a 88 y.o. female.       HPI   Pt is here today accompanied by her caregiver.  Her caregiver reports patient has had concern of abdominal pain, nausea, dizziness.  Symptoms started about a week ago.  No vomiting.  She started clear liquids only yesterday; nausea a little better.  Hasn't had a BM since last Thursday; had to have help to have that last one.  Has a rectal growth that has been very painful for her; scheduling to see GI about this.  Caregiver has noticed about a 10 pound weight drop in the last week.  Patient reports abdominal pain is not as bad today.  Denies any fevers.   She was admitted to the hospital a month ago for diverticulitis.      The following portions of the patient's history were reviewed and updated as appropriate: allergies, current medications, past family history, past medical history, past social history, past surgical history, and problem list.    Review of Systems   Constitutional:  Positive for unexpected weight loss. Negative for chills, fatigue and fever.   Respiratory:  Negative for cough, shortness of breath and wheezing.    Cardiovascular:  Negative for chest pain and palpitations.   Gastrointestinal:  Positive for abdominal pain, constipation and rectal pain. Negative for blood in stool, diarrhea, nausea and vomiting.   Genitourinary:  Negative for dysuria, frequency, hematuria and urgency.   Neurological:  Positive for dizziness and weakness (general). Negative for light-headedness and headache.   Psychiatric/Behavioral:  Negative for depressed mood. The patient is not nervous/anxious.        Objective   Physical Exam  Vitals reviewed.   Constitutional:       General: She is not in acute distress.     Appearance: Normal appearance. She is obese.      Comments: Using walker.    Accompanied by caregiver today   Cardiovascular:      Rate and Rhythm: Normal rate and regular rhythm.      Pulses: Normal pulses.      Heart sounds: Normal heart  sounds.   Pulmonary:      Effort: Pulmonary effort is normal. No respiratory distress.      Breath sounds: Normal breath sounds. No wheezing or rhonchi.   Chest:      Chest wall: No tenderness.   Abdominal:      General: Bowel sounds are decreased. There is no distension.      Palpations: Abdomen is soft.      Tenderness: There is generalized abdominal tenderness. There is no right CVA tenderness, left CVA tenderness, guarding or rebound.   Neurological:      Mental Status: She is alert and oriented to person, place, and time. Mental status is at baseline.   Psychiatric:         Mood and Affect: Mood normal.                  Procedures   Assessment & Plan   Diagnoses and all orders for this visit:    1. Lower abdominal pain (Primary)  Comments:  STAT CT abd/pelvis ordered.   CBC, CMP today.  Orders:  -     CBC w AUTO Differential; Future  -     Comprehensive metabolic panel; Future  -     CT Abdomen With & Without Contrast; Future  -     XR Abdomen KUB; Future    2. Nausea and vomiting, unspecified vomiting type  Comments:  STAT CT abd/pelvis ordered.   CBC, CMP today.   Continue clear liquid diet for now.  Orders:  -     CBC w AUTO Differential; Future  -     Comprehensive metabolic panel; Future  -     CT Abdomen With & Without Contrast; Future  -     XR Abdomen KUB; Future    3. Rectal abnormality  Comments:  STAT CT abd/pelvis ordered.   CBC, CMP today.   Schedule f/u with GI.  Orders:  -     CBC w AUTO Differential; Future  -     Comprehensive metabolic panel; Future  -     CT Abdomen With & Without Contrast; Future      ADDENDUM:  Insurance denied CT scan due to patient having had one in the ER a month ago.  Will switch order to KUB and blood work today.  Pt will continue with clear liquid diet until results are in.  Go to ER for worsening.

## 2024-05-13 NOTE — TELEPHONE ENCOUNTER
Caller: DAYANA POTTER    Relationship: Emergency Contact    Best call back number:     [phone]       What is the best time to reach you: ANY    Who are you requesting to speak with (clinical staff, provider,  specific staff member): PCP    Do you know the name of the person who called:     What was the call regarding: LAB RESULTS AND QUESTIONS ABOUT REFERRAL    Is it okay if the provider responds through MyChart:        Discontinue Regimen: Bath salts Detail Level: Zone Initiate Treatment: Zyrtec twice daily for itching. TAC cream BID, PRN Plan: Use hypoallergenic products.

## 2024-05-14 NOTE — TELEPHONE ENCOUNTER
CBC blood counts are normal; no evidence of infection.  Kidney and liver are stable.  I am still waiting on the result from the xray.   Does she have a GI provider?

## 2024-05-14 NOTE — TELEPHONE ENCOUNTER
Can you let patient know who to contact about the GI referral (Dr. Rios put one in on 5/9).  Thank you!

## 2024-05-14 NOTE — TELEPHONE ENCOUNTER
Spoke with patient Power of  about results. She also had a referral to over to [facility] and they didn't get it through fax.

## 2024-05-22 ENCOUNTER — OFFICE (OUTPATIENT)
Dept: URBAN - METROPOLITAN AREA CLINIC 64 | Facility: CLINIC | Age: 89
End: 2024-05-22

## 2024-05-22 VITALS
WEIGHT: 204 LBS | DIASTOLIC BLOOD PRESSURE: 81 MMHG | HEART RATE: 54 BPM | HEIGHT: 63 IN | SYSTOLIC BLOOD PRESSURE: 135 MMHG

## 2024-05-22 DIAGNOSIS — R10.32 LEFT LOWER QUADRANT PAIN: ICD-10-CM

## 2024-05-22 DIAGNOSIS — R19.4 CHANGE IN BOWEL HABIT: ICD-10-CM

## 2024-05-22 DIAGNOSIS — R19.5 OTHER FECAL ABNORMALITIES: ICD-10-CM

## 2024-05-22 DIAGNOSIS — K57.92 DIVERTICULITIS OF INTESTINE, PART UNSPECIFIED, WITHOUT PERFO: ICD-10-CM

## 2024-05-22 DIAGNOSIS — R11.0 NAUSEA: ICD-10-CM

## 2024-05-22 PROCEDURE — 99204 OFFICE O/P NEW MOD 45 MIN: CPT | Performed by: INTERNAL MEDICINE

## 2024-05-29 ENCOUNTER — HOSPITAL ENCOUNTER (OUTPATIENT)
Facility: HOSPITAL | Age: 89
Setting detail: HOSPITAL OUTPATIENT SURGERY
Discharge: HOME | End: 2024-05-29
Attending: INTERNAL MEDICINE | Admitting: INTERNAL MEDICINE
Payer: MEDICARE

## 2024-05-29 ENCOUNTER — APPOINTMENT (OUTPATIENT)
Dept: GENERAL RADIOLOGY | Facility: HOSPITAL | Age: 89
End: 2024-05-29
Payer: MEDICARE

## 2024-05-29 ENCOUNTER — ANESTHESIA EVENT (OUTPATIENT)
Dept: GASTROENTEROLOGY | Facility: HOSPITAL | Age: 89
End: 2024-05-29
Payer: MEDICARE

## 2024-05-29 ENCOUNTER — ANESTHESIA (OUTPATIENT)
Dept: GASTROENTEROLOGY | Facility: HOSPITAL | Age: 89
End: 2024-05-29
Payer: MEDICARE

## 2024-05-29 ENCOUNTER — ON CAMPUS - OUTPATIENT (OUTPATIENT)
Dept: URBAN - METROPOLITAN AREA HOSPITAL 85 | Facility: HOSPITAL | Age: 89
End: 2024-05-29
Payer: MEDICARE

## 2024-05-29 VITALS
BODY MASS INDEX: 36.29 KG/M2 | RESPIRATION RATE: 20 BRPM | OXYGEN SATURATION: 98 % | TEMPERATURE: 98 F | HEIGHT: 63 IN | SYSTOLIC BLOOD PRESSURE: 134 MMHG | WEIGHT: 204.8 LBS | DIASTOLIC BLOOD PRESSURE: 57 MMHG | HEART RATE: 56 BPM

## 2024-05-29 DIAGNOSIS — R11.0 NAUSEA: ICD-10-CM

## 2024-05-29 DIAGNOSIS — D12.2 BENIGN NEOPLASM OF ASCENDING COLON: ICD-10-CM

## 2024-05-29 DIAGNOSIS — R19.4 CHANGE IN BOWEL HABITS: ICD-10-CM

## 2024-05-29 DIAGNOSIS — K57.30 DIVERTICULOSIS OF LARGE INTESTINE WITHOUT PERFORATION OR ABS: ICD-10-CM

## 2024-05-29 DIAGNOSIS — R10.32 LEFT LOWER QUADRANT PAIN: ICD-10-CM

## 2024-05-29 DIAGNOSIS — D12.3 BENIGN NEOPLASM OF TRANSVERSE COLON: ICD-10-CM

## 2024-05-29 DIAGNOSIS — K21.00 GASTRO-ESOPHAGEAL REFLUX DISEASE WITH ESOPHAGITIS, WITHOUT B: ICD-10-CM

## 2024-05-29 DIAGNOSIS — B96.81 HELICOBACTER PYLORI [H. PYLORI] AS THE CAUSE OF DISEASES CLA: ICD-10-CM

## 2024-05-29 DIAGNOSIS — R10.32 ABDOMINAL PAIN, LLQ: ICD-10-CM

## 2024-05-29 DIAGNOSIS — K29.50 UNSPECIFIED CHRONIC GASTRITIS WITHOUT BLEEDING: ICD-10-CM

## 2024-05-29 DIAGNOSIS — D12.4 BENIGN NEOPLASM OF DESCENDING COLON: ICD-10-CM

## 2024-05-29 DIAGNOSIS — K57.92 DIVERTICULITIS OF INTESTINE, PART UNSPECIFIED, WITHOUT PERFO: ICD-10-CM

## 2024-05-29 DIAGNOSIS — D12.0 BENIGN NEOPLASM OF CECUM: ICD-10-CM

## 2024-05-29 DIAGNOSIS — K57.92 DIVERTICULITIS: ICD-10-CM

## 2024-05-29 DIAGNOSIS — K92.1 BLOOD IN STOOL: ICD-10-CM

## 2024-05-29 DIAGNOSIS — K92.1 MELENA: ICD-10-CM

## 2024-05-29 DIAGNOSIS — D12.8 BENIGN NEOPLASM OF RECTUM: ICD-10-CM

## 2024-05-29 PROCEDURE — 73501 X-RAY EXAM HIP UNI 1 VIEW: CPT

## 2024-05-29 PROCEDURE — 25010000002 FENTANYL CITRATE (PF) 50 MCG/ML SOLUTION

## 2024-05-29 PROCEDURE — 25010000002 HYDRALAZINE PER 20 MG: Performed by: NURSE ANESTHETIST, CERTIFIED REGISTERED

## 2024-05-29 PROCEDURE — 25810000003 SODIUM CHLORIDE 0.9 % SOLUTION: Performed by: NURSE ANESTHETIST, CERTIFIED REGISTERED

## 2024-05-29 PROCEDURE — 45385 COLONOSCOPY W/LESION REMOVAL: CPT | Performed by: INTERNAL MEDICINE

## 2024-05-29 PROCEDURE — 88305 TISSUE EXAM BY PATHOLOGIST: CPT | Performed by: INTERNAL MEDICINE

## 2024-05-29 PROCEDURE — 25810000003 SODIUM CHLORIDE 0.9 % SOLUTION: Performed by: INTERNAL MEDICINE

## 2024-05-29 PROCEDURE — 45380 COLONOSCOPY AND BIOPSY: CPT | Mod: 51,59 | Performed by: INTERNAL MEDICINE

## 2024-05-29 PROCEDURE — 25010000002 PROPOFOL 1000 MG/100ML EMULSION: Performed by: NURSE ANESTHETIST, CERTIFIED REGISTERED

## 2024-05-29 PROCEDURE — 25010000002 FENTANYL CITRATE (PF) 50 MCG/ML SOLUTION: Performed by: INTERNAL MEDICINE

## 2024-05-29 PROCEDURE — 74018 RADEX ABDOMEN 1 VIEW: CPT

## 2024-05-29 PROCEDURE — 45380 COLONOSCOPY AND BIOPSY: CPT | Mod: 59,51 | Performed by: INTERNAL MEDICINE

## 2024-05-29 PROCEDURE — 45390 COLONOSCOPY W/RESECTION: CPT | Mod: 59 | Performed by: INTERNAL MEDICINE

## 2024-05-29 PROCEDURE — 43239 EGD BIOPSY SINGLE/MULTIPLE: CPT | Performed by: INTERNAL MEDICINE

## 2024-05-29 RX ORDER — SODIUM CHLORIDE 9 MG/ML
10 INJECTION, SOLUTION INTRAVENOUS ONCE
Status: COMPLETED | OUTPATIENT
Start: 2024-05-29 | End: 2024-05-29

## 2024-05-29 RX ORDER — FENTANYL CITRATE 50 UG/ML
25 INJECTION, SOLUTION INTRAMUSCULAR; INTRAVENOUS
Status: DISCONTINUED | OUTPATIENT
Start: 2024-05-29 | End: 2024-05-29 | Stop reason: HOSPADM

## 2024-05-29 RX ORDER — PROPOFOL 10 MG/ML
INJECTION, EMULSION INTRAVENOUS AS NEEDED
Status: DISCONTINUED | OUTPATIENT
Start: 2024-05-29 | End: 2024-05-29 | Stop reason: SURG

## 2024-05-29 RX ORDER — HYDRALAZINE HYDROCHLORIDE 20 MG/ML
INJECTION INTRAMUSCULAR; INTRAVENOUS AS NEEDED
Status: DISCONTINUED | OUTPATIENT
Start: 2024-05-29 | End: 2024-05-29 | Stop reason: SURG

## 2024-05-29 RX ORDER — MEPERIDINE HYDROCHLORIDE 25 MG/ML
12.5 INJECTION INTRAMUSCULAR; INTRAVENOUS; SUBCUTANEOUS
Status: DISCONTINUED | OUTPATIENT
Start: 2024-05-29 | End: 2024-05-29 | Stop reason: HOSPADM

## 2024-05-29 RX ORDER — EPHEDRINE SULFATE 5 MG/ML
5 INJECTION INTRAVENOUS ONCE AS NEEDED
Status: DISCONTINUED | OUTPATIENT
Start: 2024-05-29 | End: 2024-05-29 | Stop reason: HOSPADM

## 2024-05-29 RX ORDER — ONDANSETRON 2 MG/ML
4 INJECTION INTRAMUSCULAR; INTRAVENOUS ONCE AS NEEDED
Status: DISCONTINUED | OUTPATIENT
Start: 2024-05-29 | End: 2024-05-29 | Stop reason: HOSPADM

## 2024-05-29 RX ORDER — SODIUM CHLORIDE 9 MG/ML
INJECTION, SOLUTION INTRAVENOUS CONTINUOUS PRN
Status: DISCONTINUED | OUTPATIENT
Start: 2024-05-29 | End: 2024-05-29 | Stop reason: SURG

## 2024-05-29 RX ORDER — IPRATROPIUM BROMIDE AND ALBUTEROL SULFATE 2.5; .5 MG/3ML; MG/3ML
3 SOLUTION RESPIRATORY (INHALATION) ONCE AS NEEDED
Status: DISCONTINUED | OUTPATIENT
Start: 2024-05-29 | End: 2024-05-29 | Stop reason: HOSPADM

## 2024-05-29 RX ORDER — HYDRALAZINE HYDROCHLORIDE 20 MG/ML
5 INJECTION INTRAMUSCULAR; INTRAVENOUS
Status: DISCONTINUED | OUTPATIENT
Start: 2024-05-29 | End: 2024-05-29 | Stop reason: HOSPADM

## 2024-05-29 RX ORDER — FENTANYL CITRATE 50 UG/ML
INJECTION, SOLUTION INTRAMUSCULAR; INTRAVENOUS
Status: COMPLETED
Start: 2024-05-29 | End: 2024-05-29

## 2024-05-29 RX ORDER — LIDOCAINE HYDROCHLORIDE 20 MG/ML
INJECTION, SOLUTION EPIDURAL; INFILTRATION; INTRACAUDAL; PERINEURAL AS NEEDED
Status: DISCONTINUED | OUTPATIENT
Start: 2024-05-29 | End: 2024-05-29 | Stop reason: SURG

## 2024-05-29 RX ORDER — DIPHENHYDRAMINE HYDROCHLORIDE 50 MG/ML
12.5 INJECTION INTRAMUSCULAR; INTRAVENOUS
Status: DISCONTINUED | OUTPATIENT
Start: 2024-05-29 | End: 2024-05-29 | Stop reason: HOSPADM

## 2024-05-29 RX ORDER — LABETALOL HYDROCHLORIDE 5 MG/ML
5 INJECTION, SOLUTION INTRAVENOUS
Status: DISCONTINUED | OUTPATIENT
Start: 2024-05-29 | End: 2024-05-29 | Stop reason: HOSPADM

## 2024-05-29 RX ADMIN — SODIUM CHLORIDE 10 ML/HR: 9 INJECTION, SOLUTION INTRAVENOUS at 10:43

## 2024-05-29 RX ADMIN — PROPOFOL INJECTABLE EMULSION 25 MG: 10 INJECTION, EMULSION INTRAVENOUS at 10:51

## 2024-05-29 RX ADMIN — HYDRALAZINE HYDROCHLORIDE 5 MG: 20 INJECTION INTRAMUSCULAR; INTRAVENOUS at 11:10

## 2024-05-29 RX ADMIN — PROPOFOL INJECTABLE EMULSION 25 MG: 10 INJECTION, EMULSION INTRAVENOUS at 11:28

## 2024-05-29 RX ADMIN — PROPOFOL INJECTABLE EMULSION 25 MG: 10 INJECTION, EMULSION INTRAVENOUS at 11:07

## 2024-05-29 RX ADMIN — LIDOCAINE HYDROCHLORIDE 100 MG: 20 INJECTION, SOLUTION EPIDURAL; INFILTRATION; INTRACAUDAL; PERINEURAL at 10:51

## 2024-05-29 RX ADMIN — PROPOFOL INJECTABLE EMULSION 25 MG: 10 INJECTION, EMULSION INTRAVENOUS at 11:02

## 2024-05-29 RX ADMIN — FENTANYL CITRATE 25 MCG: 50 INJECTION, SOLUTION INTRAMUSCULAR; INTRAVENOUS at 13:19

## 2024-05-29 RX ADMIN — PROPOFOL INJECTABLE EMULSION 25 MG: 10 INJECTION, EMULSION INTRAVENOUS at 11:25

## 2024-05-29 RX ADMIN — PROPOFOL INJECTABLE EMULSION 25 MG: 10 INJECTION, EMULSION INTRAVENOUS at 11:10

## 2024-05-29 RX ADMIN — PROPOFOL INJECTABLE EMULSION 25 MG: 10 INJECTION, EMULSION INTRAVENOUS at 11:21

## 2024-05-29 RX ADMIN — PROPOFOL INJECTABLE EMULSION 25 MG: 10 INJECTION, EMULSION INTRAVENOUS at 10:57

## 2024-05-29 RX ADMIN — PROPOFOL INJECTABLE EMULSION 25 MG: 10 INJECTION, EMULSION INTRAVENOUS at 11:05

## 2024-05-29 RX ADMIN — FENTANYL CITRATE 25 MCG: 50 INJECTION, SOLUTION INTRAMUSCULAR; INTRAVENOUS at 13:53

## 2024-05-29 RX ADMIN — PROPOFOL INJECTABLE EMULSION 25 MG: 10 INJECTION, EMULSION INTRAVENOUS at 11:14

## 2024-05-29 RX ADMIN — PROPOFOL INJECTABLE EMULSION 25 MG: 10 INJECTION, EMULSION INTRAVENOUS at 10:54

## 2024-05-29 RX ADMIN — SODIUM CHLORIDE: 9 INJECTION, SOLUTION INTRAVENOUS at 10:48

## 2024-05-29 RX ADMIN — PROPOFOL INJECTABLE EMULSION 25 MG: 10 INJECTION, EMULSION INTRAVENOUS at 11:17

## 2024-05-29 RX ADMIN — HYDRALAZINE HYDROCHLORIDE 10 MG: 20 INJECTION INTRAMUSCULAR; INTRAVENOUS at 11:15

## 2024-05-29 RX ADMIN — HYDRALAZINE HYDROCHLORIDE 5 MG: 20 INJECTION INTRAMUSCULAR; INTRAVENOUS at 11:03

## 2024-05-29 RX ADMIN — PROPOFOL INJECTABLE EMULSION 25 MG: 10 INJECTION, EMULSION INTRAVENOUS at 11:00

## 2024-05-29 NOTE — ANESTHESIA PREPROCEDURE EVALUATION
Anesthesia Evaluation     NPO Solid Status: > 8 hours  NPO Liquid Status: > 2 hours           Airway   Mallampati: II  TM distance: >3 FB  Neck ROM: full  No difficulty expected  Dental - normal exam     Pulmonary - normal exam   (+) asthma,shortness of breath, sleep apnea  Cardiovascular - normal exam    (+) hypertension well controlled, hyperlipidemia      Neuro/Psych  (+) psychiatric history, dementia  GI/Hepatic/Renal/Endo    (+) obesity, GERD, renal disease-, diabetes mellitus type 2    Musculoskeletal     Abdominal  - normal exam    Bowel sounds: normal.   Substance History      OB/GYN          Other   arthritis,   history of cancer                Anesthesia Plan    ASA 3     MAC   total IV anesthesia  intravenous induction     Anesthetic plan, risks, benefits, and alternatives have been provided, discussed and informed consent has been obtained with: patient.  Pre-procedure education provided  Plan discussed with CRNA.    CODE STATUS:

## 2024-05-29 NOTE — ANESTHESIA POSTPROCEDURE EVALUATION
Patient: Leandra Nuñez    Procedure Summary       Date: 05/29/24 Room / Location: [facility] ENDOSCOPY 4 / [facility] ENDOSCOPY    Anesthesia Start: 1048 Anesthesia Stop: 1136    Procedures:       COLONOSCOPY WITH POLYPECTOMY X5 AND ENDOSCOPIC MUCOSAL RESECTION OF RECTAL POLYP      ESOPHAGOGASTRODUODENOSCOPY WITH BIOPSY X1 AREA Diagnosis:       Diverticulitis      Blood in stool      Abdominal pain, LLQ      Change in bowel habits      Nausea      (Diverticulitis [K57.92])      (Blood in stool [K92.1])      (Abdominal pain, LLQ [R10.32])      (Change in bowel habits [R19.4])      (Nausea [R11.0])    Surgeons: Joon Martin MD Provider: Yamil Luo MD    Anesthesia Type: MAC ASA Status: 3            Anesthesia Type: MAC    Vitals  Vitals Value Taken Time   /106 05/29/24 1223   Temp     Pulse 104 05/29/24 1226   Resp 20 05/29/24 1200   SpO2 98 % 05/29/24 1226   Vitals shown include unfiled device data.        Post Anesthesia Care and Evaluation    Patient location during evaluation: PACU  Patient participation: complete - patient participated  Level of consciousness: awake  Pain scale: See nurse's notes for pain score.  Pain management: adequate    Airway patency: patent  Anesthetic complications: No anesthetic complications  PONV Status: none  Cardiovascular status: acceptable  Respiratory status: acceptable and spontaneous ventilation  Hydration status: acceptable    Comments: Patient seen and examined postoperatively; vital signs stable; SpO2 greater than or equal to 90%; cardiopulmonary status stable; nausea/vomiting adequately controlled; pain adequately controlled; no apparent anesthesia complications; patient discharged from anesthesia care when discharge criteria were met

## 2024-05-29 NOTE — H&P
GI CONSULT  NOTE:    Referring Provider:    Anabelle Sellers MD Obert, Jonathan, MD    Chief complaint: <principal problem not specified>    Subjective .       Pre op diagnosis  Diverticulitis [K57.92]  Blood in stool [K92.1]  Abdominal pain, LLQ [R10.32]  Change in bowel habits [R19.4]  Nausea [R11.0]      History of present illness:      Leandra Nuñez is a 88 y.o. female who presents today for Procedure(s):  COLONOSCOPY  ESOPHAGOGASTRODUODENOSCOPY for the indications listed below.     The updated Patient Profile was reviewed prior to the procedure, in conjunction with the Physical Exam, including medical conditions, surgical procedures, medications, allergies, family history and social history.     Pre-operatively, I reviewed the indication(s) for the procedure, the risks of the procedure [including but not limited to: unexpected bleeding possibly requiring hospitalization and/or unplanned repeat procedures, perforation possibly requiring surgical treatment, missed lesions and complications of sedation/MAC (also explained by anesthesia staff)].     I have evaluated the patient for risks associated with the planned anesthesia and the procedure to be performed and find the patient an acceptable candidate for IV sedation.    Multiple opportunities were provided for any questions or concerns, and all questions were answered satisfactorily before any anesthesia was administered. We will proceed with the planned procedure.    Past Medical History:  Past Medical History:   Diagnosis Date    Anxiety     Arthritis     Breast nodule 2013    Left breast nodule (fibrocystic disease , no malignancy)     Cancer     Cataract     Chronic kidney disease     HL (hearing loss)     Hyperlipidemia     Hypertension     Obesity     Shortness of breath        Past Surgical History:  Past Surgical History:   Procedure Laterality Date    BREAST BIOPSY Left 05/21/2013    Benign fibrocystic disease ,Abstracted from [facility].     CARDIAC CATHETERIZATION      D & C AND LAPAROSCOPY      FULGURATION ENDOMETRIOSIS      surgery    NEPHRECTOMY Right     ORIF HUMERUS FRACTURE Left 3/24/2021    Procedure: HUMERUS PROXIMAL OPEN REDUCTION INTERNAL FIXATION;  Surgeon: Yair Anne MD;  Location: [facility] MAIN OR;  Service: Orthopedics;  Laterality: Left;       Social History:  Social History     Tobacco Use    Smoking status: Never    Smokeless tobacco: Never   Vaping Use    Vaping status: Never Used   Substance Use Topics    Alcohol use: No    Drug use: No       Family History:  History reviewed. No pertinent family history.    Medications:  Medications Prior to Admission   Medication Sig Dispense Refill Last Dose    allopurinol (ZYLOPRIM) 100 MG tablet Take 1 tablet by mouth Daily.   5/29/2024    bisacodyl (DULCOLAX) 10 MG suppository Insert 1 suppository into the rectum Daily As Needed for Constipation (Use if bisacodyl oral is ineffective). 30 suppository 0 Past Month    cyclobenzaprine (FLEXERIL) 5 MG tablet Take 1 tablet by mouth 3 (Three) Times a Day As Needed for Muscle Spasms. 30 tablet 1 Past Month    donepezil (Aricept) 10 MG tablet Take 1 tablet by mouth Every Night. 30 tablet 11 5/28/2024    furosemide (LASIX) 20 MG tablet Take 1 tablet by mouth Daily. For swelling 90 tablet 3 Past Month    gabapentin (NEURONTIN) 100 MG capsule Take 1 capsule by mouth Every Night. 30 capsule 0 5/28/2024    hydrALAZINE (APRESOLINE) 100 MG tablet TAKE 1 TABLET TWICE A  tablet 3 5/28/2024    HYDROcodone-acetaminophen (Norco) 7.5-325 MG per tablet Take 1 tablet by mouth Every 6 (Six) Hours As Needed for Moderate Pain. 30 tablet 0 5/28/2024    melatonin 5 MG tablet tablet Take 1 tablet by mouth Every Night.   5/28/2024    naloxone (NARCAN) 4 MG/0.1ML nasal spray Call 911. Don't prime. [place] in 1 nostril for overdose. Repeat in 2-3 minutes in other nostril if no or minimal breathing/responsiveness. 2 each 0     ondansetron ODT (ZOFRAN-ODT) 4 MG  "disintegrating tablet Place 1 tablet on the tongue Every 8 (Eight) Hours As Needed for Nausea or Vomiting. 30 tablet 0 5/29/2024    polyethylene glycol (MIRALAX) 17 g packet Take 17 g by mouth Daily As Needed (Use if senna-docusate is ineffective). 30 packet 0 5/28/2024    potassium chloride 10 MEQ CR tablet Take 1 tablet by mouth Daily. When taking furosemide 90 tablet 3     sennosides-docusate (PERICOLACE) 8.6-50 MG per tablet Take 2 tablets by mouth 2 (Two) Times a Day As Needed for Constipation. 30 tablet 0 Past Week    traZODone (DESYREL) 50 MG tablet TAKE 1 TABLET BY MOUTH EVERY NIGHT 30 tablet 1 5/28/2024    ezetimibe (ZETIA) 10 MG tablet Take 1 tablet by mouth Daily. (Patient not taking: Reported on 5/13/2024)          Scheduled Meds:   Continuous Infusions:   PRN Meds:.  atropine    diphenhydrAMINE    ePHEDrine Sulfate (Pressors)    hydrALAZINE    ipratropium-albuterol    labetalol    lidocaine (cardiac)    meperidine    ondansetron    ALLERGIES:  Statins    ROS:  The following systems were reviewed and negative;  Constitution:  No fevers, chills, no unintentional weight loss  Skin: no rash, no jaundice  Eyes:  No blurry vision, no eye pain  HENT:  No change in hearing or smell  Resp:  No dyspnea or cough  CV:  No chest pain or palpitations  :  No dysuria, hematuria  Musculoskeletal:  No leg cramps or arthralgias  Neuro:  No tremor, no numbness  Psych:  No depression or confsusion    Objective     Vital Signs:   Vitals:    05/22/24 1123 05/29/24 1033   BP:  157/77   BP Location:  Left arm   Patient Position:  Lying   Pulse:  64   Resp:  14   Temp:  98 °F (36.7 °C)   TempSrc:  Oral   SpO2:  96%   Weight: 90.3 kg (199 lb) 92.9 kg (204 lb 12.8 oz)   Height: 160 cm (63\") 160 cm (63\")       Physical Exam:       General Appearance:    Awake and alert, in no acute distress   Head:    Normocephalic, without obvious abnormality, atraumatic   Throat:   No oral lesions, no thrush, oral mucosa moist   Lungs:     " respirations regular, even and unlabored   Skin:   No rash, no jaundice       Results Review:  Lab Results (last 24 hours)       ** No results found for the last 24 hours. **            Imaging Results (Last 24 Hours)       ** No results found for the last 24 hours. **             I reviewed the patient's labs and imaging.    ASSESSMENT AND PLAN:      Active Problems:    * No active hospital problems. *       Procedure(s):  COLONOSCOPY  ESOPHAGOGASTRODUODENOSCOPY      I discussed the patient's findings and my recommendations with the patient.    Joon Martin MD  05/29/24  10:52 EDT

## 2024-05-29 NOTE — OP NOTE
COLONOSCOPY, ESOPHAGOGASTRODUODENOSCOPY Procedure Report    Patient Name:  Leandra Nuñez  YOB: 1935    Date of Surgery:  5/29/2024     Pre-Op Diagnosis:  Diverticulitis [K57.92]  Blood in stool [K92.1]  Abdominal pain, LLQ [R10.32]  Change in bowel habits [R19.4]  Nausea [R11.0]    Postop diagnosis:  1.  Esophagitis  2.  Erosive gastritis  3.  Colon polyp  4.  Diverticulosis  5.  Diverticulitis  6.  Internal and external hemorrhoids      Procedure/CPT® Codes:      Procedure(s):  COLONOSCOPY with snare plus endoscopic mucosal resection plus biopsy  ESOPHAGOGASTRODUODENOSCOPY WITH BIOPSY X1 AREA    Staff:  Surgeon(s):  Joon Martin MD      Anesthesia: Monitored Anesthesia Care    Description of Procedure:  A description of the procedure as well as risks, benefits and alternative methods were explained to the patient who voiced understanding and signed the corresponding consent form. A physical exam was performed and vital signs were monitored throughout the procedure.    An upper GI endoscope was placed into the mouth and proceeded through the esophagus, stomach and second portion of the duodenum without difficulty. The scope was then retroflexed and the fundus was visualized. The procedure was not difficult and there were no immediate complications.  There was no blood loss.    Next, A rectal exam was performed which was normal. An Olympus colonoscope was placed into the rectum and proceeded under direct visualization through the colon until the cecum and appendiceal orifice were identified. Careful visualization occurred upon slow withdraw of the scope. The scope was then retroflexed and the distal rectum was visualized. The quality of the prep was good. The procedure was not difficult and there were no immediate complications.  There was no blood loss.    EGD findings:  1.  [place] grade B esophagitis at the GE junction secondary to acid reflux  2.  Diffuse erosions and erythema within  abrupt cut off in the mid gastric body transitioning to normal gastric mucosa in the proximal body fundus and cardia, comforts of biopsies antrum body were taken to rule out H. Pylori  3.  Normal duodenal mucosa visualized to D2    Colonoscopy findings:  1.  Grade 3 large internal and external hemorrhoids are present.  2.  A large 2 cm polyp in the rectum was visualized that shows some signs of possible underlying malignancy.  Ascendo injection was performed creating a saline-based pillow submucosally raising the polyp and allowing for demarcation of the polyp edges followed by hot snare polypectomy and a single piece using endoscopic mucosal resection technique with successful complete removal of polyp.  3.  1 polyp in the cecum that was 7 mm and sessile removed via cold snare  4.  1 polyp in the ascending colon was 2 mm and sessile removed via cold forcep biopsies  5.  1 polyp in the transverse at 7 mm and flat removed via cold snare  6.  1 polyp in the descending that 5 mm and flat removed via cold snare  7.  Diverticulosis that was moderate to severe in severity in the sigmoid colon with 1 area of some underlying granularity and inflammation suggestive of prior area of diverticulitis      Recommendations:  Internal hemorrhoid banding to be done in the office  Follow biopsy results  Avoid NSAIDs and blood thinners for 5 days due to endoscopic mucosal resection  Treat H. pylori if positive  No recall on colonoscopy or EGD      Joon Martin MD     Date: 5/29/2024    Time: 11:00 EDT

## 2024-05-29 NOTE — SIGNIFICANT NOTE
Problem: At Risk for Falls  Goal: # Patient does not fall  Outcome: Outcome Met, Continue evaluating goal progress toward completion     Problem: Pain  Goal: #Acceptable pain level achieved/maintained at rest using NRS/Faces  Description: This goal is used for patients who can self-report.  Acceptable means the level is at or below the identified comfort/function goal.  Outcome: Outcome Met, Continue evaluating goal progress toward completion      Dr Luo here to see pt, orders recieved

## 2024-05-29 NOTE — DISCHARGE INSTRUCTIONS
A responsible adult should stay with you and you should rest quietly for the rest of the day.    Do not drink alcohol, drive, operate any heavy machinery or power tools or make any legal/important decisions for the next 24 hours.     Progress your diet as tolerated.  If you begin to experience severe pain, increased shortness of breath, racing heartbeat or a fever above 101 F, seek immediate medical attention.     Follow up with MD as instructed. Call office for results in 3 to 5 days if needed.  Dr Martin [phone]    EGD findings:  1.  Jackson grade B esophagitis at the GE junction secondary to acid reflux  2.  Diffuse erosions and erythema within abrupt cut off in the mid gastric body transitioning to normal gastric mucosa in the proximal body fundus and cardia, comforts of biopsies antrum body were taken to rule out H. Pylori  3.  Normal duodenal mucosa visualized to D2     Colonoscopy findings:  1.  Grade 3 large internal and external hemorrhoids are present.  2.  A large 2 cm polyp in the rectum was visualized that shows some signs of possible underlying malignancy.  Ascendo injection was performed creating a saline-based pillow submucosally raising the polyp and allowing for demarcation of the polyp edges followed by hot snare polypectomy and a single piece using endoscopic mucosal resection technique with successful complete removal of polyp.  3.  1 polyp in the cecum that was 7 mm and sessile removed via cold snare  4.  1 polyp in the ascending colon was 2 mm and sessile removed via cold forcep biopsies  5.  1 polyp in the transverse at 7 mm and flat removed via cold snare  6.  1 polyp in the descending that 5 mm and flat removed via cold snare  7.  Diverticulosis that was moderate to severe in severity in the sigmoid colon with 1 area of some underlying granularity and inflammation suggestive of prior area of diverticulitis        Recommendations:  Internal hemorrhoid banding to be done in the  office  Follow biopsy results  Avoid NSAIDs and blood thinners for 5 days due to endoscopic mucosal resection  Treat H. pylori if positive  No recall on colonoscopy or EGD        Joon Martin MD      Date: 5/29/2024    Time: 11:00 EDT

## 2024-05-30 LAB
LAB AP CASE REPORT: NORMAL
PATH REPORT.FINAL DX SPEC: NORMAL
PATH REPORT.GROSS SPEC: NORMAL

## 2024-06-02 DIAGNOSIS — G62.9 NEUROPATHY: ICD-10-CM

## 2024-06-03 RX ORDER — GABAPENTIN 100 MG/1
100 CAPSULE ORAL NIGHTLY
Qty: 30 CAPSULE | Refills: 0 | Status: SHIPPED | OUTPATIENT
Start: 2024-06-03

## 2024-06-12 ENCOUNTER — PATIENT MESSAGE (OUTPATIENT)
Dept: FAMILY MEDICINE CLINIC | Facility: CLINIC | Age: 89
End: 2024-06-12
Payer: MEDICARE

## 2024-06-12 DIAGNOSIS — F41.1 GENERALIZED ANXIETY DISORDER: Primary | ICD-10-CM

## 2024-06-12 DIAGNOSIS — F03.93 DEMENTIA WITH MOOD DISTURBANCE, UNSPECIFIED DEMENTIA SEVERITY, UNSPECIFIED DEMENTIA TYPE: ICD-10-CM

## 2024-06-12 DIAGNOSIS — G89.29 CHRONIC LEFT SHOULDER PAIN: ICD-10-CM

## 2024-06-12 DIAGNOSIS — M25.512 CHRONIC LEFT SHOULDER PAIN: ICD-10-CM

## 2024-06-12 RX ORDER — HYDROCODONE BITARTRATE AND ACETAMINOPHEN 7.5; 325 MG/1; MG/1
1 TABLET ORAL EVERY 6 HOURS PRN
Qty: 30 TABLET | Refills: 0 | Status: SHIPPED | OUTPATIENT
Start: 2024-06-12

## 2024-06-13 RX ORDER — ALPRAZOLAM 0.5 MG/1
0.5 TABLET ORAL 2 TIMES DAILY PRN
Qty: 30 TABLET | Refills: 0 | Status: SHIPPED | OUTPATIENT
Start: 2024-06-13

## 2024-06-13 RX ORDER — QUETIAPINE FUMARATE 25 MG/1
25 TABLET, FILM COATED ORAL NIGHTLY
Qty: 90 TABLET | Refills: 1 | Status: SHIPPED | OUTPATIENT
Start: 2024-06-13

## 2024-06-20 ENCOUNTER — APPOINTMENT (OUTPATIENT)
Dept: CT IMAGING | Facility: HOSPITAL | Age: 89
DRG: 948 | End: 2024-06-20
Payer: MEDICARE

## 2024-06-20 ENCOUNTER — HOSPITAL ENCOUNTER (INPATIENT)
Facility: HOSPITAL | Age: 89
LOS: 3 days | Discharge: HOME HEALTH | DRG: 948 | End: 2024-06-23
Attending: EMERGENCY MEDICINE | Admitting: INTERNAL MEDICINE
Payer: COMMERCIAL

## 2024-06-20 DIAGNOSIS — R29.6 RECURRENT FALLS: ICD-10-CM

## 2024-06-20 DIAGNOSIS — S29.9XXA INJURY OF CHEST WALL, INITIAL ENCOUNTER: ICD-10-CM

## 2024-06-20 DIAGNOSIS — R42 DIZZINESS: Primary | ICD-10-CM

## 2024-06-20 DIAGNOSIS — I10 HYPERTENSION, UNSPECIFIED TYPE: ICD-10-CM

## 2024-06-20 DIAGNOSIS — F03.90 DEMENTIA, UNSPECIFIED DEMENTIA SEVERITY, UNSPECIFIED DEMENTIA TYPE, UNSPECIFIED WHETHER BEHAVIORAL, PSYCHOTIC, OR MOOD DISTURBANCE OR ANXIETY: ICD-10-CM

## 2024-06-20 LAB
ALBUMIN SERPL-MCNC: 4.3 G/DL (ref 3.5–5.2)
ALBUMIN/GLOB SERPL: 1.4 G/DL
ALP SERPL-CCNC: 124 U/L (ref 39–117)
ALT SERPL W P-5'-P-CCNC: 35 U/L (ref 1–33)
ANION GAP SERPL CALCULATED.3IONS-SCNC: 13.5 MMOL/L (ref 5–15)
AST SERPL-CCNC: 64 U/L (ref 1–32)
BASOPHILS # BLD AUTO: 0.08 10*3/MM3 (ref 0–0.2)
BASOPHILS NFR BLD AUTO: 0.7 % (ref 0–1.5)
BILIRUB SERPL-MCNC: 0.3 MG/DL (ref 0–1.2)
BILIRUB UR QL STRIP: NEGATIVE
BUN SERPL-MCNC: 20 MG/DL (ref 8–23)
BUN/CREAT SERPL: 22.5 (ref 7–25)
CALCIUM SPEC-SCNC: 10.2 MG/DL (ref 8.6–10.5)
CHLORIDE SERPL-SCNC: 103 MMOL/L (ref 98–107)
CK SERPL-CCNC: 106 U/L (ref 20–180)
CLARITY UR: CLEAR
CO2 SERPL-SCNC: 24.5 MMOL/L (ref 22–29)
COLOR UR: YELLOW
CREAT SERPL-MCNC: 0.89 MG/DL (ref 0.57–1)
DEPRECATED RDW RBC AUTO: 52.4 FL (ref 37–54)
EGFRCR SERPLBLD CKD-EPI 2021: 62.8 ML/MIN/1.73
EOSINOPHIL # BLD AUTO: 0.06 10*3/MM3 (ref 0–0.4)
EOSINOPHIL NFR BLD AUTO: 0.5 % (ref 0.3–6.2)
ERYTHROCYTE [DISTWIDTH] IN BLOOD BY AUTOMATED COUNT: 15.2 % (ref 12.3–15.4)
GLOBULIN UR ELPH-MCNC: 3 GM/DL
GLUCOSE SERPL-MCNC: 133 MG/DL (ref 65–99)
GLUCOSE UR STRIP-MCNC: NEGATIVE MG/DL
HCT VFR BLD AUTO: 42.9 % (ref 34–46.6)
HGB BLD-MCNC: 13.6 G/DL (ref 12–15.9)
HGB UR QL STRIP.AUTO: NEGATIVE
HOLD SPECIMEN: NORMAL
HOLD SPECIMEN: NORMAL
IMM GRANULOCYTES # BLD AUTO: 0.05 10*3/MM3 (ref 0–0.05)
IMM GRANULOCYTES NFR BLD AUTO: 0.4 % (ref 0–0.5)
KETONES UR QL STRIP: NEGATIVE
LEUKOCYTE ESTERASE UR QL STRIP.AUTO: NEGATIVE
LYMPHOCYTES # BLD AUTO: 1.95 10*3/MM3 (ref 0.7–3.1)
LYMPHOCYTES NFR BLD AUTO: 15.9 % (ref 19.6–45.3)
MAGNESIUM SERPL-MCNC: 2.1 MG/DL (ref 1.6–2.4)
MCH RBC QN AUTO: 29.6 PG (ref 26.6–33)
MCHC RBC AUTO-ENTMCNC: 31.7 G/DL (ref 31.5–35.7)
MCV RBC AUTO: 93.3 FL (ref 79–97)
MONOCYTES # BLD AUTO: 0.61 10*3/MM3 (ref 0.1–0.9)
MONOCYTES NFR BLD AUTO: 5 % (ref 5–12)
NEUTROPHILS NFR BLD AUTO: 77.5 % (ref 42.7–76)
NEUTROPHILS NFR BLD AUTO: 9.53 10*3/MM3 (ref 1.7–7)
NITRITE UR QL STRIP: NEGATIVE
NRBC BLD AUTO-RTO: 0 /100 WBC (ref 0–0.2)
PH UR STRIP.AUTO: 6.5 [PH] (ref 5–8)
PLATELET # BLD AUTO: 308 10*3/MM3 (ref 140–450)
PMV BLD AUTO: 10.6 FL (ref 6–12)
POTASSIUM SERPL-SCNC: 3.7 MMOL/L (ref 3.5–5.2)
PROT SERPL-MCNC: 7.3 G/DL (ref 6–8.5)
PROT UR QL STRIP: NEGATIVE
RBC # BLD AUTO: 4.6 10*6/MM3 (ref 3.77–5.28)
SODIUM SERPL-SCNC: 141 MMOL/L (ref 136–145)
SP GR UR STRIP: 1.01 (ref 1–1.03)
TROPONIN T SERPL HS-MCNC: 25 NG/L
UROBILINOGEN UR QL STRIP: NORMAL
WBC NRBC COR # BLD AUTO: 12.28 10*3/MM3 (ref 3.4–10.8)

## 2024-06-20 PROCEDURE — 25010000002 HYDRALAZINE PER 20 MG: Performed by: EMERGENCY MEDICINE

## 2024-06-20 PROCEDURE — 70486 CT MAXILLOFACIAL W/O DYE: CPT

## 2024-06-20 PROCEDURE — 80053 COMPREHEN METABOLIC PANEL: CPT | Performed by: EMERGENCY MEDICINE

## 2024-06-20 PROCEDURE — 82550 ASSAY OF CK (CPK): CPT | Performed by: EMERGENCY MEDICINE

## 2024-06-20 PROCEDURE — 99285 EMERGENCY DEPT VISIT HI MDM: CPT

## 2024-06-20 PROCEDURE — 97162 PT EVAL MOD COMPLEX 30 MIN: CPT

## 2024-06-20 PROCEDURE — 71250 CT THORAX DX C-: CPT

## 2024-06-20 PROCEDURE — 83735 ASSAY OF MAGNESIUM: CPT | Performed by: EMERGENCY MEDICINE

## 2024-06-20 PROCEDURE — 84484 ASSAY OF TROPONIN QUANT: CPT | Performed by: EMERGENCY MEDICINE

## 2024-06-20 PROCEDURE — 93005 ELECTROCARDIOGRAM TRACING: CPT | Performed by: EMERGENCY MEDICINE

## 2024-06-20 PROCEDURE — 25010000002 HYDRALAZINE PER 20 MG: Performed by: STUDENT IN AN ORGANIZED HEALTH CARE EDUCATION/TRAINING PROGRAM

## 2024-06-20 PROCEDURE — 36415 COLL VENOUS BLD VENIPUNCTURE: CPT

## 2024-06-20 PROCEDURE — 25810000003 LACTATED RINGERS PER 1000 ML: Performed by: STUDENT IN AN ORGANIZED HEALTH CARE EDUCATION/TRAINING PROGRAM

## 2024-06-20 PROCEDURE — 85025 COMPLETE CBC W/AUTO DIFF WBC: CPT | Performed by: EMERGENCY MEDICINE

## 2024-06-20 PROCEDURE — 81003 URINALYSIS AUTO W/O SCOPE: CPT | Performed by: EMERGENCY MEDICINE

## 2024-06-20 PROCEDURE — 93005 ELECTROCARDIOGRAM TRACING: CPT | Performed by: INTERNAL MEDICINE

## 2024-06-20 PROCEDURE — 93010 ELECTROCARDIOGRAM REPORT: CPT | Performed by: INTERNAL MEDICINE

## 2024-06-20 PROCEDURE — 70450 CT HEAD/BRAIN W/O DYE: CPT

## 2024-06-20 RX ORDER — FUROSEMIDE 20 MG/1
20 TABLET ORAL DAILY PRN
COMMUNITY
End: 2024-06-23 | Stop reason: HOSPADM

## 2024-06-20 RX ORDER — POTASSIUM CHLORIDE 750 MG/1
10 TABLET, FILM COATED, EXTENDED RELEASE ORAL DAILY PRN
Status: DISCONTINUED | OUTPATIENT
Start: 2024-06-20 | End: 2024-06-23 | Stop reason: HOSPADM

## 2024-06-20 RX ORDER — HYDRALAZINE HYDROCHLORIDE 25 MG/1
100 TABLET, FILM COATED ORAL 2 TIMES DAILY
Status: DISCONTINUED | OUTPATIENT
Start: 2024-06-20 | End: 2024-06-23 | Stop reason: HOSPADM

## 2024-06-20 RX ORDER — SODIUM CHLORIDE 9 MG/ML
40 INJECTION, SOLUTION INTRAVENOUS AS NEEDED
Status: DISCONTINUED | OUTPATIENT
Start: 2024-06-20 | End: 2024-06-23 | Stop reason: HOSPADM

## 2024-06-20 RX ORDER — BISACODYL 10 MG
10 SUPPOSITORY, RECTAL RECTAL DAILY PRN
Status: DISCONTINUED | OUTPATIENT
Start: 2024-06-20 | End: 2024-06-23 | Stop reason: HOSPADM

## 2024-06-20 RX ORDER — PANTOPRAZOLE SODIUM 40 MG/1
40 TABLET, DELAYED RELEASE ORAL
Status: DISCONTINUED | OUTPATIENT
Start: 2024-06-20 | End: 2024-06-23 | Stop reason: HOSPADM

## 2024-06-20 RX ORDER — ALLOPURINOL 100 MG/1
100 TABLET ORAL DAILY
Status: DISCONTINUED | OUTPATIENT
Start: 2024-06-20 | End: 2024-06-23 | Stop reason: HOSPADM

## 2024-06-20 RX ORDER — ACETAMINOPHEN 325 MG/1
650 TABLET ORAL EVERY 6 HOURS PRN
Status: DISCONTINUED | OUTPATIENT
Start: 2024-06-20 | End: 2024-06-23 | Stop reason: HOSPADM

## 2024-06-20 RX ORDER — AMOXICILLIN 250 MG
2 CAPSULE ORAL 2 TIMES DAILY PRN
Status: DISCONTINUED | OUTPATIENT
Start: 2024-06-20 | End: 2024-06-23 | Stop reason: HOSPADM

## 2024-06-20 RX ORDER — POLYETHYLENE GLYCOL 3350 17 G/17G
17 POWDER, FOR SOLUTION ORAL DAILY PRN
Status: DISCONTINUED | OUTPATIENT
Start: 2024-06-20 | End: 2024-06-20 | Stop reason: SDUPTHER

## 2024-06-20 RX ORDER — HYDRALAZINE HYDROCHLORIDE 20 MG/ML
5 INJECTION INTRAMUSCULAR; INTRAVENOUS EVERY 4 HOURS PRN
Status: DISCONTINUED | OUTPATIENT
Start: 2024-06-20 | End: 2024-06-23 | Stop reason: HOSPADM

## 2024-06-20 RX ORDER — ONDANSETRON 4 MG/1
4 TABLET, ORALLY DISINTEGRATING ORAL EVERY 6 HOURS PRN
Status: DISCONTINUED | OUTPATIENT
Start: 2024-06-20 | End: 2024-06-23 | Stop reason: HOSPADM

## 2024-06-20 RX ORDER — TRAZODONE HYDROCHLORIDE 50 MG/1
1 TABLET ORAL NIGHTLY
COMMUNITY
Start: 2022-01-01

## 2024-06-20 RX ORDER — SODIUM CHLORIDE 0.9 % (FLUSH) 0.9 %
10 SYRINGE (ML) INJECTION AS NEEDED
Status: DISCONTINUED | OUTPATIENT
Start: 2024-06-20 | End: 2024-06-23 | Stop reason: HOSPADM

## 2024-06-20 RX ORDER — DONEPEZIL HYDROCHLORIDE 5 MG/1
10 TABLET, FILM COATED ORAL NIGHTLY
Status: DISCONTINUED | OUTPATIENT
Start: 2024-06-20 | End: 2024-06-23 | Stop reason: HOSPADM

## 2024-06-20 RX ORDER — ONDANSETRON 4 MG/1
4 TABLET, ORALLY DISINTEGRATING ORAL EVERY 8 HOURS PRN
Status: DISCONTINUED | OUTPATIENT
Start: 2024-06-20 | End: 2024-06-23 | Stop reason: HOSPADM

## 2024-06-20 RX ORDER — HYDRALAZINE HYDROCHLORIDE 20 MG/ML
10 INJECTION INTRAMUSCULAR; INTRAVENOUS ONCE
Status: COMPLETED | OUTPATIENT
Start: 2024-06-20 | End: 2024-06-20

## 2024-06-20 RX ORDER — NITROGLYCERIN 0.4 MG/1
0.4 TABLET SUBLINGUAL
Status: DISCONTINUED | OUTPATIENT
Start: 2024-06-20 | End: 2024-06-23 | Stop reason: HOSPADM

## 2024-06-20 RX ORDER — BISACODYL 5 MG/1
5 TABLET, DELAYED RELEASE ORAL DAILY PRN
Status: DISCONTINUED | OUTPATIENT
Start: 2024-06-20 | End: 2024-06-23 | Stop reason: HOSPADM

## 2024-06-20 RX ORDER — SODIUM CHLORIDE, SODIUM LACTATE, POTASSIUM CHLORIDE, CALCIUM CHLORIDE 600; 310; 30; 20 MG/100ML; MG/100ML; MG/100ML; MG/100ML
75 INJECTION, SOLUTION INTRAVENOUS CONTINUOUS
Status: DISCONTINUED | OUTPATIENT
Start: 2024-06-20 | End: 2024-06-23

## 2024-06-20 RX ORDER — FUROSEMIDE 20 MG/1
20 TABLET ORAL DAILY PRN
Status: DISCONTINUED | OUTPATIENT
Start: 2024-06-20 | End: 2024-06-23 | Stop reason: HOSPADM

## 2024-06-20 RX ORDER — POTASSIUM CHLORIDE 750 MG/1
1 TABLET, EXTENDED RELEASE ORAL DAILY PRN
COMMUNITY
Start: 2022-01-01

## 2024-06-20 RX ORDER — SODIUM CHLORIDE 0.9 % (FLUSH) 0.9 %
10 SYRINGE (ML) INJECTION EVERY 12 HOURS SCHEDULED
Status: DISCONTINUED | OUTPATIENT
Start: 2024-06-20 | End: 2024-06-23 | Stop reason: HOSPADM

## 2024-06-20 RX ORDER — HYDRALAZINE HYDROCHLORIDE 100 MG/1
1 TABLET, FILM COATED ORAL 2 TIMES DAILY
COMMUNITY
Start: 2022-01-01

## 2024-06-20 RX ORDER — GABAPENTIN 100 MG/1
100 CAPSULE ORAL NIGHTLY
Status: DISCONTINUED | OUTPATIENT
Start: 2024-06-20 | End: 2024-06-23 | Stop reason: HOSPADM

## 2024-06-20 RX ORDER — ALPRAZOLAM 0.5 MG/1
0.5 TABLET ORAL 2 TIMES DAILY PRN
Status: DISCONTINUED | OUTPATIENT
Start: 2024-06-20 | End: 2024-06-23 | Stop reason: HOSPADM

## 2024-06-20 RX ORDER — OMEPRAZOLE 40 MG/1
1 CAPSULE, DELAYED RELEASE ORAL DAILY
COMMUNITY
Start: 2022-01-01

## 2024-06-20 RX ORDER — CLARITHROMYCIN 500 MG/1
500 TABLET, COATED ORAL 2 TIMES DAILY
COMMUNITY
End: 2024-06-24

## 2024-06-20 RX ORDER — TRAZODONE HYDROCHLORIDE 50 MG/1
50 TABLET ORAL NIGHTLY
Status: DISCONTINUED | OUTPATIENT
Start: 2024-06-20 | End: 2024-06-23 | Stop reason: HOSPADM

## 2024-06-20 RX ORDER — ONDANSETRON 2 MG/ML
4 INJECTION INTRAMUSCULAR; INTRAVENOUS EVERY 6 HOURS PRN
Status: DISCONTINUED | OUTPATIENT
Start: 2024-06-20 | End: 2024-06-23 | Stop reason: HOSPADM

## 2024-06-20 RX ORDER — HYDROCODONE BITARTRATE AND ACETAMINOPHEN 7.5; 325 MG/1; MG/1
1 TABLET ORAL EVERY 6 HOURS PRN
Status: DISCONTINUED | OUTPATIENT
Start: 2024-06-20 | End: 2024-06-23 | Stop reason: HOSPADM

## 2024-06-20 RX ORDER — UREA 10 %
5 LOTION (ML) TOPICAL NIGHTLY
Status: DISCONTINUED | OUTPATIENT
Start: 2024-06-20 | End: 2024-06-23 | Stop reason: HOSPADM

## 2024-06-20 RX ORDER — POLYETHYLENE GLYCOL 3350 17 G/17G
17 POWDER, FOR SOLUTION ORAL DAILY PRN
Status: DISCONTINUED | OUTPATIENT
Start: 2024-06-20 | End: 2024-06-23 | Stop reason: HOSPADM

## 2024-06-20 RX ORDER — GABAPENTIN 100 MG/1
1 CAPSULE ORAL NIGHTLY
COMMUNITY
Start: 2022-01-01 | End: 2024-07-01

## 2024-06-20 RX ORDER — BISACODYL 10 MG
10 SUPPOSITORY, RECTAL RECTAL DAILY PRN
Status: DISCONTINUED | OUTPATIENT
Start: 2024-06-20 | End: 2024-06-20

## 2024-06-20 RX ADMIN — ALPRAZOLAM 0.5 MG: 0.5 TABLET ORAL at 22:04

## 2024-06-20 RX ADMIN — HYDRALAZINE HYDROCHLORIDE 100 MG: 25 TABLET ORAL at 22:04

## 2024-06-20 RX ADMIN — ALLOPURINOL 100 MG: 100 TABLET ORAL at 10:20

## 2024-06-20 RX ADMIN — PANTOPRAZOLE SODIUM 40 MG: 40 TABLET, DELAYED RELEASE ORAL at 10:20

## 2024-06-20 RX ADMIN — SENNOSIDES AND DOCUSATE SODIUM 2 TABLET: 50; 8.6 TABLET ORAL at 22:04

## 2024-06-20 RX ADMIN — TRAZODONE HYDROCHLORIDE 50 MG: 50 TABLET ORAL at 22:04

## 2024-06-20 RX ADMIN — Medication 5 MG: at 22:04

## 2024-06-20 RX ADMIN — SODIUM CHLORIDE, POTASSIUM CHLORIDE, SODIUM LACTATE AND CALCIUM CHLORIDE 75 ML/HR: 600; 310; 30; 20 INJECTION, SOLUTION INTRAVENOUS at 08:42

## 2024-06-20 RX ADMIN — HYDRALAZINE HYDROCHLORIDE 10 MG: 20 INJECTION INTRAMUSCULAR; INTRAVENOUS at 05:18

## 2024-06-20 RX ADMIN — GABAPENTIN 100 MG: 100 CAPSULE ORAL at 22:04

## 2024-06-20 RX ADMIN — SODIUM CHLORIDE, POTASSIUM CHLORIDE, SODIUM LACTATE AND CALCIUM CHLORIDE 75 ML/HR: 600; 310; 30; 20 INJECTION, SOLUTION INTRAVENOUS at 22:00

## 2024-06-20 RX ADMIN — DONEPEZIL HYDROCHLORIDE 10 MG: 5 TABLET, FILM COATED ORAL at 22:04

## 2024-06-20 RX ADMIN — Medication 10 ML: at 22:05

## 2024-06-20 RX ADMIN — Medication 10 ML: at 08:42

## 2024-06-20 RX ADMIN — HYDRALAZINE HYDROCHLORIDE 5 MG: 20 INJECTION, SOLUTION INTRAMUSCULAR; INTRAVENOUS at 16:53

## 2024-06-20 RX ADMIN — ACETAMINOPHEN 650 MG: 325 TABLET, FILM COATED ORAL at 22:04

## 2024-06-20 NOTE — Clinical Note
Level of Care: Med/Surg [1]   Diagnosis: Fall [319184]   Admitting Physician: FABRIZIO LEVY [[id number]]   Attending Physician: FABRIZIO LEVY [[id number]]   Certification: I Certify That Inpatient Hospital Services Are Medically Necessary For Greater Than 2 Midnights

## 2024-06-20 NOTE — PLAN OF CARE
Problem: Adult Inpatient Plan of Care  Goal: Plan of Care Review  Outcome: Ongoing, Progressing  Flowsheets (Taken 6/20/2024 1752)  Progress: no change  Plan of Care Reviewed With: patient  Outcome Evaluation: Pt remains slightly confused. Plan on discharge to care [facility] once medically ready. No falls this shift. Mild hypertension, PRN medication administered with improvement.  Goal: Patient-Specific Goal (Individualized)  Outcome: Ongoing, Progressing  Goal: Absence of Hospital-Acquired Illness or Injury  Outcome: Ongoing, Progressing  Intervention: Identify and Manage Fall Risk  Recent Flowsheet Documentation  Taken 6/20/2024 1610 by Sissy Coelho RN  Safety Promotion/Fall Prevention:   safety round/check completed   room organization consistent   nonskid shoes/slippers when out of bed   lighting adjusted   fall prevention program maintained   clutter free environment maintained   assistive device/personal items within reach  Intervention: Prevent Skin Injury  Recent Flowsheet Documentation  Taken 6/20/2024 1610 by Sissy Coelho RN  Body Position:   turned   right  Intervention: Prevent and Manage VTE (Venous Thromboembolism) Risk  Recent Flowsheet Documentation  Taken 6/20/2024 1610 by Sissy Coelho RN  Activity Management: bedrest  VTE Prevention/Management:   bilateral   sequential compression devices off  Goal: Optimal Comfort and Wellbeing  Outcome: Ongoing, Progressing  Intervention: Provide Person-Centered Care  Recent Flowsheet Documentation  Taken 6/20/2024 1610 by Sissy Coelho RN  Trust Relationship/Rapport:   care explained   questions answered  Goal: Readiness for Transition of Care  Outcome: Ongoing, Progressing     Problem: Fall Injury Risk  Goal: Absence of Fall and Fall-Related Injury  Outcome: Ongoing, Progressing  Intervention: Promote Injury-Free Environment  Recent Flowsheet Documentation  Taken 6/20/2024 1610 by Sissy Coelho RN  Safety Promotion/Fall  Prevention:   safety round/check completed   room organization consistent   nonskid shoes/slippers when out of bed   lighting adjusted   fall prevention program maintained   clutter free environment maintained   assistive device/personal items within reach   Goal Outcome Evaluation:  Plan of Care Reviewed With: patient        Progress: no change  Outcome Evaluation: Pt remains slightly confused. Plan on discharge to care [facility] once medically ready. No falls this shift. Mild hypertension, PRN medication administered with improvement.

## 2024-06-20 NOTE — CASE MANAGEMENT/SOCIAL WORK
Discharge Planning Assessment   [place]     Patient Name: Leandra Nuñez  MRN: [id number]  Today's Date: 6/20/2024    Admit Date: 6/20/2024    Plan: Home pending PT/OT recommendations. PASRR approved if needed. Will need precert if SNF needed   Discharge Needs Assessment       Row Name 06/20/24 1025       Living Environment    People in Home other (see comments)    Unique Family Situation Pt reports she lives alone,but granddaughter Daisy can stay with her when needed    Current Living Arrangements home    Potentially Unsafe Housing Conditions none    In the past 12 months has the electric, gas, oil, or water company threatened to shut off services in your home? No    Primary Care Provided by self    Provides Primary Care For no one    Family Caregiver if Needed child(regis), adult;grandchild(regis), adult    Family Caregiver Names Daughter Dulce and granddaughter Daisy    Quality of Family Relationships supportive;involved;helpful    Able to Return to Prior Arrangements yes       Resource/Environmental Concerns    Resource/Environmental Concerns none    Transportation Concerns none       Transportation Needs    In the past 12 months, has lack of transportation kept you from medical appointments or from getting medications? no    In the past 12 months, has lack of transportation kept you from meetings, work, or from getting things needed for daily living? No       Food Insecurity    Within the past 12 months, you worried that your food would run out before you got the money to buy more. Never true    Within the past 12 months, the food you bought just didn't last and you didn't have money to get more. Never true       Transition Planning    Patient/Family Anticipates Transition to home    Patient/Family Anticipated Services at Transition none    Transportation Anticipated family or friend will provide       Discharge Needs Assessment    Equipment Currently Used at Home walker, rolling    Concerns to be Addressed  "denies needs/concerns at this time    Anticipated Changes Related to Illness none    Provided Post Acute Provider List? Yes    Post Acute Provider List Home Health;Nursing Home;Outpatient Therapy    Provided Post Acute Provider Quality & Resource List? Yes    Post Acute Provider Quality and Resource List Home Health;Nursing Home    Delivered To Patient    Method of Delivery In person                   Discharge Plan       Row Name 06/20/24 1027       Plan    Plan Home pending PT/OT recommendations. PASRR approved if needed. Will need precert if SNF needed    Patient/Family in Agreement with Plan yes    Plan Comments  spoke with patient. She reports she lives alone,but granddaughter Daisy stays with her when needed. She confirms pcp and wants enrolled in meds to bed.She denies difficulty obtaining food/medication/transportation. She reports daughter Dulce provides transportation and sets up her medications in planner. Pt reports she is independent with personal cares, but family assists with meals. She uses a rolling walker \"sometimes\". She reports she has no stairs in home. She intends to return home at dc and denies dc needs at present. She was agreeable to  contacting Dulce to discuss dc needs as well.  left voicemail for Dulce requesting a return phone call which is currently pending. DC Barriers: IV fluids, prn hydralazine, PT/OT evals                   Demographic Summary       Row Name 06/20/24 1022       General Information    Admission Type inpatient    Arrived From home    Required Notices Provided Important Message from Medicare    Referral Source admission list;hospital clinician/department    Reason for Consult discharge planning    Preferred Language English       Contact Information    Permission Granted to Share Info With ;family/designee;facility                    Functional Status       Row Name 06/20/24 1023       Functional " "Status    Usual Activity Tolerance moderate    Current Activity Tolerance moderate  recent falls       Functional Status, IADL    Medications assistive person    Meal Preparation assistive person    Housekeeping assistive person    Laundry independent    Shopping assistive equipment       Mental Status Summary    Mental Status Comments Alert and oriented to person/place/date/ \"how many quarters in a dollar?\"                       Patient Forms       Row Name 06/20/24 1031       Patient Forms    Important Message from Medicare (IMM) Delivered  IMM & TriIM 6/20/24 per registration                  Mtira Cordova RN, [facility]  Office: [phone]  Fax: [phone]  [email or web address]      I met with patient in room wearing PPE: mask      Maintained distance greater than six feet and spent </=15 minutes in the room    Mitra Cordova RN    "

## 2024-06-20 NOTE — H&P
[facility]  History & Physical    Patient Name: Leandra Nuñez  : 1935  MRN: [id number]  Primary Care Physician:  Anabelle Sellers MD  Date of admission: 2024  Date and Time of Service: 2024 at 0715    Subjective      Chief Complaint: fall, AMS    History of Present Illness: Leandra Nuñez is a 88 y.o. female with a CMH of T2DM, CKD3, HTN, HLD, dementia, anxiety who presented to [facility] on 2024 due to repeated falls. Per granddaughter, patient has fell twice in the past 24 hours. Granddaughter reported that patient fell in the morning and then she found the patient on the floor at about 2 am this morning after an unwitnessed fall. Patient did not have any apparent injuries but was reporting right lateral rib pain. Patient states she recalls falls and denies dizziness or other prodromal symptoms just prior. She believes she lost her balance while using walker. She currently denies pain. Denies hitting head and is not on blood thinners. She also denies weakness, HA, chest pain, abdominal pain, nausea, vomiting, fever and chills. Denies dysuria or other urinary symptoms. Denies misuse of benzodiazepine or narcotics.     On ED evaluation, patient HDS. Labs pertinent for mild leukocytosis of 12.28 but otherwise unremarkable. Troponin was 25. EKG was sinus rhythm with PACs, rate 68, no ischemic changes. UA was clean, no evidence of infection. CTH, CT facial bones and CT chest wo with no acute findings. Hospitalist service to admit for PT evaluation and possible placement.       Review of Systems  Constitutional: No fevers, chills, sweats  Eye: No recent visual problems, eye discharge, eye pain, redness  HEENT: No ear pain, nasal congestion, sore throat, voice changes  Respiratory: No shortness of breath, cough, pain on breathing, sputum production  Cardiovascular: No Chest pain, palpitations, syncope, orthopnea  Gastrointestinal: No nausea,  vomiting, diarrhea, constipation  Genitourinary: No hematuria, dysuria, incontinence  Hema/Lymph: Negative for bruising tendency, swollen lymph glands, nosebleeds  Endocrine: Negative for excessive thirst, excessive hunger, excessive urination  Musculoskeletal: No back pain, neck pain, joint pain, muscle pain, decreased range of motion  Integumentary: No rash, pruritus, abrasions, lesions  Neurologic: No weakness, numbness, frequent headaches, tremors  Psychiatric: No anxiety, depression, mood changes, hallucinations        Personal History     Past Medical History:   Diagnosis Date    Anxiety     Arthritis     Breast nodule 2013    Left breast nodule (fibrocystic disease , no malignancy)     Cancer     Cataract     Chronic kidney disease     HL (hearing loss)     Hyperlipidemia     Hypertension     Obesity     Shortness of breath        Past Surgical History:   Procedure Laterality Date    BREAST BIOPSY Left 05/21/2013    Benign fibrocystic disease ,Abstracted from [facility].    CARDIAC CATHETERIZATION      COLONOSCOPY N/A 5/29/2024    Procedure: COLONOSCOPY WITH POLYPECTOMY X5 AND ENDOSCOPIC MUCOSAL RESECTION OF RECTAL POLYP;  Surgeon: Joon Martin MD;  Location: [facility] ENDOSCOPY;  Service: Gastroenterology;  Laterality: N/A;  POST: DIVERTICULOSIS, POLYPS    D & C AND LAPAROSCOPY      ENDOSCOPY N/A 5/29/2024    Procedure: ESOPHAGOGASTRODUODENOSCOPY WITH BIOPSY X1 AREA;  Surgeon: Joon Martin MD;  Location: [facility] ENDOSCOPY;  Service: Gastroenterology;  Laterality: N/A;  POST: GASTRITIS, ESOPHAGITIS    FULGURATION ENDOMETRIOSIS      surgery    NEPHRECTOMY Right     ORIF HUMERUS FRACTURE Left 3/24/2021    Procedure: HUMERUS PROXIMAL OPEN REDUCTION INTERNAL FIXATION;  Surgeon: Yair Anne MD;  Location: [facility] MAIN OR;  Service: Orthopedics;  Laterality: Left;       Family History: family history is not on file. Otherwise pertinent FHx was reviewed and not pertinent to current issue.    Social History:   reports that she has never smoked. She has never used smokeless tobacco. She reports that she does not drink alcohol and does not use drugs.    Home Medications:  Prior to Admission Medications       Prescriptions Last Dose Informant Patient Reported? Taking?    allopurinol (ZYLOPRIM) 100 MG tablet   Yes Yes    Take 1 tablet by mouth Daily.    ALPRAZolam (XANAX) 0.5 MG tablet   No Yes    Take 1 tablet by mouth 2 (Two) Times a Day As Needed for Anxiety.    bisacodyl (DULCOLAX) 10 MG suppository   No Yes    Insert 1 suppository into the rectum Daily As Needed for Constipation (Use if bisacodyl oral is ineffective).    clarithromycin (BIAXIN) 500 MG tablet   Yes Yes    Take 1 tablet by mouth 2 (Two) Times a Day.    donepezil (Aricept) 10 MG tablet   No Yes    Take 1 tablet by mouth Every Night.    furosemide (LASIX) 20 MG tablet   Yes Yes    Take 1 tablet by mouth Daily As Needed.    gabapentin (NEURONTIN) 100 MG capsule   Yes Yes    Take 1 capsule by mouth Every Night.    hydrALAZINE (APRESOLINE) 100 MG tablet   Yes Yes    Take 1 tablet by mouth 2 (Two) Times a Day.    HYDROcodone-acetaminophen (Norco) 7.5-325 MG per tablet   No Yes    Take 1 tablet by mouth Every 6 (Six) Hours As Needed for Moderate Pain.    melatonin 5 MG tablet tablet   Yes Yes    Take 1 tablet by mouth Every Night.    naloxone (NARCAN) 4 MG/0.1ML nasal spray   No Yes    Call 911. Don't prime. [place] in 1 nostril for overdose. Repeat in 2-3 minutes in other nostril if no or minimal breathing/responsiveness.    omeprazole (priLOSEC) 40 MG capsule   Yes Yes    Take 1 capsule by mouth Daily.    ondansetron ODT (ZOFRAN-ODT) 4 MG disintegrating tablet   No Yes    Place 1 tablet on the tongue Every 8 (Eight) Hours As Needed for Nausea or Vomiting.    polyethylene glycol (MIRALAX) 17 g packet   No Yes    Take 17 g by mouth Daily As Needed (Use if senna-docusate is ineffective).    potassium chloride (KLOR-CON M10) 10 MEQ CR tablet   Yes Yes    Take 1  tablet by mouth Daily As Needed.    traZODone (DESYREL) 50 MG tablet   Yes Yes    Take 1 tablet by mouth Every Night.              Allergies:  Allergies   Allergen Reactions    Statins Myalgia and Other (See Comments)       Objective      Vitals:   Temp:  [97.8 °F (36.6 °C)] 97.8 °F (36.6 °C)  Heart Rate:  [63-78] 72  Resp:  [18] 18  BP: (160-210)/(68-91) 160/91  Body mass index is 35.53 kg/m².      Physical Exam  General: 87 yo WF, Alert and oriented x3, well nourished, no acute distress.  HENT: Normocephalic, normal hearing, moist oral mucosa, no scleral icterus.  Neck: Supple, non-tender, no carotid bruits, no JVD, no LAD.  Lungs: Clear to auscultation, non-labored respiration.  Heart: RRR, no murmur, gallop or edema.  Abdomen: Soft, nontender, non-distended, + bowel sounds.  Musculoskeletal: Normal range of motion and strength, no tenderness or swelling.  Skin: Skin is warm, dry and pink, no rashes or lesions.  Psychiatric: Cooperative, appropriate mood and affect.      Diagnostic Data:  Lab Results (last 24 hours)       Procedure Component Value Units Date/Time    Comprehensive Metabolic Panel [[id number]]  (Abnormal) Collected: 06/20/24 0506    Specimen: Blood Updated: 06/20/24 0541     Glucose 133 mg/dL      BUN 20 mg/dL      Creatinine 0.89 mg/dL      Sodium 141 mmol/L      Potassium 3.7 mmol/L      Chloride 103 mmol/L      CO2 24.5 mmol/L      Calcium 10.2 mg/dL      Total Protein 7.3 g/dL      Albumin 4.3 g/dL      ALT (SGPT) 35 U/L      AST (SGOT) 64 U/L      Alkaline Phosphatase 124 U/L      Total Bilirubin 0.3 mg/dL      Globulin 3.0 gm/dL      A/G Ratio 1.4 g/dL      BUN/Creatinine Ratio 22.5     Anion Gap 13.5 mmol/L      eGFR 62.8 mL/min/1.73     Narrative:      GFR Normal >60  Chronic Kidney Disease <60  Kidney Failure <15    The GFR formula is only valid for adults with stable renal function between ages 18 and 70.    Single High Sensitivity Troponin T [[id number]]  (Abnormal) Collected: 06/20/24  0506    Specimen: Blood Updated: 06/20/24 0541     HS Troponin T 25 ng/L     Narrative:      High Sensitive Troponin T Reference Range:  <14.0 ng/L- Negative Female for AMI  <22.0 ng/L- Negative Male for AMI  >=14 - Abnormal Female indicating possible myocardial injury.  >=22 - Abnormal Male indicating possible myocardial injury.   Clinicians would have to utilize clinical acumen, EKG, Troponin, and serial changes to determine if it is an Acute Myocardial Infarction or myocardial injury due to an underlying chronic condition.         CK [[id number]]  (Normal) Collected: 06/20/24 0506    Specimen: Blood Updated: 06/20/24 0541     Creatine Kinase 106 U/L     Magnesium [[id number]]  (Normal) Collected: 06/20/24 0506    Specimen: Blood Updated: 06/20/24 0541     Magnesium 2.1 mg/dL     Extra Tubes [[id number]] Collected: 06/20/24 0506    Specimen: Blood, Venous Line Updated: 06/20/24 0515    Narrative:      The following orders were created for panel order Extra Tubes.  Procedure                               Abnormality         Status                     ---------                               -----------         ------                     Gold Top - SST[[id number]]                                   Final result                 Please view results for these tests on the individual orders.    Gold Top - SST [[id number]] Collected: 06/20/24 0506    Specimen: Blood Updated: 06/20/24 0515     Extra Tube Hold for add-ons.     Comment: Auto resulted.       Extra Tubes [[id number]] Collected: 06/20/24 0413    Specimen: Blood, Venous Line Updated: 06/20/24 0430    Narrative:      The following orders were created for panel order Extra Tubes.  Procedure                               Abnormality         Status                     ---------                               -----------         ------                     Gold Top - SST[[id number]]                                   Final result                 Please view results for these  tests on the individual orders.    [facility] [[id number]] Collected: 06/20/24 0413    Specimen: Blood Updated: 06/20/24 0430     Extra Tube Hold for add-ons.     Comment: Auto resulted.       Urinalysis With Culture If Indicated - Urine, Clean Catch [[id number]]  (Normal) Collected: 06/20/24 0414    Specimen: Urine, Clean Catch Updated: 06/20/24 0422     Color, UA Yellow     Appearance, UA Clear     pH, UA 6.5     Specific Gravity, UA 1.006     Glucose, UA Negative     Ketones, UA Negative     Bilirubin, UA Negative     Blood, UA Negative     Protein, UA Negative     Leuk Esterase, UA Negative     Nitrite, UA Negative     Urobilinogen, UA 0.2 E.U./dL    Narrative:      In absence of clinical symptoms, the presence of pyuria, bacteria, and/or nitrites on the urinalysis result does not correlate with infection.  Urine microscopic not indicated.    CBC & Differential [[id number]]  (Abnormal) Collected: 06/20/24 0413    Specimen: Blood Updated: 06/20/24 0421    Narrative:      The following orders were created for panel order CBC & Differential.  Procedure                               Abnormality         Status                     ---------                               -----------         ------                     CBC Auto Differential[[id number]]        Abnormal            Final result                 Please view results for these tests on the individual orders.    CBC Auto Differential [[id number]]  (Abnormal) Collected: 06/20/24 0413    Specimen: Blood Updated: 06/20/24 0421     WBC 12.28 10*3/mm3      RBC 4.60 10*6/mm3      Hemoglobin 13.6 g/dL      Hematocrit 42.9 %      MCV 93.3 fL      MCH 29.6 pg      MCHC 31.7 g/dL      RDW 15.2 %      RDW-SD 52.4 fl      MPV 10.6 fL      Platelets 308 10*3/mm3      Neutrophil % 77.5 %      Lymphocyte % 15.9 %      Monocyte % 5.0 %      Eosinophil % 0.5 %      Basophil % 0.7 %      Immature Grans % 0.4 %      Neutrophils, Absolute 9.53 10*3/mm3      Lymphocytes, Absolute  1.95 10*3/mm3      Monocytes, Absolute 0.61 10*3/mm3      Eosinophils, Absolute 0.06 10*3/mm3      Basophils, Absolute 0.08 10*3/mm3      Immature Grans, Absolute 0.05 10*3/mm3      nRBC 0.0 /100 WBC              Imaging Results (Last 24 Hours)       Procedure Component Value Units Date/Time    CT Chest Without Contrast Diagnostic [[id number]] Collected: 06/20/24 0434     Updated: 06/20/24 0438    Narrative:      CT CHEST WO CONTRAST DIAGNOSTIC    Date of Exam: 6/20/2024 3:45 AM EDT    Indication: trauma right side.    Comparison: Chest radiograph 4/5/2024 and chest CT 2/14/2023.    Technique: Axial CT images were obtained of the chest without contrast administration.  Sagittal and coronal reconstructions were performed.  Automated exposure control and iterative reconstruction methods were used.      Findings:  The thyroid is heterogeneous without dominant nodule. The trachea and the esophagus appear within normal limits. There is a small hiatal hernia. The heart is enlarged. There are mild coronary artery calcifications. There are aortic and mitral annular   calcifications. No pericardial effusion or mediastinal lymphadenopathy. There are multiple calcified mediastinal granulomas and there is mild aortic and aortic branch vessel atherosclerosis.    There is no pneumothorax, pleural effusion or focal airspace consolidation. There are multiple calcified granulomas in both lungs. There is dependent bibasilar atelectasis. Airways are patent. No significant pleural disease.    There are no acute findings in the superficial soft tissues. There is a stable low-density left adrenal nodule, likely adenoma. No acute findings in the upper abdomen. There is colonic diverticulosis and colonic fecal retention. No acute osseous   abnormality or destructive bone lesion. There is mild thoracic spondylosis. There is evidence of prior left humerus ORIF.      Impression:      Impression:  1.No acute cardiopulmonary  abnormality.  2.Cardiomegaly and coronary artery disease.  3.Small hiatal hernia.  4.Colonic diverticulosis and colonic fecal retention.        Electronically Signed: Dillon Pena MD    6/20/2024 4:36 AM EDT    Workstation ID: [id number]    CT Facial Bones Without Contrast [[id number]] Collected: 06/20/24 0432     Updated: 06/20/24 0436    Narrative:      CT FACIAL BONES WO CONTRAST    Date of Exam: 6/20/2024 3:45 AM EDT    Indication: trauma.    Comparison: None available.    Technique: Axial CT images were obtained from the inferior aspect of the mandible through the frontal sinuses without contrast administration.  Sagittal and coronal reconstructions were performed.  Automated exposure control and iterative reconstruction   methods were used.      Findings:  The mandible and temporomandibular joints appear intact. Pterygoid plates, sinus walls, bony orbits, nasal bones, anterior nasal spine, bony nasal septum, zygomatic arches and visualized calvarium all appear intact without fracture. There is thinning of   the orbital lenses compatible with prior cataract surgery. The paranasal sinuses and mastoid air cells appear well aerated. Intraorbital structures appear within normal limits. No retrobulbar hematoma. No soft tissue contusion or hematoma. There is mild   left-sided TMJ arthritis. There is mild to moderate cervical spondylosis with ankylosis of the left C2-C3 facet joint.      Impression:      Impression:  1.No acute osseous abnormality.  2.Mild left-sided TMJ arthritis.  3.Mild to moderate cervical spondylosis.        Electronically Signed: Dillon Pena MD    6/20/2024 4:34 AM EDT    Workstation ID: [id number]    CT Head Without Contrast [[id number]] Collected: 06/20/24 0431     Updated: 06/20/24 0434    Narrative:      CT HEAD WO CONTRAST    Date of Exam: 6/20/2024 3:45 AM EDT    Indication: head injury.    Comparison: 3/7/2019 and brain MRI 1/18/2024.    Technique: Axial CT images were obtained of the head  without contrast administration.  Coronal reconstructions were performed.  Automated exposure control and iterative reconstruction methods were used.      Findings:  Superficial soft tissues appear within normal limits. The calvarium is intact.  Paranasal sinuses and mastoid air cells appear well aerated. Stable thinning of the orbital lenses. There is no acute intracranial hemorrhage.  No mass effect or midline   shift.  No abnormal extra-axial collections.  Gray-white differentiation is within normal limits. Stable mild patchy white matter hypoattenuation. There is generalized parenchymal volume loss congruent with age.      Impression:      Impression:  1.No acute intracranial abnormality.  2.Stable mild chronic small vessel ischemic change.  3.Stable generalized parenchymal volume loss congruent with age.        Electronically Signed: Dillon Pena MD    6/20/2024 4:32 AM EDT    Workstation ID: [id number]              Assessment & Plan        This is a 88 y.o. female with:    Active and Resolved Problems  Active Hospital Problems    Diagnosis  POA    **Fall [W19.XXXA]  Yes      Resolved Hospital Problems   No resolved problems to display.       Repetitive falls  CTH, CT chest wo and CT face with no acute findings  EKG: NSR with PACs, rate 68, no ischemic changes  Troponin 25  UA clean   Continue Xanax prn for anxiety  Holding Norco 7.5 mg, will likely need dose decrease  PT/OT to evaluate  Falls risk     Diabetes Mellitus  A1c pending   Low SSI   Not on oral glycemics  Consistent carb diet  Hypoglycemia protocol     Hypertension  BP stable  Continue Hydralazine     Hyperlipidemia  Continue statin     Dementia  Currently oriented x3  Continue Donepezil  High likelihood of hospital induced delirium  SCDS for DVT ppx, falls risk       VTE Prophylaxis:  Mechanical VTE prophylaxis orders are present.        The patient desires to be as follows:    CODE STATUS:         Admission Status:  I believe this patient meets  inpatient status.    Expected Length of Stay: > 24 hours    PDMP and Medication Dispenses via Sidebar reviewed and consistent with patient reported medications.    I discussed the patient's findings and my recommendations with patient and family.      Signature:     This document has been electronically signed by Mishel Manzano PA-C on June 20, 2024 08:18 EDT   [facility] Hospitalist Team

## 2024-06-20 NOTE — PLAN OF CARE
"Goal Outcome Evaluation:  Plan of Care Reviewed With: patient           Outcome Evaluation: Pt is a 89 y/o female with known hx of dementia.  Pt with 2 falls at home with intermittent dizziness and elevated BP.  Pt with R lateral chest injury.  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  CT head: (-) acute.  CT chest: (-) acute.  Pt reports she lives alone in a 1 story home with no steps to enter into home.  Pt \"sometimes\" ambulates with RW.  Pt on room air, telemetry, IV and purewick this date.  Pt required Min/Mod A for bed mobility, Min/Mod A for transfers with use of RW x 2 attempts.  Pt ambulated 10' with RW with Min A.  Pt then incontinent with urine.  Pt sat down at EOB and pt/floor cleaned up.  Pt with intermittent confusion throughout session.  Decreased safety awareness present with transfers and ambulation.  Pt unable to recall urinary incontinence or assistance with pericare and floor cleanup.  Pt is a high fall risk and is unsafe to d/c home alone at this time.  Recommend SNF.      Anticipated Discharge Disposition (PT): skilled nursing facility                        "

## 2024-06-20 NOTE — THERAPY EVALUATION
Patient Name: Leandra Nuñez  : 1935    MRN: [id number]                              Today's Date: 2024       Admit Date: 2024    Visit Dx:     ICD-10-CM ICD-9-CM   1. Dizziness  R42 780.4   2. Injury of chest wall, initial encounter  S29.9XXA 959.11   3. Hypertension, unspecified type  I10 401.9     Patient Active Problem List   Diagnosis    Anxiety disorder, unspecified    Asthma    Stage 3 chronic kidney disease    Hyperlipidemia    Hypertension    Obesity    Renal insufficiency    Type 2 diabetes mellitus without complication    Encounter for annual wellness exam in Medicare patient    Adjustment disorder with anxiety    Immune thrombocytopenia    Obstructive sleep apnea syndrome    Osteoarthritis    Clear cell carcinoma of kidney    Thrombocytopenia    Gastroesophageal reflux disease    White coat syndrome with diagnosis of hypertension    Leg cramps    Closed fracture of surgical neck of humerus    Fall    Absent kidney    Edema    Vitamin deficiency    Acute midline thoracic back pain    Chest pain    Hyperlipidemia due to type 2 diabetes mellitus    Dementia     Past Medical History:   Diagnosis Date    Anxiety     Arthritis     Breast nodule     Left breast nodule (fibrocystic disease , no malignancy)     Cancer     Cataract     Chronic kidney disease     HL (hearing loss)     Hyperlipidemia     Hypertension     Obesity     Shortness of breath      Past Surgical History:   Procedure Laterality Date    BREAST BIOPSY Left 2013    Benign fibrocystic disease ,Abstracted from [facility].    CARDIAC CATHETERIZATION      COLONOSCOPY N/A 2024    Procedure: COLONOSCOPY WITH POLYPECTOMY X5 AND ENDOSCOPIC MUCOSAL RESECTION OF RECTAL POLYP;  Surgeon: Joon Martin MD;  Location: [facility] ENDOSCOPY;  Service: Gastroenterology;  Laterality: N/A;  POST: DIVERTICULOSIS, POLYPS    D & C AND LAPAROSCOPY      ENDOSCOPY N/A 2024    Procedure: ESOPHAGOGASTRODUODENOSCOPY WITH BIOPSY X1  "AREA;  Surgeon: Joon Martin MD;  Location: [facility] ENDOSCOPY;  Service: Gastroenterology;  Laterality: N/A;  POST: GASTRITIS, ESOPHAGITIS    FULGURATION ENDOMETRIOSIS      surgery    NEPHRECTOMY Right     ORIF HUMERUS FRACTURE Left 3/24/2021    Procedure: HUMERUS PROXIMAL OPEN REDUCTION INTERNAL FIXATION;  Surgeon: Yair Anne MD;  Location: [facility] MAIN OR;  Service: Orthopedics;  Laterality: Left;      General Information       Row Name 06/20/24 1330          Physical Therapy Time and Intention    Document Type evaluation  -AM     Mode of Treatment physical therapy  -AM       Row Name 06/20/24 1330          General Information    Patient Profile Reviewed yes  -AM     Prior Level of Function independent:;all household mobility;community mobility;gait;transfer;bed mobility  ambulates \"sometimes with RW\"  -AM     Existing Precautions/Restrictions fall  -AM     Barriers to Rehab medically complex  -AM       Row Name 06/20/24 1330          Living Environment    People in Home alone  -AM       Row Name 06/20/24 1330          Home Main Entrance    Number of Stairs, Main Entrance none  -AM       Row Name 06/20/24 1330          Stairs Within Home, Primary    Number of Stairs, Within Home, Primary none  -AM       Row Name 06/20/24 1330          Cognition    Orientation Status (Cognition) oriented x 3  -AM       Row Name 06/20/24 1330          Safety Issues, Functional Mobility    Safety Issues Affecting Function (Mobility) insight into deficits/self-awareness;problem-solving;safety precaution awareness  -AM     Impairments Affecting Function (Mobility) balance;endurance/activity tolerance;pain;strength  -AM     Comment, Safety Issues/Impairments (Mobility) gait belt utilized  -AM               User Key  (r) = Recorded By, (t) = Taken By, (c) = Cosigned By      Initials Name Provider Type    AM Kanu Hudson, PT Physical Therapist                   Mobility       Row Name 06/20/24 1331          Bed Mobility    Bed " Mobility bed mobility (all) activities  -AM     All Activities, [place] (Bed Mobility) minimum assist (75% patient effort);moderate assist (50% patient effort);1 person assist  -AM     Assistive Device (Bed Mobility) bed rails;draw sheet;head of bed elevated  -AM       Row Name 06/20/24 1331          Sit-Stand Transfer    Sit-Stand [place] (Transfers) minimum assist (75% patient effort);moderate assist (50% patient effort);1 person assist  -AM     Assistive Device (Sit-Stand Transfers) walker, front-wheeled  -AM     Comment, (Sit-Stand Transfer) x 2 attempts  -AM       Row Name 06/20/24 1331          Gait/Stairs (Locomotion)    [place] Level (Gait) minimum assist (75% patient effort)  -AM     Assistive Device (Gait) walker, front-wheeled  -AM     Distance in Feet (Gait) 10  -AM     Deviations/Abnormal Patterns (Gait) base of support, narrow;gait speed decreased  -AM     Bilateral Gait Deviations forward flexed posture  -AM     Comment, (Gait/Stairs) decreased balance, decreased safety awareness  -AM               User Key  (r) = Recorded By, (t) = Taken By, (c) = Cosigned By      Initials Name Provider Type    AM Kanu Hudson, PT Physical Therapist                   Obj/Interventions       Row Name 06/20/24 1333          Range of Motion Comprehensive    General Range of Motion no range of motion deficits identified  -AM       [facility] Name 06/20/24 1333          Strength Comprehensive (MMT)    Comment, General Manual Muscle Testing (MMT) Assessment 4/5 throughout  -AM       Row Name 06/20/24 1333          Motor Skills    Motor Skills functional endurance  -AM     Functional Endurance fair -  -AM       Row Name 06/20/24 1333          Balance    Balance Assessment sitting static balance;sitting dynamic balance;sit to stand dynamic balance;standing static balance;standing dynamic balance  -AM     Static Sitting Balance supervision  -AM     Dynamic Sitting Balance supervision  -AM     Position, Sitting  Balance unsupported;sitting edge of bed  -AM     Sit to Stand Dynamic Balance minimal assist;moderate assist  -AM     Static Standing Balance minimal assist  -AM     Dynamic Standing Balance minimal assist  -AM     Position/Device Used, Standing Balance supported;walker, rolling  -AM       Row Name 06/20/24 1333          Sensory Assessment (Somatosensory)    Sensory Assessment (Somatosensory) sensation intact  -AM               User Key  (r) = Recorded By, (t) = Taken By, (c) = Cosigned By      Initials Name Provider Type    AM Kanu Hudson, PT Physical Therapist                   Goals/Plan       Row Name 06/20/24 1341          Bed Mobility Goal 1 (PT)    Activity/Assistive Device (Bed Mobility Goal 1, PT) bed mobility activities, all  -AM     [place] Level/Cues Needed (Bed Mobility Goal 1, PT) modified independence  -AM     Time Frame (Bed Mobility Goal 1, PT) long term goal (LTG)  -AM       Row Name 06/20/24 1341          Transfer Goal 1 (PT)    Activity/Assistive Device (Transfer Goal 1, PT) transfers, all;walker, rolling  -AM     [place] Level/Cues Needed (Transfer Goal 1, PT) modified independence  -AM     Time Frame (Transfer Goal 1, PT) long term goal (LTG)  -AM       Row Name 06/20/24 1341          Gait Training Goal 1 (PT)    Activity/Assistive Device (Gait Training Goal 1, PT) gait (walking locomotion);walker, rolling  -AM     [place] Level (Gait Training Goal 1, PT) modified independence  -AM     Distance (Gait Training Goal 1, PT) 150  -AM     Time Frame (Gait Training Goal 1, PT) long term goal (LTG)  -AM       Row Name 06/20/24 1341          Therapy Assessment/Plan (PT)    Planned Therapy Interventions (PT) balance training;bed mobility training;gait training;strengthening;patient/family education;transfer training  -AM               User Key  (r) = Recorded By, (t) = Taken By, (c) = Cosigned By      Initials Name Provider Type    AM Kanu Hudson, PT Physical Therapist               "     Clinical Impression       Row Name 06/20/24 1334          Pain    Pretreatment Pain Rating 0/10 - no pain  -AM     Posttreatment Pain Rating 0/10 - no pain  -AM       Row Name 06/20/24 1338          Plan of Care Review    Plan of Care Reviewed With patient  -AM     Outcome Evaluation Pt is a 87 y/o female with known hx of dementia.  Pt with 2 falls at home with intermittent dizziness and elevated BP.  Pt with R lateral chest injury.  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  CT head: (-) acute.  CT chest: (-) acute.  Pt reports she lives alone in a 1 story home with no steps to enter into home.  Pt \"sometimes\" ambulates with RW.  Pt on room air, telemetry, IV and purewick this date.  Pt required Min/Mod A for bed mobility, Min/Mod A for transfers with use of RW x 2 attempts.  Pt ambulated 10' with RW with Min A.  Pt then incontinent with urine.  Pt sat down at EOB and pt/floor cleaned up.  Pt with intermittent confusion throughout session.  Decreased safety awareness present with transfers and ambulation.  Pt unable to recall urinary incontinence or assistance with pericare and floor cleanup.  Pt is a high fall risk and is unsafe to d/c home alone at this time.  Recommend SNF.  -AM       Row Name 06/20/24 9393          Therapy Assessment/Plan (PT)    Patient/Family Therapy Goals Statement (PT) To go home  -AM     Rehab Potential (PT) good, to achieve stated therapy goals  -AM     Criteria for Skilled Interventions Met (PT) yes  -AM     Therapy Frequency (PT) 5 times/wk  -AM     Predicted Duration of Therapy Intervention (PT) until d/c  -AM       Row Name 06/20/24 1331          Vital Signs    O2 Delivery Pre Treatment room air  -AM     O2 Delivery Intra Treatment room air  -AM     O2 Delivery Post Treatment room air  -AM     Pre Patient Position Supine  -AM     Intra Patient Position Standing  -AM     Post Patient Position Supine  -AM       Row Name " 06/20/24 1334          Positioning and Restraints    Pre-Treatment Position in bed  -AM     Post Treatment Position bed  -AM     In Bed notified nsg;supine;call light within reach;encouraged to call for assist;exit alarm on  -AM               User Key  (r) = Recorded By, (t) = Taken By, (c) = Cosigned By      Initials Name Provider Type    Kanu Charles PT Physical Therapist                   Outcome Measures       Row Name 06/20/24 1342 06/20/24 0800       How much help from another person do you currently need...    Turning from your back to your side while in flat bed without using bedrails? 3  -AM 3  -LK    Moving from lying on back to sitting on the side of a flat bed without bedrails? 2  -AM 3  -LK    Moving to and from a bed to a chair (including a wheelchair)? 2  -AM 2  -LK    Standing up from a chair using your arms (e.g., wheelchair, bedside chair)? 2  -AM 2  -LK    Climbing 3-5 steps with a railing? 1  -AM 2  -LK    To walk in hospital room? 3  -AM 3  -LK    AM-PAC 6 Clicks Score (PT) 13  -AM 15  -LK    Highest Level of Mobility Goal 4 --> Transfer to chair/commode  -AM 4 --> Transfer to chair/commode  -LK      Row Name 06/20/24 1342          Functional Assessment    Outcome Measure Options AM-PAC 6 Clicks Basic Mobility (PT)  -AM               User Key  (r) = Recorded By, (t) = Taken By, (c) = Cosigned By      Initials Name Provider Type    Noemi Rodriguez, RN Registered Nurse    Kanu Charles, IAIN Physical Therapist                                 Physical Therapy Education       Title: PT OT SLP Therapies (In Progress)       Topic: Physical Therapy (Done)       Point: Mobility training (Done)       Learning Progress Summary             Patient Acceptance, E,TB, VU,NR by AM at 6/20/2024 1342                         Point: Body mechanics (Done)       Learning Progress Summary             Patient Acceptance, E,TB, VU,NR by AM at 6/20/2024 1342                         Point: Precautions  "(Done)       Learning Progress Summary             Patient Acceptance, E,TB, VU,NR by AM at 6/20/2024 1342                                         User Key       Initials Effective Dates Name Provider Type Discipline    AM 05/10/21 -  Kanu Hudson PT Physical Therapist PT                  PT Recommendation and Plan  Planned Therapy Interventions (PT): balance training, bed mobility training, gait training, strengthening, patient/family education, transfer training  Plan of Care Reviewed With: patient  Outcome Evaluation: Pt is a 87 y/o female with known hx of dementia.  Pt with 2 falls at home with intermittent dizziness and elevated BP.  Pt with R lateral chest injury.  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  CT head: (-) acute.  CT chest: (-) acute.  Pt reports she lives alone in a 1 story home with no steps to enter into home.  Pt \"sometimes\" ambulates with RW.  Pt on room air, telemetry, IV and purewick this date.  Pt required Min/Mod A for bed mobility, Min/Mod A for transfers with use of RW x 2 attempts.  Pt ambulated 10' with RW with Min A.  Pt then incontinent with urine.  Pt sat down at EOB and pt/floor cleaned up.  Pt with intermittent confusion throughout session.  Decreased safety awareness present with transfers and ambulation.  Pt unable to recall urinary incontinence or assistance with pericare and floor cleanup.  Pt is a high fall risk and is unsafe to d/c home alone at this time.  Recommend SNF.     Time Calculation:         PT Charges       Row Name 06/20/24 1343             Time Calculation    Start Time 0947  -AM      Stop Time 1016  -AM      Time Calculation (min) 29 min  -AM      PT Received On 06/20/24  -AM      PT - Next Appointment 06/21/24  -AM      PT Goal Re-Cert Due Date 07/04/24  -AM                User Key  (r) = Recorded By, (t) = Taken By, (c) = Cosigned By      Initials Name Provider Type    AM Kanu Hudson PT " Physical Therapist                  Therapy Charges for Today       Code Description Service Date Service Provider Modifiers Qty    [id number] HC PT EVAL MOD COMPLEXITY 4 6/20/2024 Kanu Hudson, PT GP 1            PT G-Codes  Outcome Measure Options: AM-PAC 6 Clicks Basic Mobility (PT)  AM-PAC 6 Clicks Score (PT): 13  PT Discharge Summary  Anticipated Discharge Disposition (PT): skilled nursing facility    Kanu Hudson, PT  6/20/2024

## 2024-06-20 NOTE — ED PROVIDER NOTES
Subjective   History of Present Illness  88-year-old female with 2 falls in the past 24 hours accompanied by granddaughter.  Patient normally gets around well with a walker, does have dementia.  Granddaughter states patient fell yesterday morning and did not have any apparent injury and did well throughout the day but when she left around 11 and came back around 2 AM her grandmother was in the floor complaining of right lateral rib pain.  Patient's blood pressure has been elevated as well which is unusual.  Patient has complained of some intermittent dizziness.  At bedside, patient complains of only right lateral rib pain.        Review of Systems   Unable to perform ROS: Dementia       Past Medical History:   Diagnosis Date    Anxiety     Arthritis     Breast nodule 2013    Left breast nodule (fibrocystic disease , no malignancy)     Cancer     Cataract     Chronic kidney disease     HL (hearing loss)     Hyperlipidemia     Hypertension     Obesity     Shortness of breath        Allergies   Allergen Reactions    Statins Myalgia and Other (See Comments)       Past Surgical History:   Procedure Laterality Date    BREAST BIOPSY Left 05/21/2013    Benign fibrocystic disease ,[facility]    CARDIAC CATHETERIZATION      COLONOSCOPY N/A 5/29/2024    Procedure: COLONOSCOPY WITH POLYPECTOMY X5 AND ENDOSCOPIC MUCOSAL RESECTION OF RECTAL POLYP;  Surgeon: Joon Martin MD;  Location: [facility] ENDOSCOPY;  Service: Gastroenterology;  Laterality: N/A;  POST: DIVERTICULOSIS, POLYPS    D & C AND LAPAROSCOPY      ENDOSCOPY N/A 5/29/2024    Procedure: ESOPHAGOGASTRODUODENOSCOPY WITH BIOPSY X1 AREA;  Surgeon: Joon Martin MD;  Location: [facility] ENDOSCOPY;  Service: Gastroenterology;  Laterality: N/A;  POST: GASTRITIS, ESOPHAGITIS    FULGURATION ENDOMETRIOSIS      surgery    NEPHRECTOMY Right     ORIF HUMERUS FRACTURE Left 3/24/2021    Procedure: HUMERUS PROXIMAL OPEN REDUCTION INTERNAL FIXATION;  Surgeon: Dayana  Yair GODINEZ MD;  Location: [facility] MAIN OR;  Service: Orthopedics;  Laterality: Left;       History reviewed. No pertinent family history.    Social History     Socioeconomic History    Marital status:    Tobacco Use    Smoking status: Never    Smokeless tobacco: Never   Vaping Use    Vaping status: Never Used   Substance and Sexual Activity    Alcohol use: No    Drug use: No    Sexual activity: Defer     Partners: Male           Objective   Physical Exam  Constitutional:       General: She is not in acute distress.  HENT:      Head: Atraumatic.      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      Conjunctiva/sclera: Conjunctivae normal.      Pupils: Pupils are equal, round, and reactive to light.   Cardiovascular:      Rate and Rhythm: Normal rate and regular rhythm.   Pulmonary:      Effort: Pulmonary effort is normal.      Breath sounds: Normal breath sounds.      Comments: Right lateral chest wall ecchymosis with associated tenderness  Abdominal:      General: Bowel sounds are normal. There is no distension.      Palpations: Abdomen is soft.      Tenderness: There is no abdominal tenderness.   Musculoskeletal:      Cervical back: Normal range of motion and neck supple. No tenderness.      Comments: Full range of motion all 4 extremities without any pain or tenderness, no point tenderness over thoracic or lumbar spine.   Skin:     General: Skin is warm and dry.      Capillary Refill: Capillary refill takes less than 2 seconds.   Neurological:      General: No focal deficit present.      Mental Status: She is alert. Mental status is at baseline.         Procedures           ED Course                                             Medical Decision Making  88-year-old female with dementia with 2 falls today and intermittent dizziness  of uncertain etiology with elevated blood pressure.  Patient normally has good blood pressure control baseline per granddaughter normally does not fall.  Only injury thus far identified is the right lateral chest.  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  Will observe in the hospital, IV hydralazine given with some improvement in blood pressure.  Case discussed with Dr. Murguia with hospital service, agrees with plan.    Problems Addressed:  Dizziness: complicated acute illness or injury  Hypertension, unspecified type: complicated acute illness or injury  Injury of chest wall, initial encounter: complicated acute illness or injury    Amount and/or Complexity of Data Reviewed  Labs: ordered.  Radiology: ordered.  ECG/medicine tests: ordered and independent interpretation performed.     Details: EKG interpretation: Sinus rhythm with PACs, rate 68, occasional PVC, QTc 509    Risk  Prescription drug management.  Decision regarding hospitalization.        Final diagnoses:   Dizziness   Injury of chest wall, initial encounter   Hypertension, unspecified type       ED Disposition  ED Disposition       ED Disposition   Decision to Admit    Condition   --    Comment   Level of Care: Med/Surg [1]   Diagnosis: Recurrent falls [[id number]]   Admitting Physician: FABRIZIO LEVY [[id number]]   Attending Physician: FABRIZIO LEVY [[id number]]   Certification: I Certify That Inpatient Hospital Services Are Medically Necessary For Greater Than 2 Midnights                 No follow-up provider specified.       Medication List        ASK your doctor about these medications      furosemide 20 MG tablet  Commonly known as: LASIX  Ask about: Which instructions should I use?     gabapentin 100 MG capsule  Commonly known as: NEURONTIN  Ask about: Which instructions should I use?     hydrALAZINE 100 MG tablet  Commonly known as: APRESOLINE  Ask about: Which instructions  should I use?     traZODone 50 MG tablet  Commonly known as: DESYREL  Ask about: Which instructions should I use?                 Nikhil Howard MD  06/20/24 8805

## 2024-06-21 LAB
ANION GAP SERPL CALCULATED.3IONS-SCNC: 12 MMOL/L (ref 5–15)
BUN SERPL-MCNC: 18 MG/DL (ref 8–23)
BUN/CREAT SERPL: 14.2 (ref 7–25)
CALCIUM SPEC-SCNC: 9.5 MG/DL (ref 8.6–10.5)
CHLORIDE SERPL-SCNC: 102 MMOL/L (ref 98–107)
CO2 SERPL-SCNC: 22 MMOL/L (ref 22–29)
CREAT SERPL-MCNC: 1.27 MG/DL (ref 0.57–1)
CREAT UR-MCNC: 14.9 MG/DL
DEPRECATED RDW RBC AUTO: 53.4 FL (ref 37–54)
EGFRCR SERPLBLD CKD-EPI 2021: 41 ML/MIN/1.73
ERYTHROCYTE [DISTWIDTH] IN BLOOD BY AUTOMATED COUNT: 15.6 % (ref 12.3–15.4)
GEN 5 2HR TROPONIN T REFLEX: 30 NG/L
GLUCOSE SERPL-MCNC: 117 MG/DL (ref 65–99)
HBA1C MFR BLD: 6.36 % (ref 4.8–5.6)
HCT VFR BLD AUTO: 38.6 % (ref 34–46.6)
HGB BLD-MCNC: 12.1 G/DL (ref 12–15.9)
MCH RBC QN AUTO: 29.2 PG (ref 26.6–33)
MCHC RBC AUTO-ENTMCNC: 31.3 G/DL (ref 31.5–35.7)
MCV RBC AUTO: 93.2 FL (ref 79–97)
PLATELET # BLD AUTO: 317 10*3/MM3 (ref 140–450)
PMV BLD AUTO: 10.8 FL (ref 6–12)
POTASSIUM SERPL-SCNC: 4.2 MMOL/L (ref 3.5–5.2)
PROT ?TM UR-MCNC: <4 MG/DL
PROT/CREAT UR: NORMAL MG/G{CREAT}
QT INTERVAL: 484 MS
QTC INTERVAL: 509 MS
RBC # BLD AUTO: 4.14 10*6/MM3 (ref 3.77–5.28)
SODIUM SERPL-SCNC: 136 MMOL/L (ref 136–145)
SODIUM UR-SCNC: 72 MMOL/L
TROPONIN T DELTA: -1 NG/L
TROPONIN T SERPL HS-MCNC: 31 NG/L
WBC NRBC COR # BLD AUTO: 11.44 10*3/MM3 (ref 3.4–10.8)

## 2024-06-21 PROCEDURE — 84156 ASSAY OF PROTEIN URINE: CPT | Performed by: INTERNAL MEDICINE

## 2024-06-21 PROCEDURE — 97116 GAIT TRAINING THERAPY: CPT

## 2024-06-21 PROCEDURE — 85027 COMPLETE CBC AUTOMATED: CPT | Performed by: STUDENT IN AN ORGANIZED HEALTH CARE EDUCATION/TRAINING PROGRAM

## 2024-06-21 PROCEDURE — 99222 1ST HOSP IP/OBS MODERATE 55: CPT | Performed by: INTERNAL MEDICINE

## 2024-06-21 PROCEDURE — 84484 ASSAY OF TROPONIN QUANT: CPT | Performed by: STUDENT IN AN ORGANIZED HEALTH CARE EDUCATION/TRAINING PROGRAM

## 2024-06-21 PROCEDURE — 82570 ASSAY OF URINE CREATININE: CPT | Performed by: INTERNAL MEDICINE

## 2024-06-21 PROCEDURE — 25810000003 LACTATED RINGERS PER 1000 ML: Performed by: STUDENT IN AN ORGANIZED HEALTH CARE EDUCATION/TRAINING PROGRAM

## 2024-06-21 PROCEDURE — 83036 HEMOGLOBIN GLYCOSYLATED A1C: CPT

## 2024-06-21 PROCEDURE — 97530 THERAPEUTIC ACTIVITIES: CPT

## 2024-06-21 PROCEDURE — 97166 OT EVAL MOD COMPLEX 45 MIN: CPT

## 2024-06-21 PROCEDURE — 80048 BASIC METABOLIC PNL TOTAL CA: CPT | Performed by: STUDENT IN AN ORGANIZED HEALTH CARE EDUCATION/TRAINING PROGRAM

## 2024-06-21 PROCEDURE — 84300 ASSAY OF URINE SODIUM: CPT | Performed by: INTERNAL MEDICINE

## 2024-06-21 RX ORDER — AMLODIPINE BESYLATE 5 MG/1
5 TABLET ORAL
Status: DISCONTINUED | OUTPATIENT
Start: 2024-06-21 | End: 2024-06-23 | Stop reason: HOSPADM

## 2024-06-21 RX ORDER — QUETIAPINE FUMARATE 25 MG/1
25 TABLET, FILM COATED ORAL NIGHTLY
Status: DISCONTINUED | OUTPATIENT
Start: 2024-06-21 | End: 2024-06-22

## 2024-06-21 RX ADMIN — HYDRALAZINE HYDROCHLORIDE 100 MG: 25 TABLET ORAL at 08:51

## 2024-06-21 RX ADMIN — QUETIAPINE FUMARATE 25 MG: 25 TABLET ORAL at 20:02

## 2024-06-21 RX ADMIN — ACETAMINOPHEN 650 MG: 325 TABLET, FILM COATED ORAL at 21:49

## 2024-06-21 RX ADMIN — TRAZODONE HYDROCHLORIDE 50 MG: 50 TABLET ORAL at 20:02

## 2024-06-21 RX ADMIN — Medication 10 ML: at 08:51

## 2024-06-21 RX ADMIN — AMLODIPINE BESYLATE 5 MG: 5 TABLET ORAL at 11:30

## 2024-06-21 RX ADMIN — HYDRALAZINE HYDROCHLORIDE 100 MG: 25 TABLET ORAL at 20:02

## 2024-06-21 RX ADMIN — SODIUM CHLORIDE, POTASSIUM CHLORIDE, SODIUM LACTATE AND CALCIUM CHLORIDE 75 ML/HR: 600; 310; 30; 20 INJECTION, SOLUTION INTRAVENOUS at 10:26

## 2024-06-21 RX ADMIN — DONEPEZIL HYDROCHLORIDE 10 MG: 5 TABLET, FILM COATED ORAL at 20:02

## 2024-06-21 RX ADMIN — ACETAMINOPHEN 650 MG: 325 TABLET, FILM COATED ORAL at 15:45

## 2024-06-21 RX ADMIN — GABAPENTIN 100 MG: 100 CAPSULE ORAL at 20:02

## 2024-06-21 RX ADMIN — Medication 10 ML: at 20:03

## 2024-06-21 RX ADMIN — ALLOPURINOL 100 MG: 100 TABLET ORAL at 08:51

## 2024-06-21 RX ADMIN — Medication 5 MG: at 20:02

## 2024-06-21 RX ADMIN — PANTOPRAZOLE SODIUM 40 MG: 40 TABLET, DELAYED RELEASE ORAL at 05:41

## 2024-06-21 NOTE — PROGRESS NOTES
[facility] MEDICINE SERVICE  DAILY PROGRESS NOTE    NAME: Leandra Nuñez  : 1935  MRN: [id number]      LOS: 1 day     PROVIDER OF SERVICE: Elvin Lawton MD    Chief Complaint: Fall    Subjective:     Interval History:  History taken from: patient  Patient Complaints:     Overall feeling weak however no specific complaints of pain anywhere, patient does use a walker at home does not recall exactly the incident of falling however she did report that she just lost her balance does not recall if she tripped, she definitely did not lose her consciousness, denied having palpitations, no chest pain      Objective:     Vital Signs  Temp:  [97.8 °F (36.6 °C)-98.1 °F (36.7 °C)] 97.9 °F (36.6 °C)  Heart Rate:  [57-65] 60  Resp:  [12-20] 20  BP: (129-173)/(61-81) 173/61   Body mass index is 38.98 kg/m².    Physical Exam  General Appearance:  Alert, cooperative, no distress, appears stated age  Head:   Normocephalic, without obvious abnormality, atraumatic  Eyes:   PERRL, conjunctiva/corneas clear, EOM's intact, fundi benign, both eyes  Ears:    Normal TM's and external ear canals, both ears  Nose:   Nares normal, septum midline, mucosa normal, no drainage or sinus tenderness  Throat: Lips, mucosa, and tongue normal; teeth and gums normal  Neck:   Supple, symmetrical, trachea midline, no adenopathy, thyroid: not enlarged, symmetric, no tenderness/mass/nodules, no carotid bruit or JVD  Lungs:             Clear to auscultation bilaterally, respirations unlabored  Heart:  Regular rate and rhythm, S1, S2 normal, no murmur, rub or gallop  Abdomen:  Soft, non-tender, bowel sounds active all four quadrants,  no masses, no organomegaly  Extremities:     Extremities normal, atraumatic, no cyanosis or edema  Pulses:            2+ and symmetric  Skin:    Skin color, texture, turgor normal, no rashes or lesions  Neurologic: Normal    Scheduled Meds   allopurinol, 100 mg, Oral, Daily  donepezil, 10 mg, Oral,  Nightly  gabapentin, 100 mg, Oral, Nightly  hydrALAZINE, 100 mg, Oral, BID  melatonin, 5 mg, Oral, Nightly  pantoprazole, 40 mg, Oral, Q AM  sodium chloride, 10 mL, Intravenous, Q12H  traZODone, 50 mg, Oral, Nightly       PRN Meds     acetaminophen    ALPRAZolam    senna-docusate sodium **AND** [DISCONTINUED] polyethylene glycol **AND** bisacodyl **AND** [DISCONTINUED] bisacodyl    bisacodyl    Calcium Replacement - Follow Nurse / BPA Driven Protocol    furosemide    hydrALAZINE    [Held by provider] HYDROcodone-acetaminophen    Magnesium Standard Dose Replacement - Follow Nurse / BPA Driven Protocol    nitroglycerin    ondansetron ODT **OR** ondansetron    [Held by provider] ondansetron ODT    Phosphorus Replacement - Follow Nurse / BPA Driven Protocol    [Held by provider] polyethylene glycol    [Held by provider] potassium chloride    Potassium Replacement - Follow Nurse / BPA Driven Protocol    sodium chloride    sodium chloride   Infusions  lactated ringers, 75 mL/hr, Last Rate: 75 mL/hr (06/20/24 2200)          Diagnostic Data    Results from last 7 days   Lab Units 06/21/24  0203 06/20/24  0506   WBC 10*3/mm3 11.44*  --    HEMOGLOBIN g/dL 12.1  --    HEMATOCRIT % 38.6  --    PLATELETS 10*3/mm3 317  --    GLUCOSE mg/dL 117* 133*   CREATININE mg/dL 1.27* 0.89   BUN mg/dL 18 20   SODIUM mmol/L 136 141   POTASSIUM mmol/L 4.2 3.7   AST (SGOT) U/L  --  64*   ALT (SGPT) U/L  --  35*   ALK PHOS U/L  --  124*   BILIRUBIN mg/dL  --  0.3   ANION GAP mmol/L 12.0 13.5       CT Chest Without Contrast Diagnostic    Result Date: 6/20/2024  Impression: 1.No acute cardiopulmonary abnormality. 2.Cardiomegaly and coronary artery disease. 3.Small hiatal hernia. 4.Colonic diverticulosis and colonic fecal retention. Electronically Signed: Dillon Pena MD  6/20/2024 4:36 AM EDT  Workstation ID: [id number]    CT Facial Bones Without Contrast    Result Date: 6/20/2024  Impression: 1.No acute osseous abnormality. 2.Mild left-sided TMJ  arthritis. 3.Mild to moderate cervical spondylosis. Electronically Signed: Dillon Pena MD  6/20/2024 4:34 AM EDT  Workstation ID: [id number]    CT Head Without Contrast    Result Date: 6/20/2024  Impression: 1.No acute intracranial abnormality. 2.Stable mild chronic small vessel ischemic change. 3.Stable generalized parenchymal volume loss congruent with age. Electronically Signed: Dillon Pena MD  6/20/2024 4:32 AM EDT  Workstation ID: [id number]       I reviewed the patient's new clinical results.    Assessment/Plan:     Active and Resolved Problems  Active Hospital Problems    Diagnosis  POA    **Fall [W19.XXXA]  Yes    Recurrent falls [R29.6]  Not Applicable      Resolved Hospital Problems   No resolved problems to display.       1-recent recurrent falls with overall generalized weakness   -workup in ED did not reveal any acute process in regard to imaging studies  -EKG reviewed personally, normal sinus rhythm, PACs, no ischemic changes  -No infectious process  -PT OT, might need SNF    2-elevated troponin  -EKG no ischemic changes  -Check echocardiogram  -Consult cardiology    3-RODERICK  -With drop in GFR from 61-42 in the last 24 hours  -Questionable related to hemodynamic changes as the patient initially was significantly hypertensive and after treatment she did drop to the 120s by yesterday evening  -Started on gentle hydration  -Check bladder scan  -Check urine electrolyte  -Avoid hypotensive episodes  -Start Norvasc 5 mg p.o. daily  -Check renal ultrasound  -Urinalysis reviewed, planned  -check UPCR     VTE Prophylaxis:  Mechanical VTE prophylaxis orders are present.         Code status is   There are no questions and answers to display.       Plan for disposition:TBD    Time: 30 minutes    Signature: Electronically signed by Elvin Lawton MD, 06/21/24, 10:16 EDT.  [facility] Hospitalist Team

## 2024-06-21 NOTE — THERAPY TREATMENT NOTE
"Subjective: Pt agreeable to therapeutic plan of care.    Objective:     Bed mobility - Min-A, verbal and manual cues to use bed rail for supine to sit. Min A with LE for sit to supine  Transfers - Min-A, Mod-A, Assist x 2, and with rolling walker  Ambulation - 5 feet x 2CGA, Min-A, and with rolling walker    Vitals: Hypotensive BP in sitting 165/86, standing 132/85. No dizziness reported until ambulating.    Pain: 3 VAS   Location: R ankle  Intervention for pain: elevation, ice applied    Education: Provided education on the importance of mobility in the acute care setting, Verbal/Tactile Cues, Transfer Training, and Gait Training    Assessment: Leandra Nuñez presents with functional mobility impairments which indicate the need for skilled intervention. Patient reports she is weak and now with R ankle pain; + swelling. Patient needed min A for supine<>sit and min/mod A of 2 to come to standing from low bed surface but min/CGA from BSC. Ambulated 5 ft x 2 using rw with mostly CGA ; distance limited by patient report of pain, fatigue, lightheadedness.Tolerating session today without incident. Will continue to follow and progress as tolerated. Patient needs 24/7 supervision and currently functioning below her reported baseline thus continue to recommend SNF at discharge.     Plan/Recommendations:   If medically appropriate, Moderate Intensity Therapy recommended post-acute care. This is recommended as therapy feels the patient would require 3-4 days per week and wouldn't tolerate \"3 hour daily\" rehab intensity. SNF would be the preferred choice. If the patient does not agree to SNF, arrange HH or OP depending on home bound status. If patient is medically complex, consider LTACH. Pt requires no DME at discharge.     Pt desires Home with family assist at discharge. Grand daughter can assist per RN report however unsure if able to stay with patient 24/7. Pt cooperative; agreeable to therapeutic recommendations and plan " of care.         Basic Mobility 6-click:  Rollin = Total, A lot = 2, A little = 3; 4 = None  Supine>Sit:   1 = Total, A lot = 2, A little = 3; 4 = None   Sit>Stand with arms:  1 = Total, A lot = 2, A little = 3; 4 = None  Bed>Chair:   1 = Total, A lot = 2, A little = 3; 4 = None  Ambulate in room:  1 = Total, A lot = 2, A little = 3; 4 = None  3-5 Steps with railin = Total, A lot = 2, A little = 3; 4 = None  Score: 15    Post-Tx Position: Supine with HOB Elevated and Call light and personal items within reach  PPE: gloves and surgical mask

## 2024-06-21 NOTE — CONSULTS
[facility] Consult Note      Patient Name: Leandra Nuñez  : 1935  MRN: [id number]  Primary Care Physician:  Anabelle Sellers MD  Referring Physician: Anabelle Roth*  Date of admission: 2024    Subjective     Reason for Consult:   RODERICK     HPI:   Leandra Nuñez is a 88 y.o. 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 Mitchell who presented to the hospital for weakness.  The patient had 2 recent falls in the last 24 hours.  Emergency department blood pressure was up to 210/90, sodium was noted to be at 141, potassium 4.2, creatinine up from 0.9 on admission to 1.27.  Currently her baseline creatinine is around 1.1 she continues to feel overall weak.  Review of Systems:   14 point review of systems is otherwise negative except for mentioned above on HPI    Personal History     Past Medical History:   Diagnosis Date    Anxiety     Arthritis     Breast nodule     Left breast nodule (fibrocystic disease , no malignancy)     Cancer     Cataract     Chronic kidney disease     HL (hearing loss)     Hyperlipidemia     Hypertension     Obesity     Shortness of breath        Past Surgical History:   Procedure Laterality Date    BREAST BIOPSY Left 2013    Benign fibrocystic disease ,Abstracted from [facility].    CARDIAC CATHETERIZATION      COLONOSCOPY N/A 2024    Procedure: COLONOSCOPY WITH POLYPECTOMY X5 AND ENDOSCOPIC MUCOSAL RESECTION OF RECTAL POLYP;  Surgeon: Joon Martin MD;  Location: [facility] ENDOSCOPY;  Service: Gastroenterology;  Laterality: N/A;  POST: DIVERTICULOSIS, POLYPS    D & C AND LAPAROSCOPY      ENDOSCOPY N/A 2024    Procedure: ESOPHAGOGASTRODUODENOSCOPY WITH BIOPSY X1 AREA;  Surgeon: Joon Martin MD;  Location: [facility] ENDOSCOPY;   Service: Gastroenterology;  Laterality: N/A;  POST: GASTRITIS, ESOPHAGITIS    FULGURATION ENDOMETRIOSIS      surgery    NEPHRECTOMY Right     ORIF HUMERUS FRACTURE Left 3/24/2021    Procedure: HUMERUS PROXIMAL OPEN REDUCTION INTERNAL FIXATION;  Surgeon: Yair Anne MD;  Location: [facility] MAIN OR;  Service: Orthopedics;  Laterality: Left;       Family History: family history is not on file.    Social History:  reports that she has never smoked. She has never used smokeless tobacco. She reports that she does not drink alcohol and does not use drugs.    Home Medications:  Prior to Admission medications    Medication Sig Start Date End Date Taking? Authorizing Provider   allopurinol (ZYLOPRIM) 100 MG tablet Take 1 tablet by mouth Daily.   Yes Jory Nick MD   ALPRAZolam (XANAX) 0.5 MG tablet Take 1 tablet by mouth 2 (Two) Times a Day As Needed for Anxiety. 6/13/24  Yes Anabelle Sellers MD   bisacodyl (DULCOLAX) 10 MG suppository Insert 1 suppository into the rectum Daily As Needed for Constipation (Use if bisacodyl oral is ineffective). 4/9/24  Yes Cathy Aranda MD   clarithromycin (BIAXIN) 500 MG tablet Take 1 tablet by mouth 2 (Two) Times a Day.  6/24/24 Yes Jory Nick MD   donepezil (Aricept) 10 MG tablet Take 1 tablet by mouth Every Night. 2/8/24 2/7/25 Yes Seipel, Joseph F, MD   furosemide (LASIX) 20 MG tablet Take 1 tablet by mouth Daily As Needed.   Yes Jory Nick MD   gabapentin (NEURONTIN) 100 MG capsule Take 1 capsule by mouth Every Night.   Yes Jory Nick MD   hydrALAZINE (APRESOLINE) 100 MG tablet Take 1 tablet by mouth 2 (Two) Times a Day.   Yes Jory Nick MD   HYDROcodone-acetaminophen (Norco) 7.5-325 MG per tablet Take 1 tablet by mouth Every 6 (Six) Hours As Needed for Moderate Pain. 6/12/24  Yes Anabelle Sellers MD   melatonin 5 MG tablet tablet Take 1 tablet by mouth Every Night.   Yes Jory Nick MD   naloxone  (NARCAN) 4 MG/0.1ML nasal spray Call 911. Don't prime. [place] in 1 nostril for overdose. Repeat in 2-3 minutes in other nostril if no or minimal breathing/responsiveness. 4/9/24  Yes Cathy Aranda MD   omeprazole (priLOSEC) 40 MG capsule Take 1 capsule by mouth Daily.   Yes Jory Nick MD   ondansetron ODT (ZOFRAN-ODT) 4 MG disintegrating tablet Place 1 tablet on the tongue Every 8 (Eight) Hours As Needed for Nausea or Vomiting. 5/6/24  Yes Germaine Daily APRN   polyethylene glycol (MIRALAX) 17 g packet Take 17 g by mouth Daily As Needed (Use if senna-docusate is ineffective). 4/9/24  Yes Cathy Aranda MD   potassium chloride (KLOR-CON M10) 10 MEQ CR tablet Take 1 tablet by mouth Daily As Needed.   Yes Jory Nick MD   traZODone (DESYREL) 50 MG tablet Take 1 tablet by mouth Every Night.   Yes Jory Nick MD       Allergies:  Allergies   Allergen Reactions    Statins Myalgia and Other (See Comments)       Objective     Vitals:   Temp:  [97.9 °F (36.6 °C)-98.9 °F (37.2 °C)] 98.9 °F (37.2 °C)  Heart Rate:  [57-71] 71  Resp:  [13-20] 13  BP: (129-188)/(61-86) 188/79    Intake/Output Summary (Last 24 hours) at 6/21/2024 1241  Last data filed at 6/21/2024 0559  Gross per 24 hour   Intake 919.5 ml   Output 1200 ml   Net -280.5 ml       Physical Exam:   Constitutional: Looking  HEENT: Sclera anicteric, no conjunctival injection  Neck: Supple, no thyromegaly, no lymphadenopathy, trachea at midline, no JVD  Respiratory: Clear to auscultation bilaterally, nonlabored respiration  Cardiovascular: RRR, no murmurs, no rubs or gallops, no carotid bruit  Gastrointestinal: Positive bowel sounds, abdomen is soft, nontender and nondistended  : No palpable bladder  Musculoskeletal: No edema, no clubbing or cyanosis  Psychiatric: Appropriate affect, cooperative  Neurologic: Oriented x3, moving all extremities, normal speech and mental status  Skin: Warm and dry       Scheduled Meds:      allopurinol, 100 mg, Oral, Daily  amLODIPine, 5 mg, Oral, Q24H  donepezil, 10 mg, Oral, Nightly  gabapentin, 100 mg, Oral, Nightly  hydrALAZINE, 100 mg, Oral, BID  melatonin, 5 mg, Oral, Nightly  pantoprazole, 40 mg, Oral, Q AM  QUEtiapine, 25 mg, Oral, Nightly  sodium chloride, 10 mL, Intravenous, Q12H  traZODone, 50 mg, Oral, Nightly      IV Meds:   lactated ringers, 75 mL/hr, Last Rate: 75 mL/hr (06/21/24 1026)        Results Reviewed:   I have personally reviewed the results from the time of this admission to 6/21/2024 12:41 EDT     Lab Results   Component Value Date    GLUCOSE 117 (H) 06/21/2024    CALCIUM 9.5 06/21/2024     06/21/2024    K 4.2 06/21/2024    CO2 22.0 06/21/2024     06/21/2024    BUN 18 06/21/2024    CREATININE 1.27 (H) 06/21/2024    EGFRIFNONA 51 (L) 01/14/2022    BCR 14.2 06/21/2024    ANIONGAP 12.0 06/21/2024      Lab Results   Component Value Date    MG 2.1 06/20/2024    ALBUMIN 4.3 06/20/2024           Assessment / Plan     ASSESSMENT:  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  Serology of worsening kidney function likely secondary to hypertensive urgency in setting of diuretic use.  Patient noted decreased oral intake  Recurrent falls  Hypertensive urgency with systolic blood pressure above 200 started on Norvasc this morning.  But allow permissive hypertension in order to prevent worsening of kidney function  Type 2 diabetes mellitus with CKD  Chronic kidney disease stage IIIa followed by Dr. Mitchell  Dementia  Falls evaluated with no fractures        PLAN:  Agree with gentle IV hydration  Patient was started on Norvasc 5 mg daily for blood pressure control.  Hold diuretic therapy  Check urine sodium.  Encourage p.o. intake  Renal ultrasound still pending        Thank you for involving us in the care of Leandra Nuñez.  Please feel free to call with any questions.    Kannan Jones MD  06/21/24  12:41  EDT    [facility]  [phone]    Parts of this note may be an electronic transcription/translation of spoken language to printed text using the Dragon dictation system.

## 2024-06-21 NOTE — PLAN OF CARE
Assessment: Leandra Nuñez presents with functional mobility impairments which indicate the need for skilled intervention. Patient reports she is weak and now with R ankle pain; + swelling. Patient needed min A for supine<>sit and min/mod A of 2 to come to standing from low bed surface but min/CGA from BSC. Ambulated 5 ft x 2 using rw with mostly CGA ; distance limited by patient report of pain, fatigue, lightheadedness.Tolerating session today without incident. Will continue to follow and progress as tolerated. Patient needs 24/7 supervision and currently functioning below her reported baseline thus continue to recommend SNF at discharge.

## 2024-06-21 NOTE — CASE MANAGEMENT/SOCIAL WORK
Continued Stay Note  TAYLOR Lam     Patient Name: Leandra Nuñez  MRN: [id number]  Today's Date: 6/21/2024    Admit Date: 6/20/2024    Plan: Return home with family for assistance and [facility] (pending acceptance- will need order.) Family is refusing SNF.   Discharge Plan       Row Name 06/21/24 1537       Plan    Plan Return home with family for assistance and [facility] (pending acceptance- will need order.) Family is refusing SNF.    Patient/Family in Agreement with Plan yes    Plan Comments CM spoke with daughter/RAJAT Cardona via phone concerning PT recommendation of SNF. Daughter is refusing SNF, but is agreeable to [facility]. No choices given, stated she didn't care to make a choice, and that she didn't know if insurance would cover it. Referral made to [facility] and liaison Kate contacted (pending acceptance.) DC barriers: Elevated troponins. Nephrology and cardiology following.               Naila Dorado RN      Office phone: [phone]  Office fax: [phone]

## 2024-06-21 NOTE — THERAPY EVALUATION
Patient Name: Leandra Nuñez  : 1935    MRN: [id number]                              Today's Date: 2024       Admit Date: 2024    Visit Dx:     ICD-10-CM ICD-9-CM   1. Dizziness  R42 780.4   2. Injury of chest wall, initial encounter  S29.9XXA 959.11   3. Hypertension, unspecified type  I10 401.9     Patient Active Problem List   Diagnosis    Anxiety disorder, unspecified    Asthma    Stage 3 chronic kidney disease    Hyperlipidemia    Hypertension    Obesity    Renal insufficiency    Type 2 diabetes mellitus without complication    Encounter for annual wellness exam in Medicare patient    Adjustment disorder with anxiety    Immune thrombocytopenia    Obstructive sleep apnea syndrome    Osteoarthritis    Clear cell carcinoma of kidney    Thrombocytopenia    Gastroesophageal reflux disease    White coat syndrome with diagnosis of hypertension    Leg cramps    Closed fracture of surgical neck of humerus    Fall    Absent kidney    Edema    Vitamin deficiency    Acute midline thoracic back pain    Chest pain    Hyperlipidemia due to type 2 diabetes mellitus    Dementia    Recurrent falls     Past Medical History:   Diagnosis Date    Anxiety     Arthritis     Breast nodule     Left breast nodule (fibrocystic disease , no malignancy)     Cancer     Cataract     Chronic kidney disease     HL (hearing loss)     Hyperlipidemia     Hypertension     Obesity     Shortness of breath      Past Surgical History:   Procedure Laterality Date    BREAST BIOPSY Left 2013    Benign fibrocystic disease ,Abstracted from [facility].    CARDIAC CATHETERIZATION      COLONOSCOPY N/A 2024    Procedure: COLONOSCOPY WITH POLYPECTOMY X5 AND ENDOSCOPIC MUCOSAL RESECTION OF RECTAL POLYP;  Surgeon: Joon Martin MD;  Location: [facility] ENDOSCOPY;  Service: Gastroenterology;  Laterality: N/A;  POST: DIVERTICULOSIS, POLYPS    D & C AND LAPAROSCOPY      ENDOSCOPY N/A 2024    Procedure:  ESOPHAGOGASTRODUODENOSCOPY WITH BIOPSY X1 AREA;  Surgeon: Joon Martin MD;  Location: [facility] ENDOSCOPY;  Service: Gastroenterology;  Laterality: N/A;  POST: GASTRITIS, ESOPHAGITIS    FULGURATION ENDOMETRIOSIS      surgery    NEPHRECTOMY Right     ORIF HUMERUS FRACTURE Left 3/24/2021    Procedure: HUMERUS PROXIMAL OPEN REDUCTION INTERNAL FIXATION;  Surgeon: Yair Anne MD;  Location: [facility] MAIN OR;  Service: Orthopedics;  Laterality: Left;      General Information       Row Name 06/21/24 1135          OT Time and Intention    Document Type evaluation  -SP     Mode of Treatment occupational therapy  -SP       Row Name 06/21/24 1135          General Information    Patient Profile Reviewed yes  -SP     Prior Level of Function independent:;min assist:;ADL's;dependent:;driving  -SP     Barriers to Rehab medically complex  -SP       Row Name 06/21/24 1135          Occupational Profile    Reason for Services/Referral (Occupational Profile) Pt is a 87 y/o female admitted to [facility] on 6/20/24 with c/o frequent falls. Pt with R lateral rib pain 2/2 fall, elevated BP, with c/o  intermittent dizziness. 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 CT head: (-) acute. CT chest: (-) acute. PMH of T2DM, CKD3, HTN, HLD, dementia, anxiety. At baseline, pt resides alone caring for herself and cat in [facility] with 0 IVAN. Pt reports she performs bathing IND with washcloths, occasional assist from daughter for dressing and does not drive. Pt reports she ambulates with walker.  -SP       Row Name 06/21/24 1135          Living Environment    People in Home alone  -SP       Row Name 06/21/24 1135          Home Main Entrance    Number of Stairs, Main Entrance none  -SP       Row Name 06/21/24 1135          Stairs Within Home, Primary    Number of Stairs, Within Home, Primary none  -SP       Row Name 06/21/24 1135          Cognition    Orientation Status (Cognition) oriented x 3  -SP        Row Name 06/21/24 1135          Safety Issues, Functional Mobility    Safety Issues Affecting Function (Mobility) awareness of need for assistance;insight into deficits/self-awareness  -SP     Impairments Affecting Function (Mobility) balance;endurance/activity tolerance;pain;strength  -SP               User Key  (r) = Recorded By, (t) = Taken By, (c) = Cosigned By      Initials Name Provider Type    SP Candelario Acuna OT Occupational Therapist                     Mobility/ADL's       Row Name 06/21/24 1136          Bed Mobility    Bed Mobility bed mobility (all) activities  -SP     All Activities, [place] (Bed Mobility) minimum assist (75% patient effort)  -SP     Bed Mobility, Safety Issues decreased use of arms for pushing/pulling;decreased use of legs for bridging/pushing;other (see comments)  scooting towardws HOB  -SP     Assistive Device (Bed Mobility) bed rails  -SP       Row Name 06/21/24 1136          Transfers    Transfers sit-stand transfer;toilet transfer  -SP       Row Name 06/21/24 1136          Sit-Stand Transfer    Sit-Stand [place] (Transfers) minimum assist (75% patient effort);2 person assist  -SP       Row Name 06/21/24 1136          Toilet Transfer    [place] Level (Toilet Transfer) minimum assist (75% patient effort)  -SP       Row Name 06/21/24 1136          Functional Mobility    Patient was able to Ambulate yes  -SP       Row Name 06/21/24 1136          Activities of Daily Living    BADL Assessment/Intervention lower body dressing  -SP       Row Name 06/21/24 1136          Lower Body Dressing Assessment/Training    [place] Level (Lower Body Dressing) don;doff;maximum assist (25% patient effort)  -SP     Position (Lower Body Dressing) supported standing  -SP               User Key  (r) = Recorded By, (t) = Taken By, (c) = Cosigned By      Initials Name Provider Type    SP Candelario Acuna OT Occupational Therapist                   Obj/Interventions       Row Name  06/21/24 1138          Sensory Assessment (Somatosensory)    Sensory Assessment (Somatosensory) UE sensation intact  -SP       Row Name 06/21/24 1138          Range of Motion Comprehensive    Comment, General Range of Motion BUE shoulers 90*  -SP       Row Name 06/21/24 1138          Strength Comprehensive (MMT)    Comment, General Manual Muscle Testing (MMT) Assessment BUE grossly 4-/5  -SP       Row Name 06/21/24 1138          Balance    Balance Assessment sitting static balance;sitting dynamic balance;sit to stand dynamic balance;standing static balance;standing dynamic balance  -SP     Static Sitting Balance standby assist  -SP     Dynamic Sitting Balance standby assist  -SP     Position, Sitting Balance unsupported;sitting edge of bed  -SP     Sit to Stand Dynamic Balance minimal assist  -SP     Static Standing Balance minimal assist  -SP     Dynamic Standing Balance minimal assist;contact guard  -SP     Position/Device Used, Standing Balance walker, front-wheeled;supported  -SP               User Key  (r) = Recorded By, (t) = Taken By, (c) = Cosigned By      Initials Name Provider Type    SP Candelario Acuna, LUCIE Occupational Therapist                   Goals/Plan       Row Name 06/21/24 1142          Bathing Goal 1 (OT)    Activity/Device (Bathing Goal 1, OT) bathing skills, all  -SP     [place] Level/Cues Needed (Bathing Goal 1, OT) minimum assist (75% or more patient effort)  -SP     Time Frame (Bathing Goal 1, OT) 2 weeks  -SP     Strategies/Barriers (Bathing Goal 1, OT) until d/c  -SP       Row Name 06/21/24 1142          Dressing Goal 1 (OT)    Activity/Device (Dressing Goal 1, OT) dressing skills, all  -SP     [place]/Cues Needed (Dressing Goal 1, OT) minimum assist (75% or more patient effort)  -SP     Time Frame (Dressing Goal 1, OT) 2 weeks  -SP     Strategies/Barriers (Dressing Goal 1, OT) until d/c  -SP       Row Name 06/21/24 1142          Problem Specific Goal 1 (OT)    Problem Specific  Goal 1 (OT) Pt will complete and demo good understanding of BUE HEP  -SP     Time Frame (Problem Specific Goal 1, OT) 2 weeks  -SP     Strategies/Barriers (Problem Specific Goal 1, OT) until d/c  -SP       Row Name 06/21/24 1142          Therapy Assessment/Plan (OT)    Planned Therapy Interventions (OT) activity tolerance training;occupation/activity based interventions;strengthening exercise;transfer/mobility retraining;patient/caregiver education/training;ROM/therapeutic exercise;BADL retraining  -SP               User Key  (r) = Recorded By, (t) = Taken By, (c) = Cosigned By      Initials Name Provider Type    SP Candelario Acuna, OT Occupational Therapist                   Clinical Impression       Row Name 06/21/24 1139          Pain Assessment    Pretreatment Pain Rating 0/10 - no pain  -SP     Pain Intervention(s) Repositioned;Therapeutic presence;Nursing Notified  -SP     Additional Documentation Pain Scale: FACES Pre/Post-Treatment (Group)  -SP       Row Name 06/21/24 1136          Pain Scale: FACES Pre/Post-Treatment    Posttreatment Pain Rating 4-->hurts little more  -SP       Row Name 06/21/24 1137          Plan of Care Review    Plan of Care Reviewed With patient  -SP     Outcome Evaluation Pt is a 87 y/o female admitted to [facility] on 6/20/24 with c/o frequent falls. Pt with R lateral rib pain 2/2 fall, elevated BP, with c/o  intermittent dizziness. 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 CT head: (-) acute. CT chest: (-) acute. PMH of T2DM, CKD3, HTN, HLD, dementia, anxiety. At baseline, pt resides alone caring for herself and cat in [facility] with 0 IVAN. Pt reports she performs bathing IND with washcloths, occasional assist from daughter for dressing and does not drive. Pt reports she ambulates with walker. During assessment, pt requires min A to complete sup<>sit with verbal cues to utilize bed rails. Sitting EOB static/dynamic pt SBA. ROM BUE  shoulders 90* and MMT grossly 4-/5. To come to standing, pt requires min A x2 with FWW. Pt ambulated to BS and requires max A to doff/don brief. Min A x1 with FWW to come to standing from BSC. BP siting /86 and in standing 132/85. Pt returned to supine where she requires dep A x2 to scoot HOB. OT administered SBT, pt scored 6/28 indicating questionable cognitive impairment. Based on assessment, pt would benefit from continued skilled OT services while admitted for increased activity tolerance and strength in preparation for ADLs for a safe discharge. Pt reports she feels weak/fatigued and not near her baseline to go home. OT recommending SNF when medically approrpriate.  -SP       Row Name 06/21/24 1139          Therapy Assessment/Plan (OT)    Criteria for Skilled Therapeutic Interventions Met (OT) yes;meets criteria;skilled treatment is necessary  -SP     Therapy Frequency (OT) 3 times/wk  -SP     Predicted Duration of Therapy Intervention (OT) until d/c  -SP       Row Name 06/21/24 1139          Therapy Plan Review/Discharge Plan (OT)    Anticipated Discharge Disposition (OT) skilled nursing facility  -SP       Row Name 06/21/24 1139          Vital Signs    Intra Systolic BP Rehab 165  -SP     Intra Treatment Diastolic BP 86  -SP     Post Systolic BP Rehab 132  -SP     Post Treatment Diastolic BP 85  -SP     O2 Delivery Pre Treatment room air  -SP     O2 Delivery Intra Treatment room air  -SP     O2 Delivery Post Treatment room air  -SP     Pre Patient Position Supine  -SP     Intra Patient Position Standing  -SP     Post Patient Position Supine  -SP       Row Name 06/21/24 1139          Positioning and Restraints    Pre-Treatment Position in bed  -SP     Post Treatment Position bed  -SP     In Bed notified nsg;fowlers;call light within reach;encouraged to call for assist;exit alarm on  -SP               User Key  (r) = Recorded By, (t) = Taken By, (c) = Cosigned By      Initials Name Provider Type    SP  Candelario Acuna OT Occupational Therapist                   Outcome Measures       Row Name 06/21/24 1143          How much help from another is currently needed...    Putting on and taking off regular lower body clothing? 2  -SP     Bathing (including washing, rinsing, and drying) 2  -SP     Toileting (which includes using toilet bed pan or urinal) 3  -SP     Putting on and taking off regular upper body clothing 3  -SP     Taking care of personal grooming (such as brushing teeth) 4  -SP     Eating meals 4  -SP     AM-PAC 6 Clicks Score (OT) 18  -SP       Row Name 06/21/24 0845          How much help from another person do you currently need...    Turning from your back to your side while in flat bed without using bedrails? 2  -SR     Moving from lying on back to sitting on the side of a flat bed without bedrails? 2  -SR     Moving to and from a bed to a chair (including a wheelchair)? 2  -SR     Standing up from a chair using your arms (e.g., wheelchair, bedside chair)? 2  -SR     Climbing 3-5 steps with a railing? 2  -SR     To walk in hospital room? 2  -SR     AM-PAC 6 Clicks Score (PT) 12  -SR     Highest Level of Mobility Goal 4 --> Transfer to chair/commode  -SR       Row Name 06/21/24 1143          Functional Assessment    Outcome Measure Options AM-PAC 6 Clicks Daily Activity (OT)  -SP               User Key  (r) = Recorded By, (t) = Taken By, (c) = Cosigned By      Initials Name Provider Type    SR Shawnee Martinez, RN Registered Nurse    Candelario Davidson OT Occupational Therapist                    Occupational Therapy Education       Title: PT OT SLP Therapies (In Progress)       Topic: Occupational Therapy (In Progress)       Point: ADL training (Done)       Description:   Instruct learner(s) on proper safety adaptation and remediation techniques during self care or transfers.   Instruct in proper use of assistive devices.                  Learning Progress Summary             Patient Acceptance,  E,TB, VU by SP at 6/21/2024 1143                         Point: Home exercise program (Not Started)       Description:   Instruct learner(s) on appropriate technique for monitoring, assisting and/or progressing therapeutic exercises/activities.                  Learner Progress:  Not documented in this visit.              Point: Precautions (Not Started)       Description:   Instruct learner(s) on prescribed precautions during self-care and functional transfers.                  Learner Progress:  Not documented in this visit.              Point: Body mechanics (Done)       Description:   Instruct learner(s) on proper positioning and spine alignment during self-care, functional mobility activities and/or exercises.                  Learning Progress Summary             Patient Acceptance, E,TB, VU by SP at 6/21/2024 1143                                         User Key       Initials Effective Dates Name Provider Type Discipline    JENNA 11/15/23 -  Candelario Acuna OT Occupational Therapist OT                  OT Recommendation and Plan  Planned Therapy Interventions (OT): activity tolerance training, occupation/activity based interventions, strengthening exercise, transfer/mobility retraining, patient/caregiver education/training, ROM/therapeutic exercise, BADL retraining  Therapy Frequency (OT): 3 times/wk  Plan of Care Review  Plan of Care Reviewed With: patient  Outcome Evaluation: Pt is a 89 y/o female admitted to [facility] on 6/20/24 with c/o frequent falls. Pt with R lateral rib pain 2/2 fall, elevated BP, with c/o  intermittent dizziness. 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 CT head: (-) acute. CT chest: (-) acute. PMH of T2DM, CKD3, HTN, HLD, dementia, anxiety. At baseline, pt resides alone caring for herself and cat in [facility] with 0 IVAN. Pt reports she performs bathing IND with washcloths, occasional assist from daughter for dressing and does not  drive. Pt reports she ambulates with walker. During assessment, pt requires min A to complete sup<>sit with verbal cues to utilize bed rails. Sitting EOB static/dynamic pt SBA. ROM BUE shoulders 90* and MMT grossly 4-/5. To come to standing, pt requires min A x2 with FWW. Pt ambulated to [facility] and requires max A to doff/don brief. Min A x1 with FWW to come to standing from BSC. BP siting /86 and in standing 132/85. Pt returned to supine where she requires dep A x2 to scoot HOB. OT administered SBT, pt scored 6/28 indicating questionable cognitive impairment. Based on assessment, pt would benefit from continued skilled OT services while admitted for increased activity tolerance and strength in preparation for ADLs for a safe discharge. Pt reports she feels weak/fatigued and not near her baseline to go home. OT recommending SNF when medically approrpriate.     Time Calculation:         Time Calculation- OT       Row Name 06/21/24 1143             Time Calculation- OT    OT Start Time 1052  -SP      OT Stop Time 1116  -SP      OT Time Calculation (min) 24 min  -SP      OT Received On 06/21/24  -SP      OT - Next Appointment 06/24/24  -SP      OT Goal Re-Cert Due Date 07/05/24  -SP                User Key  (r) = Recorded By, (t) = Taken By, (c) = Cosigned By      Initials Name Provider Type    Candelario Davidson OT Occupational Therapist                  Therapy Charges for Today       Code Description Service Date Service Provider Modifiers Qty    [id number]  OT EVAL MOD COMPLEXITY 4 6/21/2024 Candelario Acuna OT GO 1                 Candelario Acuna OT  6/21/2024

## 2024-06-21 NOTE — PLAN OF CARE
Problem: Adult Inpatient Plan of Care  Goal: Plan of Care Review  Outcome: Ongoing, Progressing  Flowsheets (Taken 6/20/2024 1752 by Sissy Coelho RN)  Progress: no change  Plan of Care Reviewed With: patient  Outcome Evaluation: Pt remains slightly confused. Plan on discharge to care [facility] once medically ready. No falls this shift. Mild hypertension, PRN medication administered with improvement.  Goal: Patient-Specific Goal (Individualized)  Outcome: Ongoing, Progressing  Goal: Absence of Hospital-Acquired Illness or Injury  Outcome: Ongoing, Progressing  Goal: Optimal Comfort and Wellbeing  Outcome: Ongoing, Progressing  Goal: Readiness for Transition of Care  Outcome: Ongoing, Progressing     Problem: Fall Injury Risk  Goal: Absence of Fall and Fall-Related Injury  Outcome: Ongoing, Progressing   Goal Outcome Evaluation:

## 2024-06-21 NOTE — CONSULTS
[facility] CARDIOLOGY CONSULT  [facility]        Subjective:     Encounter Date:06/20/2024      Patient ID: Leandra Nuñez is a 88 y.o. female.    Chief Complaint: Fall; consult for elevated troponin      HPI:  Leandra Nuñez is a 88 y.o. female known to Dr. Ty with a past cardiac history of chronic diastolic heart failure.  Last nuclear stress test in 2023 showed no ischemia (EF 60%).  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  She presents the ER following a nonsyncopal fall.  Cardiology was consulted for elevated troponin.    Patient is able to state her name, location, and the year.  She lives at home independently with some assistance from her granddaughter.  She ambulates around her home with a walker.  She tells me that yesterday she lost her balance and fell.  She denies any lightheadedness or palpitations.  She denies any chest pain or shortness of breath.      Past Medical History:   Diagnosis Date    Anxiety     Arthritis     Breast nodule 2013    Left breast nodule (fibrocystic disease , no malignancy)     Cancer     Cataract     Chronic kidney disease     HL (hearing loss)     Hyperlipidemia     Hypertension     Obesity     Shortness of breath          Past Surgical History:   Procedure Laterality Date    BREAST BIOPSY Left 05/21/2013    Benign fibrocystic disease ,[facility]    CARDIAC CATHETERIZATION      COLONOSCOPY N/A 5/29/2024    Procedure: COLONOSCOPY WITH POLYPECTOMY X5 AND ENDOSCOPIC MUCOSAL RESECTION OF RECTAL POLYP;  Surgeon: Joon Martin MD;  Location: [facility] ENDOSCOPY;  Service: Gastroenterology;  Laterality: N/A;  POST: DIVERTICULOSIS, POLYPS    D & C AND LAPAROSCOPY      ENDOSCOPY N/A 5/29/2024    Procedure: ESOPHAGOGASTRODUODENOSCOPY WITH BIOPSY X1 AREA;  Surgeon: Joon Martin MD;  Location: [facility] ENDOSCOPY;  Service: Gastroenterology;  Laterality: N/A;  POST: GASTRITIS, ESOPHAGITIS  "   FULGURATION ENDOMETRIOSIS      surgery    NEPHRECTOMY Right     ORIF HUMERUS FRACTURE Left 3/24/2021    Procedure: HUMERUS PROXIMAL OPEN REDUCTION INTERNAL FIXATION;  Surgeon: Yair Anne MD;  Location: [facility] MAIN OR;  Service: Orthopedics;  Laterality: Left;         Social History     Socioeconomic History    Marital status:    Tobacco Use    Smoking status: Never    Smokeless tobacco: Never   Vaping Use    Vaping status: Never Used   Substance and Sexual Activity    Alcohol use: No    Drug use: No    Sexual activity: Defer     Partners: Male       History reviewed. No pertinent family history.      Allergies   Allergen Reactions    Statins Myalgia and Other (See Comments)       Current Medications:   Scheduled Meds:allopurinol, 100 mg, Oral, Daily  amLODIPine, 5 mg, Oral, Q24H  donepezil, 10 mg, Oral, Nightly  gabapentin, 100 mg, Oral, Nightly  hydrALAZINE, 100 mg, Oral, BID  melatonin, 5 mg, Oral, Nightly  pantoprazole, 40 mg, Oral, Q AM  QUEtiapine, 25 mg, Oral, Nightly  sodium chloride, 10 mL, Intravenous, Q12H  traZODone, 50 mg, Oral, Nightly      Continuous Infusions:lactated ringers, 75 mL/hr, Last Rate: 75 mL/hr (06/21/24 1026)        ROS  All other systems reviewed and are negative.       Objective:         BP (!) 188/79 (BP Location: Left arm, Patient Position: Lying)   Pulse 71   Temp 98.9 °F (37.2 °C) (Oral)   Resp 13   Ht 162.6 cm (64\")   Wt 103 kg (227 lb 1.2 oz)   SpO2 96%   BMI 38.98 kg/m²       General: Well-developed in NAD.  Neuro: AAOx3. No gross deficits.  HEENT: Sclerae clear, no xanthelasmas.  CV: S1S2, RRR. 3/6 systolic murmur.  Resp: Breathing is unlabored. Lungs CTA throughout.  GI: BS+. Abdomen soft and NTTP.  Ext: Pedal pulses are palpable. Extremities are with mild non-pitting BLE edema.  MS: moves all extremities, no weakness.  Skin: warm, dry.  Psych: calm and cooperative.            Lab Review:     Results from last 7 days   Lab Units 06/21/24  0203 " "06/20/24  0506   SODIUM mmol/L 136 141   POTASSIUM mmol/L 4.2 3.7   CHLORIDE mmol/L 102 103   CO2 mmol/L 22.0 24.5   BUN mg/dL 18 20   CREATININE mg/dL 1.27* 0.89   GLUCOSE mg/dL 117* 133*   CALCIUM mg/dL 9.5 10.2   AST (SGOT) U/L  --  64*   ALT (SGPT) U/L  --  35*     Results from last 7 days   Lab Units 06/21/24  0544 06/21/24  0203 06/20/24  0506   CK TOTAL U/L  --   --  106   HSTROP T ng/L 30* 31* 25*     Results from last 7 days   Lab Units 06/21/24  0203 06/20/24  0413   WBC 10*3/mm3 11.44* 12.28*   HEMOGLOBIN g/dL 12.1 13.6   HEMATOCRIT % 38.6 42.9   PLATELETS 10*3/mm3 317 308         Results from last 7 days   Lab Units 06/20/24  0506   MAGNESIUM mg/dL 2.1           Invalid input(s): \"LDLCALC\"            Recent Radiology:  Imaging Results (Most Recent)       Procedure Component Value Units Date/Time    CT Chest Without Contrast Diagnostic [[id number]] Collected: 06/20/24 0434     Updated: 06/20/24 0438    Narrative:      CT CHEST WO CONTRAST DIAGNOSTIC    Date of Exam: 6/20/2024 3:45 AM EDT    Indication: trauma right side.    Comparison: Chest radiograph 4/5/2024 and chest CT 2/14/2023.    Technique: Axial CT images were obtained of the chest without contrast administration.  Sagittal and coronal reconstructions were performed.  Automated exposure control and iterative reconstruction methods were used.      Findings:  The thyroid is heterogeneous without dominant nodule. The trachea and the esophagus appear within normal limits. There is a small hiatal hernia. The heart is enlarged. There are mild coronary artery calcifications. There are aortic and mitral annular   calcifications. No pericardial effusion or mediastinal lymphadenopathy. There are multiple calcified mediastinal granulomas and there is mild aortic and aortic branch vessel atherosclerosis.    There is no pneumothorax, pleural effusion or focal airspace consolidation. There are multiple calcified granulomas in both lungs. There is dependent " bibasilar atelectasis. Airways are patent. No significant pleural disease.    There are no acute findings in the superficial soft tissues. There is a stable low-density left adrenal nodule, likely adenoma. No acute findings in the upper abdomen. There is colonic diverticulosis and colonic fecal retention. No acute osseous   abnormality or destructive bone lesion. There is mild thoracic spondylosis. There is evidence of prior left humerus ORIF.      Impression:      Impression:  1.No acute cardiopulmonary abnormality.  2.Cardiomegaly and coronary artery disease.  3.Small hiatal hernia.  4.Colonic diverticulosis and colonic fecal retention.        Electronically Signed: Dillon Pena MD    6/20/2024 4:36 AM EDT    Workstation ID: [id number]    CT Facial Bones Without Contrast [[id number]] Collected: 06/20/24 0432     Updated: 06/20/24 0436    Narrative:      CT FACIAL BONES WO CONTRAST    Date of Exam: 6/20/2024 3:45 AM EDT    Indication: trauma.    Comparison: None available.    Technique: Axial CT images were obtained from the inferior aspect of the mandible through the frontal sinuses without contrast administration.  Sagittal and coronal reconstructions were performed.  Automated exposure control and iterative reconstruction   methods were used.      Findings:  The mandible and temporomandibular joints appear intact. Pterygoid plates, sinus walls, bony orbits, nasal bones, anterior nasal spine, bony nasal septum, zygomatic arches and visualized calvarium all appear intact without fracture. There is thinning of   the orbital lenses compatible with prior cataract surgery. The paranasal sinuses and mastoid air cells appear well aerated. Intraorbital structures appear within normal limits. No retrobulbar hematoma. No soft tissue contusion or hematoma. There is mild   left-sided TMJ arthritis. There is mild to moderate cervical spondylosis with ankylosis of the left C2-C3 facet joint.      Impression:       Impression:  1.No acute osseous abnormality.  2.Mild left-sided TMJ arthritis.  3.Mild to moderate cervical spondylosis.        Electronically Signed: Dillon Pena MD    6/20/2024 4:34 AM EDT    Workstation ID: [id number]    CT Head Without Contrast [[id number]] Collected: 06/20/24 0431     Updated: 06/20/24 0434    Narrative:      CT HEAD WO CONTRAST    Date of Exam: 6/20/2024 3:45 AM EDT    Indication: head injury.    Comparison: 3/7/2019 and brain MRI 1/18/2024.    Technique: Axial CT images were obtained of the head without contrast administration.  Coronal reconstructions were performed.  Automated exposure control and iterative reconstruction methods were used.      Findings:  Superficial soft tissues appear within normal limits. The calvarium is intact.  Paranasal sinuses and mastoid air cells appear well aerated. Stable thinning of the orbital lenses. There is no acute intracranial hemorrhage.  No mass effect or midline   shift.  No abnormal extra-axial collections.  Gray-white differentiation is within normal limits. Stable mild patchy white matter hypoattenuation. There is generalized parenchymal volume loss congruent with age.      Impression:      Impression:  1.No acute intracranial abnormality.  2.Stable mild chronic small vessel ischemic change.  3.Stable generalized parenchymal volume loss congruent with age.        Electronically Signed: Dillon Pena MD    6/20/2024 4:32 AM EDT    Workstation ID: [id number]              ECHOCARDIOGRAM:              Assessment:         Active Hospital Problems    Diagnosis  POA    **Fall [W19.XXXA]  Yes    Recurrent falls [R29.6]  Not Applicable     1) Elevated troponin  -High-sensitivity troponin 25, 31, 30  -CK is 106  -EKG shows no acute ST abnormality  -Last nuclear stress test in 11/2023 showed no ischemia (EF 60%).      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  Patient appears compensated with respect to volume.  Loud murmur on exam.  Echo has been ordered.  Further recommendations per attending cardiologist.         Electronically signed by JESSIE Carey, 06/21/24, 1:23 PM EDT.       88-year-old pleasant patient well-known to me comes in with a mechanical fall.  Denies any chest pain or dyspnea.  Denies any syncopal type of symptoms.        Physical Exam    General:      well developed, well nourished, in no acute distress.    Head:      normocephalic and atraumatic.    Eyes:      PERRL/EOM intact, conjunctivae and sclerae clear without nystagmus.    Neck:      no  thyromegaly, trachea central with normal respiratory effort  Lungs:      clear bilaterally to auscultation.    Heart:       regular rate and rhythm, S1, S2 without  rubs, or gallops  Skin:      intact without lesions or rashes.    Psych:      alert and cooperative; normal mood and affect; normal attention span and concentration.        Labs x-rays EKGs reviewed  Echo pending to be read.  Patient cardiac wise is stable.  Amlodipine added with hydralazine for hypertension optimization.  Patient can be discharged home and followed as an outpatient        Electronically signed by Damien Ty MD, 06/21/24, 4:44 PM EDT.

## 2024-06-21 NOTE — PLAN OF CARE
Problem: Adult Inpatient Plan of Care  Goal: Plan of Care Review  Outcome: Ongoing, Progressing  Goal: Patient-Specific Goal (Individualized)  Outcome: Ongoing, Progressing  Goal: Absence of Hospital-Acquired Illness or Injury  Outcome: Ongoing, Progressing  Intervention: Identify and Manage Fall Risk  Recent Flowsheet Documentation  Taken 6/21/2024 1412 by Shawnee Martinez RN  Safety Promotion/Fall Prevention: safety round/check completed  Taken 6/21/2024 1210 by Shawnee Martinez RN  Safety Promotion/Fall Prevention: safety round/check completed  Taken 6/21/2024 1000 by Shawnee Martinez RN  Safety Promotion/Fall Prevention: safety round/check completed  Taken 6/21/2024 0845 by Shawnee Martinez RN  Safety Promotion/Fall Prevention: safety round/check completed  Intervention: Prevent and Manage VTE (Venous Thromboembolism) Risk  Recent Flowsheet Documentation  Taken 6/21/2024 0845 by Shawnee Martinez RN  VTE Prevention/Management:   bilateral   sequential compression devices off  Range of Motion: active ROM (range of motion) encouraged  Intervention: Prevent Infection  Recent Flowsheet Documentation  Taken 6/21/2024 0845 by Shawnee Martinez RN  Infection Prevention: single patient room provided  Goal: Optimal Comfort and Wellbeing  Outcome: Ongoing, Progressing  Intervention: Provide Person-Centered Care  Recent Flowsheet Documentation  Taken 6/21/2024 0845 by Shawnee Martinez RN  Trust Relationship/Rapport:   care explained   choices provided   emotional support provided   empathic listening provided   questions answered  Goal: Readiness for Transition of Care  Outcome: Ongoing, Progressing     Problem: Fall Injury Risk  Goal: Absence of Fall and Fall-Related Injury  Outcome: Ongoing, Progressing  Intervention: Promote Injury-Free Environment  Recent Flowsheet Documentation  Taken 6/21/2024 1412 by Shawnee Martinez RN  Safety Promotion/Fall Prevention: safety round/check  "completed  Taken 6/21/2024 1210 by Shawnee Martinez, RN  Safety Promotion/Fall Prevention: safety round/check completed  Taken 6/21/2024 1000 by Shawnee Martinez RN  Safety Promotion/Fall Prevention: safety round/check completed  Taken 6/21/2024 0845 by Shawnee Martinez RN  Safety Promotion/Fall Prevention: safety round/check completed     Problem: Skin Injury Risk Increased  Goal: Skin Health and Integrity  Outcome: Ongoing, Progressing   Goal Outcome Evaluation:   Pt has been relaxing in bed throughout the day. Her granddaughter has been at bedside as well. She worked with PT/OT today. She has been up to [facility] numerous times today. Pt's family would like treatment plan updates to be directed to pt's daughter, Kianna. \"My mother has advance dementia and will not remember anything you all tell her.\" Tylenol PRN was administered for a low grade fever as well. LR remains running at 75ml/hr. No concerns and call light within reach.                                            "

## 2024-06-21 NOTE — PLAN OF CARE
Goal Outcome Evaluation:  Plan of Care Reviewed With: patient    Outcome Evaluation: Pt is a 87 y/o female admitted to [facility] on 6/20/24 with c/o frequent falls. Pt with R lateral rib pain 2/2 fall, elevated BP, with c/o  intermittent dizziness. 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 CT head: (-) acute. CT chest: (-) acute. PMH of T2DM, CKD3, HTN, HLD, dementia, anxiety. At baseline, pt resides alone caring for herself and cat in [facility] with 0 IVAN. Pt reports she performs bathing IND with washcloths, occasional assist from daughter for dressing and does not drive. Pt reports she ambulates with walker. During assessment, pt requires min A to complete sup<>sit with verbal cues to utilize bed rails. Sitting EOB static/dynamic pt SBA. ROM BUE shoulders 90* and MMT grossly 4-/5. To come to standing, pt requires min A x2 with FWW. Pt ambulated to [facility] and requires max A to doff/don brief. Min A x1 with FWW to come to standing from [facility]. BP siting /86 and in standing 132/85. Pt returned to supine where she requires dep A x2 to scoot HOB. OT administered SBT, pt scored 6/28 indicating questionable cognitive impairment. Based on assessment, pt would benefit from continued skilled OT services while admitted for increased activity tolerance and strength in preparation for ADLs for a safe discharge. Pt reports she feels weak/fatigued and not near her baseline to go home. OT recommending SNF when medically approrpriate.    Anticipated Discharge Disposition (OT): skilled nursing facility

## 2024-06-21 NOTE — DISCHARGE PLACEMENT REQUEST
"Leandra Nuñez (88 y.o. Female)       Date of Birth   1935    Social Security Number       Address   [address] [place] [facility]    Home Phone   [phone]    MRN   [id number]       Taoism   Mandaeism    Marital Status                               Admission Date   6/20/24    Admission Type   Emergency    Admitting Provider   Yasir Panchal MD    Attending Provider   Elvin Lawton MD    Department, Room/Bed   [facility] 3A MEDICAL INPATIENT, 306/1       Discharge Date       Discharge Disposition       Discharge Destination                                 Attending Provider: Elvin Lawton MD    Allergies: Statins    Isolation: None   Infection: None   Code Status: Prior    Ht: 162.6 cm (64\")   Wt: 103 kg (227 lb 1.2 oz)    Admission Cmt: None   Principal Problem: Fall [W19.XXXA]                   Active Insurance as of 6/20/2024       Primary Coverage       Payor Plan Insurance Group Employer/Plan Group    ANTHEM MEDICARE REPLACEMENT ANTHEM MEDICARE ADVANTAGE [id number]       Payor Plan Address Payor Plan Phone Number Payor Plan Fax Number Effective Dates    [address] [phone]  1/1/2024 - None Entered    [facility] [address]         Subscriber Name Subscriber Birth Date Member ID       LEANDRA NUÑEZ 1935 [id number]               Secondary Coverage       Payor Plan Insurance Group Employer/Plan Group     FOR LIFE  FOR LIFE MC SUP         Payor Plan Address Payor Plan Phone Number Payor Plan Fax Number Effective Dates    [address] [phone]  8/14/2013 - None Entered    [facility] [address]         Subscriber Name Subscriber Birth Date Member ID       LEANDRA NUÑEZ 1935 [id number]                     Emergency Contacts        (Rel.) Home Phone Work Phone Mobile Phone    TARIQDAYANA (Daughter) [phone] -- --    BHAVYA POTTER (Relative) -- -- [phone]    ESTRELLAKENNY (Grandchild) -- -- [phone] "    Daisy Rooney (Grandchild) -- -- [phone]

## 2024-06-22 ENCOUNTER — APPOINTMENT (OUTPATIENT)
Dept: CARDIOLOGY | Facility: HOSPITAL | Age: 89
DRG: 948 | End: 2024-06-22
Payer: MEDICARE

## 2024-06-22 ENCOUNTER — APPOINTMENT (OUTPATIENT)
Dept: GENERAL RADIOLOGY | Facility: HOSPITAL | Age: 89
DRG: 948 | End: 2024-06-22
Payer: COMMERCIAL

## 2024-06-22 LAB
ALBUMIN SERPL-MCNC: 3.7 G/DL (ref 3.5–5.2)
ANION GAP SERPL CALCULATED.3IONS-SCNC: 12.5 MMOL/L (ref 5–15)
AORTIC DIMENSIONLESS INDEX: 0.6 (DI)
B PARAPERT DNA SPEC QL NAA+PROBE: NOT DETECTED
B PERT DNA SPEC QL NAA+PROBE: NOT DETECTED
BASOPHILS # BLD AUTO: 0.07 10*3/MM3 (ref 0–0.2)
BASOPHILS NFR BLD AUTO: 0.7 % (ref 0–1.5)
BH CV ECHO MEAS - ACS: 1.4 CM
BH CV ECHO MEAS - AO MAX PG: 12.7 MMHG
BH CV ECHO MEAS - AO MEAN PG: 8 MMHG
BH CV ECHO MEAS - AO V2 MAX: 178 CM/SEC
BH CV ECHO MEAS - AO V2 VTI: 42.6 CM
BH CV ECHO MEAS - AVA(I,D): 1.72 CM2
BH CV ECHO MEAS - EDV(CUBED): 68.9 ML
BH CV ECHO MEAS - EDV(MOD-SP4): 134 ML
BH CV ECHO MEAS - EF(MOD-SP4): 66.9 %
BH CV ECHO MEAS - ESV(CUBED): 24.4 ML
BH CV ECHO MEAS - ESV(MOD-SP4): 44.3 ML
BH CV ECHO MEAS - FS: 29.3 %
BH CV ECHO MEAS - IVS/LVPW: 1 CM
BH CV ECHO MEAS - IVSD: 1.1 CM
BH CV ECHO MEAS - LA DIMENSION: 2.5 CM
BH CV ECHO MEAS - LAT PEAK E' VEL: 6.7 CM/SEC
BH CV ECHO MEAS - LV MASS(C)D: 151.3 GRAMS
BH CV ECHO MEAS - LV MAX PG: 4.5 MMHG
BH CV ECHO MEAS - LV MEAN PG: 3 MMHG
BH CV ECHO MEAS - LV V1 MAX: 106 CM/SEC
BH CV ECHO MEAS - LV V1 VTI: 25.9 CM
BH CV ECHO MEAS - LVIDD: 4.1 CM
BH CV ECHO MEAS - LVIDS: 2.9 CM
BH CV ECHO MEAS - LVOT AREA: 2.8 CM2
BH CV ECHO MEAS - LVOT DIAM: 1.9 CM
BH CV ECHO MEAS - LVPWD: 1.1 CM
BH CV ECHO MEAS - MED PEAK E' VEL: 7 CM/SEC
BH CV ECHO MEAS - MV A MAX VEL: 136 CM/SEC
BH CV ECHO MEAS - MV DEC SLOPE: 371.5 CM/SEC2
BH CV ECHO MEAS - MV DEC TIME: 0.37 SEC
BH CV ECHO MEAS - MV E MAX VEL: 115 CM/SEC
BH CV ECHO MEAS - MV E/A: 0.85
BH CV ECHO MEAS - MV MAX PG: 7.5 MMHG
BH CV ECHO MEAS - MV MEAN PG: 3 MMHG
BH CV ECHO MEAS - MV P1/2T: 97 MSEC
BH CV ECHO MEAS - MV V2 VTI: 38.5 CM
BH CV ECHO MEAS - MVA(P1/2T): 2.27 CM2
BH CV ECHO MEAS - MVA(VTI): 1.91 CM2
BH CV ECHO MEAS - PA V2 MAX: 128.5 CM/SEC
BH CV ECHO MEAS - QP/QS: 0.85
BH CV ECHO MEAS - RV MAX PG: 4 MMHG
BH CV ECHO MEAS - RV V1 MAX: 99.8 CM/SEC
BH CV ECHO MEAS - RV V1 VTI: 24.5 CM
BH CV ECHO MEAS - RVDD: 2.4 CM
BH CV ECHO MEAS - RVOT DIAM: 1.8 CM
BH CV ECHO MEAS - SV(LVOT): 73.4 ML
BH CV ECHO MEAS - SV(MOD-SP4): 89.7 ML
BH CV ECHO MEAS - SV(RVOT): 62.3 ML
BH CV ECHO MEAS - TR MAX PG: 34.6 MMHG
BH CV ECHO MEAS - TR MAX VEL: 294 CM/SEC
BH CV ECHO MEASUREMENTS AVERAGE E/E' RATIO: 16.79
BUN SERPL-MCNC: 14 MG/DL (ref 8–23)
BUN/CREAT SERPL: 13.7 (ref 7–25)
C PNEUM DNA NPH QL NAA+NON-PROBE: NOT DETECTED
CALCIUM SPEC-SCNC: 9.5 MG/DL (ref 8.6–10.5)
CHLORIDE SERPL-SCNC: 102 MMOL/L (ref 98–107)
CO2 SERPL-SCNC: 23.5 MMOL/L (ref 22–29)
CREAT SERPL-MCNC: 1.02 MG/DL (ref 0.57–1)
DEPRECATED RDW RBC AUTO: 52.6 FL (ref 37–54)
EGFRCR SERPLBLD CKD-EPI 2021: 53.4 ML/MIN/1.73
EOSINOPHIL # BLD AUTO: 0.09 10*3/MM3 (ref 0–0.4)
EOSINOPHIL NFR BLD AUTO: 0.9 % (ref 0.3–6.2)
ERYTHROCYTE [DISTWIDTH] IN BLOOD BY AUTOMATED COUNT: 15.5 % (ref 12.3–15.4)
FLUAV SUBTYP SPEC NAA+PROBE: NOT DETECTED
FLUBV RNA ISLT QL NAA+PROBE: NOT DETECTED
GLUCOSE SERPL-MCNC: 122 MG/DL (ref 65–99)
HADV DNA SPEC NAA+PROBE: NOT DETECTED
HCOV 229E RNA SPEC QL NAA+PROBE: NOT DETECTED
HCOV HKU1 RNA SPEC QL NAA+PROBE: NOT DETECTED
HCOV NL63 RNA SPEC QL NAA+PROBE: NOT DETECTED
HCOV OC43 RNA SPEC QL NAA+PROBE: NOT DETECTED
HCT VFR BLD AUTO: 37.1 % (ref 34–46.6)
HGB BLD-MCNC: 11.8 G/DL (ref 12–15.9)
HMPV RNA NPH QL NAA+NON-PROBE: NOT DETECTED
HPIV1 RNA ISLT QL NAA+PROBE: NOT DETECTED
HPIV2 RNA SPEC QL NAA+PROBE: NOT DETECTED
HPIV3 RNA NPH QL NAA+PROBE: NOT DETECTED
HPIV4 P GENE NPH QL NAA+PROBE: NOT DETECTED
IMM GRANULOCYTES # BLD AUTO: 0.03 10*3/MM3 (ref 0–0.05)
IMM GRANULOCYTES NFR BLD AUTO: 0.3 % (ref 0–0.5)
LYMPHOCYTES # BLD AUTO: 2.24 10*3/MM3 (ref 0.7–3.1)
LYMPHOCYTES NFR BLD AUTO: 21.8 % (ref 19.6–45.3)
M PNEUMO IGG SER IA-ACNC: NOT DETECTED
MCH RBC QN AUTO: 29.4 PG (ref 26.6–33)
MCHC RBC AUTO-ENTMCNC: 31.8 G/DL (ref 31.5–35.7)
MCV RBC AUTO: 92.3 FL (ref 79–97)
MONOCYTES # BLD AUTO: 1.14 10*3/MM3 (ref 0.1–0.9)
MONOCYTES NFR BLD AUTO: 11.1 % (ref 5–12)
NEUTROPHILS NFR BLD AUTO: 6.71 10*3/MM3 (ref 1.7–7)
NEUTROPHILS NFR BLD AUTO: 65.2 % (ref 42.7–76)
NRBC BLD AUTO-RTO: 0 /100 WBC (ref 0–0.2)
PHOSPHATE SERPL-MCNC: 3.6 MG/DL (ref 2.5–4.5)
PLATELET # BLD AUTO: 244 10*3/MM3 (ref 140–450)
PMV BLD AUTO: 11.1 FL (ref 6–12)
POTASSIUM SERPL-SCNC: 3.8 MMOL/L (ref 3.5–5.2)
PROCALCITONIN SERPL-MCNC: 0.07 NG/ML (ref 0–0.25)
QT INTERVAL: 485 MS
QTC INTERVAL: 499 MS
RBC # BLD AUTO: 4.02 10*6/MM3 (ref 3.77–5.28)
RHINOVIRUS RNA SPEC NAA+PROBE: NOT DETECTED
RSV RNA NPH QL NAA+NON-PROBE: NOT DETECTED
SARS-COV-2 RNA NPH QL NAA+NON-PROBE: NOT DETECTED
SINUS: 2.5 CM
SODIUM SERPL-SCNC: 138 MMOL/L (ref 136–145)
WBC NRBC COR # BLD AUTO: 10.28 10*3/MM3 (ref 3.4–10.8)

## 2024-06-22 PROCEDURE — 99232 SBSQ HOSP IP/OBS MODERATE 35: CPT | Performed by: INTERNAL MEDICINE

## 2024-06-22 PROCEDURE — 93306 TTE W/DOPPLER COMPLETE: CPT | Performed by: INTERNAL MEDICINE

## 2024-06-22 PROCEDURE — 80069 RENAL FUNCTION PANEL: CPT | Performed by: HOSPITALIST

## 2024-06-22 PROCEDURE — 84145 PROCALCITONIN (PCT): CPT | Performed by: INTERNAL MEDICINE

## 2024-06-22 PROCEDURE — 87040 BLOOD CULTURE FOR BACTERIA: CPT | Performed by: INTERNAL MEDICINE

## 2024-06-22 PROCEDURE — 85025 COMPLETE CBC W/AUTO DIFF WBC: CPT | Performed by: HOSPITALIST

## 2024-06-22 PROCEDURE — 0202U NFCT DS 22 TRGT SARS-COV-2: CPT | Performed by: INTERNAL MEDICINE

## 2024-06-22 PROCEDURE — 71045 X-RAY EXAM CHEST 1 VIEW: CPT

## 2024-06-22 PROCEDURE — 93306 TTE W/DOPPLER COMPLETE: CPT

## 2024-06-22 PROCEDURE — 25810000003 LACTATED RINGERS PER 1000 ML: Performed by: STUDENT IN AN ORGANIZED HEALTH CARE EDUCATION/TRAINING PROGRAM

## 2024-06-22 RX ORDER — AMLODIPINE BESYLATE 5 MG/1
5 TABLET ORAL
Qty: 30 TABLET | Refills: 0 | Status: SHIPPED | OUTPATIENT
Start: 2024-06-23 | End: 2024-06-23

## 2024-06-22 RX ORDER — CLARITHROMYCIN 250 MG/1
500 TABLET, FILM COATED ORAL 2 TIMES DAILY
Status: DISCONTINUED | OUTPATIENT
Start: 2024-06-22 | End: 2024-06-23 | Stop reason: HOSPADM

## 2024-06-22 RX ADMIN — PANTOPRAZOLE SODIUM 40 MG: 40 TABLET, DELAYED RELEASE ORAL at 05:49

## 2024-06-22 RX ADMIN — CLARITHROMYCIN 500 MG: 250 TABLET, FILM COATED ORAL at 12:49

## 2024-06-22 RX ADMIN — HYDRALAZINE HYDROCHLORIDE 100 MG: 25 TABLET ORAL at 09:21

## 2024-06-22 RX ADMIN — ALLOPURINOL 100 MG: 100 TABLET ORAL at 09:22

## 2024-06-22 RX ADMIN — SODIUM CHLORIDE, POTASSIUM CHLORIDE, SODIUM LACTATE AND CALCIUM CHLORIDE 75 ML/HR: 600; 310; 30; 20 INJECTION, SOLUTION INTRAVENOUS at 14:13

## 2024-06-22 RX ADMIN — SODIUM CHLORIDE, POTASSIUM CHLORIDE, SODIUM LACTATE AND CALCIUM CHLORIDE 75 ML/HR: 600; 310; 30; 20 INJECTION, SOLUTION INTRAVENOUS at 01:15

## 2024-06-22 RX ADMIN — Medication 10 ML: at 09:22

## 2024-06-22 RX ADMIN — AMLODIPINE BESYLATE 5 MG: 5 TABLET ORAL at 09:22

## 2024-06-22 RX ADMIN — Medication 10 ML: at 20:26

## 2024-06-22 RX ADMIN — Medication 5 MG: at 20:26

## 2024-06-22 RX ADMIN — GABAPENTIN 100 MG: 100 CAPSULE ORAL at 20:26

## 2024-06-22 RX ADMIN — TRAZODONE HYDROCHLORIDE 50 MG: 50 TABLET ORAL at 20:26

## 2024-06-22 RX ADMIN — ALPRAZOLAM 0.5 MG: 0.5 TABLET ORAL at 07:18

## 2024-06-22 RX ADMIN — SENNOSIDES AND DOCUSATE SODIUM 2 TABLET: 50; 8.6 TABLET ORAL at 19:41

## 2024-06-22 RX ADMIN — DONEPEZIL HYDROCHLORIDE 10 MG: 5 TABLET, FILM COATED ORAL at 20:26

## 2024-06-22 RX ADMIN — CLARITHROMYCIN 500 MG: 250 TABLET, FILM COATED ORAL at 20:25

## 2024-06-22 RX ADMIN — ACETAMINOPHEN 650 MG: 325 TABLET, FILM COATED ORAL at 20:26

## 2024-06-22 RX ADMIN — HYDRALAZINE HYDROCHLORIDE 100 MG: 25 TABLET ORAL at 20:26

## 2024-06-22 NOTE — PROGRESS NOTES
[facility] MED[facility]INE SERV[facility]  DAILY PROGRESS NOTE    NAME: Leandra Nuñez  : 1935  MRN: [id number]      LOS: 2 days     PROVIDER OF SERVICE: Elvin Lawton MD    Chief Complaint: Fall    Subjective:     Interval History:  History taken from: patient  Patient Complaints:     Overall feeling weak however no specific complaints of pain anywhere, patient does use a walker at home does not recall exactly the incident of falling however she did report that she just lost her balance does not recall if she tripped, she definitely did not lose her consciousness, denied having palpitations, no chest pain    24    Doing well this morning no specific complaint patient denied chest pain, no shortness of breath, no GI/ complaints, although the initial plan was to consider discharging patient home after the family refused SNF however reviewing the last 24 hours vital signs noticed that the patient did have a temperature last night, I did call her daughter Ms. Rooney the POA explained this new event and the need for further workup.  She did mention to me that the patient still on clarithromycin as a part of treatment for H. pylori however she is not completely sure if she finished the course or not, she is going to check with her daughter and get back with us    Objective:     Vital Signs  Temp:  [97.2 °F (36.2 °C)-100.9 °F (38.3 °C)] 98.1 °F (36.7 °C)  Heart Rate:  [49-71] 57  Resp:  [13-23] 18  BP: (120-188)/(57-86) 131/67  Flow (L/min):  [1] 1   Body mass index is 38.96 kg/m².    Physical Exam  General Appearance:  Alert, cooperative, no distress, appears stated age  Head:   Normocephalic, without obvious abnormality, atraumatic  Eyes:   PERRL, conjunctiva/corneas clear, EOM's intact, fundi benign, both eyes  Ears:    Normal TM's and external ear canals, both ears  Nose:   Nares normal, septum midline, mucosa normal, no drainage or sinus tenderness  Throat: Lips, mucosa, and tongue normal; teeth and gums  normal  Neck:   Supple, symmetrical, trachea midline, no adenopathy, thyroid: not enlarged, symmetric, no tenderness/mass/nodules, no carotid bruit or JVD  Lungs:             Clear to auscultation bilaterally, respirations unlabored  Heart:  Regular rate and rhythm, S1, S2 normal, no murmur, rub or gallop  Abdomen:  Soft, non-tender, bowel sounds active all four quadrants,  no masses, no organomegaly  Extremities:     Extremities normal, atraumatic, no cyanosis or edema  Pulses:            2+ and symmetric  Skin:    Skin color, texture, turgor normal, no rashes or lesions  Neurologic: Normal    Scheduled Meds   allopurinol, 100 mg, Oral, Daily  amLODIPine, 5 mg, Oral, Q24H  clarithromycin, 500 mg, Oral, BID  donepezil, 10 mg, Oral, Nightly  gabapentin, 100 mg, Oral, Nightly  hydrALAZINE, 100 mg, Oral, BID  melatonin, 5 mg, Oral, Nightly  pantoprazole, 40 mg, Oral, Q AM  sodium chloride, 10 mL, Intravenous, Q12H  traZODone, 50 mg, Oral, Nightly       PRN Meds     acetaminophen    ALPRAZolam    senna-docusate sodium **AND** [DISCONTINUED] polyethylene glycol **AND** bisacodyl **AND** [DISCONTINUED] bisacodyl    bisacodyl    Calcium Replacement - Follow Nurse / BPA Driven Protocol    furosemide    hydrALAZINE    [Held by provider] HYDROcodone-acetaminophen    Magnesium Standard Dose Replacement - Follow Nurse / BPA Driven Protocol    nitroglycerin    ondansetron ODT **OR** ondansetron    [Held by provider] ondansetron ODT    Phosphorus Replacement - Follow Nurse / BPA Driven Protocol    [Held by provider] polyethylene glycol    [Held by provider] potassium chloride    Potassium Replacement - Follow Nurse / BPA Driven Protocol    sodium chloride    sodium chloride   Infusions  lactated ringers, 75 mL/hr, Last Rate: 75 mL/hr (06/22/24 0115)          Diagnostic Data    Results from last 7 days   Lab Units 06/22/24  0124 06/21/24  0203 06/20/24  0506   WBC 10*3/mm3 10.28   < >  --    HEMOGLOBIN g/dL 11.8*   < >  --     HEMATOCRIT % 37.1   < >  --    PLATELETS 10*3/mm3 244   < >  --    GLUCOSE mg/dL 122*   < > 133*   CREATININE mg/dL 1.02*   < > 0.89   BUN mg/dL 14   < > 20   SODIUM mmol/L 138   < > 141   POTASSIUM mmol/L 3.8   < > 3.7   AST (SGOT) U/L  --   --  64*   ALT (SGPT) U/L  --   --  35*   ALK PHOS U/L  --   --  124*   BILIRUBIN mg/dL  --   --  0.3   ANION GAP mmol/L 12.5   < > 13.5    < > = values in this interval not displayed.       No radiology results for the last day      I reviewed the patient's new clinical results.    Assessment/Plan:     Active and Resolved Problems  Active Hospital Problems    Diagnosis  POA    **Fall [W19.XXXA]  Yes    Recurrent falls [R29.6]  Not Applicable      Resolved Hospital Problems   No resolved problems to display.       1-recent recurrent falls with overall generalized weakness   -workup in ED did not reveal any acute process in regard to imaging studies  -EKG reviewed personally, normal sinus rhythm, PACs, no ischemic changes  -No infectious process  -PT OT, recommending SNF however the family declined and asked only for home health    2-elevated troponin  -EKG no ischemic changes  -Echo is being done this morning, if no major abnormality no further workup is needed at this point    3-RODERICK on CKD stage IIIa  -Back to baseline  -Likely related to hemodynamic changes  -Keep on Norvasc 5 mg p.o. daily  -Continue holding diuretics  -Renal ultrasound is pending  -Avoid nephrotoxic's    4-low-grade fever last night  -No clear etiology so far  -Patient does not have any subjective complaint  -She did not have fever upon initial presentation to the ED for which no detailed workup was done  -Called daughter and updated her, at this point we will check chest x-ray, procalcitonin, RVP, blood culture, CBC with differential in a.m., depends on workup and clinical progress in the next 24 hours if patient remains afebrile and the above workup is negative then at that point will discharge patient  home with home health tomorrow      VTE Prophylaxis:  Mechanical VTE prophylaxis orders are present.         Code status is   There are no questions and answers to display.       Plan for disposition: Likely home tomorrow with home health    Time: 30 minutes    Signature: Electronically signed by Elvin Lawton MD, 06/22/24, 09:41 EDT.  [facility] Hospitalist Team

## 2024-06-22 NOTE — PROGRESS NOTES
CC--- hypertension mechanical fall    Sub  Patient clinically doing well and denies any new symptoms      Past Medical History:   Diagnosis Date    Anxiety     Arthritis     Breast nodule 2013    Left breast nodule (fibrocystic disease , no malignancy)     Cancer     Cataract     Chronic kidney disease     HL (hearing loss)     Hyperlipidemia     Hypertension     Obesity     Shortness of breath      Past Surgical History:   Procedure Laterality Date    BREAST BIOPSY Left 05/21/2013    Benign fibrocystic disease ,[facility].    CARDIAC CATHETERIZATION      COLONOSCOPY N/A 5/29/2024    Procedure: COLONOSCOPY WITH POLYPECTOMY X5 AND ENDOSCOPIC MUCOSAL RESECTION OF RECTAL POLYP;  Surgeon: Joon Martin MD;  Location: [facility] ENDOSCOPY;  Service: Gastroenterology;  Laterality: N/A;  POST: DIVERTICULOSIS, POLYPS    D & C AND LAPAROSCOPY      ENDOSCOPY N/A 5/29/2024    Procedure: ESOPHAGOGASTRODUODENOSCOPY WITH BIOPSY X1 AREA;  Surgeon: Joon Martin MD;  Location: [facility] ENDOSCOPY;  Service: Gastroenterology;  Laterality: N/A;  POST: GASTRITIS, ESOPHAGITIS    FULGURATION ENDOMETRIOSIS      surgery    NEPHRECTOMY Right     ORIF HUMERUS FRACTURE Left 3/24/2021    Procedure: HUMERUS PROXIMAL OPEN REDUCTION INTERNAL FIXATION;  Surgeon: Yair Anne MD;  Location: [facility] MAIN OR;  Service: Orthopedics;  Laterality: Left;       Physical Exam    General:      well developed, well nourished, in no acute distress.    Head:      normocephalic and atraumatic.    Eyes:      PERRL/EOM intact, conjunctivae and sclerae clear without nystagmus.    Neck:      no  thyromegaly, trachea central with normal respiratory effort  Lungs:      clear bilaterally to auscultation.    Heart:       regular rate and rhythm, S1, S2 without  rubs, or gallops  Skin:      intact without lesions or rashes.    Psych:      alert and cooperative; normal mood and affect; normal attention span and concentration.              CBC    Results  from last 7 days   Lab Units 06/22/24  0124 06/21/24  0203 06/20/24  0413   WBC 10*3/mm3 10.28 11.44* 12.28*   HEMOGLOBIN g/dL 11.8* 12.1 13.6   PLATELETS 10*3/mm3 244 317 308     BMP   Results from last 7 days   Lab Units 06/22/24  0124 06/21/24  0203 06/20/24  0506   SODIUM mmol/L 138 136 141   POTASSIUM mmol/L 3.8 4.2 3.7   CHLORIDE mmol/L 102 102 103   CO2 mmol/L 23.5 22.0 24.5   BUN mg/dL 14 18 20   CREATININE mg/dL 1.02* 1.27* 0.89   GLUCOSE mg/dL 122* 117* 133*   MAGNESIUM mg/dL  --   --  2.1   PHOSPHORUS mg/dL 3.6  --   --      CMP   Results from last 7 days   Lab Units 06/22/24  0124 06/21/24  0203 06/20/24  0506   SODIUM mmol/L 138 136 141   POTASSIUM mmol/L 3.8 4.2 3.7   CHLORIDE mmol/L 102 102 103   CO2 mmol/L 23.5 22.0 24.5   BUN mg/dL 14 18 20   CREATININE mg/dL 1.02* 1.27* 0.89   GLUCOSE mg/dL 122* 117* 133*   ALBUMIN g/dL 3.7  --  4.3   BILIRUBIN mg/dL  --   --  0.3   ALK PHOS U/L  --   --  124*   AST (SGOT) U/L  --   --  64*   ALT (SGPT) U/L  --   --  35*     Radiology(recent) XR Chest 1 View    Result Date: 6/22/2024  Impression: No acute process. Electronically Signed: Giacomo Moreno MD  6/22/2024 11:40 AM EDT  Workstation ID: [id number]       Assessment plan    Echocardiogram with aortic valve sclerosis with normal EF  Hypertension controlled with current regimen  Post mechanical fall  History of dementia on therapy  Patient can be discharged home and followed as an outpatient      Electronically signed by Damien Ty MD, 06/22/24, 1:02 PM EDT.

## 2024-06-22 NOTE — PROGRESS NOTES
[facility] Progress Note      Patient Name: Leandra Nuñez  : 1935  MRN: [id number]      Subjective     Follow Up for :  RODERICK      Review of Systems:   14 point review of systems is otherwise negative except for mentioned above on HPI    Personal History     Past Medical History:   Diagnosis Date    Anxiety     Arthritis     Breast nodule     Left breast nodule (fibrocystic disease , no malignancy)     Cancer     Cataract     Chronic kidney disease     HL (hearing loss)     Hyperlipidemia     Hypertension     Obesity     Shortness of breath        Past Surgical History:   Procedure Laterality Date    BREAST BIOPSY Left 2013    Benign fibrocystic disease ,Abstracted from [facility].    CARDIAC CATHETERIZATION      COLONOSCOPY N/A 2024    Procedure: COLONOSCOPY WITH POLYPECTOMY X5 AND ENDOSCOPIC MUCOSAL RESECTION OF RECTAL POLYP;  Surgeon: Joon Martin MD;  Location: [facility] ENDOSCOPY;  Service: Gastroenterology;  Laterality: N/A;  POST: DIVERTICULOSIS, POLYPS    D & C AND LAPAROSCOPY      ENDOSCOPY N/A 2024    Procedure: ESOPHAGOGASTRODUODENOSCOPY WITH BIOPSY X1 AREA;  Surgeon: Joon Martin MD;  Location: [facility] ENDOSCOPY;  Service: Gastroenterology;  Laterality: N/A;  POST: GASTRITIS, ESOPHAGITIS    FULGURATION ENDOMETRIOSIS      surgery    NEPHRECTOMY Right     ORIF HUMERUS FRACTURE Left 3/24/2021    Procedure: HUMERUS PROXIMAL OPEN REDUCTION INTERNAL FIXATION;  Surgeon: Yair Anne MD;  Location: [facility] MAIN OR;  Service: Orthopedics;  Laterality: Left;       Objective     Vitals:   Temp:  [97.2 °F (36.2 °C)-100.9 °F (38.3 °C)] 98.1 °F (36.7 °C)  Heart Rate:  [49-71] 49  Resp:  [13-23] 18  BP: (120-188)/(57-86) 131/67  Flow (L/min):  [1] 1    Intake/Output Summary (Last 24 hours) at 2024 0732  Last data filed at 2024 1823  Gross per 24 hour   Intake 220 ml   Output 825 ml   Net -605 ml       Physical Exam:   Constitutional:  Looking  HEENT: Sclera anicteric, no conjunctival injection  Neck: Supple, no thyromegaly, no lymphadenopathy, trachea at midline, no JVD  Respiratory: Clear to auscultation bilaterally, nonlabored respiration  Cardiovascular: RRR, no murmurs, no rubs or gallops, no carotid bruit  Gastrointestinal: Positive bowel sounds, abdomen is soft, nontender and nondistended  : No palpable bladder  Musculoskeletal: No edema, no clubbing or cyanosis  Psychiatric: Appropriate affect, cooperative  Neurologic: Oriented x3, moving all extremities, normal speech and mental status  Skin: Warm and dry       Scheduled Meds:     allopurinol, 100 mg, Oral, Daily  amLODIPine, 5 mg, Oral, Q24H  donepezil, 10 mg, Oral, Nightly  gabapentin, 100 mg, Oral, Nightly  hydrALAZINE, 100 mg, Oral, BID  melatonin, 5 mg, Oral, Nightly  pantoprazole, 40 mg, Oral, Q AM  QUEtiapine, 25 mg, Oral, Nightly  sodium chloride, 10 mL, Intravenous, Q12H  traZODone, 50 mg, Oral, Nightly      IV Meds:   lactated ringers, 75 mL/hr, Last Rate: 75 mL/hr (06/22/24 0115)        Results Reviewed:   I have personally reviewed the results from the time of this admission to 6/22/2024 07:32 EDT     Lab Results   Component Value Date    GLUCOSE 122 (H) 06/22/2024    CALCIUM 9.5 06/22/2024     06/22/2024    K 3.8 06/22/2024    CO2 23.5 06/22/2024     06/22/2024    BUN 14 06/22/2024    CREATININE 1.02 (H) 06/22/2024    EGFRIFNONA 51 (L) 01/14/2022    BCR 13.7 06/22/2024    ANIONGAP 12.5 06/22/2024      Lab Results   Component Value Date    MG 2.1 06/20/2024    PHOS 3.6 06/22/2024    ALBUMIN 3.7 06/22/2024           Assessment / Plan     ASSESSMENT:  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  Serology of worsening kidney function likely secondary to hypertensive urgency in setting of diuretic use.  Patient noted decreased oral intake  Recurrent falls  Hypertensive urgency with systolic blood pressure above  200 started on Norvasc this morning.  But allow permissive hypertension in order to prevent worsening of kidney function  Type 2 diabetes mellitus with CKD  Chronic kidney disease stage IIIa followed by Dr. Mitchell  Dementia  Falls evaluated with no fractures        PLAN:  Continue LR gtt one more day with renal function improving. Urine sodium is 70.  Continue norvasc, BP is better  Hold diuretic therapy for now        Alysia Renae MD  06/22/24  07:32 EDT    [facility]  [phone]    Parts of this note may be an electronic transcription/translation of spoken language to printed text using the Dragon dictation system.

## 2024-06-22 NOTE — PLAN OF CARE
Goal Outcome Evaluation:              Outcome Evaluation: patient in bed with call ligtht in reach. Patient awaiting echo in the morning

## 2024-06-22 NOTE — PLAN OF CARE
Goal Outcome Evaluation:  Plan of Care Reviewed With: patient           Outcome Evaluation: patient laying in bed today no complaints echo was done as well as a chest xray today and multiple labs, patient has been orient to person, place most of the day and time most of the day. various family members at bedside throughtout the day. patient denies complaints of pain unless she tries to go to the bedside commode ambulation was more limited than expected from report and patient states her feet hurt. Hospitalist states he spoke with her daughter this am and together they decided that patient should stay a bit longer. patient is agreeable to plan of care and has taken meds well without difficulty today

## 2024-06-23 ENCOUNTER — READMISSION MANAGEMENT (OUTPATIENT)
Dept: CALL CENTER | Facility: HOSPITAL | Age: 89
End: 2024-06-23
Payer: MEDICARE

## 2024-06-23 ENCOUNTER — HOME HEALTH ADMISSION (OUTPATIENT)
Dept: HOME HEALTH SERVICES | Facility: HOME HEALTHCARE | Age: 89
End: 2024-06-23
Payer: MEDICARE

## 2024-06-23 VITALS
OXYGEN SATURATION: 90 % | SYSTOLIC BLOOD PRESSURE: 157 MMHG | TEMPERATURE: 97.7 F | HEIGHT: 64 IN | WEIGHT: 227 LBS | HEART RATE: 77 BPM | BODY MASS INDEX: 38.76 KG/M2 | DIASTOLIC BLOOD PRESSURE: 54 MMHG | RESPIRATION RATE: 12 BRPM

## 2024-06-23 LAB
ANION GAP SERPL CALCULATED.3IONS-SCNC: 11.5 MMOL/L (ref 5–15)
BUN SERPL-MCNC: 13 MG/DL (ref 8–23)
BUN/CREAT SERPL: 13.3 (ref 7–25)
CALCIUM SPEC-SCNC: 9.7 MG/DL (ref 8.6–10.5)
CHLORIDE SERPL-SCNC: 102 MMOL/L (ref 98–107)
CO2 SERPL-SCNC: 24.5 MMOL/L (ref 22–29)
CREAT SERPL-MCNC: 0.98 MG/DL (ref 0.57–1)
DEPRECATED RDW RBC AUTO: 51.9 FL (ref 37–54)
EGFRCR SERPLBLD CKD-EPI 2021: 55.6 ML/MIN/1.73
ERYTHROCYTE [DISTWIDTH] IN BLOOD BY AUTOMATED COUNT: 15.4 % (ref 12.3–15.4)
GLUCOSE SERPL-MCNC: 114 MG/DL (ref 65–99)
HCT VFR BLD AUTO: 38.8 % (ref 34–46.6)
HGB BLD-MCNC: 12.2 G/DL (ref 12–15.9)
MCH RBC QN AUTO: 28.9 PG (ref 26.6–33)
MCHC RBC AUTO-ENTMCNC: 31.4 G/DL (ref 31.5–35.7)
MCV RBC AUTO: 91.9 FL (ref 79–97)
PLATELET # BLD AUTO: 287 10*3/MM3 (ref 140–450)
PMV BLD AUTO: 11.2 FL (ref 6–12)
POTASSIUM SERPL-SCNC: 3.6 MMOL/L (ref 3.5–5.2)
RBC # BLD AUTO: 4.22 10*6/MM3 (ref 3.77–5.28)
SODIUM SERPL-SCNC: 138 MMOL/L (ref 136–145)
WBC NRBC COR # BLD AUTO: 11.29 10*3/MM3 (ref 3.4–10.8)

## 2024-06-23 PROCEDURE — 85027 COMPLETE CBC AUTOMATED: CPT | Performed by: STUDENT IN AN ORGANIZED HEALTH CARE EDUCATION/TRAINING PROGRAM

## 2024-06-23 PROCEDURE — 25810000003 LACTATED RINGERS PER 1000 ML: Performed by: STUDENT IN AN ORGANIZED HEALTH CARE EDUCATION/TRAINING PROGRAM

## 2024-06-23 PROCEDURE — 99232 SBSQ HOSP IP/OBS MODERATE 35: CPT | Performed by: INTERNAL MEDICINE

## 2024-06-23 PROCEDURE — 80048 BASIC METABOLIC PNL TOTAL CA: CPT | Performed by: STUDENT IN AN ORGANIZED HEALTH CARE EDUCATION/TRAINING PROGRAM

## 2024-06-23 RX ORDER — AMLODIPINE BESYLATE 5 MG/1
5 TABLET ORAL
Qty: 30 TABLET | Refills: 0 | Status: SHIPPED | OUTPATIENT
Start: 2024-06-23 | End: 2024-07-23

## 2024-06-23 RX ADMIN — ALLOPURINOL 100 MG: 100 TABLET ORAL at 09:51

## 2024-06-23 RX ADMIN — Medication 10 ML: at 09:55

## 2024-06-23 RX ADMIN — SODIUM CHLORIDE, POTASSIUM CHLORIDE, SODIUM LACTATE AND CALCIUM CHLORIDE 75 ML/HR: 600; 310; 30; 20 INJECTION, SOLUTION INTRAVENOUS at 06:05

## 2024-06-23 RX ADMIN — AMLODIPINE BESYLATE 5 MG: 5 TABLET ORAL at 09:51

## 2024-06-23 RX ADMIN — HYDRALAZINE HYDROCHLORIDE 100 MG: 25 TABLET ORAL at 09:51

## 2024-06-23 RX ADMIN — CLARITHROMYCIN 500 MG: 250 TABLET, FILM COATED ORAL at 09:51

## 2024-06-23 RX ADMIN — PANTOPRAZOLE SODIUM 40 MG: 40 TABLET, DELAYED RELEASE ORAL at 05:19

## 2024-06-23 NOTE — PROGRESS NOTES
CC--- hypertension mechanical fall    Sub  Patient clinically doing well and denies any new symptoms  Resting comfortably      Past Medical History:   Diagnosis Date    Anxiety     Arthritis     Breast nodule 2013    Left breast nodule (fibrocystic disease , no malignancy)     Cancer     Cataract     Chronic kidney disease     HL (hearing loss)     Hyperlipidemia     Hypertension     Obesity     Shortness of breath      Past Surgical History:   Procedure Laterality Date    BREAST BIOPSY Left 05/21/2013    Benign fibrocystic disease ,Abstract[facility].    CARDIAC CATHETERIZATION      COLONOSCOPY N/A 5/29/2024    Procedure: COLONOSCOPY WITH POLYPECTOMY X5 AND ENDOSCOPIC MUCOSAL RESECTION OF RECTAL POLYP;  Surgeon: Joon Martin MD;  Location: [facility] ENDOSCOPY;  Service: Gastroenterology;  Laterality: N/A;  POST: DIVERTICULOSIS, POLYPS    D & C AND LAPAROSCOPY      ENDOSCOPY N/A 5/29/2024    Procedure: ESOPHAGOGASTRODUODENOSCOPY WITH BIOPSY X1 AREA;  Surgeon: Joon Martin MD;  Location: [facility] ENDOSCOPY;  Service: Gastroenterology;  Laterality: N/A;  POST: GASTRITIS, ESOPHAGITIS    FULGURATION ENDOMETRIOSIS      surgery    NEPHRECTOMY Right     ORIF HUMERUS FRACTURE Left 3/24/2021    Procedure: HUMERUS PROXIMAL OPEN REDUCTION INTERNAL FIXATION;  Surgeon: Yair Anne MD;  Location: [facility] MAIN OR;  Service: Orthopedics;  Laterality: Left;       Physical Exam    General:      well developed, well nourished, in no acute distress.    Head:      normocephalic and atraumatic.    Eyes:      PERRL/EOM intact, conjunctivae and sclerae clear without nystagmus.    Neck:      no  thyromegaly, trachea central with normal respiratory effort  Lungs:      clear bilaterally to auscultation.    Heart:       regular rate and rhythm, S1, S2 without murmurs, rubs, or gallops  Skin:      intact without lesions or rashes.    Psych:      alert and cooperative; normal mood and affect; normal attention span and  concentration.          CBC    Results from last 7 days   Lab Units 06/23/24  0519 06/22/24  0124 06/21/24  0203 06/20/24  0413   WBC 10*3/mm3 11.29* 10.28 11.44* 12.28*   HEMOGLOBIN g/dL 12.2 11.8* 12.1 13.6   PLATELETS 10*3/mm3 287 244 317 308     BMP   Results from last 7 days   Lab Units 06/23/24 0519 06/22/24 0124 06/21/24  0203 06/20/24  0506   SODIUM mmol/L 138 138 136 141   POTASSIUM mmol/L 3.6 3.8 4.2 3.7   CHLORIDE mmol/L 102 102 102 103   CO2 mmol/L 24.5 23.5 22.0 24.5   BUN mg/dL 13 14 18 20   CREATININE mg/dL 0.98 1.02* 1.27* 0.89   GLUCOSE mg/dL 114* 122* 117* 133*   MAGNESIUM mg/dL  --   --   --  2.1   PHOSPHORUS mg/dL  --  3.6  --   --      CMP   Results from last 7 days   Lab Units 06/23/24 0519 06/22/24 0124 06/21/24  0203 06/20/24  0506   SODIUM mmol/L 138 138 136 141   POTASSIUM mmol/L 3.6 3.8 4.2 3.7   CHLORIDE mmol/L 102 102 102 103   CO2 mmol/L 24.5 23.5 22.0 24.5   BUN mg/dL 13 14 18 20   CREATININE mg/dL 0.98 1.02* 1.27* 0.89   GLUCOSE mg/dL 114* 122* 117* 133*   ALBUMIN g/dL  --  3.7  --  4.3   BILIRUBIN mg/dL  --   --   --  0.3   ALK PHOS U/L  --   --   --  124*   AST (SGOT) U/L  --   --   --  64*   ALT (SGPT) U/L  --   --   --  35*       Assessment plan    Echocardiogram with aortic valve sclerosis with normal EF  Hypertension controlled with current regimen  Post mechanical fall  History of dementia on therapy  Patient can be discharged home and followed as an outpatient  Clinically stable no new changes      Electronically signed by Damien Ty MD, 06/23/24, 10:11 AM EDT.

## 2024-06-23 NOTE — CASE MANAGEMENT/SOCIAL WORK
Continued Stay Note  [facility]     Patient Name: Leandra Nuñez  MRN: [id number]  Today's Date: 6/23/2024    Admit Date: 6/20/2024    Plan: home with family and [facility]- accepted and ordered.  Family refused SNf   Discharge Plan       Row Name 06/23/24 1445       Plan    Plan home with family and [facility]- accepted and ordered.  Family refused SNf    Plan Comments [facility] accepted.  first visit will be 6/26/6/27 after they contact pt's PCP for Plan of Care.  CM updated AVS and nurse via secure chat.      Row Name 06/23/24 1408       Plan    Plan home with family and - [facility] pending accept.  order in.   Family refused SNF.    Plan Comments CM spoke with dtr Dulce via phone.  Daughter updated that still seeking accepting home health. she verbalized understanding.  Dtr reports that she is going to switch her mother's insurance to traditional medicare which may be in effect on July 1.  CM clarified with MD that first visit could be delayed until July 1 if that is soonest available.  referral sent to [facility] and secure chat to central intake with above information.                   Discharge Codes    No documentation.                 Expected Discharge Date and Time       Expected Discharge Date Expected Discharge Time    Jun 23, 2024               Rosalina Wilkins, RN

## 2024-06-23 NOTE — CASE MANAGEMENT/SOCIAL WORK
Continued Stay Note  [facility]     Patient Name: Leandra Nuñez  MRN: [id number]  Today's Date: 6/23/2024    Admit Date: 6/20/2024    Plan: home with family and [facility]- accepted and ordered.  Family refused SNf   Discharge Plan       Row Name 06/23/24 1510       Plan    Plan Comments kayleigh sibley updated via phone      Row Name 06/23/24 1445       Plan    Plan home with family and [facility]- accepted and ordered.  Family refused SNf    Plan Comments [facility] accepted.  first visit will be 6/26/6/27 after they contact pt's PCP for Plan of Care.  CM updated AVS and nurse via secure chat.      Row Name 06/23/24 1402       Plan    Plan home with family and - [facility] pending accept.  order in.   Family refused SNF.    Plan Comments CM spoke with kayleigh Cardona via phone.  Daughter updated that still seeking accepting home health. she verbalized understanding.  Dtr reports that she is going to switch her mother's insurance to traditional medicare which may be in effect on July 1.  TAVON clarified with MD that first visit could be delayed until July 1 if that is soonest available.  referral sent to [facility] and secure chat to central intake with above information.                   Discharge Codes    No documentation.                 Expected Discharge Date and Time       Expected Discharge Date Expected Discharge Time    Jun 23, 2024               Rosalina Wilkins RN

## 2024-06-23 NOTE — OUTREACH NOTE
Prep Survey      Flowsheet Row Responses   Judaism facility patient discharged from? [facility]   Is LACE score < 7 ? No   Eligibility [facility]   Date of Admission 06/20/24   Date of Discharge 06/23/24   Discharge Disposition Home-Health Care Svc   Discharge diagnosis recurrent falls   Does the patient have one of the following disease processes/diagnoses(primary or secondary)? Other   Does the patient have Home health ordered? Yes   What is the Home health agency?  [facility]   Is there a DME ordered? No   Prep survey completed? Yes            Monica KINNEY - Registered Nurse

## 2024-06-23 NOTE — CASE MANAGEMENT/SOCIAL WORK
Continued Stay Note   [facility]     Patient Name: Leandra Nuñez  MRN: [id number]  Today's Date: 6/23/2024    Admit Date: 6/20/2024    Plan: home with family and - [facility] pending accept.  order in.   Family refused SNF.   Discharge Plan       Row Name 06/23/24 1402       Plan    Plan home with family and - [facility] pending accept.  order in.   Family refused SNF.    Plan Comments CM spoke with dtr Dulce via phone.  Daughter updated that still seeking accepting home health. she verbalized understanding.  Dtr reports that she is going to switch her mother's insurance to traditional medicare which may be in effect on July 1.  TAVON clarified with MD that first visit could be delayed until July 1 if that is soonest available.  referral sent to [facility] and secure chat to central intake with above information.                      Expected Discharge Date and Time       Expected Discharge Date Expected Discharge Time    Jun 23, 2024               Rosalina Wilkins RN

## 2024-06-23 NOTE — NURSING NOTE
Patient triggered Potassium replacement protocol discussed with Dr. Lawton and he did not want potassium given today as it has been on hold.

## 2024-06-23 NOTE — DISCHARGE INSTR - OTHER ORDERS
[facility] will contact her Primary Care provider on Monday and First appoint will be on 6/26 or 6/27.

## 2024-06-23 NOTE — CASE MANAGEMENT/SOCIAL WORK
Case Management Discharge Note      Final Note: home with [facility]    Provided Post Acute Provider List?: Yes  Post Acute Provider List: Home Health, Nursing Home, Outpatient Therapy  Provided Post Acute Provider Quality & Resource List?: Yes  Post Acute Provider Quality and Resource List: Home Health, Nursing Home  Delivered To: Patient  Method of Delivery: In person    Selected Continued Care - Discharged on 6/23/2024 Admission date: 6/20/2024 - Discharge disposition: Home or Self Care        Home Medical Care Coordination complete.      Service Provider Selected Services Address Phone Fax Patient Preferred    [facility] [id number] [facility] [address] [phone] --                 Transportation Services  Private: Car    Final Discharge Disposition Code: 06 - home with home health care

## 2024-06-23 NOTE — PLAN OF CARE
Goal Outcome Evaluation:           Progress: no change  Outcome Evaluation: patient in bed with call light in reach

## 2024-06-23 NOTE — PLAN OF CARE
Goal Outcome Evaluation:  Plan of Care Reviewed With: patient, daughter        Progress: improving  Outcome Evaluation: patient resting in bed most of day pleasant and oriented x4. Patient states she does not have any questions about what is going on she would just really like to go home for her birthday and see her cat. Patient did report that her fingers and her ankle feels a little puffy but that is normal for her. Discharge orders received from hospitalist and reviewed with patient, daughter and granddaughter.  Daughter and granddaughter both via phone all state they understand discharge instructions and have no further questions at present.  Daughter had called earlier and had several questions regarding medicare vs medicare advantage and placement in SNF vs home health.  RN reached out to Rosalina  and asked her to speak with jacob via phone which did happen. Patient does have a home health referral order Rosalina states that they do not have an accepting agency at present however they will be working in the background to get that completed. Daughter and granddaughter both verbalized understanding.

## 2024-06-23 NOTE — DISCHARGE SUMMARY
[facility] Medicine Services  Discharge Summary    Date of Service: 2024  Patient Name: Leandra Nuñez  : 1935  MRN: [id number]    Date of Admission: 2024  Discharge Diagnosis:   -Recurrent falls   -RODERICK   -Elevated troponin, nonischemic nontraumatic  -One-time low-grade fever, all workup was negative    Date of Discharge:  2024  Primary Care Physician: Anabelle Sellres MD      Presenting Problem:   Dizziness [R42]  Fall [W19.XXXA]  Recurrent falls [R29.6]  Injury of chest wall, initial encounter [S29.9XXA]  Hypertension, unspecified type [I10]    Active and Resolved Hospital Problems:  Active Hospital Problems    Diagnosis POA    **Fall [W19.XXXA] Yes    Recurrent falls [R29.6] Not Applicable      Resolved Hospital Problems   No resolved problems to display.         Hospital Course     Brief HPI:    88 y.o. female with a CMH of T2DM, CKD3, HTN, HLD, dementia, anxiety who presented to [facility] on 2024 due to repeated falls. Per granddaughter, patient has fell twice in the past 24 hours. Granddaughter reported that patient fell in the morning and then she found the patient on the floor at about 2 am this morning after an unwitnessed fall. Patient did not have any apparent injuries but was reporting right lateral rib pain. Patient states she recalls falls and denies dizziness or other prodromal symptoms just prior. She believes she lost her balance while using walker. She currently denies pain. Denies hitting head and is not on blood thinners. She also denies weakness, HA, chest pain, abdominal pain, nausea, vomiting, fever and chills. Denies dysuria or other urinary symptoms. Denies misuse of benzodiazepine or narcotics.      On ED evaluation, patient HDS. Labs pertinent for mild leukocytosis of 12.28 but otherwise unremarkable. Troponin was 25. EKG was sinus rhythm with PACs, rate 68, no ischemic changes. UA was clean, no evidence of infection.  CTH, CT facial bones and CT chest wo with no acute findings. Hospitalist service to admit for PT evaluation and possible placement.   Hospital Course:    1-recent recurrent falls with overall generalized weakness   -workup in ED did not reveal any acute process in regard to imaging studies  -EKG reviewed personally, normal sinus rhythm, PACs, no ischemic changes  -No infectious process  -PT OT, recommending SNF however the family declined and asked only for home health, order was placed     2-elevated troponin  -EKG no ischemic changes  -Echo did not show any ischemic changes  -Evaluated by cardiology, no further workup is needed, this likely nonischemic nontraumatic secondary to RODERICK on CKD     3-RODERICK on CKD stage IIIa  -Back to baseline  -Likely related to hemodynamic changes  -Started on Norvasc 5 mg p.o. daily for better control on her blood pressure     4-low-grade fever last night  -Happened on Friday evening, for which I called patient daughter yesterday morning and later discharged to do extra workup including chest x-ray, blood culture, urinalysis which has been negative, patient since then did not have any further fever neither that she had any specific subjective complaint might indicate the possibility of underlying infection    --- On Friday during  multidisciplinary rounds staff did ask for neurology consult based on one of the family member request  To be evaluated for possible dementia, I informed the staff during the rounds that this is an outpatient evaluation however seems that someone Friday late afternoon placed a consult for neurology, please refer to neurology note.  Will place referral for outpt neurology   DISCHARGE Follow Up Recommendations for labs and diagnostics:   Follow-up with primary care physician in the next 1 to 2 weeks, home health ordered, pending acceptance      Reasons For Change In Medications and Indications for New Medications:      Day of Discharge     Vital Signs:  Temp:   [97.6 °F (36.4 °C)-98.8 °F (37.1 °C)] 97.6 °F (36.4 °C)  Heart Rate:  [57-74] 68  Resp:  [14-26] 16  BP: (141-159)/(68-80) 154/77  Flow (L/min):  [0.5] 0.5    Physical Exam:    General Appearance:    Alert, cooperative, in no acute distress   Head:    Normocephalic, without obvious abnormality, atraumatic   Eyes:            conjunctivae and sclerae normal, no   icterus, no pallor, corneas  clear, PERRLA   Neck:   No adenopathy, supple, trachea midline, no thyromegaly, no   carotid bruit, no JVD   Lungs:     Clear to auscultation,respirations regular, even and                  unlabored    Heart:    Regular rhythm and normal rate, normal S1 and S2, no            murmur, no gallop, no rub, no click   Abdomen:     Normal bowel sounds, no masses, no organomegaly, soft        non-tender, non-distended, no guarding, no rebound                No tenderness   Extremities:   Moves all extremities well, no edema, no cyanosis, no             redness   Lymph nodes:   No palpable adenopathy   Neurologic:   Cranial nerves 2 - 12 grossly intact, sensation intact, DTR       present and equal bilaterally          Pertinent  and/or Most Recent Results     LAB RESULTS:      Lab 06/23/24  0519 06/22/24  1158 06/22/24  0124 06/21/24  0203 06/20/24  0413   WBC 11.29*  --  10.28 11.44* 12.28*   HEMOGLOBIN 12.2  --  11.8* 12.1 13.6   HEMATOCRIT 38.8  --  37.1 38.6 42.9   PLATELETS 287  --  244 317 308   NEUTROS ABS  --   --  6.71  --  9.53*   IMMATURE GRANS (ABS)  --   --  0.03  --  0.05   LYMPHS ABS  --   --  2.24  --  1.95   MONOS ABS  --   --  1.14*  --  0.61   EOS ABS  --   --  0.09  --  0.06   MCV 91.9  --  92.3 93.2 93.3   PROCALCITONIN  --  0.07  --   --   --          Lab 06/23/24  0519 06/22/24  0124 06/21/24  0203 06/20/24  0506   SODIUM 138 138 136 141   POTASSIUM 3.6 3.8 4.2 3.7   CHLORIDE 102 102 102 103   CO2 24.5 23.5 22.0 24.5   ANION GAP 11.5 12.5 12.0 13.5   BUN 13 14 18 20   CREATININE 0.98 1.02* 1.27* 0.89   EGFR 55.6*  53.4* 41.0* 62.8   GLUCOSE 114* 122* 117* 133*   CALCIUM 9.7 9.5 9.5 10.2   MAGNESIUM  --   --   --  2.1   PHOSPHORUS  --  3.6  --   --    HEMOGLOBIN A1C  --   --  6.36*  --          Lab 06/22/24  0124 06/20/24  0506   TOTAL PROTEIN  --  7.3   ALBUMIN 3.7 4.3   GLOBULIN  --  3.0   ALT (SGPT)  --  35*   AST (SGOT)  --  64*   BILIRUBIN  --  0.3   ALK PHOS  --  124*         Lab 06/21/24  0544 06/21/24  0203 06/20/24  0506   HSTROP T 30* 31* 25*                 Brief Urine Lab Results  (Last result in the past 365 days)        Color   Clarity   Blood   Leuk Est   Nitrite   Protein   CREAT   Urine HCG        06/21/24 1528             14.9               Microbiology Results (last 10 days)       Procedure Component Value - Date/Time    Respiratory Panel PCR w/COVID-19(SARS-CoV-2) PRICE/TRUPTI/[facility]/PAD/COR/JEREMIE In-House, NP Swab in UTM/VTM, 2 HR TAT - Swab, Nasopharynx [[id number]]  (Normal) Collected: 06/22/24 1158    Lab Status: Final result Specimen: Swab from Nasopharynx Updated: 06/22/24 1321     ADENOVIRUS, PCR Not Detected     Coronavirus 229E Not Detected     Coronavirus HKU1 Not Detected     Coronavirus NL63 Not Detected     Coronavirus OC43 Not Detected     COVID19 Not Detected     Human Metapneumovirus Not Detected     Human Rhinovirus/Enterovirus Not Detected     Influenza A PCR Not Detected     Influenza B PCR Not Detected     Parainfluenza Virus 1 Not Detected     Parainfluenza Virus 2 Not Detected     Parainfluenza Virus 3 Not Detected     Parainfluenza Virus 4 Not Detected     RSV, PCR Not Detected     Bordetella pertussis pcr Not Detected     Bordetella parapertussis PCR Not Detected     Chlamydophila pneumoniae PCR Not Detected     Mycoplasma pneumo by PCR Not Detected    Narrative:      In the setting of a positive respiratory panel with a viral infection PLUS a negative procalcitonin without other underlying concern for bacterial infection, consider observing off antibiotics or discontinuation of antibiotics and  continue supportive care. If the respiratory panel is positive for atypical bacterial infection (Bordetella pertussis, Chlamydophila pneumoniae, or Mycoplasma pneumoniae), consider antibiotic de-escalation to target atypical bacterial infection.            XR Chest 1 View    Result Date: 6/22/2024  Impression: Impression: No acute process. Electronically Signed: Giacomo Moreno MD  6/22/2024 11:40 AM EDT  Workstation ID: [id number]    CT Chest Without Contrast Diagnostic    Result Date: 6/20/2024  Impression: Impression: 1.No acute cardiopulmonary abnormality. 2.Cardiomegaly and coronary artery disease. 3.Small hiatal hernia. 4.Colonic diverticulosis and colonic fecal retention. Electronically Signed: Dillon Pena MD  6/20/2024 4:36 AM EDT  Workstation ID: [id number]    CT Facial Bones Without Contrast    Result Date: 6/20/2024  Impression: Impression: 1.No acute osseous abnormality. 2.Mild left-sided TMJ arthritis. 3.Mild to moderate cervical spondylosis. Electronically Signed: Dillon Pena MD  6/20/2024 4:34 AM EDT  Workstation ID: [id number]    CT Head Without Contrast    Result Date: 6/20/2024  Impression: Impression: 1.No acute intracranial abnormality. 2.Stable mild chronic small vessel ischemic change. 3.Stable generalized parenchymal volume loss congruent with age. Electronically Signed: Dillon Pena MD  6/20/2024 4:32 AM EDT  Workstation ID: [id number]    XR Abdomen KUB    Result Date: 5/29/2024  Impression: Impression: 1. No acute findings in the abdomen. 2. Mild gas within multiple small bowel loops and moderate gas within the stomach. No indication of high-grade bowel obstruction. No gross free intraperitoneal air is identified Electronically Signed: Saranya Zee MD  5/29/2024 2:17 PM EDT  Workstation ID: [id number]    XR Hip 1 View Without Pelvis Right (Surgery Only)    Result Date: 5/29/2024  Impression: Impression: 1. No acute right hip findings. 2. Chronic fragmentation of the superior margin of the right  greater trochanter, similar to 2017. 3. Mild right hip degenerative joint space narrowing. Electronically Signed: Saranya Zee MD  5/29/2024 1:09 PM EDT  Workstation ID: [id number]     Results for orders placed during the hospital encounter of 02/24/24    Duplex Venous Lower Extremity - LEFT    Interpretation Summary    Normal left lower extremity venous duplex scan.      Results for orders placed during the hospital encounter of 02/24/24    Duplex Venous Lower Extremity - LEFT    Interpretation Summary    Normal left lower extremity venous duplex scan.      Results for orders placed during the hospital encounter of 06/20/24    Adult Transthoracic Echo Complete W/ Cont if Necessary Per Protocol    Interpretation Summary    Left ventricular ejection fraction appears to be 56 - 60%.    There is calcification of the aortic valve.      Labs Pending at Discharge:  Pending Labs       Order Current Status    Blood Culture - Blood, Arm, Right In process            Procedures Performed           Consults:   Consults       Date and Time Order Name Status Description    6/21/2024 10:33 AM Inpatient Nephrology Consult Completed     6/21/2024 10:27 AM Inpatient Cardiology Consult Completed     6/20/2024  5:52 AM Inpatient Hospitalist Consult                Discharge Details        Discharge Medications        New Medications        Instructions Start Date   amLODIPine 5 MG tablet  Commonly known as: NORVASC   5 mg, Oral, Every 24 Hours Scheduled             Continue These Medications        Instructions Start Date   allopurinol 100 MG tablet  Commonly known as: ZYLOPRIM   100 mg, Oral, Daily      ALPRAZolam 0.5 MG tablet  Commonly known as: XANAX   0.5 mg, Oral, 2 Times Daily PRN      bisacodyl 10 MG suppository  Commonly known as: DULCOLAX   10 mg, Rectal, Daily PRN      clarithromycin 500 MG tablet  Commonly known as: BIAXIN   500 mg, Oral, 2 Times Daily      donepezil 10 MG tablet  Commonly known as: Aricept   10 mg, Oral,  Nightly      gabapentin 100 MG capsule  Commonly known as: NEURONTIN   100 mg, Oral, Nightly      hydrALAZINE 100 MG tablet  Commonly known as: APRESOLINE   100 mg, Oral, 2 Times Daily      HYDROcodone-acetaminophen 7.5-325 MG per tablet  Commonly known as: Norco   1 tablet, Oral, Every 6 Hours PRN      melatonin 5 MG tablet tablet   5 mg, Oral, Nightly      naloxone 4 MG/0.1ML nasal spray  Commonly known as: NARCAN   Call 911. Don't prime. [place] in 1 nostril for overdose. Repeat in 2-3 minutes in other nostril if no or minimal breathing/responsiveness.      omeprazole 40 MG capsule  Commonly known as: priLOSEC   40 mg, Oral, Daily      ondansetron ODT 4 MG disintegrating tablet  Commonly known as: ZOFRAN-ODT   4 mg, Translingual, Every 8 Hours PRN      polyethylene glycol 17 g packet  Commonly known as: MIRALAX   17 g, Oral, Daily PRN      potassium chloride 10 MEQ CR tablet  Commonly known as: KLOR-CON M10   10 mEq, Oral, Daily PRN      traZODone 50 MG tablet  Commonly known as: DESYREL   50 mg, Oral, Nightly             Stop These Medications      furosemide 20 MG tablet  Commonly known as: LASIX              Allergies   Allergen Reactions    Statins Myalgia and Other (See Comments)         Discharge Disposition:   Home or Self Care    Diet:  Hospital:  Diet Order   Procedures    Diet: Cardiac, Diabetic; Healthy Heart (2-3 Na+); Consistent Carbohydrate; Fluid Consistency: Thin (IDDSI 0)         Discharge Activity:         CODE STATUS:  There are no questions and answers to display.         Future Appointments   Date Time Provider Department Center   7/9/2024 11:30 AM Seipel, Joseph F, MD [facility]   9/13/2024 11:15 AM Anabelle Sellers MD [facility]       Additional Instructions for the Follow-ups that You Need to Schedule       Ambulatory Referral to Home Health (Hospital)   As directed      Face to Face Visit Date: 6/23/2024   Follow-up provider for Plan of Care?: I treated the patient in  an acute care facility and will not continue treatment after discharge.   Follow-up provider: LUIGI CALDERON [[id number]]   Reason/Clinical Findings: G weakness/ falls   Describe mobility limitations that make leaving home difficult: G weakness   Nursing/Therapeutic Services Requested: Skilled Nursing Home Monitoring (Select INITIATE PROTOCOL order from panel below) Physical Therapy Occupational Therapy   Skilled nursing orders: Medication education   Frequency: 1 Week 1                Time spent on Discharge including face to face service:  >30 minutes    Signature: Electronically signed by Elvin Lawton MD, 06/23/24, 09:10 EDT.  [facility] Hospitalist Team

## 2024-06-24 ENCOUNTER — TRANSITIONAL CARE MANAGEMENT TELEPHONE ENCOUNTER (OUTPATIENT)
Dept: CALL CENTER | Facility: HOSPITAL | Age: 89
End: 2024-06-24
Payer: MEDICARE

## 2024-06-24 NOTE — OUTREACH NOTE
Call Center TCM Note      Flowsheet Row Responses   [facility] patient discharged from? [facility]   Does the patient have one of the following disease processes/diagnoses(primary or secondary)? Other   TCM attempt successful? Yes   Call start time 1016   Call end time 1021   Discharge diagnosis recurrent falls   Person spoke with today (if not patient) and relationship Mandi--Kianna Rooney   Medication alerts for this patient Amlodipine   Meds reviewed with patient/caregiver? Yes   Is the patient having any side effects they believe may be caused by any medication additions or changes? No   Does the patient have all medications ordered at discharge? Yes   Is the patient taking all medications as directed (includes completed medication regime)? Yes   Comments PCP hospital f/u appt on 7/2/24 at 10:00 AM with Dr. Anabelle Sellers.   Does the patient have an appointment with their PCP within 7-14 days of discharge? No   Nursing Interventions Assisted patient with making appointment per protocol   What is the Home health agency?  [facility]   Home health comments Daughter states HH should start on  6/27.   Psychosocial issues? No   Comments Daughter states patient is the same.   Did the patient receive a copy of their discharge instructions? Yes   Nursing interventions Reviewed instructions with patient  [daughter--Kianna]   What is the patient's perception of their health status since discharge? Same   Is the patient/caregiver able to teach back signs and symptoms related to disease process for when to call PCP? Yes   Is the patient/caregiver able to teach back signs and symptoms related to disease process for when to call 911? Yes   Is the patient/caregiver able to teach back the hierarchy of who to call/visit for symptoms/problems? PCP, Specialist, Home health nurse, Urgent Care, ED, 911 Yes   If the patient is a current smoker, are they able to teach back resources for cessation? Not a smoker   TCM call  completed? Yes   Call end time 1021   Would this patient benefit from a Referral to [facility] Social Work? No   Is the patient interested in additional calls from an ambulatory ? No            Naila Sr RN    6/24/2024, 10:21 EDT

## 2024-06-27 ENCOUNTER — HOME CARE VISIT (OUTPATIENT)
Dept: HOME HEALTH SERVICES | Facility: HOME HEALTHCARE | Age: 89
End: 2024-06-27
Payer: COMMERCIAL

## 2024-06-27 VITALS — RESPIRATION RATE: 16 BRPM

## 2024-06-27 LAB — BACTERIA SPEC AEROBE CULT: NORMAL

## 2024-06-27 PROCEDURE — G0299 HHS/HOSPICE OF RN EA 15 MIN: HCPCS

## 2024-06-28 ENCOUNTER — HOME CARE VISIT (OUTPATIENT)
Dept: HOME HEALTH SERVICES | Facility: HOME HEALTHCARE | Age: 89
End: 2024-06-28
Payer: COMMERCIAL

## 2024-06-28 PROCEDURE — G0151 HHCP-SERV OF PT,EA 15 MIN: HCPCS

## 2024-06-28 NOTE — HOME HEALTH
"SOC Note: Patient is a pleasant 89 year old female who went to the ER for general weakness and multiple recent falls. Patient was hypertensive. Patient had a few med changes upon DC from the hospital. [facility] during the day. Patient's daughter comes over during the day to be with patient. Patient agrees to PT, OT and SN.     Home Health ordered for: SN, PT, OT    Reason for Hosp/Primary Dx/Co-morbidities: weakness, falls, HTN, dementia    Focus of Care: HTN management    Patient's goal(s): \"get stronger\"    Current Functional status/mobility/DME: uses rollator in her home    HB status/Living Arrangements: lives with [facility]    Skin Integrity/wound status: no open wounds    Code Status: full code    Fall Risk/Safety concerns: high fall risk    Educated on Emergency Plan, steps to take prior to going to the ER and when to Call Home Health First:  yes    Medication issues/Concerns: none    Additional Problems/Concerns: NA    SDOH Barriers (i.e. caregiver concerns, social isolation, transportation, food insecurity, environment, income etc.)/Need for MSW: denies"

## 2024-06-30 VITALS
RESPIRATION RATE: 18 BRPM | HEART RATE: 98 BPM | OXYGEN SATURATION: 98 % | SYSTOLIC BLOOD PRESSURE: 122 MMHG | DIASTOLIC BLOOD PRESSURE: 68 MMHG

## 2024-06-30 NOTE — HOME HEALTH
"Marleneal Note    Patient's goal(s): \"Get stronger\"    Services required to achieve goals: PT    Potential Issues for goal attainment: History of dementia    Problems identified: Generalized weakness related to sedentary lifestyle due to long standing history homebound status     Describe the Functional status and safety: Patient requires supervision and cues for correct  and only makes obligatory short distances indoor such as bathroom trips needing supervision and rolling walker.    Describe any environmental issues: Patient lives in one story home without steps. Daughter stays with her during the day while granddaughter who lives her is available in the evenings    Any equipment needs: Rollator walker especially for the newly established outdoor walking program    POC confirmed with patient/daughter.    Patient will require additional PT 1wk1 for follow-up on the established home exercise program, transfer teaching, gait training and walking program reinforcement."

## 2024-07-01 ENCOUNTER — HOME CARE VISIT (OUTPATIENT)
Dept: HOME HEALTH SERVICES | Facility: HOME HEALTHCARE | Age: 89
End: 2024-07-01
Payer: MEDICARE

## 2024-07-01 VITALS
OXYGEN SATURATION: 97 % | TEMPERATURE: 98.1 F | SYSTOLIC BLOOD PRESSURE: 124 MMHG | HEART RATE: 50 BPM | DIASTOLIC BLOOD PRESSURE: 80 MMHG

## 2024-07-01 PROCEDURE — G0152 HHCP-SERV OF OT,EA 15 MIN: HCPCS

## 2024-07-01 RX ORDER — GABAPENTIN 100 MG/1
100 CAPSULE ORAL NIGHTLY
Qty: 30 CAPSULE | Refills: 0 | Status: SHIPPED | OUTPATIENT
Start: 2024-07-01 | End: 2024-07-02 | Stop reason: SDUPTHER

## 2024-07-02 ENCOUNTER — OFFICE VISIT (OUTPATIENT)
Dept: FAMILY MEDICINE CLINIC | Facility: CLINIC | Age: 89
End: 2024-07-02
Payer: MEDICARE

## 2024-07-02 ENCOUNTER — HOME CARE VISIT (OUTPATIENT)
Dept: HOME HEALTH SERVICES | Facility: HOME HEALTHCARE | Age: 89
End: 2024-07-02
Payer: MEDICARE

## 2024-07-02 VITALS
TEMPERATURE: 97.2 F | BODY MASS INDEX: 32.61 KG/M2 | WEIGHT: 191 LBS | SYSTOLIC BLOOD PRESSURE: 125 MMHG | DIASTOLIC BLOOD PRESSURE: 80 MMHG | HEART RATE: 55 BPM | HEIGHT: 64 IN | OXYGEN SATURATION: 97 %

## 2024-07-02 DIAGNOSIS — I10 PRIMARY HYPERTENSION: ICD-10-CM

## 2024-07-02 DIAGNOSIS — R53.1 GENERALIZED WEAKNESS: ICD-10-CM

## 2024-07-02 DIAGNOSIS — N18.30 STAGE 3 CHRONIC KIDNEY DISEASE, UNSPECIFIED WHETHER STAGE 3A OR 3B CKD: Primary | ICD-10-CM

## 2024-07-02 DIAGNOSIS — R41.3 MEMORY LOSS: ICD-10-CM
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
## 2024-07-02 PROCEDURE — 1160F RVW MEDS BY RX/DR IN RCRD: CPT | Performed by: FAMILY MEDICINE

## 2024-07-02 PROCEDURE — 1125F AMNT PAIN NOTED PAIN PRSNT: CPT | Performed by: FAMILY MEDICINE

## 2024-07-02 PROCEDURE — 1111F DSCHRG MED/CURRENT MED MERGE: CPT | Performed by: FAMILY MEDICINE

## 2024-07-02 PROCEDURE — 99495 TRANSJ CARE MGMT MOD F2F 14D: CPT | Performed by: FAMILY MEDICINE

## 2024-07-02 PROCEDURE — 1159F MED LIST DOCD IN RCRD: CPT | Performed by: FAMILY MEDICINE

## 2024-07-02 RX ORDER — GABAPENTIN 100 MG/1
100 CAPSULE ORAL NIGHTLY
Qty: 90 CAPSULE | Refills: 1 | Status: SHIPPED | OUTPATIENT
Start: 2024-07-02

## 2024-07-02 NOTE — PROGRESS NOTES
Transitional Care Follow Up Visit  Subjective     Leandra Nuñez is a 89 y.o. female who presents for a transitional care management visit.    Within 48 business hours after discharge our office contacted her via telephone to coordinate her care and needs.      I reviewed and discussed the details of that call along with the discharge summary, hospital problems, inpatient lab results, inpatient diagnostic studies, and consultation reports with Leandra.     Current outpatient and discharge medications have been reconciled for the patient.  Reviewed by: Anabelle Sellers MD          6/23/2024     5:18 PM   Date of TCM Phone Call   Hospital [facility]   Date of Admission 6/20/2024   Date of Discharge 6/23/2024   Discharge Disposition [place]-Health Care [facility] [place]     Risk for Readmission (LACE) Score: 14 (6/23/2024  6:00 AM)      History of Present Illness   Course During Hospital Stay:  presented to [facility] on 6/20/2024 due to repeated falls. Per granddaughter, patient has fell twice in the past 24 hours. Granddaughter reported that patient fell in the morning and then she found the patient on the floor at about 2 am this morning after an unwitnessed fall. Patient did not have any apparent injuries but was reporting right lateral rib pain. Patient states she recalls falls and denies dizziness or other prodromal symptoms just prior. She believes she lost her balance while using walker. She currently denies pain. Denies hitting head and is not on blood thinners. She also denies weakness, HA, chest pain, abdominal pain, nausea, vomiting, fever and chills. Denies dysuria or other urinary symptoms. Denies misuse of benzodiazepine or narcotics.      On ED evaluation, patient HDS. Labs pertinent for mild leukocytosis of 12.28 but otherwise unremarkable. Troponin was 25. EKG was sinus rhythm with PACs, rate 68, no ischemic changes. UA was clean, no evidence of infection. CTH, CT facial bones and CT  chest wo with no acute findings. Hospitalist service to admit for PT evaluation and possible placement.   Hospital Course:     1-recent recurrent falls with overall generalized weakness   -workup in ED did not reveal any acute process in regard to imaging studies  -EKG reviewed personally, normal sinus rhythm, PACs, no ischemic changes  -No infectious process  -PT OT, recommending SNF however the family declined and asked only for home health, order was placed     2-elevated troponin  -EKG no ischemic changes  -Echo did not show any ischemic changes  -Evaluated by cardiology, no further workup is needed, this likely nonischemic nontraumatic secondary to RODERICK on CKD     3-RODERICK on CKD stage IIIa  -Back to baseline  -Likely related to hemodynamic changes  -Started on Norvasc 5 mg p.o. daily for better control on her blood pressure     4-low-grade fever last night  -Happened on Friday evening, for which I called patient daughter yesterday morning and later discharged to do extra workup including chest x-ray, blood culture, urinalysis which has been negative, patient since then did not have any further fever neither that she had any specific subjective complaint might indicate the possibility of underlying infection     --- On Friday during  multidisciplinary rounds staff did ask for neurology consult based on one of the family member request  To be evaluated for possible dementia, I informed the staff during the rounds that this is an outpatient evaluation however seems that someone Friday late afternoon placed a consult for neurology, please refer to neurology note.  Will place referral for outpt neurology     Daughter is with her today.  She is feeling better.  Home health is still coming weekly patient is getting out of bed more.  She is using her rolling walker.  She is eating and drinking better.  She is slowly regaining her strength.  Patient has been diagnosed with memory loss and is on Aricept.  She is a follow-up  "appointment with Dr. Seipel next week.     The following portions of the patient's history were reviewed and updated as appropriate: allergies, current medications, past family history, past medical history, past social history, past surgical history, and problem list.    Review of Systems   Respiratory: Negative.     Cardiovascular: Negative.    Genitourinary:  Negative for urgency.   Neurological:  Positive for weakness. Negative for dizziness.       Objective   /80 (BP Location: Left arm, Patient Position: Sitting, Cuff Size: Adult)   Pulse 55   Temp 97.2 °F (36.2 °C) (Temporal)   Ht 162.6 cm (64\")   Wt 86.6 kg (191 lb)   SpO2 97%   BMI 32.79 kg/m²   Physical Exam  Vitals and nursing note reviewed.   Constitutional:       Appearance: Normal appearance. She is obese.   Cardiovascular:      Rate and Rhythm: Normal rate and regular rhythm.      Heart sounds: Normal heart sounds.   Pulmonary:      Effort: Pulmonary effort is normal.      Breath sounds: Normal breath sounds.   Musculoskeletal:      Right lower leg: No edema.      Left lower leg: No edema.   Neurological:      Mental Status: She is alert.      Comments: Using rollator  Follows commands  Will answer questions but will not initiate conversation       Lab Results   Component Value Date    GLUCOSE 114 (H) 06/23/2024    BUN 13 06/23/2024    CREATININE 0.98 06/23/2024    EGFR 55.6 (L) 06/23/2024    BCR 13.3 06/23/2024    K 3.6 06/23/2024    CO2 24.5 06/23/2024    CALCIUM 9.7 06/23/2024    ALBUMIN 3.7 06/22/2024    BILITOT 0.3 06/20/2024    AST 64 (H) 06/20/2024    ALT 35 (H) 06/20/2024     Lab Results   Component Value Date    WBC 11.29 (H) 06/23/2024    HGB 12.2 06/23/2024    HCT 38.8 06/23/2024    MCV 91.9 06/23/2024     06/23/2024     CT FACIAL BONES WO CONTRAST    Date of Exam: 6/20/2024 3:45 AM EDT    Indication: trauma.    Comparison: None available.    Technique: Axial CT images were obtained from the inferior aspect of the mandible " through the frontal sinuses without contrast administration.  Sagittal and coronal reconstructions were performed.  Automated exposure control and iterative reconstruction  methods were used. ...   Impression   Impression:  1.No acute osseous abnormality.  2.Mild left-sided TMJ arthritis.  3.Mild to moderate cervical spondylosis.        Electronically Signed: Dillon Pena MD     CT HEAD WO CONTRAST    Date of Exam: 6/20/2024 3:45 AM EDT    Indication: head injury.    Comparison: 3/7/2019 and brain MRI 1/18/2024.    Technique: Axial CT images were obtained of the head without contrast administration.  Coronal reconstructions were performed.  Automated exposure control and iterative reconstruction methods were used.   ...   Impression   Impression:  1.No acute intracranial abnormality.  2.Stable mild chronic small vessel ischemic change.  3.Stable generalized parenchymal volume loss congruent with age.        Electronically Signed: Dillon Pena MD   T CHEST WO CONTRAST DIAGNOSTIC     Date of Exam: 6/20/2024 3:45 AM EDT     Indication: trauma right side.     Comparison: Chest radiograph 4/5/2024 and chest CT 2/14/2023.     Technique: Axial CT images were obtained of the chest without contrast administration.  Sagittal and coronal reconstructions were performed.  Automated exposure control and iterative reconstruction methods were used.        Findings:  The thyroid is heterogeneous without dominant nodule. The trachea and the esophagus appear within normal limits. There is a small hiatal hernia. The heart is enlarged. There are mild coronary artery calcifications. There are aortic and mitral annular   calcifications. No pericardial effusion or mediastinal lymphadenopathy. There are multiple calcified mediastinal granulomas and there is mild aortic and aortic branch vessel atherosclerosis.     There is no pneumothorax, pleural effusion or focal airspace consolidation. There are multiple calcified granulomas in both  lungs. There is dependent bibasilar atelectasis. Airways are patent. No significant pleural disease.     There are no acute findings in the superficial soft tissues. There is a stable low-density left adrenal nodule, likely adenoma. No acute findings in the upper abdomen. There is colonic diverticulosis and colonic fecal retention. No acute osseous   abnormality or destructive bone lesion. There is mild thoracic spondylosis. There is evidence of prior left humerus ORIF.     IMPRESSION:  Impression:  1.No acute cardiopulmonary abnormality.  2.Cardiomegaly and coronary artery disease.  3.Small hiatal hernia.  4.Colonic diverticulosis and colonic fecal retention.           Electronically Signed: Dillon Pena MD     Assessment & Plan   Problems Addressed this Visit          Cardiac and Vasculature    Hypertension       Genitourinary and Reproductive     Stage 3 chronic kidney disease - Primary     Other Visit Diagnoses       Generalized weakness        Accidental fall, sequela        Memory loss              Diagnoses         Codes Comments    Stage 3 chronic kidney disease, unspecified whether stage 3a or 3b CKD    -  Primary ICD-10-CM: N18.30  ICD-9-CM: 585.3     Generalized weakness     ICD-10-CM: R53.1  ICD-9-CM: 780.79     Accidental fall, sequela     ICD-10-CM: W19.XXXS  ICD-9-CM: E929.3     Memory loss     ICD-10-CM: R41.3  ICD-9-CM: 780.93     Primary hypertension     ICD-10-CM: I10  ICD-9-CM: 401.9           Chronic kidney disease: She will keep follow-up with her nephrologist  Generalized weakness: Home health is continuing to, work with her on physical therapy and she will continue to use her rollator  Accidental fall: No further incidents; she was counseled on the need to use her rollator and to avoid getting in a hurry  Memory loss she will keep the follow-up appointment with neurology  Primary hypertension: Amlodipine was added to her medication regimen and blood pressure is doing well  I will see her  back for her Medicare wellness in March

## 2024-07-02 NOTE — PATIENT INSTRUCTIONS
Continue to work with physical therapy  Continue the aricept  Use the rollator  Do not get in a hurry  Keep the follow up appt with Dr. seipel

## 2024-07-02 NOTE — HOME HEALTH
Pt is an 88 yo female who lives in a 1 story home with granddaughter.   Pt recently hospitalized secondary to generalized weakness and multiple falls.      PLOF pt independent with feeding grooming toileting sponge bathing and dressing.   Pt requires total assist with all IADLs      Currently pt independent with feeding grooming toileting sponge bathing and dressing.   Pt requires total assist with all IADLs    Skilled OT for ther ex/HEP.   Pt and therapist in agreement with goals and poc.      Next visit ther ex/HEP      Pt denies any falls or med changes                                        th x 2

## 2024-07-03 ENCOUNTER — HOME CARE VISIT (OUTPATIENT)
Dept: HOME HEALTH SERVICES | Facility: HOME HEALTHCARE | Age: 89
End: 2024-07-03
Payer: MEDICARE

## 2024-07-03 VITALS
BODY MASS INDEX: 32.61 KG/M2 | HEART RATE: 56 BPM | WEIGHT: 191 LBS | SYSTOLIC BLOOD PRESSURE: 128 MMHG | HEIGHT: 64 IN | OXYGEN SATURATION: 96 % | RESPIRATION RATE: 12 BRPM | DIASTOLIC BLOOD PRESSURE: 82 MMHG | TEMPERATURE: 97.6 F

## 2024-07-03 PROCEDURE — G0299 HHS/HOSPICE OF RN EA 15 MIN: HCPCS

## 2024-07-03 NOTE — HOME HEALTH
"SOC Note:  88y/o female admitted by SN after admission to [facility] 6/20-6/23 with c/o 2 recent falls at home.  Pt was c/o left side pain, but CT of head, chest, and abdoment were all WNL.     Home Health ordered for: disciplines SN 1w4, PT-Eval and treat, OT-Eval and treat, MSW-Community resources available to stay with pt while granddaughter works.  Pt declines RPM    Reason for Hosp/Primary Dx/Co-morbidities: Falls, weakness, dizziness/HTN/Co-morbidities include:  Anxiety disorder, Arthritis of foot, Asthma, Bronchospasm, Stage 3 chronic kidney disease, Gastroesophageal reflux disease, Hyperlipidemia, Hypertension, Obesity, Renal cancer, Type 2 diabetes mellitus, Adjustment disorder with anxiety, Immune thrombocytopenia, NSVT (nonsustained ventricular tachycardia), Obstructive sleep apnea syndrome, Osteoarthritis, Right Nephrectomy, Thrombocytopenia, Gastroesophageal reflux disease, White coat syndrome with diagnosis of hypertension, Closed fracture of surgical neck of humerus, Acute diverticulitis, Dementia  Focus of Care: Hypertension    Patient's goal(s):  \"I want to get stronger\"    Current Functional status/mobility/DME:  Ambulatory with rollator walker    HB status/Living Arrangements: Lives in single family, one story home with granddaughter, pt's daughter stays with her during the day while granddaughter is at work    Skin Integrity/wound status: Skin intact    Code Status: CPR    Fall Risk/Safety concerns: Fall risk    Educated on Emergency Plan, steps to take prior to going to the ER and when to Call Home Health First:  Yes     Medication issues/Concerns: No    Additional Problems/Concerns:  No    SDOH Barriers (i.e. caregiver concerns, social isolation, transportation, food insecurity, environment, income etc.)/Need for MSW: Yes--evaluation orders entered"

## 2024-07-04 DIAGNOSIS — R11.0 NAUSEA: ICD-10-CM

## 2024-07-05 RX ORDER — ONDANSETRON 4 MG/1
TABLET, ORALLY DISINTEGRATING ORAL
Qty: 30 TABLET | Refills: 0 | Status: SHIPPED | OUTPATIENT
Start: 2024-07-05

## 2024-07-08 ENCOUNTER — HOME CARE VISIT (OUTPATIENT)
Dept: HOME HEALTH SERVICES | Facility: HOME HEALTHCARE | Age: 89
End: 2024-07-08
Payer: MEDICARE

## 2024-07-08 VITALS
DIASTOLIC BLOOD PRESSURE: 66 MMHG | RESPIRATION RATE: 18 BRPM | HEART RATE: 62 BPM | SYSTOLIC BLOOD PRESSURE: 116 MMHG | OXYGEN SATURATION: 96 % | TEMPERATURE: 98.1 F

## 2024-07-08 PROCEDURE — G0151 HHCP-SERV OF PT,EA 15 MIN: HCPCS

## 2024-07-09 ENCOUNTER — OFFICE VISIT (OUTPATIENT)
Dept: NEUROLOGY | Facility: CLINIC | Age: 89
End: 2024-07-09
Payer: MEDICARE

## 2024-07-09 VITALS
WEIGHT: 195 LBS | DIASTOLIC BLOOD PRESSURE: 75 MMHG | SYSTOLIC BLOOD PRESSURE: 133 MMHG | HEART RATE: 61 BPM | HEIGHT: 64 IN | BODY MASS INDEX: 33.29 KG/M2

## 2024-07-09 DIAGNOSIS — F03.B0 MODERATE DEMENTIA, UNSPECIFIED DEMENTIA TYPE, UNSPECIFIED WHETHER BEHAVIORAL, PSYCHOTIC, OR MOOD DISTURBANCE OR ANXIETY: Primary | ICD-10-CM

## 2024-07-09 PROCEDURE — 1159F MED LIST DOCD IN RCRD: CPT | Performed by: PSYCHIATRY & NEUROLOGY

## 2024-07-09 PROCEDURE — 99214 OFFICE O/P EST MOD 30 MIN: CPT | Performed by: PSYCHIATRY & NEUROLOGY

## 2024-07-09 PROCEDURE — 1160F RVW MEDS BY RX/DR IN RCRD: CPT | Performed by: PSYCHIATRY & NEUROLOGY

## 2024-07-09 RX ORDER — MEMANTINE HYDROCHLORIDE 10 MG/1
TABLET ORAL
Qty: 60 TABLET | Refills: 10 | Status: SHIPPED | OUTPATIENT
Start: 2024-07-09

## 2024-07-09 RX ORDER — MEMANTINE HYDROCHLORIDE 5 MG/1
5 TABLET ORAL DAILY
Qty: 30 TABLET | Refills: 0 | Status: SHIPPED | OUTPATIENT
Start: 2024-07-09 | End: 2024-07-11 | Stop reason: SDUPTHER

## 2024-07-09 NOTE — PROGRESS NOTES
"Chief Complaint  Follow-up (MEMORY)    Subjective          Leandra Nuñez presents to [facility] NEUROLOGY for MEMORY  History of Present Illness  Patient is here to f/u on memory,per daughter patient memory has been worse example: she advised mom she had an appt today but patient asked her 3-4 times afterwards where she is going, as well as on the way here in car, she currently takes aricept 10 mg 1 qd....    ** patient no longer uses her cpap per daughter patient couldn't tolerate    Pt requires help with ADL she is living in her own home.  She has family with her most of the time.  Her daughter feels she now needs assistance all the time.  She is no longer able to safely ambulate or transfer without standby assistance.    She has been taking Aricept 10 mg daily.          Maximum   Score Patient's   Score Questions   5 4 \"What is the year?Season?Date?Day of the week?Month?\"   5 2 \"Where are we now: State?County?Town/city?Hospital?Floor?\"   3 3 3 Unrelated objects Number of trials:___   5 5 Count backward from 100 by sevens or spell WORLD backwards   3 1 Name 3 things from above   2 2 Identify 2 objects   1 1 Repeat the phrase: No ifs, ands,or buts.   3 3 Take paper in right hand, fold it in half, and put it on the floor.   1 1 Please read this and do what it says. \"Close your eyes\"   1 1 Make up and write a sentence about anything. Noun and verb   1 1 Copy this picture 10 angles must be present.   30 24 Total MMSE         ====CT HEAD WO CONTRAST 6/20/24=====  Impression:  1.No acute intracranial abnormality.  2.Stable mild chronic small vessel ischemic change.  3.Stable generalized parenchymal volume loss congruent with age.        Current Outpatient Medications:     allopurinol (ZYLOPRIM) 100 MG tablet, Take 1 tablet by mouth Daily. Indications: Gout, Disp: , Rfl:     ALPRAZolam (XANAX) 0.5 MG tablet, Take 1 tablet by mouth 2 (Two) Times a Day As Needed for Anxiety., Disp: 30 tablet, Rfl: 0    " amLODIPine (NORVASC) 5 MG tablet, Take 1 tablet by mouth Daily for 30 days., Disp: 30 tablet, Rfl: 0    bisacodyl (DULCOLAX) 10 MG suppository, Insert 1 suppository into the rectum Daily As Needed for Constipation (Use if bisacodyl oral is ineffective)., Disp: 30 suppository, Rfl: 0    cyclobenzaprine (FLEXERIL) 5 MG tablet, Take 1 tablet by mouth 3 (Three) Times a Day As Needed. Indications: Muscle Spasm, Disp: , Rfl:     donepezil (Aricept) 10 MG tablet, Take 1 tablet by mouth Every Night., Disp: 30 tablet, Rfl: 11    furosemide (LASIX) 20 MG tablet, Take 1 tablet by mouth Daily As Needed. Indications: Edema, Disp: , Rfl:     gabapentin (NEURONTIN) 100 MG capsule, Take 1 capsule by mouth Every Night., Disp: 90 capsule, Rfl: 1    hydrALAZINE (APRESOLINE) 100 MG tablet, Take 1 tablet by mouth 2 (Two) Times a Day. Indications: High Blood Pressure Disorder, Disp: , Rfl:     HYDROcodone-acetaminophen (Norco) 7.5-325 MG per tablet, Take 1 tablet by mouth Every 6 (Six) Hours As Needed for Moderate Pain., Disp: 30 tablet, Rfl: 0    melatonin 5 MG tablet tablet, Take 1 tablet by mouth Every Night. Indications: Trouble Sleeping, Disp: , Rfl:     omeprazole (priLOSEC) 40 MG capsule, Take 1 capsule by mouth Daily. Indications: Heartburn, Disp: , Rfl:     ondansetron ODT (ZOFRAN-ODT) 4 MG disintegrating tablet, DISSOLVE 1 TABLET ON THE TONGUE EVERY 8 HOURS AS NEEDED FOR NAUSEA OR VOMITING, Disp: 30 tablet, Rfl: 0    polyethylene glycol (MIRALAX) 17 g packet, Take 17 g by mouth Daily As Needed (Use if senna-docusate is ineffective)., Disp: 30 packet, Rfl: 0    potassium chloride (KLOR-CON M10) 10 MEQ CR tablet, Take 1 tablet by mouth Daily As Needed. Indications: Low Amount of Potassium in the Blood, Disp: , Rfl:     QUEtiapine (SEROquel) 25 MG tablet, Take 1 tablet by mouth Every Night. Indications: Major Depressive Disorder, Disp: , Rfl:     traZODone (DESYREL) 50 MG tablet, Take 1 tablet by mouth Every Night. Indications:  "Trouble Sleeping, Disp: , Rfl:     Review of Systems   Constitutional:  Negative for fatigue.   Respiratory:  Negative for shortness of breath.    Psychiatric/Behavioral:  Positive for confusion. Negative for sleep disturbance. The patient is nervous/anxious.    All other systems reviewed and are negative.         Objective:    Vital Signs:   /75   Pulse 61   Ht 162.6 cm (64\")   Wt 88.5 kg (195 lb)   BMI 33.47 kg/m²     Physical Exam  Vitals reviewed.   Cardiovascular:      Rate and Rhythm: Normal rate.   Pulmonary:      Effort: Pulmonary effort is normal. No respiratory distress.   Neurological:      Mental Status: She is alert and oriented to person, place, and time.   Psychiatric:         Behavior: Behavior normal.        Result Review :                Neurologic Exam     Mental Status   Oriented to person, place, and time.         Assessment and Plan    Diagnoses and all orders for this visit:    1. Moderate dementia, unspecified dementia type, unspecified whether behavioral, psychotic, or mood disturbance or anxiety (Primary)    Other orders  -     COGNITIVE ASSESSMENT SCAN    Continue Aricept 10 mg daily and will start Namenda 5 mg daily for 1 month then 10 mg daily for a month then 10 mg twice daily.    It is medically indicated that she have assistance with her at all times.    Follow Up   Return in about 1 year (around 7/9/2025).  Patient was given instructions and counseling regarding her condition or for health maintenance advice. Please see specific information pulled into the AVS if appropriate.     This document has been electronically signed by Joseph Seipel, MD on July 9, 2024 18:02 EDT      "

## 2024-07-11 ENCOUNTER — HOME CARE VISIT (OUTPATIENT)
Dept: HOME HEALTH SERVICES | Facility: HOME HEALTHCARE | Age: 89
End: 2024-07-11
Payer: MEDICARE

## 2024-07-11 VITALS
DIASTOLIC BLOOD PRESSURE: 62 MMHG | SYSTOLIC BLOOD PRESSURE: 108 MMHG | OXYGEN SATURATION: 97 % | TEMPERATURE: 97.7 F | HEART RATE: 61 BPM | RESPIRATION RATE: 12 BRPM

## 2024-07-11 PROCEDURE — G0162 HHC RN E&M PLAN SVS, 15 MIN: HCPCS

## 2024-07-11 RX ORDER — MEMANTINE HYDROCHLORIDE 5 MG/1
5 TABLET ORAL DAILY
Qty: 30 TABLET | Refills: 0 | Status: CANCELLED | OUTPATIENT
Start: 2024-07-11 | End: 2025-07-11

## 2024-07-11 RX ORDER — MEMANTINE HYDROCHLORIDE 5 MG/1
5 TABLET ORAL DAILY
Qty: 30 TABLET | Refills: 0 | Status: SHIPPED | OUTPATIENT
Start: 2024-07-11 | End: 2025-07-11

## 2024-07-12 ENCOUNTER — HOME CARE VISIT (OUTPATIENT)
Dept: HOME HEALTH SERVICES | Facility: HOME HEALTHCARE | Age: 89
End: 2024-07-12
Payer: MEDICARE

## 2024-07-15 ENCOUNTER — HOME CARE VISIT (OUTPATIENT)
Dept: HOME HEALTH SERVICES | Facility: HOME HEALTHCARE | Age: 89
End: 2024-07-15
Payer: MEDICARE

## 2024-07-15 VITALS
SYSTOLIC BLOOD PRESSURE: 118 MMHG | DIASTOLIC BLOOD PRESSURE: 70 MMHG | TEMPERATURE: 99.1 F | HEART RATE: 52 BPM | OXYGEN SATURATION: 96 %

## 2024-07-15 PROCEDURE — G0152 HHCP-SERV OF OT,EA 15 MIN: HCPCS

## 2024-07-16 ENCOUNTER — HOME CARE VISIT (OUTPATIENT)
Dept: HOME HEALTH SERVICES | Facility: HOME HEALTHCARE | Age: 89
End: 2024-07-16
Payer: MEDICARE

## 2024-07-16 NOTE — HOME HEALTH
Pt is an 88 yo female who lives in a 1 story home with granddaughter. Pt recently hospitalized secondary to generalized weakness and multiple falls.   PLOF pt independent with feeding grooming toileting sponge bathing and dressing. Pt requires total assist with all IADLs   Currently pt independent with feeding grooming toileting sponge bathing and dressing. Pt requires total assist with all IADLs   Skilled OT for ther ex/HEP. Pt and therapist in agreement with goals and poc.   Next visit ther ex/HEP   Pt denies any falls or med changes      F 10-12

## 2024-07-18 ENCOUNTER — HOME CARE VISIT (OUTPATIENT)
Dept: HOME HEALTH SERVICES | Facility: HOME HEALTHCARE | Age: 89
End: 2024-07-18
Payer: MEDICARE

## 2024-07-19 ENCOUNTER — HOME CARE VISIT (OUTPATIENT)
Dept: HOME HEALTH SERVICES | Facility: HOME HEALTHCARE | Age: 89
End: 2024-07-19
Payer: MEDICARE

## 2024-07-23 ENCOUNTER — HOME CARE VISIT (OUTPATIENT)
Dept: HOME HEALTH SERVICES | Facility: HOME HEALTHCARE | Age: 89
End: 2024-07-23
Payer: MEDICARE

## 2024-07-25 ENCOUNTER — TELEPHONE (OUTPATIENT)
Dept: NEUROLOGY | Facility: CLINIC | Age: 89
End: 2024-07-25
Payer: MEDICARE

## 2024-07-25 NOTE — TELEPHONE ENCOUNTER
"    Caller: KAYCEE POTTER    Relationship: Emergency Contact    Best call back number: [phone]    What form or medical record are you requesting: HOME HEALTH CERTIFICATION AND PLAN OF TREATMENT    How would you like to receive the form or medical records (pick-up, mail, fax): FAX - [phone] ATTENTION DEEDEE \"[facility] LINK\"      Additional notes: SHE STATE THEY ADVISED THIS PPW WAS NEVER RECEIVED, SHE IS REQUESTING IT BE RE-FAXED TO THE ABOVE FAX, SHE IS ALSO WONDERING IF SHE MAY HAVE A COPY.       "

## 2024-07-26 ENCOUNTER — HOME CARE VISIT (OUTPATIENT)
Dept: HOME HEALTH SERVICES | Facility: HOME HEALTHCARE | Age: 89
End: 2024-07-26
Payer: MEDICARE

## 2024-07-26 DIAGNOSIS — G89.29 CHRONIC LEFT SHOULDER PAIN: ICD-10-CM

## 2024-07-26 DIAGNOSIS — M25.512 CHRONIC LEFT SHOULDER PAIN: ICD-10-CM

## 2024-07-26 RX ORDER — HYDROCODONE BITARTRATE AND ACETAMINOPHEN 7.5; 325 MG/1; MG/1
1 TABLET ORAL EVERY 6 HOURS PRN
Qty: 30 TABLET | Refills: 0 | Status: SHIPPED | OUTPATIENT
Start: 2024-07-26

## 2024-08-15 RX ORDER — GABAPENTIN 100 MG/1
100 CAPSULE ORAL NIGHTLY
Qty: 90 CAPSULE | Refills: 1 | Status: SHIPPED | OUTPATIENT
Start: 2024-08-15

## 2024-08-21 RX ORDER — TRAZODONE HYDROCHLORIDE 50 MG/1
50 TABLET ORAL NIGHTLY
Qty: 30 TABLET | Refills: 1 | Status: SHIPPED | OUTPATIENT
Start: 2024-08-21

## 2024-08-27 ENCOUNTER — OFFICE (OUTPATIENT)
Age: 89
End: 2024-08-27

## 2024-08-27 ENCOUNTER — OFFICE (OUTPATIENT)
Dept: URBAN - METROPOLITAN AREA CLINIC 64 | Facility: CLINIC | Age: 89
End: 2024-08-27

## 2024-08-27 VITALS
WEIGHT: 197 LBS | HEIGHT: 63 IN | HEART RATE: 62 BPM | DIASTOLIC BLOOD PRESSURE: 75 MMHG | HEIGHT: 63 IN | HEIGHT: 63 IN | SYSTOLIC BLOOD PRESSURE: 138 MMHG | DIASTOLIC BLOOD PRESSURE: 75 MMHG | SYSTOLIC BLOOD PRESSURE: 138 MMHG | HEIGHT: 63 IN | HEART RATE: 62 BPM | HEIGHT: 63 IN | WEIGHT: 197 LBS | WEIGHT: 197 LBS | DIASTOLIC BLOOD PRESSURE: 75 MMHG | SYSTOLIC BLOOD PRESSURE: 138 MMHG | SYSTOLIC BLOOD PRESSURE: 138 MMHG | HEIGHT: 63 IN | SYSTOLIC BLOOD PRESSURE: 138 MMHG | DIASTOLIC BLOOD PRESSURE: 75 MMHG | WEIGHT: 197 LBS | DIASTOLIC BLOOD PRESSURE: 75 MMHG | DIASTOLIC BLOOD PRESSURE: 75 MMHG | HEART RATE: 62 BPM | SYSTOLIC BLOOD PRESSURE: 138 MMHG | WEIGHT: 197 LBS | DIASTOLIC BLOOD PRESSURE: 75 MMHG | SYSTOLIC BLOOD PRESSURE: 138 MMHG | HEART RATE: 62 BPM | HEART RATE: 62 BPM | WEIGHT: 197 LBS | HEART RATE: 62 BPM | HEART RATE: 62 BPM | WEIGHT: 197 LBS | HEIGHT: 63 IN

## 2024-08-27 DIAGNOSIS — K21.9 GASTRO-ESOPHAGEAL REFLUX DISEASE WITHOUT ESOPHAGITIS: ICD-10-CM

## 2024-08-27 DIAGNOSIS — K29.70 GASTRITIS, UNSPECIFIED, WITHOUT BLEEDING: ICD-10-CM

## 2024-08-27 PROCEDURE — 99213 OFFICE O/P EST LOW 20 MIN: CPT | Performed by: NURSE PRACTITIONER

## 2024-09-13 ENCOUNTER — OFFICE VISIT (OUTPATIENT)
Dept: FAMILY MEDICINE CLINIC | Facility: CLINIC | Age: 89
End: 2024-09-13
Payer: MEDICARE

## 2024-09-13 VITALS
SYSTOLIC BLOOD PRESSURE: 131 MMHG | DIASTOLIC BLOOD PRESSURE: 78 MMHG | HEIGHT: 64 IN | BODY MASS INDEX: 33.97 KG/M2 | WEIGHT: 199 LBS | OXYGEN SATURATION: 94 % | HEART RATE: 50 BPM

## 2024-09-13 DIAGNOSIS — I10 PRIMARY HYPERTENSION: Primary | ICD-10-CM

## 2024-09-13 DIAGNOSIS — E11.9 TYPE 2 DIABETES MELLITUS WITHOUT COMPLICATION, WITHOUT LONG-TERM CURRENT USE OF INSULIN: ICD-10-CM

## 2024-09-13 DIAGNOSIS — E78.1 PURE HYPERTRIGLYCERIDEMIA: ICD-10-CM

## 2024-09-13 NOTE — PROGRESS NOTES
Subjective   Leandra Nuñez is a 89 y.o. female.     History of Present Illness  The patient is here today for follow-up on her blood pressure, cholesterol, and diabetes. She is accompanied by an adult female.    She reports no chest pain or difficulty breathing.    She does not monitor her blood sugar levels at home. She has never been diagnosed with diabetes per her daughter and has not attended any diabetic training classes or been prescribed glucometers or test strips. Her , who resides in a nursing home, had these supplies which she occasionally used, but it has been years since she last checked her blood sugar.    She lives at home but her daughter comes in every morning and another person comes in the afternoon.  Her granddaughter lives with her and is with her at night and on weekends. She has an upcoming appointment with Dr. Mitchell in 03/2024.       The following portions of the patient's history were reviewed and updated as appropriate: allergies, current medications, past family history, past medical history, past social history, past surgical history, and problem list.  Past Medical History:   Diagnosis Date    Anxiety     Arthritis     Breast nodule 2013    Left breast nodule (fibrocystic disease , no malignancy)     Cancer     Cataract     Chronic kidney disease     HL (hearing loss)     Hyperlipidemia     Hypertension     Obesity     Shortness of breath      Past Surgical History:   Procedure Laterality Date    BREAST BIOPSY Left 05/21/2013    Ben[facility]rocystic [facility] ,[facility]    CARDIAC CATHETERIZATION      COLONOSCOPY N/A 5/29/2024    Procedure: COLONOSCOPY WITH POLYPECTOMY X5 AND ENDOSCOPIC MUCOSAL RESECTION OF RECTAL POLYP;  Surgeon: Joon Martin MD;  Location: [facility] ENDOSCOPY;  Service: Gastroenterology;  Laterality: N/A;  POST: DIVERTICULOSIS, POLYPS    D & C AND LAPAROSCOPY      ENDOSCOPY N/A 5/29/2024    Procedure: ESOPHAGOGASTRODUODENOSCOPY WITH BIOPSY X1  AREA;  Surgeon: Joon Martin MD;  Location: [facility] ENDOSCOPY;  Service: Gastroenterology;  Laterality: N/A;  POST: GASTRITIS, ESOPHAGITIS    FULGURATION ENDOMETRIOSIS      surgery    NEPHRECTOMY Right     ORIF HUMERUS FRACTURE Left 3/24/2021    Procedure: HUMERUS PROXIMAL OPEN REDUCTION INTERNAL FIXATION;  Surgeon: Yair Anne MD;  Location: [facility] MAIN OR;  Service: Orthopedics;  Laterality: Left;     History reviewed. No pertinent family history.  Social History     Socioeconomic History    Marital status:    Tobacco Use    Smoking status: Never     Passive exposure: Never    Smokeless tobacco: Never   Vaping Use    Vaping status: Never Used   Substance and Sexual Activity    Alcohol use: No    Drug use: No    Sexual activity: Defer     Partners: Male         Current Outpatient Medications:     allopurinol (ZYLOPRIM) 100 MG tablet, Take 1 tablet by mouth Daily. Indications: Gout, Disp: , Rfl:     ALPRAZolam (XANAX) 0.5 MG tablet, Take 1 tablet by mouth 2 (Two) Times a Day As Needed for Anxiety., Disp: 30 tablet, Rfl: 0    bisacodyl (DULCOLAX) 10 MG suppository, Insert 1 suppository into the rectum Daily As Needed for Constipation (Use if bisacodyl oral is ineffective)., Disp: 30 suppository, Rfl: 0    cyclobenzaprine (FLEXERIL) 5 MG tablet, Take 1 tablet by mouth 3 (Three) Times a Day As Needed. Indications: Muscle Spasm, Disp: , Rfl:     donepezil (Aricept) 10 MG tablet, Take 1 tablet by mouth Every Night., Disp: 30 tablet, Rfl: 11    gabapentin (NEURONTIN) 100 MG capsule, TAKE 1 CAPSULE BY MOUTH EVERY NIGHT, Disp: 90 capsule, Rfl: 1    hydrALAZINE (APRESOLINE) 100 MG tablet, Take 1 tablet by mouth As Needed. Indications: High Blood Pressure Disorder, Disp: , Rfl:     HYDROcodone-acetaminophen (Norco) 7.5-325 MG per tablet, Take 1 tablet by mouth Every 6 (Six) Hours As Needed for Moderate Pain. Indications: Pain, Disp: 30 tablet, Rfl: 0    melatonin 5 MG tablet tablet, Take 1 tablet by mouth Every  "Night. Indications: Trouble Sleeping, Disp: , Rfl:     memantine (NAMENDA) 10 MG tablet, One daily for the month after taking 5mg daily for one month  Then take 10mg bid (Patient taking differently: Take 1 tablet by mouth 2 (Two) Times a Day. Indications: Cognitive Dysfunction), Disp: 60 tablet, Rfl: 10    ondansetron ODT (ZOFRAN-ODT) 4 MG disintegrating tablet, DISSOLVE 1 TABLET ON THE TONGUE EVERY 8 HOURS AS NEEDED FOR NAUSEA OR VOMITING, Disp: 30 tablet, Rfl: 0    polyethylene glycol (MIRALAX) 17 g packet, Take 17 g by mouth Daily As Needed (Use if senna-docusate is ineffective)., Disp: 30 packet, Rfl: 0    QUEtiapine (SEROquel) 25 MG tablet, Take 1 tablet by mouth Every Night. Indications: Major Depressive Disorder, Disp: , Rfl:     traZODone (DESYREL) 50 MG tablet, TAKE 1 TABLET BY MOUTH EVERY NIGHT, Disp: 30 tablet, Rfl: 1    furosemide (LASIX) 20 MG tablet, Take 1 tablet by mouth Daily As Needed. Indications: Edema (Patient not taking: Reported on 9/13/2024), Disp: , Rfl:     potassium chloride (KLOR-CON M10) 10 MEQ CR tablet, Take 1 tablet by mouth Daily As Needed. Indications: Low Amount of Potassium in the Blood (Patient not taking: Reported on 9/13/2024), Disp: , Rfl:     Review of Systems  ROS done and noted in HPI    /78 (BP Location: Left arm, Patient Position: Sitting, Cuff Size: Large Adult) Comment (BP Location): left forearm  Pulse 50   Ht 162.6 cm (64\")   Wt 90.3 kg (199 lb)   SpO2 94%   BMI 34.16 kg/m²           Objective   Physical Exam  Vitals and nursing note reviewed.   Constitutional:       Appearance: Normal appearance. She is obese.   Cardiovascular:      Rate and Rhythm: Normal rate and regular rhythm.      Heart sounds: Normal heart sounds.   Pulmonary:      Effort: Pulmonary effort is normal.      Breath sounds: Normal breath sounds.   Musculoskeletal:      Right lower leg: No edema.      Left lower leg: No edema.   Neurological:      Mental Status: She is alert. "       Physical Exam         Results  Laboratory Studies  A1c was 6.3.     Lab Results   Component Value Date    GLUCOSE 114 (H) 06/23/2024    BUN 13 06/23/2024    CREATININE 0.98 06/23/2024     06/23/2024    K 3.6 06/23/2024     06/23/2024    CALCIUM 9.7 06/23/2024    PROTEINTOT 7.3 06/20/2024    ALBUMIN 3.7 06/22/2024    ALT 35 (H) 06/20/2024    AST 64 (H) 06/20/2024    ALKPHOS 124 (H) 06/20/2024    BILITOT 0.3 06/20/2024    GLOB 3.0 06/20/2024    AGRATIO 1.4 06/20/2024    BCR 13.3 06/23/2024    ANIONGAP 11.5 06/23/2024    EGFR 55.6 (L) 06/23/2024     Lab Results   Component Value Date    CHOL 231 (H) 03/13/2024    TRIG 223 (H) 03/13/2024    HDL 44 03/13/2024     (H) 03/13/2024     Lab Results   Component Value Date    HGBA1C 6.36 (H) 06/21/2024         Assessment & Plan   Problems Addressed this Visit          Cardiac and Vasculature    Hyperlipidemia    Hypertension - Primary       Endocrine and Metabolic    Type 2 diabetes mellitus without complication     Diagnoses         Codes Comments    Primary hypertension    -  Primary ICD-10-CM: I10  ICD-9-CM: 401.9     Pure hypertriglyceridemia     ICD-10-CM: E78.1  ICD-9-CM: 272.1     Type 2 diabetes mellitus without complication, without long-term current use of insulin     ICD-10-CM: E11.9  ICD-9-CM: 250.00           Assessment & Plan  1.  Type 2 diabetes.  Her A1c level from June 2024 was 6.3, indicating slightly elevated blood sugar levels. She was diagnosed with diabetes in 2017 and previously checked her blood sugar.  She is advised to monitor her diet, particularly reducing sweets, pastas, and breads. If her blood sugar levels continue to rise, further evaluation and potential treatment options will be considered.    2. Hypertension.  She reports no chest pain or trouble breathing. Her blood pressure will continue to be monitored. She is advised to maintain a healthy lifestyle, including a balanced diet and regular exercise.  She will continue  hydralazine    3. Hyperlipidemia.  Her cholesterol levels will continue to be monitored. She is advised to follow a heart-healthy diet and engage in regular physical activity.    Labs from June were reviewed    Follow-up  She will follow up in March 2024.            Patient or patient representative verbalized consent for the use of Ambient Listening during the visit with  Anabelle Sellers MD for chart documentation. 9/13/2024  11:37 EDT

## 2024-10-21 ENCOUNTER — TELEPHONE (OUTPATIENT)
Dept: FAMILY MEDICINE CLINIC | Facility: CLINIC | Age: 89
End: 2024-10-21
Payer: MEDICARE

## 2024-10-21 NOTE — TELEPHONE ENCOUNTER
She is on 50mg of trazodone at night  Have her try giving her 1 and 1/2 pills (75mg) for a couple of nights and see how she does

## 2024-10-21 NOTE — TELEPHONE ENCOUNTER
Caller: KAYCEE POTTER    Relationship: Emergency Contact    Best call back number: [phone]    What medication are you requesting: traZODone (DESYREL)    What are your current symptoms: NOT SLEEPING THROUGH THE NIGHT     If a prescription is needed, what is your preferred pharmacy and phone number: [facility] #[id number] [place] [id number] AT [address] [phone] [facility] [phone]      Additional notes:DAUGHTER ASKS FOR INCREASED DOSAGE OF MEDICATION OR IF THERE IS ANOTHER MEDICATION THAT WOULD HELP PATIENT BE ABLE TO SLEEP THROUGH THE NIGHT. DAUGHTER STATES PATIENT IS WAKING UP IN THE MIDDLE OF THE NIGHT AND WAKING THE REST OF THE HOUSEHOLD

## 2024-10-22 RX ORDER — TRAZODONE HYDROCHLORIDE 50 MG/1
75 TABLET, FILM COATED ORAL NIGHTLY
Qty: 45 TABLET | Refills: 1 | Status: SHIPPED | OUTPATIENT
Start: 2024-10-22

## 2024-12-22 RX ORDER — TRAZODONE HYDROCHLORIDE 50 MG/1
75 TABLET, FILM COATED ORAL NIGHTLY
Qty: 45 TABLET | Refills: 1 | Status: SHIPPED | OUTPATIENT
Start: 2024-12-22

## 2024-12-23 NOTE — TELEPHONE ENCOUNTER
It doesn't look like Dr. Rios has prescribed this and she is on trazodone.  I would have her wait until Dr. Rios returns.

## 2024-12-23 NOTE — TELEPHONE ENCOUNTER
Caller: KAYCEE POTTER    Relationship: Emergency Contact    Best call back number: [phone] OK TO LEAVE MESSAGE    Requested Prescriptions:   Requested Prescriptions     Pending Prescriptions Disp Refills    QUEtiapine (SEROquel) 25 MG tablet       Sig: Take 1 tablet by mouth Every Night. Indications: Major Depressive Disorder        Pharm[facility] where request should be sent[id number] [place] [address] [address]  AT [address] [phone] [facility] [phone]      Last office visit with prescribing clinician: 9/13/2024   Last telemedicine visit with prescribing clinician: Visit date not found   Next office visit with prescribing clinician: 3/20/2025     Additional details provided by patient: PATIENT HAS ENOUGH TO LAST UNTIL THURSDAY. THE BOTTLE WAS THROWN AWAY. PATIENT'S DAUGHTER DOESN'T KNOW WHO WAS WRITING IT BEFORE. SHE WOULD LIKE PCP TO REFILL     Does the patient have less than a 3 day supply:  [] Yes  [x] No    Would you like a call back once the refill request has been completed: [] Yes [x] No    If the office needs to give you a call back, can they leave a voicemail: [] Yes [x] No    Waqar Tavares Rep   12/23/24 11:19 EST

## 2024-12-24 RX ORDER — QUETIAPINE FUMARATE 25 MG/1
25 TABLET, FILM COATED ORAL NIGHTLY
Qty: 90 TABLET | Refills: 0 | Status: SHIPPED | OUTPATIENT
Start: 2024-12-24

## 2025-01-03 DIAGNOSIS — R41.3 MEMORY LOSS: ICD-10-CM

## 2025-01-03 RX ORDER — DONEPEZIL HYDROCHLORIDE 10 MG/1
10 TABLET, FILM COATED ORAL NIGHTLY
Qty: 30 TABLET | Refills: 11 | Status: SHIPPED | OUTPATIENT
Start: 2025-01-03 | End: 2026-01-03

## 2025-01-23 RX ORDER — TRAZODONE HYDROCHLORIDE 50 MG/1
75 TABLET, FILM COATED ORAL NIGHTLY
Qty: 45 TABLET | Refills: 1 | Status: SHIPPED | OUTPATIENT
Start: 2025-01-23

## 2025-02-03 DIAGNOSIS — F41.1 GENERALIZED ANXIETY DISORDER: ICD-10-CM

## 2025-02-04 RX ORDER — ALPRAZOLAM 0.5 MG
0.5 TABLET ORAL 2 TIMES DAILY PRN
Qty: 30 TABLET | Refills: 0 | Status: SHIPPED | OUTPATIENT
Start: 2025-02-04

## 2025-02-17 RX ORDER — HYDRALAZINE HYDROCHLORIDE 100 MG/1
100 TABLET, FILM COATED ORAL AS NEEDED
OUTPATIENT
Start: 2025-02-17

## 2025-02-17 NOTE — TELEPHONE ENCOUNTER
Caller: KAYCEE POTTER    Relationship: Emergency Contact    Best call back number: [phone]    Requested Prescriptions:   Requested Prescriptions     Pending Prescriptions Disp Refills    hydrALAZINE (APRESOLINE) 100 MG tablet       Sig: Take 1 tablet by mouth As Needed. Indications: High Blood Pressure        [id number] [place] [address] [facility] AT [address] [phone] [facility] [phone]      Last office visit with prescribing clinician: 8/3/2023   Last telemedicine visit with prescribing clinician: Visit date not found   Next office visit with prescribing clinician: Visit date not found     Additional details provided by patient:     Does the patient have less than a 3 day supply:  [] Yes  [] No    Would you like a call back once the refill request has been completed: [] Yes [x] No    If the office needs to give you a call back, can they leave a voicemail: [x] Yes [] No    Waqar Davis Rep   02/17/25 08:40 EST

## 2025-02-24 ENCOUNTER — TELEPHONE (OUTPATIENT)
Dept: CARDIOLOGY | Facility: CLINIC | Age: 89
End: 2025-02-24
Payer: MEDICARE

## 2025-02-24 RX ORDER — HYDRALAZINE HYDROCHLORIDE 100 MG/1
100 TABLET, FILM COATED ORAL AS NEEDED
Qty: 90 TABLET | Refills: 1 | Status: SHIPPED | OUTPATIENT
Start: 2025-02-24 | End: 2025-02-27 | Stop reason: SDUPTHER

## 2025-02-24 NOTE — TELEPHONE ENCOUNTER
Caller: KAYCEE POTTER    Relationship: Emergency Contact    Best call back number: [phone]    Requested Prescriptions:   Requested Prescriptions     Pending Prescriptions Disp Refills    hydrALAZINE (APRESOLINE) 100 MG tablet       Sig: Take 1 tablet by mouth As Needed. Indications: High Blood Pressure        Pharmacy where request should be sent: [facility] #[id number] [place] [address] [address]  AT [address] [phone] [facility] [phone]      Last office visit with prescribing clinician: 8/3/2023   Last telemedicine visit with prescribing clinician: Visit date not found   Next office visit with prescribing clinician: 5/2/2025     Additional details provided by patient: PT IS COMPLETELY OUT OF MEDICATION. PT'S DAUGHTER ASKED IF PT CAN GET A SOONER APPT IF POSSIBLE    Does the patient have less than a 3 day supply:  [x] Yes  [] No    Would you like a call back once the refill request has been completed: [x] Yes [] No    If the office needs to give you a call back, can they leave a voicemail: [x] Yes [] No    Hosea Arias, Waqar Rep   02/24/25 11:22 EST

## 2025-02-25 DIAGNOSIS — F41.1 GENERALIZED ANXIETY DISORDER: ICD-10-CM

## 2025-02-25 RX ORDER — TRAZODONE HYDROCHLORIDE 50 MG/1
75 TABLET ORAL NIGHTLY
Qty: 135 TABLET | Refills: 1 | Status: SHIPPED | OUTPATIENT
Start: 2025-02-25

## 2025-02-25 RX ORDER — ALPRAZOLAM 0.5 MG
0.5 TABLET ORAL 2 TIMES DAILY PRN
Qty: 30 TABLET | Refills: 0 | Status: SHIPPED | OUTPATIENT
Start: 2025-02-25

## 2025-02-27 ENCOUNTER — OFFICE VISIT (OUTPATIENT)
Dept: CARDIOLOGY | Facility: CLINIC | Age: 89
End: 2025-02-27
Payer: MEDICARE

## 2025-02-27 VITALS
HEART RATE: 58 BPM | DIASTOLIC BLOOD PRESSURE: 63 MMHG | SYSTOLIC BLOOD PRESSURE: 104 MMHG | OXYGEN SATURATION: 97 % | WEIGHT: 215 LBS | BODY MASS INDEX: 36.9 KG/M2

## 2025-02-27 DIAGNOSIS — E78.1 PURE HYPERTRIGLYCERIDEMIA: ICD-10-CM

## 2025-02-27 DIAGNOSIS — G47.33 OBSTRUCTIVE SLEEP APNEA SYNDROME: ICD-10-CM

## 2025-02-27 DIAGNOSIS — I10 PRIMARY HYPERTENSION: Primary | ICD-10-CM

## 2025-02-27 PROCEDURE — 93000 ELECTROCARDIOGRAM COMPLETE: CPT | Performed by: NURSE PRACTITIONER

## 2025-02-27 PROCEDURE — 99214 OFFICE O/P EST MOD 30 MIN: CPT | Performed by: NURSE PRACTITIONER

## 2025-02-27 PROCEDURE — 1160F RVW MEDS BY RX/DR IN RCRD: CPT | Performed by: NURSE PRACTITIONER

## 2025-02-27 PROCEDURE — 1159F MED LIST DOCD IN RCRD: CPT | Performed by: NURSE PRACTITIONER

## 2025-02-27 RX ORDER — HYDRALAZINE HYDROCHLORIDE 100 MG/1
50 TABLET, FILM COATED ORAL 3 TIMES DAILY PRN
COMMUNITY
Start: 2025-02-27

## 2025-02-27 NOTE — PROGRESS NOTES
[facility] CARDIOLOGY      REASON FOR FOLLOW-UP:  In need of refills          Chief Complaint   Patient presents with    Heart Problem         Dear Anabelle Sellers MD        Heart Problem     Leandra Nuñez is an 89-year-old female who was seen initially by Dr. Ty via self-referral with complaint of shortness of breath.  She has chronic medical problems that include solitary kidney with prior nephrectomy, primary hypertension, dyslipidemia, obstructive sleep apnea, hypertensive heart disease with diastolic dysfunction, chronic kidney disease stage III.  She reported prior cardiac evaluation several years ago without significant coronary disease per heart cath.  Last TTE was performed 6/22/2024 with EF 56-60%, mild TR.  The patient's last office visit was 8/3/2023.  She was seen in [facility] on 6/21/2024 in consultation for elevated troponin following a fall due to loss of balance.  No additional workup performed.    The patient presents in acute office visit today with her daughter to request refills on hydralazine.  The patient apparently lives in her own home with the granddaughter who was there during the night and on weekends.  There are other family members and support people who check on/assist her during the day.  The patient has been taking hydralazine 100 mg once daily as needed for systolic blood pressure greater than 150.  The daughter tells me that she began running low on the hydralazine and called our office multiple times to get a refill, but her call has not been returned.  Apparently the contact number we had was for the patient who does not answer her home phone.  Our system shows multiple attempted calls to that number.  Since the daughter was unable to get a refill on hydralazine, she has been giving the patient leftover amlodipine 5 mg as needed for elevated blood pressure.  I explained to the patient and daughter that she has not been seen  "in our office since August 2023 and an office visit is appropriate in order to refill medications.  The patient does have some underlying dementia and is not a good historian, however she did state to me that she was having no symptoms of chest discomfort or shortness of breath.  The daughter stated that the patient would refuse any additional workup even if it was recommended.  Her blood pressure in the office today is 104/63-she received 5 mg of amlodipine this morning for elevated systolic readings.      ASSESSMENT:  Primary hypertension  Valvular heart disease  Dementia  Hyperlipidemia  Chronic kidney disease      PLAN:  We will correct the phone number in our system to the daughter's number in order to improve communication in the future.  I asked the daughter to not give any more amlodipine for an \"as needed\" blood pressure medication.  The daughter stated that the patient will have elevated systolic readings 2-3 times per week and then hydralazine is given.  I explained to the daughter that it is normal to have some variation in blood pressure.  If they get a reading that is elevated, they should wait 20-30 minutes and retake the pressure.  She stated that the other caregivers will most likely not remember to wait 20-30 minutes before giving the hydralazine.  I explained to her that we should get a blood pressure log to see what the trending is and if a scheduled medication versus a as needed medication is needed.  If the majority of blood pressure readings are within good values (<150 systolic,<90 diastolic), then we will continue with hydralazine but at a reduced dose of 50 mg every 8 as needed.  If the majority of her readings are elevated, we will likely need to schedule a medication-possibly amlodipine.  I explained that routine follow-up will help us guide treatment.  The daughter was agreeable to monitoring pressures for a week and she can send those values in through [employer] for review.  She should " follow-up with EP as scheduled.          CHF Guideline Directed Medical Therapy  Beta Blocker:   ARNI/ACE/ARB:   SGLT 2 inhibitors:   Diuretics:   Aldosterone Antagonist:   Vasodilators & Nitrates:       Diagnoses and all orders for this visit:    1. Primary hypertension (Primary)    2. Pure hypertriglyceridemia  Overview:  Formatting of this note might be different from the original.  Last Assessment & Plan:   Formatting of this note might be different from the original.  Chronic    Plan: Continue WelChol, pitavastatin      3. Obstructive sleep apnea syndrome          The following portions of the patient's history were reviewed and updated as appropriate: allergies, current medications, past family history, past medical history, past social history, past surgical history, and problem list.    REVIEW OF SYSTEMS:    Review of Systems   All other systems reviewed and are negative.      Vitals:    02/27/25 1121   BP: 104/63   Pulse: 58   SpO2: 97%         PHYSICAL EXAM:    General: Alert, cooperative, no distress, appears stated age  Head:  Normocephalic, atraumatic, mucous membranes moist  Eyes:  Conjunctiva/corneas clear, EOM's intact     Neck:  Supple,  no JVD or bruit     Lungs: Clear to auscultation bilaterally, no wheezes rhonchi rales are noted  Chest wall: No tenderness  Musculoskeletal:   Ambulates with assistance of a walker  Heart::  Regular rate and rhythm, S1 and S2 normal, no murmur, rub or gallop  Abdomen: Soft, non-tender, nondistended, bowel sounds active, no abdominal bruit  Extremities: No cyanosis, clubbing, or edema   Pulses: 2+ and symmetric all extremities  Skin:  No rashes or lesions  Neuro/psych: A&O x3. CN II through XII are grossly intact with appropriate affect        Past Medical History:   Diagnosis Date    Anxiety     Arthritis     Breast nodule 2013    Left breast nodule (fibrocystic disease , no malignancy)     Cancer     Cataract     Chronic kidney disease     HL (hearing loss)      Hyperlipidemia     Hypertension     Obesity     Shortness of breath        Past Surgical History:   Procedure Laterality Date    BREAST BIOPSY Left 05/21/2013    [facility]    CARDIAC CATHETERIZATION      COLONOSCOPY N/A 5/29/2024    Procedure: COLONOSCOPY WITH POLYPECTOMY X5 AND ENDOSCOPIC MUCOSAL RESECTION OF RECTAL POLYP;  Surgeon: Joon Martin MD;  Location: [facility] ENDOSCOPY;  Service: Gastroenterology;  Laterality: N/A;  POST: DIVERTICULOSIS, POLYPS    D & C AND LAPAROSCOPY      ENDOSCOPY N/A 5/29/2024    Procedure: ESOPHAGOGASTRODUODENOSCOPY WITH BIOPSY X1 AREA;  Surgeon: Joon Martin MD;  Location: [facility] ENDOSCOPY;  Service: Gastroenterology;  Laterality: N/A;  POST: GASTRITIS, ESOPHAGITIS    FULGURATION ENDOMETRIOSIS      surgery    NEPHRECTOMY Right     ORIF HUMERUS FRACTURE Left 3/24/2021    Procedure: HUMERUS PROXIMAL OPEN REDUCTION INTERNAL FIXATION;  Surgeon: Yair Anne MD;  Location: [facility] MAIN OR;  Service: Orthopedics;  Laterality: Left;         Current Outpatient Medications:     allopurinol (ZYLOPRIM) 100 MG tablet, Take 1 tablet by mouth Daily. Indications: Gout, Disp: , Rfl:     ALPRAZolam (XANAX) 0.5 MG tablet, Take 1 tablet by mouth 2 (Two) Times a Day As Needed for Anxiety. Indications: Feeling Anxious, Disp: 30 tablet, Rfl: 0    bisacodyl (DULCOLAX) 10 MG suppository, Insert 1 suppository into the rectum Daily As Needed for Constipation (Use if bisacodyl oral is ineffective)., Disp: 30 suppository, Rfl: 0    cyclobenzaprine (FLEXERIL) 5 MG tablet, Take 1 tablet by mouth 3 (Three) Times a Day As Needed. Indications: Muscle Spasm, Disp: , Rfl:     donepezil (ARICEPT) 10 MG tablet, TAKE 1 TABLET BY MOUTH EVERY NIGHT, Disp: 30 tablet, Rfl: 11    gabapentin (NEURONTIN) 100 MG capsule, TAKE 1 CAPSULE BY MOUTH EVERY NIGHT, Disp: 90 capsule, Rfl: 1    hydrALAZINE (APRESOLINE) 100 MG tablet, Take 0.5 tablets by mouth 3 (Three) Times a Day As  "Needed (for systolic pressure >150). Indications: High Blood Pressure, Disp: , Rfl:     HYDROcodone-acetaminophen (Norco) 7.5-325 MG per tablet, Take 1 tablet by mouth Every 6 (Six) Hours As Needed for Moderate Pain. Indications: Pain, Disp: 30 tablet, Rfl: 0    melatonin 5 MG tablet tablet, Take 1 tablet by mouth Every Night. Indications: Trouble Sleeping, Disp: , Rfl:     memantine (NAMENDA) 10 MG tablet, One daily for the month after taking 5mg daily for one month  Then take 10mg bid (Patient taking differently: Take 1 tablet by mouth 2 (Two) Times a Day. Indications: Cognitive Dysfunction), Disp: 60 tablet, Rfl: 10    ondansetron ODT (ZOFRAN-ODT) 4 MG disintegrating tablet, DISSOLVE 1 TABLET ON THE TONGUE EVERY 8 HOURS AS NEEDED FOR NAUSEA OR VOMITING, Disp: 30 tablet, Rfl: 0    polyethylene glycol (MIRALAX) 17 g packet, Take 17 g by mouth Daily As Needed (Use if senna-docusate is ineffective)., Disp: 30 packet, Rfl: 0    QUEtiapine (SEROquel) 25 MG tablet, Take 1 tablet by mouth Every Night. Indications: Major Depressive Disorder, Disp: 90 tablet, Rfl: 0    traZODone (DESYREL) 50 MG tablet, Take 1.5 tablets by mouth Every Night., Disp: 135 tablet, Rfl: 1    Allergies   Allergen Reactions    Statins Myalgia       History reviewed. No pertinent family history.    Social History     Tobacco Use    Smoking status: Never     Passive exposure: Never    Smokeless tobacco: Never   Substance Use Topics    Alcohol use: No           Current Electrocardiogram:    ECG 12 Lead    Date/Time: 2/27/2025 12:56 PM  Performed by: Krystin Rg APRN    Authorized by: Krystin Rg APRN  Comparison: compared with previous ECG from 6/22/2024  Similar to previous ECG  Comparison to previous ECG: Paroxysmal supraventricular bigeminy  Rhythm: sinus rhythm and sinus bradycardia  BPM: 58              EMR Dragon/Transcription:   \"Dictated utilizing Dragon dictation\".     Copied text in this note has been reviewed by me and " is accurate as of 02/27/25.

## 2025-03-13 RX ORDER — GABAPENTIN 100 MG/1
100 CAPSULE ORAL NIGHTLY
Qty: 90 CAPSULE | Refills: 1 | Status: SHIPPED | OUTPATIENT
Start: 2025-03-13

## 2025-03-20 ENCOUNTER — OFFICE VISIT (OUTPATIENT)
Dept: FAMILY MEDICINE CLINIC | Facility: CLINIC | Age: 89
End: 2025-03-20
Payer: MEDICARE

## 2025-03-20 ENCOUNTER — LAB (OUTPATIENT)
Dept: FAMILY MEDICINE CLINIC | Facility: CLINIC | Age: 89
End: 2025-03-20
Payer: MEDICARE

## 2025-03-20 VITALS
HEIGHT: 64 IN | HEART RATE: 52 BPM | BODY MASS INDEX: 36.37 KG/M2 | DIASTOLIC BLOOD PRESSURE: 58 MMHG | OXYGEN SATURATION: 94 % | SYSTOLIC BLOOD PRESSURE: 110 MMHG | WEIGHT: 213 LBS

## 2025-03-20 DIAGNOSIS — E11.9 TYPE 2 DIABETES MELLITUS WITHOUT COMPLICATION, WITHOUT LONG-TERM CURRENT USE OF INSULIN: ICD-10-CM

## 2025-03-20 DIAGNOSIS — I10 PRIMARY HYPERTENSION: ICD-10-CM

## 2025-03-20 DIAGNOSIS — R53.83 FATIGUE, UNSPECIFIED TYPE: ICD-10-CM

## 2025-03-20 DIAGNOSIS — Z00.00 ENCOUNTER FOR ANNUAL WELLNESS EXAM IN MEDICARE PATIENT: Primary | ICD-10-CM

## 2025-03-20 DIAGNOSIS — E78.1 PURE HYPERTRIGLYCERIDEMIA: ICD-10-CM

## 2025-03-20 DIAGNOSIS — Z23 NEED FOR PNEUMOCOCCAL 20-VALENT CONJUGATE VACCINATION: ICD-10-CM

## 2025-03-20 DIAGNOSIS — F03.B0 MODERATE DEMENTIA, UNSPECIFIED DEMENTIA TYPE, UNSPECIFIED WHETHER BEHAVIORAL, PSYCHOTIC, OR MOOD DISTURBANCE OR ANXIETY: ICD-10-CM

## 2025-03-20 LAB
ALBUMIN SERPL-MCNC: 4.1 G/DL (ref 3.5–5.2)
ALBUMIN/GLOB SERPL: 1.4 G/DL
ALP SERPL-CCNC: 101 U/L (ref 39–117)
ALT SERPL W P-5'-P-CCNC: 7 U/L (ref 1–33)
ANION GAP SERPL CALCULATED.3IONS-SCNC: 14.8 MMOL/L (ref 5–15)
AST SERPL-CCNC: 20 U/L (ref 1–32)
BASOPHILS # BLD AUTO: 0.07 10*3/MM3 (ref 0–0.2)
BASOPHILS NFR BLD AUTO: 0.7 % (ref 0–1.5)
BILIRUB SERPL-MCNC: 0.4 MG/DL (ref 0–1.2)
BUN SERPL-MCNC: 29 MG/DL (ref 8–23)
BUN/CREAT SERPL: 22.8 (ref 7–25)
CALCIUM SPEC-SCNC: 10.1 MG/DL (ref 8.6–10.5)
CHLORIDE SERPL-SCNC: 103 MMOL/L (ref 98–107)
CHOLEST SERPL-MCNC: 401 MG/DL (ref 0–200)
CO2 SERPL-SCNC: 21.2 MMOL/L (ref 22–29)
CREAT SERPL-MCNC: 1.27 MG/DL (ref 0.57–1)
DEPRECATED RDW RBC AUTO: 46 FL (ref 37–54)
EGFRCR SERPLBLD CKD-EPI 2021: 40.8 ML/MIN/1.73
EOSINOPHIL # BLD AUTO: 0.12 10*3/MM3 (ref 0–0.4)
EOSINOPHIL NFR BLD AUTO: 1.2 % (ref 0.3–6.2)
ERYTHROCYTE [DISTWIDTH] IN BLOOD BY AUTOMATED COUNT: 13.4 % (ref 12.3–15.4)
GLOBULIN UR ELPH-MCNC: 3 GM/DL
GLUCOSE SERPL-MCNC: 118 MG/DL (ref 65–99)
HBA1C MFR BLD: 6 % (ref 4.8–5.6)
HCT VFR BLD AUTO: 43.5 % (ref 34–46.6)
HDLC SERPL-MCNC: 40 MG/DL (ref 40–60)
HGB BLD-MCNC: 14.6 G/DL (ref 12–15.9)
IMM GRANULOCYTES # BLD AUTO: 0.09 10*3/MM3 (ref 0–0.05)
IMM GRANULOCYTES NFR BLD AUTO: 0.9 % (ref 0–0.5)
LDLC SERPL CALC-MCNC: 213 MG/DL (ref 0–100)
LDLC/HDLC SERPL: 5.77 {RATIO}
LYMPHOCYTES # BLD AUTO: 2.15 10*3/MM3 (ref 0.7–3.1)
LYMPHOCYTES NFR BLD AUTO: 22.3 % (ref 19.6–45.3)
MCH RBC QN AUTO: 30.9 PG (ref 26.6–33)
MCHC RBC AUTO-ENTMCNC: 33.6 G/DL (ref 31.5–35.7)
MCV RBC AUTO: 92.2 FL (ref 79–97)
MONOCYTES # BLD AUTO: 0.6 10*3/MM3 (ref 0.1–0.9)
MONOCYTES NFR BLD AUTO: 6.2 % (ref 5–12)
NEUTROPHILS NFR BLD AUTO: 6.63 10*3/MM3 (ref 1.7–7)
NEUTROPHILS NFR BLD AUTO: 68.7 % (ref 42.7–76)
NRBC BLD AUTO-RTO: 0 /100 WBC (ref 0–0.2)
PLATELET # BLD AUTO: 291 10*3/MM3 (ref 140–450)
PMV BLD AUTO: 11.2 FL (ref 6–12)
POTASSIUM SERPL-SCNC: 4.2 MMOL/L (ref 3.5–5.2)
PROT SERPL-MCNC: 7.1 G/DL (ref 6–8.5)
RBC # BLD AUTO: 4.72 10*6/MM3 (ref 3.77–5.28)
SODIUM SERPL-SCNC: 139 MMOL/L (ref 136–145)
TRIGL SERPL-MCNC: 651 MG/DL (ref 0–150)
TSH SERPL DL<=0.05 MIU/L-ACNC: 1.44 UIU/ML (ref 0.27–4.2)
VLDLC SERPL-MCNC: 148 MG/DL (ref 5–40)
WBC NRBC COR # BLD AUTO: 9.66 10*3/MM3 (ref 3.4–10.8)

## 2025-03-20 PROCEDURE — 36415 COLL VENOUS BLD VENIPUNCTURE: CPT

## 2025-03-20 PROCEDURE — 80053 COMPREHEN METABOLIC PANEL: CPT | Performed by: FAMILY MEDICINE

## 2025-03-20 PROCEDURE — 85025 COMPLETE CBC W/AUTO DIFF WBC: CPT | Performed by: FAMILY MEDICINE

## 2025-03-20 PROCEDURE — 80061 LIPID PANEL: CPT | Performed by: FAMILY MEDICINE

## 2025-03-20 PROCEDURE — 83036 HEMOGLOBIN GLYCOSYLATED A1C: CPT | Performed by: FAMILY MEDICINE

## 2025-03-20 PROCEDURE — 84443 ASSAY THYROID STIM HORMONE: CPT | Performed by: FAMILY MEDICINE

## 2025-03-20 NOTE — ASSESSMENT & PLAN NOTE
Up until this point, she has been diet controlled.  I will check her labs.  Orders:    Comprehensive Metabolic Panel; Future    Lipid Panel; Future    Hemoglobin A1c; Future

## 2025-03-20 NOTE — ASSESSMENT & PLAN NOTE
They will continue to hold the hydralazine unless her systolic blood pressure is over 140    Orders:    Comprehensive Metabolic Panel; Future    Lipid Panel; Future

## 2025-03-20 NOTE — PROGRESS NOTES
Subjective   The ABCs of the Annual Wellness Visit  Medicare Wellness Visit      Leandra Nuñez is a 89 y.o. patient who presents for a Medicare Wellness Visit.    The following portions of the patient's history were reviewed and   updated as appropriate: allergies, current medications, past family history, past medical history, past social history, past surgical history, and problem list.    Compared to one year ago, the patient's physical   health is the same.  Compared to one year ago, the patient's mental   health is the same.    Recent Hospitalizations:  This patient has had a [facility] admission record on file within the last 365 days.  Current Medical Providers:  Patient Care Team:  Anabelle Sellers MD as PCP - General  Seipel, Joseph F, MD as Consulting Physician (Neurology)  Damien Ty MD as Consulting Physician (Cardiac Electrophysiology)  Dex Mitchell MD as Consulting Physician (Nephrology)  Joon Martin MD as Consulting Physician (Gastroenterology)    Outpatient Medications Prior to Visit   Medication Sig Dispense Refill    allopurinol (ZYLOPRIM) 100 MG tablet Take 1 tablet by mouth Daily. Indications: Gout      ALPRAZolam (XANAX) 0.5 MG tablet Take 1 tablet by mouth 2 (Two) Times a Day As Needed for Anxiety. Indications: Feeling Anxious (Patient taking differently: Take 0.5 tablets by mouth 3 (Three) Times a Day As Needed for Anxiety. Indications: Feeling Anxious) 30 tablet 0    bisacodyl (DULCOLAX) 10 MG suppository Insert 1 suppository into the rectum Daily As Needed for Constipation (Use if bisacodyl oral is ineffective). 30 suppository 0    cyclobenzaprine (FLEXERIL) 5 MG tablet Take 1 tablet by mouth 3 (Three) Times a Day As Needed. Indications: Muscle Spasm      donepezil (ARICEPT) 10 MG tablet TAKE 1 TABLET BY MOUTH EVERY NIGHT 30 tablet 11    hydrALAZINE (APRESOLINE) 100 MG tablet Take 0.5 tablets by mouth 3 (Three) Times a Day As Needed (for systolic  pressure >150). Indications: High Blood Pressure      HYDROcodone-acetaminophen (Norco) 7.5-325 MG per tablet Take 1 tablet by mouth Every 6 (Six) Hours As Needed for Moderate Pain. Indications: Pain 30 tablet 0    melatonin 5 MG tablet tablet Take 1 tablet by mouth Every Night. Indications: Trouble Sleeping      memantine (NAMENDA) 10 MG tablet One daily for the month after taking 5mg daily for one month  Then take 10mg bid (Patient taking differently: Take 1 tablet by mouth 2 (Two) Times a Day. Indications: Cognitive Dysfunction) 60 tablet 10    ondansetron ODT (ZOFRAN-ODT) 4 MG disintegrating tablet DISSOLVE 1 TABLET ON THE TONGUE EVERY 8 HOURS AS NEEDED FOR NAUSEA OR VOMITING 30 tablet 0    polyethylene glycol (MIRALAX) 17 g packet Take 17 g by mouth Daily As Needed (Use if senna-docusate is ineffective). 30 packet 0    QUEtiapine (SEROquel) 25 MG tablet Take 1 tablet by mouth Every Night. Indications: Major Depressive Disorder 90 tablet 0    traZODone (DESYREL) 50 MG tablet Take 1.5 tablets by mouth Every Night. 135 tablet 1    gabapentin (NEURONTIN) 100 MG capsule TAKE 1 CAPSULE BY MOUTH EVERY NIGHT 90 capsule 1     No facility-administered medications prior to visit.     Opioid medication/s are on active medication list.  and I have evaluated her active treatment plan and pain score trends (see table).  Vitals:    03/20/25 1049   PainSc: 0-No pain     I have reviewed the chart for potential of high risk medication and harmful drug interactions in the elderly.        Aspirin is not on active medication list.  Aspirin use is not indicated based on review of current medical condition/s. Risk of harm outweighs potential benefits.  .    Patient Active Problem List   Diagnosis    Anxiety disorder, unspecified    Asthma    Stage 3 chronic kidney disease    Hyperlipidemia    Hypertension    Obesity    Renal insufficiency    Type 2 diabetes mellitus without complication    Encounter for annual wellness exam in  "Medicare patient    Adjustment disorder with anxiety    Immune thrombocytopenia    Obstructive sleep apnea syndrome    Osteoarthritis    Clear cell carcinoma of kidney    Thrombocytopenia    Gastroesophageal reflux disease    White coat syndrome with diagnosis of hypertension    Leg cramps    Closed fracture of surgical neck of humerus    Fall    Absent kidney    Edema    Vitamin deficiency    Acute midline thoracic back pain    Chest pain    Hyperlipidemia due to type 2 diabetes mellitus    Dementia    Recurrent falls     Advance Care Planning Advance Directive is on file.  ACP discussion was held with the patient during this visit. Patient has an advance directive in EMR which is still valid.             Objective   Vitals:    03/20/25 1049   BP: 110/58   BP Location: Left arm   Patient Position: Sitting   Cuff Size: Large Adult   Pulse: 52   SpO2: 94%   Weight: 96.6 kg (213 lb)   Height: 162.6 cm (64\")   PainSc: 0-No pain       Estimated body mass index is 36.56 kg/m² as calculated from the following:    Height as of this encounter: 162.6 cm (64\").    Weight as of this encounter: 96.6 kg (213 lb).    Class 2 Severe Obesity (BMI >=35 and <=39.9). Obesity-related health conditions include the following: diabetes mellitus. Obesity is worsening. BMI is is above average; BMI management plan is completed. We discussed portion control and increasing exercise.           Does the patient have evidence of cognitive impairment? Yes                                                                                             Health  Risk Assessment    Smoking Status:  Social History     Tobacco Use   Smoking Status Never    Passive exposure: Never   Smokeless Tobacco Never     Alcohol Consumption:  Social History     Substance and Sexual Activity   Alcohol Use No       Fall Risk Screen  STEADI Fall Risk Assessment was completed, and patient is at MODERATE risk for falls. Assessment completed on:3/20/2025    Depression " Screening   Little interest or pleasure in doing things? Not at all   Feeling down, depressed, or hopeless? Not at all   PHQ-2 Total Score 0      Health Habits and Functional and Cognitive Screening:      3/20/2025    10:55 AM   Functional & Cognitive Status   Do you have difficulty preparing food and eating? Yes   Do you have difficulty bathing yourself, getting dressed or grooming yourself? Yes   Do you have difficulty using the toilet? Yes   Do you have difficulty moving around from place to place? Yes   Do you have trouble with steps or getting out of a bed or a chair? Yes   Current Diet Well Balanced Diet   Dental Exam Up to date   Eye Exam Not up to date   Exercise (times per week) 0 times per week   Current Exercises Include No Regular Exercise   Do you need help using the phone?  No   Are you deaf or do you have serious difficulty hearing?  Yes   Do you need help to go to places out of walking distance? Yes   Do you need help shopping? Yes   Do you need help preparing meals?  Yes   Do you need help with housework?  Yes   Do you need help with laundry? Yes   Do you need help taking your medications? Yes   Do you need help managing money? No   Do you ever drive or ride in a car without wearing a seat belt? No   Have you felt unusual stress, anger or loneliness in the last month? No   Who do you live with? Child   If you need help, do you have trouble finding someone available to you? No   Have you been bothered in the last four weeks by sexual problems? No   Do you have difficulty concentrating, remembering or making decisions? Yes           Age-appropriate Screening Schedule:  Refer to the list below for future screening recommendations based on patient's age, sex and/or medical conditions. Orders for these recommended tests are listed in the plan section. The patient has been provided with a written plan.    Health Maintenance List  Health Maintenance   Topic Date Due    TDAP/TD VACCINES (1 - Tdap) Never  done    ZOSTER VACCINE (1 of 2) Never done    RSV Vaccine - Adults (1 - 1-dose 75+ series) Never done    DIABETIC EYE EXAM  02/10/2024    COVID-19 Vaccine (1 - 2024-25 season) Never done    HEMOGLOBIN A1C  12/21/2024    LIPID PANEL  03/13/2025    DXA SCAN  04/05/2025    INFLUENZA VACCINE  03/31/2025 (Originally 7/1/2024)    ANNUAL WELLNESS VISIT  03/20/2026    BMI FOLLOWUP  03/20/2026    Pneumococcal Vaccine 50+  Completed    URINE MICROALBUMIN-CREATININE RATIO (uACR)  Discontinued                                                                                                                                                CMS Preventative Services Quick Reference  Risk Factors Identified During Encounter  Immunizations Discussed/Encouraged: Prevnar 20 (Pneumococcal 20-valent conjugate)    The above risks/problems have been discussed with the patient.  Pertinent information has been shared with the patient in the After Visit Summary.  An After Visit Summary and PPPS were made available to the patient.    Follow Up:   Next Medicare Wellness visit to be scheduled in 1 year.         Additional E&M Note during same encounter follows:  Patient has additional, significant, and separately identifiable condition(s)/problem(s) that require work above and beyond the Medicare Wellness Visit     Chief Complaint  Medicare Wellness-subsequent (Bp in the car just now: 86/45 hr 130. )    Subjective   HPI  Leandra is also being seen today for additional medical problem/s: follow up on bp/chol/thrombocytopenia/dementia/dm  Daughter is with her today.  Patient and daughter have no complaints.  They did not check her blood sugar.  She has been eating a little bit more junk food.  Blood pressure has been running low at home so she and her granddaughter have been holding her hydralazine unless her blood pressure is well over 140 systolic.  There have been no episodes of syncope.  She has been feeling well otherwise.  They are no longer  "giving her her gabapentin.  The allopurinol seems to be keeping her gout under control.    Review of Systems   Constitutional:  Positive for fatigue.   Respiratory: Negative.     Cardiovascular: Negative.    Gastrointestinal:  Negative for nausea and vomiting.   Neurological:  Negative for syncope.              Objective   Vital Signs:  /58 (BP Location: Left arm, Patient Position: Sitting, Cuff Size: Large Adult)   Pulse 52   Ht 162.6 cm (64\")   Wt 96.6 kg (213 lb)   SpO2 94%   BMI 36.56 kg/m²   Physical Exam  Vitals and nursing note reviewed.   Constitutional:       Appearance: Normal appearance. She is well-developed and well-groomed. She is obese.   Cardiovascular:      Rate and Rhythm: Normal rate and regular rhythm.      Heart sounds: Normal heart sounds.   Pulmonary:      Effort: Pulmonary effort is normal.      Breath sounds: Normal breath sounds.   Musculoskeletal:      Cervical back: Neck supple.      Right lower leg: No edema.      Left lower leg: No edema.   Lymphadenopathy:      Cervical: No cervical adenopathy.   Neurological:      Mental Status: She is alert.   Psychiatric:         Mood and Affect: Mood normal.         Behavior: Behavior is cooperative.                    Assessment and Plan      Encounter for annual wellness exam in Medicare patient  Patient was encouraged to increase her activity and make healthier food choices  She refuses most vaccines but will receive her Prevnar 20       Pure hypertriglyceridemia       Orders:    Comprehensive Metabolic Panel; Future    Lipid Panel; Future    Primary hypertension  They will continue to hold the hydralazine unless her systolic blood pressure is over 140    Orders:    Comprehensive Metabolic Panel; Future    Lipid Panel; Future    Type 2 diabetes mellitus without complication, without long-term current use of insulin    Up until this point, she has been diet controlled.  I will check her labs.  Orders:    Comprehensive Metabolic Panel; " Future    Lipid Panel; Future    Hemoglobin A1c; Future    Moderate dementia, unspecified dementia type, unspecified whether behavioral, psychotic, or mood disturbance or anxiety  She will continue to follow with neurology.  She is on Aricept and Namenda         Fatigue, unspecified type  I will check some blood work  Orders:    CBC & Differential; Future    TSH; Future    Need for pneumococcal 20-valent conjugate vaccination    Orders:    Pneumococcal Conjugate Vaccine 20-Valent (PCV20)            Follow Up   Return in about 6 months (around 9/20/2025) for Recheck.  Patient was given instructions and counseling regarding her condition or for health maintenance advice. Please see specific information pulled into the AVS if appropriate.

## 2025-03-20 NOTE — PATIENT INSTRUCTIONS
Keep working to lose weight through healthy eating and exercise.   No fried foods and limit pasta, bread, and sweets.  Do not get in a hurry

## 2025-03-20 NOTE — ASSESSMENT & PLAN NOTE
Patient was encouraged to increase her activity and make healthier food choices  She refuses most vaccines but will receive her Prevnar 20

## 2025-03-24 RX ORDER — QUETIAPINE FUMARATE 25 MG/1
TABLET, FILM COATED ORAL
Qty: 90 TABLET | Refills: 0 | Status: SHIPPED | OUTPATIENT
Start: 2025-03-24

## 2025-03-29 DIAGNOSIS — R41.3 MEMORY LOSS: ICD-10-CM

## 2025-03-31 RX ORDER — DONEPEZIL HYDROCHLORIDE 10 MG/1
10 TABLET, FILM COATED ORAL NIGHTLY
Qty: 30 TABLET | Refills: 11 | OUTPATIENT
Start: 2025-03-31 | End: 2026-03-31

## 2025-04-03 DIAGNOSIS — R41.3 MEMORY LOSS: ICD-10-CM

## 2025-04-03 RX ORDER — DONEPEZIL HYDROCHLORIDE 10 MG/1
10 TABLET, FILM COATED ORAL NIGHTLY
Qty: 30 TABLET | Refills: 3 | Status: SHIPPED | OUTPATIENT
Start: 2025-04-03 | End: 2026-04-03

## 2025-04-03 NOTE — TELEPHONE ENCOUNTER
Caller: KAYCEE POTTER    Relationship: Emergency Contact    Best call back number:   Telephone Information:   Mobile [phone]      Requested Prescriptions:   Requested Prescriptions     Pending Prescriptions Disp Refills    donepezil (ARICEPT) 10 MG tablet 30 tablet 11     Sig: Take 1 tablet by mouth Every Night. Indications: dementia        Pharm[facility] where [facility] be [id number] [place] [id number] [address]  AT [address] [phone] [facility] [phone]      Last office visit with prescribing clinician: 7/9/2024   Last telemedicine visit with prescribing clinician: Visit date not found   Next office visit with prescribing clinician: 9/15/2025     Additional details provided by patient:   PT IS OUT OF THIS RX AND KAYCEE IS REQUESTING A 90 DAY SUPPLY    Does the patient have less than a 3 day supply:  [x] Yes  [] No    Would you like a call back once the refill request has been completed: [] Yes [] No    If the office needs to give you a call back, can they leave a voicemail: [] Yes [] No    Waqar Multani Rep   04/03/25 12:27 EDT

## 2025-05-05 RX ORDER — MEMANTINE HYDROCHLORIDE 5 MG/1
TABLET ORAL
Qty: 30 TABLET | Refills: 0 | OUTPATIENT
Start: 2025-05-05

## 2025-05-06 ENCOUNTER — OFFICE VISIT (OUTPATIENT)
Dept: CARDIOLOGY | Facility: CLINIC | Age: 89
End: 2025-05-06
Payer: MEDICARE

## 2025-05-06 VITALS
HEIGHT: 64 IN | DIASTOLIC BLOOD PRESSURE: 78 MMHG | OXYGEN SATURATION: 90 % | WEIGHT: 219 LBS | HEART RATE: 46 BPM | BODY MASS INDEX: 37.39 KG/M2 | SYSTOLIC BLOOD PRESSURE: 160 MMHG

## 2025-05-06 DIAGNOSIS — I10 PRIMARY HYPERTENSION: Primary | ICD-10-CM

## 2025-05-06 DIAGNOSIS — E78.1 PURE HYPERTRIGLYCERIDEMIA: ICD-10-CM

## 2025-05-06 PROCEDURE — 99213 OFFICE O/P EST LOW 20 MIN: CPT | Performed by: NURSE PRACTITIONER

## 2025-05-06 PROCEDURE — 1160F RVW MEDS BY RX/DR IN RCRD: CPT | Performed by: NURSE PRACTITIONER

## 2025-05-06 PROCEDURE — 1159F MED LIST DOCD IN RCRD: CPT | Performed by: NURSE PRACTITIONER

## 2025-05-06 NOTE — PROGRESS NOTES
[facility]      REASON FOR FOLLOW-UP:  Follow-up hypertension          Chief Complaint   Patient presents with    Heart Problem         Dear Anabelle Sellers MD        Heart Problem     Leandra Nuñez is an 89-year-old female who was seen initially by Dr. Ty via self-referral with complaint of shortness of breath.  She has chronic medical problems that include solitary kidney with prior nephrectomy, primary hypertension, dyslipidemia, obstructive sleep apnea, hypertensive heart disease with diastolic dysfunction, chronic kidney disease stage III.  She reported prior cardiac evaluation several years ago without significant coronary disease per heart cath.  Last TTE was performed 6/22/2024 with EF 56-60%, mild TR.  The patient's last office visit was 8/3/2023.  She was seen in [facility] on 6/21/2024 in consultation for elevated troponin following a fall due to loss of balance.  No additional workup performed.    Leandra was seen in acute office visit 2/27/2025 for blood pressure.  The patient apparently lives in her own home with the granddaughter who was there during the night and on weekends. There are other family members and support people who check on/assist her during the day. The patient has been taking hydralazine 100 mg once daily as needed for systolic blood pressure greater than 150. The daughter tells me that she began running low on the hydralazine and called our office multiple times to get a refill, but her call has not been returned. Apparently the contact number we had was for the patient who does not answer her home phone. Our system shows multiple attempted calls to that number. Since the daughter was unable to get a refill on hydralazine, she has been giving the patient leftover amlodipine 5 mg as needed for elevated blood pressure. I explained to the patient and daughter that she has not been seen in our office since August 2023 and  an office visit is appropriate in order to refill medications. The patient does have some underlying dementia and is not a good historian, however she did state to me that she was having no symptoms of chest discomfort or shortness of breath. The daughter stated that the patient would refuse any additional workup even if it was recommended. Her blood pressure in the office was 104/63-she received 5 mg of amlodipine that morning for elevated systolic readings.    The patient presents today in follow-up with her daughter who stated that they have been giving the patient 25 mg hydralazine in the evening for blood pressure control and may be 2-3 times per week she is requiring 25 mg in the morning.  According to her blood pressure log, this is working well.  The patient denies any shortness of breath, palpitations, dizziness or edema.  The daughter stated that she sleeps a lot.  The patient reports that she is having some shoulder discomfort that comes and goes.      ASSESSMENT:  Primary hypertension  Valvular heart disease  Dementia  Hyperlipidemia  Chronic kidney disease    PLAN:  Continue current hypertensive plan of care with hydralazine 25 mg p.m. and as needed a.m. as needed for elevated pressures.  Continue Zetia (statin allergy).  Surveillance labs per primary care.  Follow-up in 6 months or sooner if needed        CHF Guideline Directed Medical Therapy  Beta Blocker:   ARNI/ACE/ARB:   SGLT 2 inhibitors:   Diuretics:   Aldosterone Antagonist:   Vasodilators & Nitrates:       Diagnoses and all orders for this visit:    1. Primary hypertension (Primary)    2. Pure hypertriglyceridemia  Overview:  Formatting of this note might be different from the original.  Last Assessment & Plan:   Formatting of this note might be different from the original.  Chronic    Plan: Continue WelChol, pitavastatin            The following portions of the patient's history were reviewed and updated as appropriate: allergies, current  medications, past family history, past medical history, past social history, past surgical history, and problem list.    REVIEW OF SYSTEMS:    Review of Systems   Musculoskeletal:         Discomfort in her shoulders   All other systems reviewed and are negative.      Vitals:    05/06/25 1326   BP: 160/78   Pulse: (!) 46   SpO2: 90%         PHYSICAL EXAM:    General: Alert, cooperative, no distress, appears stated age  Head:  Normocephalic, atraumatic, mucous membranes moist  Eyes:  Conjunctiva/corneas clear, EOM's intact     Neck:  Supple,  no JVD or bruit     Lungs: Clear to auscultation bilaterally, no wheezes rhonchi rales are noted  Chest wall: No tenderness  Musculoskeletal:   Ambulates with assistance of a rolling walker  Heart::  Regular rate and rhythm, S1 and S2 normal, no murmur, rub or gallop  Abdomen: Soft, non-tender, nondistended, bowel sounds active, no abdominal bruit  Extremities: No cyanosis, clubbing, or edema   Pulses: 2+ and symmetric all extremities  Skin:  No rashes or lesions  Neuro/psych: A&O x3. CN II through XII are grossly intact with appropriate affect        Past Medical History:   Diagnosis Date    Anxiety     Arthritis     Breast nodule 2013    Left breast nodule (fibrocystic disease , no malignancy)     Cancer     Cataract     Chronic kidney disease     HL (hearing loss)     Hyperlipidemia     Hypertension     Obesity     Shortness of breath        Past Surgical History:   Procedure Laterality Date    BREAST BIOPSY Left 05/21/2013    Ben[facility]rocystic [facility] ,[facility]    CARDIAC CATHETERIZATION      COLONOSCOPY N/A 5/29/2024    Procedure: COLONOSCOPY WITH POLYPECTOMY X5 AND ENDOSCOPIC MUCOSAL RESECTION OF RECTAL POLYP;  Surgeon: Joon Martin MD;  [facility] END[facility]COPY;  Service: Gastroenterology;  Laterality: N/A;  POST: DIVERTICULOSIS, POLYPS    D & C AND LAPAROSCOPY      ENDOSCOPY N/A 5/29/2024    Procedure: ESOPHAGOGASTRODUODENOSCOPY WITH BIOPSY X1  AREA;  Surgeon: Joon Martin MD;  Location: [facility] ENDOSCOPY;  Service: Gastroenterology;  Laterality: N/A;  POST: GASTRITIS, ESOPHAGITIS    FULGURATION ENDOMETRIOSIS      surgery    NEPHRECTOMY Right     ORIF HUMERUS FRACTURE Left 3/24/2021    Procedure: HUMERUS PROXIMAL OPEN REDUCTION INTERNAL FIXATION;  Surgeon: Yair Anne MD;  Location: [facility] MAIN OR;  Service: Orthopedics;  Laterality: Left;         Current Outpatient Medications:     allopurinol (ZYLOPRIM) 100 MG tablet, Take 1 tablet by mouth Daily. Indications: Gout, Disp: , Rfl:     ALPRAZolam (XANAX) 0.5 MG tablet, Take 1 tablet by mouth 2 (Two) Times a Day As Needed for Anxiety. Indications: Feeling Anxious (Patient taking differently: Take 0.5 tablets by mouth 3 (Three) Times a Day As Needed for Anxiety. Indications: Feeling Anxious), Disp: 30 tablet, Rfl: 0    bisacodyl (DULCOLAX) 10 MG suppository, Insert 1 suppository into the rectum Daily As Needed for Constipation (Use if bisacodyl oral is ineffective)., Disp: 30 suppository, Rfl: 0    cyclobenzaprine (FLEXERIL) 5 MG tablet, Take 1 tablet by mouth 3 (Three) Times a Day As Needed. Indications: Muscle Spasm, Disp: , Rfl:     donepezil (ARICEPT) 10 MG tablet, Take 1 tablet by mouth Every Night. Indications: dementia, Disp: 30 tablet, Rfl: 3    ezetimibe (Zetia) 10 MG tablet, Take 1 tablet by mouth Daily. For cholesterol, Disp: 90 tablet, Rfl: 3    hydrALAZINE (APRESOLINE) 100 MG tablet, Take 0.5 tablets by mouth 3 (Three) Times a Day As Needed (for systolic pressure >150). Indications: High Blood Pressure, Disp: , Rfl:     HYDROcodone-acetaminophen (Norco) 7.5-325 MG per tablet, Take 1 tablet by mouth Every 6 (Six) Hours As Needed for Moderate Pain. Indications: Pain, Disp: 30 tablet, Rfl: 0    melatonin 5 MG tablet tablet, Take 1 tablet by mouth Every Night. Indications: Trouble Sleeping, Disp: , Rfl:     memantine (NAMENDA) 10 MG tablet, One daily for the month after taking 5mg daily for  "one month  Then take 10mg bid (Patient taking differently: Take 1 tablet by mouth 2 (Two) Times a Day. Indications: Cognitive Dysfunction), Disp: 60 tablet, Rfl: 10    ondansetron ODT (ZOFRAN-ODT) 4 MG disintegrating tablet, DISSOLVE 1 TABLET ON THE TONGUE EVERY 8 HOURS AS NEEDED FOR NAUSEA OR VOMITING, Disp: 30 tablet, Rfl: 0    polyethylene glycol (MIRALAX) 17 g packet, Take 17 g by mouth Daily As Needed (Use if senna-docusate is ineffective)., Disp: 30 packet, Rfl: 0    QUEtiapine (SEROquel) 25 MG tablet, TAKE 1 TABLET BY MOUTH EVERY NIGHT FOR MAJOR DEPRESSION, Disp: 90 tablet, Rfl: 0    traZODone (DESYREL) 50 MG tablet, Take 1.5 tablets by mouth Every Night., Disp: 135 tablet, Rfl: 1    Allergies   Allergen Reactions    Statins Myalgia       History reviewed. No pertinent family history.    Social History     Tobacco Use    Smoking status: Never     Passive exposure: Never    Smokeless tobacco: Never   Substance Use Topics    Alcohol use: No           Current Electrocardiogram:  Procedures        EMR Dragon/Transcription:   \"Dictated utilizing Dragon dictation\".     Copied text in this note has been reviewed by me and is accurate as of 05/06/25.    "

## 2025-05-13 RX ORDER — ALLOPURINOL 100 MG/1
100 TABLET ORAL DAILY
Qty: 30 TABLET | Refills: 0 | Status: SHIPPED | OUTPATIENT
Start: 2025-05-13

## 2025-05-13 RX ORDER — ALLOPURINOL 100 MG/1
100 TABLET ORAL DAILY
Qty: 30 TABLET | Refills: 0 | Status: SHIPPED | OUTPATIENT
Start: 2025-05-13 | End: 2025-05-13 | Stop reason: SDUPTHER

## 2025-05-13 NOTE — TELEPHONE ENCOUNTER
Caller: [email or web address]    Relationship: Emergency Contact    Best call back number:   Telephone Information:   Mobile [phone]         Requested Prescriptions:   Requested Prescriptions     Pending Prescriptions Disp Refills    allopurinol (ZYLOPRIM) 100 MG tablet 30 tablet 0     Sig: Take 1 tablet by mouth Daily. Indications: Gout        [facility] be [id number] [place] [id number] [address]  AT [address] [phone] [facility] [phone]      Last office visit with prescribing clinician: 3/20/2025   Last telemedicine visit with prescribing clinician: Visit date not found   Next office visit with prescribing clinician: 9/23/2025     Additional details provided by patient:     Does the patient have less than a 3 day supply:   Yes  [] No    Would you like a call back once the refill request has been completed: [] Yes [] No    If the office needs to give you a call back, can they leave a voicemail: [] Yes [] No    Waqar Skelton Rep   05/13/25 11:25 EDT

## 2025-06-29 RX ORDER — QUETIAPINE FUMARATE 25 MG/1
TABLET, FILM COATED ORAL
Qty: 90 TABLET | Refills: 0 | Status: SHIPPED | OUTPATIENT
Start: 2025-06-29

## 2025-07-02 RX ORDER — QUETIAPINE FUMARATE 25 MG/1
TABLET, FILM COATED ORAL
Qty: 90 TABLET | Refills: 0 | OUTPATIENT
Start: 2025-07-02

## 2025-07-10 RX ORDER — ALLOPURINOL 100 MG/1
TABLET ORAL
Qty: 30 TABLET | Refills: 0 | Status: SHIPPED | OUTPATIENT
Start: 2025-07-10

## 2025-07-13 DIAGNOSIS — F03.B0 MODERATE DEMENTIA, UNSPECIFIED DEMENTIA TYPE, UNSPECIFIED WHETHER BEHAVIORAL, PSYCHOTIC, OR MOOD DISTURBANCE OR ANXIETY: ICD-10-CM

## 2025-07-14 RX ORDER — MEMANTINE HYDROCHLORIDE 10 MG/1
10 TABLET ORAL 2 TIMES DAILY
Qty: 180 TABLET | Refills: 1 | Status: SHIPPED | OUTPATIENT
Start: 2025-07-14

## 2025-08-03 ENCOUNTER — PATIENT MESSAGE (OUTPATIENT)
Dept: FAMILY MEDICINE CLINIC | Facility: CLINIC | Age: 89
End: 2025-08-03
Payer: MEDICARE

## 2025-08-03 RX ORDER — QUETIAPINE FUMARATE 50 MG/1
50 TABLET, FILM COATED ORAL NIGHTLY
Qty: 90 TABLET | Refills: 0 | Status: SHIPPED | OUTPATIENT
Start: 2025-08-03

## 2025-08-11 ENCOUNTER — PATIENT MESSAGE (OUTPATIENT)
Dept: FAMILY MEDICINE CLINIC | Facility: CLINIC | Age: 89
End: 2025-08-11
Payer: MEDICARE

## 2025-08-11 DIAGNOSIS — F41.1 GENERALIZED ANXIETY DISORDER: ICD-10-CM

## 2025-08-12 RX ORDER — HYDRALAZINE HYDROCHLORIDE 100 MG/1
50 TABLET, FILM COATED ORAL 3 TIMES DAILY PRN
Qty: 135 TABLET | Refills: 0 | Status: SHIPPED | OUTPATIENT
Start: 2025-08-12

## 2025-08-12 RX ORDER — ALPRAZOLAM 0.5 MG
0.25 TABLET ORAL 3 TIMES DAILY PRN
Qty: 30 TABLET | Refills: 0 | Status: SHIPPED | OUTPATIENT
Start: 2025-08-12

## 2025-08-30 ENCOUNTER — PATIENT MESSAGE (OUTPATIENT)
Dept: FAMILY MEDICINE CLINIC | Facility: CLINIC | Age: 89
End: 2025-08-30
Payer: MEDICARE

## 2025-08-31 RX ORDER — TRAZODONE HYDROCHLORIDE 50 MG/1
75 TABLET ORAL NIGHTLY
Qty: 135 TABLET | Refills: 0 | Status: SHIPPED | OUTPATIENT
Start: 2025-08-31

## (undated) DEVICE — STAPLER, SKIN, 35W, A: Brand: MEDLINE INDUSTRIES, INC.

## (undated) DEVICE — SUT VIC 3/0 FS1 27IN J442H

## (undated) DEVICE — PK EXTREM 50

## (undated) DEVICE — AGNT LFT ENDO ASCENDO SUBMUCOSAL PREFIL/SYR 1P/U STRL

## (undated) DEVICE — DRILL BIT LOCKING, SHORT: Brand: AXSOS

## (undated) DEVICE — PENCL EVAC ULTRAVAC SMOKE W/BLD

## (undated) DEVICE — SOL IRRIG H2O 1000ML STRL

## (undated) DEVICE — SINGLE-USE BIOPSY FORCEPS: Brand: RADIAL JAW 4

## (undated) DEVICE — GOWN,REINFORCE,POLY,SIRUS,BREATH SLV,XLG: Brand: MEDLINE

## (undated) DEVICE — NDL SUT MARTIN 1/2 CIR NO7 18607DG PK/2

## (undated) DEVICE — GLV SURG SIGNATURE ESSENTIAL PF LTX SZ8.5

## (undated) DEVICE — KIRSCHNER WIRE
Type: IMPLANTABLE DEVICE | Site: HUMERUS | Status: NON-FUNCTIONAL
Removed: 2021-03-24

## (undated) DEVICE — GLV SURG DERMASSURE GRN LF PF 8.5

## (undated) DEVICE — DRILL BIT NON-LOCKING, SHORT: Brand: AXSOS

## (undated) DEVICE — PK ENDO GI 50

## (undated) DEVICE — SPNG LAP PREWSH SFTPK 18X18IN STRL PK/5

## (undated) DEVICE — BITEBLOCK ENDO W/STRAP 60F A/ LF DISP

## (undated) DEVICE — SOL IRRIG NACL 1000ML

## (undated) DEVICE — 3M™ STERI-DRAPE™ INSTRUMENT POUCH 1018: Brand: STERI-DRAPE™

## (undated) DEVICE — THE CARR-LOCKE INJECTION NEEDLE IS A SINGLE USE, DISPOSABLE, FLEXIBLE SHEATH INJECTION NEEDLE USED FOR THE INJECTION OF VARIOUS TYPES OF MEDIA THROUGH FLEXIBLE ENDOSCOPES.

## (undated) DEVICE — APPL CHLORAPREP HI/LITE TINTED 10.5ML ORNG

## (undated) DEVICE — GAUZE,SPONGE,FLUFF,6"X6.75",STRL,5/TRAY: Brand: MEDLINE

## (undated) DEVICE — KT SURG TURNOVER 050

## (undated) DEVICE — 3M™ STERI-DRAPE™ U-DRAPE 1015: Brand: STERI-DRAPE™

## (undated) DEVICE — TRAP WIDEEYE POLYP